# Patient Record
Sex: MALE | Race: WHITE | Employment: OTHER | URBAN - NONMETROPOLITAN AREA
[De-identification: names, ages, dates, MRNs, and addresses within clinical notes are randomized per-mention and may not be internally consistent; named-entity substitution may affect disease eponyms.]

---

## 2018-04-14 ENCOUNTER — APPOINTMENT (OUTPATIENT)
Dept: GENERAL RADIOLOGY | Age: 68
DRG: 981 | End: 2018-04-14
Payer: MEDICARE

## 2018-04-14 ENCOUNTER — APPOINTMENT (OUTPATIENT)
Dept: CT IMAGING | Age: 68
DRG: 981 | End: 2018-04-14
Payer: MEDICARE

## 2018-04-14 ENCOUNTER — HOSPITAL ENCOUNTER (INPATIENT)
Age: 68
LOS: 14 days | Discharge: OTHER INSTITUTION | DRG: 981 | End: 2018-04-28
Attending: EMERGENCY MEDICINE | Admitting: HOSPITALIST
Payer: MEDICARE

## 2018-04-14 DIAGNOSIS — R65.10 SIRS (SYSTEMIC INFLAMMATORY RESPONSE SYNDROME) (HCC): ICD-10-CM

## 2018-04-14 DIAGNOSIS — J96.01 ACUTE HYPOXEMIC RESPIRATORY FAILURE (HCC): Primary | ICD-10-CM

## 2018-04-14 DIAGNOSIS — I46.9 CARDIAC ARREST (HCC): ICD-10-CM

## 2018-04-14 LAB
ALBUMIN SERPL-MCNC: 4.3 G/DL (ref 3.5–5.2)
ALP BLD-CCNC: 126 U/L (ref 40–130)
ALT SERPL-CCNC: 29 U/L (ref 5–41)
AMORPHOUS: ABNORMAL /HPF
AMPHETAMINE SCREEN, URINE: NEGATIVE
ANION GAP SERPL CALCULATED.3IONS-SCNC: 24 MMOL/L (ref 7–19)
AST SERPL-CCNC: 34 U/L (ref 5–40)
BARBITURATE SCREEN URINE: NEGATIVE
BASE EXCESS ARTERIAL: -11.4 MMOL/L (ref -2–2)
BASOPHILS ABSOLUTE: 0.1 K/UL (ref 0–0.2)
BASOPHILS RELATIVE PERCENT: 0.7 % (ref 0–1)
BENZODIAZEPINE SCREEN, URINE: NEGATIVE
BILIRUB SERPL-MCNC: 0.4 MG/DL (ref 0.2–1.2)
BILIRUBIN URINE: NEGATIVE
BLOOD, URINE: ABNORMAL
BUN BLDV-MCNC: 6 MG/DL (ref 8–23)
CALCIUM SERPL-MCNC: 9 MG/DL (ref 8.8–10.2)
CANNABINOID SCREEN URINE: NEGATIVE
CARBOXYHEMOGLOBIN ARTERIAL: 7.4 % (ref 0–5)
CHLORIDE BLD-SCNC: 90 MMOL/L (ref 98–111)
CLARITY: CLEAR
CO2: 22 MMOL/L (ref 22–29)
COCAINE METABOLITE SCREEN URINE: NEGATIVE
COLOR: YELLOW
CREAT SERPL-MCNC: 0.7 MG/DL (ref 0.5–1.2)
D DIMER: 6.83 UG/ML FEU (ref 0–0.48)
EOSINOPHILS ABSOLUTE: 0.2 K/UL (ref 0–0.6)
EOSINOPHILS RELATIVE PERCENT: 1 % (ref 0–5)
EPITHELIAL CELLS, UA: ABNORMAL /HPF
ETHANOL: 270 MG/DL (ref 0–0.08)
GFR NON-AFRICAN AMERICAN: >60
GLUCOSE BLD-MCNC: 200 MG/DL (ref 74–109)
GLUCOSE URINE: NEGATIVE MG/DL
HCO3 ARTERIAL: 21.3 MMOL/L (ref 22–26)
HCT VFR BLD CALC: 61.6 % (ref 42–52)
HEMOGLOBIN, ART, EXTENDED: 18.4 G/DL (ref 14–18)
HEMOGLOBIN: 18.8 G/DL (ref 14–18)
KETONES, URINE: NEGATIVE MG/DL
LACTIC ACID: 3.7 MMOL/L (ref 0.5–1.9)
LACTIC ACID: 8.8 MMOL/L (ref 0.5–1.9)
LEUKOCYTE ESTERASE, URINE: NEGATIVE
LYMPHOCYTES ABSOLUTE: 6.1 K/UL (ref 1.1–4.5)
LYMPHOCYTES RELATIVE PERCENT: 36.7 % (ref 20–40)
Lab: NORMAL
MAGNESIUM: 2.8 MG/DL (ref 1.6–2.4)
MCH RBC QN AUTO: 31.1 PG (ref 27–31)
MCHC RBC AUTO-ENTMCNC: 30.5 G/DL (ref 33–37)
MCV RBC AUTO: 101.8 FL (ref 80–94)
METHEMOGLOBIN ARTERIAL: 1.2 %
MONOCYTES ABSOLUTE: 1.7 K/UL (ref 0–0.9)
MONOCYTES RELATIVE PERCENT: 10.3 % (ref 0–10)
NEUTROPHILS ABSOLUTE: 8.1 K/UL (ref 1.5–7.5)
NEUTROPHILS RELATIVE PERCENT: 48.5 % (ref 50–65)
NITRITE, URINE: NEGATIVE
O2 CONTENT ARTERIAL: 25.2 ML/DL
O2 SAT, ARTERIAL: 91.6 %
O2 THERAPY: ABNORMAL
OPIATE SCREEN URINE: NEGATIVE
PCO2 ARTERIAL: 77 MMHG (ref 35–45)
PDW BLD-RTO: 15.1 % (ref 11.5–14.5)
PH ARTERIAL: 7.05 (ref 7.35–7.45)
PH UA: 6
PLATELET # BLD: 169 K/UL (ref 130–400)
PMV BLD AUTO: 11.2 FL (ref 9.4–12.4)
PO2 ARTERIAL: 575 MMHG (ref 80–100)
POTASSIUM SERPL-SCNC: 3.4 MMOL/L (ref 3.5–5)
POTASSIUM, WHOLE BLOOD: 3.9
PROTEIN UA: NEGATIVE MG/DL
RBC # BLD: 6.05 M/UL (ref 4.7–6.1)
RBC UA: ABNORMAL /HPF (ref 0–2)
SODIUM BLD-SCNC: 136 MMOL/L (ref 136–145)
SPECIFIC GRAVITY UA: 1
TOTAL PROTEIN: 8.1 G/DL (ref 6.6–8.7)
TROPONIN: <0.01 NG/ML (ref 0–0.03)
URINE REFLEX TO CULTURE: ABNORMAL
UROBILINOGEN, URINE: 0.2 E.U./DL
WBC # BLD: 16.6 K/UL (ref 4.8–10.8)
WBC UA: ABNORMAL /HPF (ref 0–5)

## 2018-04-14 PROCEDURE — 83735 ASSAY OF MAGNESIUM: CPT

## 2018-04-14 PROCEDURE — 71045 X-RAY EXAM CHEST 1 VIEW: CPT

## 2018-04-14 PROCEDURE — 85379 FIBRIN DEGRADATION QUANT: CPT

## 2018-04-14 PROCEDURE — 2700000000 HC OXYGEN THERAPY PER DAY

## 2018-04-14 PROCEDURE — B543ZZA ULTRASONOGRAPHY OF RIGHT JUGULAR VEINS, GUIDANCE: ICD-10-PCS | Performed by: EMERGENCY MEDICINE

## 2018-04-14 PROCEDURE — 6360000002 HC RX W HCPCS: Performed by: EMERGENCY MEDICINE

## 2018-04-14 PROCEDURE — 96375 TX/PRO/DX INJ NEW DRUG ADDON: CPT

## 2018-04-14 PROCEDURE — 85025 COMPLETE CBC W/AUTO DIFF WBC: CPT

## 2018-04-14 PROCEDURE — 99291 CRITICAL CARE FIRST HOUR: CPT | Performed by: EMERGENCY MEDICINE

## 2018-04-14 PROCEDURE — 81001 URINALYSIS AUTO W/SCOPE: CPT

## 2018-04-14 PROCEDURE — 36556 INSERT NON-TUNNEL CV CATH: CPT | Performed by: EMERGENCY MEDICINE

## 2018-04-14 PROCEDURE — 80307 DRUG TEST PRSMV CHEM ANLYZR: CPT

## 2018-04-14 PROCEDURE — 5A1955Z RESPIRATORY VENTILATION, GREATER THAN 96 CONSECUTIVE HOURS: ICD-10-PCS | Performed by: INTERNAL MEDICINE

## 2018-04-14 PROCEDURE — 96374 THER/PROPH/DIAG INJ IV PUSH: CPT

## 2018-04-14 PROCEDURE — 0BH17EZ INSERTION OF ENDOTRACHEAL AIRWAY INTO TRACHEA, VIA NATURAL OR ARTIFICIAL OPENING: ICD-10-PCS | Performed by: EMERGENCY MEDICINE

## 2018-04-14 PROCEDURE — 70450 CT HEAD/BRAIN W/O DYE: CPT

## 2018-04-14 PROCEDURE — 84132 ASSAY OF SERUM POTASSIUM: CPT

## 2018-04-14 PROCEDURE — 83605 ASSAY OF LACTIC ACID: CPT

## 2018-04-14 PROCEDURE — 99291 CRITICAL CARE FIRST HOUR: CPT | Performed by: INTERNAL MEDICINE

## 2018-04-14 PROCEDURE — 36600 WITHDRAWAL OF ARTERIAL BLOOD: CPT

## 2018-04-14 PROCEDURE — 99291 CRITICAL CARE FIRST HOUR: CPT

## 2018-04-14 PROCEDURE — 87040 BLOOD CULTURE FOR BACTERIA: CPT

## 2018-04-14 PROCEDURE — 80053 COMPREHEN METABOLIC PANEL: CPT

## 2018-04-14 PROCEDURE — 31500 INSERT EMERGENCY AIRWAY: CPT | Performed by: EMERGENCY MEDICINE

## 2018-04-14 PROCEDURE — 93005 ELECTROCARDIOGRAM TRACING: CPT

## 2018-04-14 PROCEDURE — 36415 COLL VENOUS BLD VENIPUNCTURE: CPT

## 2018-04-14 PROCEDURE — 05HM33Z INSERTION OF INFUSION DEVICE INTO RIGHT INTERNAL JUGULAR VEIN, PERCUTANEOUS APPROACH: ICD-10-PCS | Performed by: EMERGENCY MEDICINE

## 2018-04-14 PROCEDURE — 31500 INSERT EMERGENCY AIRWAY: CPT

## 2018-04-14 PROCEDURE — 2580000003 HC RX 258: Performed by: EMERGENCY MEDICINE

## 2018-04-14 PROCEDURE — 6360000002 HC RX W HCPCS

## 2018-04-14 PROCEDURE — 2000000000 HC ICU R&B

## 2018-04-14 PROCEDURE — 94002 VENT MGMT INPAT INIT DAY: CPT

## 2018-04-14 PROCEDURE — 2500000003 HC RX 250 WO HCPCS: Performed by: EMERGENCY MEDICINE

## 2018-04-14 PROCEDURE — 84484 ASSAY OF TROPONIN QUANT: CPT

## 2018-04-14 PROCEDURE — G0480 DRUG TEST DEF 1-7 CLASSES: HCPCS

## 2018-04-14 PROCEDURE — 82803 BLOOD GASES ANY COMBINATION: CPT

## 2018-04-14 RX ORDER — 0.9 % SODIUM CHLORIDE 0.9 %
1000 INTRAVENOUS SOLUTION INTRAVENOUS ONCE
Status: COMPLETED | OUTPATIENT
Start: 2018-04-14 | End: 2018-04-14

## 2018-04-14 RX ORDER — LORAZEPAM 2 MG/ML
2 INJECTION INTRAMUSCULAR ONCE
Status: COMPLETED | OUTPATIENT
Start: 2018-04-14 | End: 2018-04-14

## 2018-04-14 RX ORDER — VECURONIUM BROMIDE 1 MG/ML
10 INJECTION, POWDER, LYOPHILIZED, FOR SOLUTION INTRAVENOUS ONCE
Status: COMPLETED | OUTPATIENT
Start: 2018-04-14 | End: 2018-04-14

## 2018-04-14 RX ORDER — 0.9 % SODIUM CHLORIDE 0.9 %
1000 INTRAVENOUS SOLUTION INTRAVENOUS ONCE
Status: COMPLETED | OUTPATIENT
Start: 2018-04-14 | End: 2018-04-15

## 2018-04-14 RX ORDER — FENTANYL CITRATE 50 UG/ML
100 INJECTION, SOLUTION INTRAMUSCULAR; INTRAVENOUS ONCE
Status: COMPLETED | OUTPATIENT
Start: 2018-04-14 | End: 2018-04-14

## 2018-04-14 RX ORDER — PROPOFOL 10 MG/ML
10 INJECTION, EMULSION INTRAVENOUS
Status: DISCONTINUED | OUTPATIENT
Start: 2018-04-14 | End: 2018-04-19

## 2018-04-14 RX ORDER — PROPOFOL 10 MG/ML
INJECTION, EMULSION INTRAVENOUS
Status: COMPLETED
Start: 2018-04-14 | End: 2018-04-14

## 2018-04-14 RX ADMIN — FENTANYL CITRATE 100 MCG: 50 INJECTION INTRAMUSCULAR; INTRAVENOUS at 19:09

## 2018-04-14 RX ADMIN — VECURONIUM BROMIDE 10 MG: 10 INJECTION, POWDER, LYOPHILIZED, FOR SOLUTION INTRAVENOUS at 20:48

## 2018-04-14 RX ADMIN — SODIUM CHLORIDE 1000 ML: 9 INJECTION, SOLUTION INTRAVENOUS at 19:30

## 2018-04-14 RX ADMIN — PROPOFOL 10 MCG/KG/MIN: 10 INJECTION, EMULSION INTRAVENOUS at 19:10

## 2018-04-14 RX ADMIN — LORAZEPAM 2 MG: 2 INJECTION INTRAMUSCULAR; INTRAVENOUS at 20:40

## 2018-04-14 RX ADMIN — SODIUM CHLORIDE 1000 ML: 9 INJECTION, SOLUTION INTRAVENOUS at 22:36

## 2018-04-14 RX ADMIN — NOREPINEPHRINE BITARTRATE 10 MCG/MIN: 1 INJECTION INTRAVENOUS at 20:15

## 2018-04-14 ASSESSMENT — PULMONARY FUNCTION TESTS
PIF_VALUE: 45.3
PIF_VALUE: 42.2
PIF_VALUE: 45.1

## 2018-04-14 ASSESSMENT — PAIN SCALES - GENERAL
PAINLEVEL_OUTOF10: 0
PAINLEVEL_OUTOF10: 1

## 2018-04-15 ENCOUNTER — APPOINTMENT (OUTPATIENT)
Dept: GENERAL RADIOLOGY | Age: 68
DRG: 981 | End: 2018-04-15
Payer: MEDICARE

## 2018-04-15 PROBLEM — E87.20 LACTIC ACIDOSIS: Status: ACTIVE | Noted: 2018-04-15

## 2018-04-15 PROBLEM — F10.929 ALCOHOLIC INTOXICATION WITH COMPLICATION (HCC): Status: ACTIVE | Noted: 2018-04-15

## 2018-04-15 PROBLEM — F17.200 SMOKER: Status: ACTIVE | Noted: 2018-04-15

## 2018-04-15 PROBLEM — J44.1 COPD EXACERBATION (HCC): Status: ACTIVE | Noted: 2018-04-15

## 2018-04-15 PROBLEM — J96.90 RESPIRATORY FAILURE REQUIRING INTUBATION (HCC): Status: ACTIVE | Noted: 2018-04-15

## 2018-04-15 LAB
ALBUMIN SERPL-MCNC: 3 G/DL (ref 3.5–5.2)
ALP BLD-CCNC: 80 U/L (ref 40–130)
ALT SERPL-CCNC: 24 U/L (ref 5–41)
ANION GAP SERPL CALCULATED.3IONS-SCNC: 12 MMOL/L (ref 7–19)
AST SERPL-CCNC: 34 U/L (ref 5–40)
BASE EXCESS ARTERIAL: -5.7 MMOL/L (ref -2–2)
BASE EXCESS ARTERIAL: -6.7 MMOL/L (ref -2–2)
BASOPHILS ABSOLUTE: 0 K/UL (ref 0–0.2)
BASOPHILS RELATIVE PERCENT: 0.2 % (ref 0–1)
BILIRUB SERPL-MCNC: 0.4 MG/DL (ref 0.2–1.2)
BUN BLDV-MCNC: 9 MG/DL (ref 8–23)
CALCIUM SERPL-MCNC: 6.7 MG/DL (ref 8.8–10.2)
CARBOXYHEMOGLOBIN ARTERIAL: 2.8 % (ref 0–5)
CARBOXYHEMOGLOBIN ARTERIAL: 3.4 % (ref 0–5)
CHLORIDE BLD-SCNC: 101 MMOL/L (ref 98–111)
CO2: 23 MMOL/L (ref 22–29)
CREAT SERPL-MCNC: 0.6 MG/DL (ref 0.5–1.2)
EOSINOPHILS ABSOLUTE: 0 K/UL (ref 0–0.6)
EOSINOPHILS RELATIVE PERCENT: 0 % (ref 0–5)
GFR NON-AFRICAN AMERICAN: >60
GLUCOSE BLD-MCNC: 115 MG/DL (ref 70–99)
GLUCOSE BLD-MCNC: 123 MG/DL (ref 70–99)
GLUCOSE BLD-MCNC: 128 MG/DL (ref 74–109)
GLUCOSE BLD-MCNC: 134 MG/DL (ref 70–99)
GLUCOSE BLD-MCNC: 138 MG/DL (ref 70–99)
GLUCOSE BLD-MCNC: 144 MG/DL (ref 70–99)
HCO3 ARTERIAL: 23.6 MMOL/L (ref 22–26)
HCO3 ARTERIAL: 24.9 MMOL/L (ref 22–26)
HCT VFR BLD CALC: 55.9 % (ref 42–52)
HEMOGLOBIN, ART, EXTENDED: 18 G/DL (ref 14–18)
HEMOGLOBIN, ART, EXTENDED: 18.3 G/DL (ref 14–18)
HEMOGLOBIN: 17.3 G/DL (ref 14–18)
LACTIC ACID: 1.9 MMOL/L (ref 0.5–1.9)
LACTIC ACID: 2.6 MMOL/L (ref 0.5–1.9)
LV EF: 60 %
LVEF MODALITY: NORMAL
LYMPHOCYTES ABSOLUTE: 0.6 K/UL (ref 1.1–4.5)
LYMPHOCYTES RELATIVE PERCENT: 3.1 % (ref 20–40)
MAGNESIUM: 1.8 MG/DL (ref 1.6–2.4)
MCH RBC QN AUTO: 30.8 PG (ref 27–31)
MCHC RBC AUTO-ENTMCNC: 30.9 G/DL (ref 33–37)
MCV RBC AUTO: 99.5 FL (ref 80–94)
METHEMOGLOBIN ARTERIAL: 1.7 %
METHEMOGLOBIN ARTERIAL: 2 %
MONOCYTES ABSOLUTE: 1.3 K/UL (ref 0–0.9)
MONOCYTES RELATIVE PERCENT: 6.7 % (ref 0–10)
NEUTROPHILS ABSOLUTE: 17.6 K/UL (ref 1.5–7.5)
NEUTROPHILS RELATIVE PERCENT: 89.4 % (ref 50–65)
O2 CONTENT ARTERIAL: 21.9 ML/DL
O2 CONTENT ARTERIAL: 23.1 ML/DL
O2 SAT, ARTERIAL: 86.6 %
O2 SAT, ARTERIAL: 89.8 %
O2 THERAPY: ABNORMAL
O2 THERAPY: ABNORMAL
PCO2 ARTERIAL: 59 MMHG (ref 35–45)
PCO2 ARTERIAL: 75 MMHG (ref 35–45)
PDW BLD-RTO: 15.1 % (ref 11.5–14.5)
PERFORMED ON: ABNORMAL
PH ARTERIAL: 7.13 (ref 7.35–7.45)
PH ARTERIAL: 7.21 (ref 7.35–7.45)
PHOSPHORUS: 3.2 MG/DL (ref 2.5–4.5)
PLATELET # BLD: 139 K/UL (ref 130–400)
PMV BLD AUTO: 10.7 FL (ref 9.4–12.4)
PO2 ARTERIAL: 60 MMHG (ref 80–100)
PO2 ARTERIAL: 81 MMHG (ref 80–100)
POTASSIUM SERPL-SCNC: 4.1 MMOL/L (ref 3.5–5)
POTASSIUM, WHOLE BLOOD: 4.5
POTASSIUM, WHOLE BLOOD: 4.6
RBC # BLD: 5.62 M/UL (ref 4.7–6.1)
SODIUM BLD-SCNC: 136 MMOL/L (ref 136–145)
TOTAL PROTEIN: 6 G/DL (ref 6.6–8.7)
TROPONIN: 0.56 NG/ML (ref 0–0.03)
WBC # BLD: 19.7 K/UL (ref 4.8–10.8)

## 2018-04-15 PROCEDURE — 36415 COLL VENOUS BLD VENIPUNCTURE: CPT

## 2018-04-15 PROCEDURE — 2500000003 HC RX 250 WO HCPCS: Performed by: INTERNAL MEDICINE

## 2018-04-15 PROCEDURE — 99233 SBSQ HOSP IP/OBS HIGH 50: CPT | Performed by: HOSPITALIST

## 2018-04-15 PROCEDURE — 84132 ASSAY OF SERUM POTASSIUM: CPT

## 2018-04-15 PROCEDURE — 94640 AIRWAY INHALATION TREATMENT: CPT

## 2018-04-15 PROCEDURE — 93306 TTE W/DOPPLER COMPLETE: CPT

## 2018-04-15 PROCEDURE — 84484 ASSAY OF TROPONIN QUANT: CPT

## 2018-04-15 PROCEDURE — 2700000000 HC OXYGEN THERAPY PER DAY

## 2018-04-15 PROCEDURE — 6360000002 HC RX W HCPCS: Performed by: HOSPITALIST

## 2018-04-15 PROCEDURE — 82948 REAGENT STRIP/BLOOD GLUCOSE: CPT

## 2018-04-15 PROCEDURE — 80053 COMPREHEN METABOLIC PANEL: CPT

## 2018-04-15 PROCEDURE — 85025 COMPLETE CBC W/AUTO DIFF WBC: CPT

## 2018-04-15 PROCEDURE — 6360000002 HC RX W HCPCS: Performed by: EMERGENCY MEDICINE

## 2018-04-15 PROCEDURE — 2580000003 HC RX 258: Performed by: HOSPITALIST

## 2018-04-15 PROCEDURE — 36600 WITHDRAWAL OF ARTERIAL BLOOD: CPT

## 2018-04-15 PROCEDURE — 2580000003 HC RX 258: Performed by: INTERNAL MEDICINE

## 2018-04-15 PROCEDURE — 87070 CULTURE OTHR SPECIMN AEROBIC: CPT

## 2018-04-15 PROCEDURE — C9113 INJ PANTOPRAZOLE SODIUM, VIA: HCPCS | Performed by: INTERNAL MEDICINE

## 2018-04-15 PROCEDURE — 82803 BLOOD GASES ANY COMBINATION: CPT

## 2018-04-15 PROCEDURE — 6370000000 HC RX 637 (ALT 250 FOR IP): Performed by: INTERNAL MEDICINE

## 2018-04-15 PROCEDURE — 2000000000 HC ICU R&B

## 2018-04-15 PROCEDURE — 6360000002 HC RX W HCPCS: Performed by: INTERNAL MEDICINE

## 2018-04-15 PROCEDURE — 84100 ASSAY OF PHOSPHORUS: CPT

## 2018-04-15 PROCEDURE — 83735 ASSAY OF MAGNESIUM: CPT

## 2018-04-15 PROCEDURE — 94003 VENT MGMT INPAT SUBQ DAY: CPT

## 2018-04-15 PROCEDURE — 71045 X-RAY EXAM CHEST 1 VIEW: CPT

## 2018-04-15 PROCEDURE — 83605 ASSAY OF LACTIC ACID: CPT

## 2018-04-15 RX ORDER — DILTIAZEM HYDROCHLORIDE 5 MG/ML
10 INJECTION INTRAVENOUS ONCE
Status: DISCONTINUED | OUTPATIENT
Start: 2018-04-15 | End: 2018-04-15

## 2018-04-15 RX ORDER — PANTOPRAZOLE SODIUM 40 MG/10ML
40 INJECTION, POWDER, LYOPHILIZED, FOR SOLUTION INTRAVENOUS DAILY
Status: DISCONTINUED | OUTPATIENT
Start: 2018-04-15 | End: 2018-04-20

## 2018-04-15 RX ORDER — ONDANSETRON 2 MG/ML
4 INJECTION INTRAMUSCULAR; INTRAVENOUS EVERY 6 HOURS PRN
Status: DISCONTINUED | OUTPATIENT
Start: 2018-04-15 | End: 2018-04-29 | Stop reason: HOSPADM

## 2018-04-15 RX ORDER — THIAMINE MONONITRATE (VIT B1) 100 MG
100 TABLET ORAL DAILY
Status: DISCONTINUED | OUTPATIENT
Start: 2018-04-15 | End: 2018-04-29 | Stop reason: HOSPADM

## 2018-04-15 RX ORDER — POTASSIUM CHLORIDE 7.45 MG/ML
10 INJECTION INTRAVENOUS PRN
Status: DISCONTINUED | OUTPATIENT
Start: 2018-04-15 | End: 2018-04-29 | Stop reason: HOSPADM

## 2018-04-15 RX ORDER — LORAZEPAM 2 MG/ML
1 INJECTION INTRAMUSCULAR EVERY 4 HOURS PRN
Status: DISCONTINUED | OUTPATIENT
Start: 2018-04-15 | End: 2018-04-20

## 2018-04-15 RX ORDER — SODIUM CHLORIDE 0.9 % (FLUSH) 0.9 %
10 SYRINGE (ML) INJECTION EVERY 12 HOURS SCHEDULED
Status: DISCONTINUED | OUTPATIENT
Start: 2018-04-15 | End: 2018-04-22 | Stop reason: SDUPTHER

## 2018-04-15 RX ORDER — SODIUM CHLORIDE 0.9 % (FLUSH) 0.9 %
10 SYRINGE (ML) INJECTION PRN
Status: DISCONTINUED | OUTPATIENT
Start: 2018-04-15 | End: 2018-04-22 | Stop reason: SDUPTHER

## 2018-04-15 RX ORDER — METHYLPREDNISOLONE SODIUM SUCCINATE 40 MG/ML
40 INJECTION, POWDER, LYOPHILIZED, FOR SOLUTION INTRAMUSCULAR; INTRAVENOUS EVERY 8 HOURS
Status: DISCONTINUED | OUTPATIENT
Start: 2018-04-15 | End: 2018-04-19

## 2018-04-15 RX ORDER — FOLIC ACID 1 MG/1
1 TABLET ORAL DAILY
Status: DISCONTINUED | OUTPATIENT
Start: 2018-04-15 | End: 2018-04-29 | Stop reason: HOSPADM

## 2018-04-15 RX ORDER — CHLORHEXIDINE GLUCONATE 0.12 MG/ML
15 RINSE ORAL 2 TIMES DAILY
Status: DISCONTINUED | OUTPATIENT
Start: 2018-04-15 | End: 2018-04-19

## 2018-04-15 RX ORDER — MORPHINE SULFATE 4 MG/ML
4 INJECTION, SOLUTION INTRAMUSCULAR; INTRAVENOUS
Status: DISCONTINUED | OUTPATIENT
Start: 2018-04-15 | End: 2018-04-29 | Stop reason: HOSPADM

## 2018-04-15 RX ORDER — SODIUM CHLORIDE 9 MG/ML
INJECTION, SOLUTION INTRAVENOUS CONTINUOUS
Status: DISCONTINUED | OUTPATIENT
Start: 2018-04-15 | End: 2018-04-20

## 2018-04-15 RX ORDER — IPRATROPIUM BROMIDE AND ALBUTEROL SULFATE 2.5; .5 MG/3ML; MG/3ML
1 SOLUTION RESPIRATORY (INHALATION) EVERY 4 HOURS
Status: DISCONTINUED | OUTPATIENT
Start: 2018-04-15 | End: 2018-04-29 | Stop reason: HOSPADM

## 2018-04-15 RX ORDER — MAGNESIUM SULFATE 1 G/100ML
1 INJECTION INTRAVENOUS PRN
Status: DISCONTINUED | OUTPATIENT
Start: 2018-04-15 | End: 2018-04-29 | Stop reason: HOSPADM

## 2018-04-15 RX ORDER — NICOTINE 21 MG/24HR
1 PATCH, TRANSDERMAL 24 HOURS TRANSDERMAL DAILY
Status: DISCONTINUED | OUTPATIENT
Start: 2018-04-15 | End: 2018-04-29 | Stop reason: HOSPADM

## 2018-04-15 RX ORDER — ACETAMINOPHEN 325 MG/1
650 TABLET ORAL EVERY 4 HOURS PRN
Status: DISCONTINUED | OUTPATIENT
Start: 2018-04-15 | End: 2018-04-29 | Stop reason: HOSPADM

## 2018-04-15 RX ORDER — POTASSIUM CHLORIDE 29.8 MG/ML
20 INJECTION INTRAVENOUS PRN
Status: DISCONTINUED | OUTPATIENT
Start: 2018-04-15 | End: 2018-04-29 | Stop reason: HOSPADM

## 2018-04-15 RX ADMIN — CHLORHEXIDINE GLUCONATE 15 ML: 1.2 RINSE ORAL at 22:00

## 2018-04-15 RX ADMIN — ENOXAPARIN SODIUM 40 MG: 100 INJECTION SUBCUTANEOUS at 10:43

## 2018-04-15 RX ADMIN — IPRATROPIUM BROMIDE AND ALBUTEROL SULFATE 1 AMPULE: .5; 3 SOLUTION RESPIRATORY (INHALATION) at 18:40

## 2018-04-15 RX ADMIN — IPRATROPIUM BROMIDE AND ALBUTEROL SULFATE 1 AMPULE: .5; 3 SOLUTION RESPIRATORY (INHALATION) at 14:40

## 2018-04-15 RX ADMIN — IPRATROPIUM BROMIDE AND ALBUTEROL SULFATE 1 AMPULE: .5; 3 SOLUTION RESPIRATORY (INHALATION) at 10:42

## 2018-04-15 RX ADMIN — PROPOFOL 50 MCG/KG/MIN: 10 INJECTION, EMULSION INTRAVENOUS at 22:28

## 2018-04-15 RX ADMIN — PROPOFOL 50 MCG/KG/MIN: 10 INJECTION, EMULSION INTRAVENOUS at 11:44

## 2018-04-15 RX ADMIN — PROPOFOL 40 MCG/KG/MIN: 10 INJECTION, EMULSION INTRAVENOUS at 09:20

## 2018-04-15 RX ADMIN — PROPOFOL 50 MCG/KG/MIN: 10 INJECTION, EMULSION INTRAVENOUS at 14:12

## 2018-04-15 RX ADMIN — PROPOFOL 30 MCG/KG/MIN: 10 INJECTION, EMULSION INTRAVENOUS at 04:05

## 2018-04-15 RX ADMIN — Medication 10 ML: at 22:00

## 2018-04-15 RX ADMIN — Medication 10 ML: at 10:58

## 2018-04-15 RX ADMIN — METHYLPREDNISOLONE SODIUM SUCCINATE 40 MG: 40 INJECTION, POWDER, FOR SOLUTION INTRAMUSCULAR; INTRAVENOUS at 10:47

## 2018-04-15 RX ADMIN — PROPOFOL 50 MCG/KG/MIN: 10 INJECTION, EMULSION INTRAVENOUS at 17:04

## 2018-04-15 RX ADMIN — PROPOFOL 50 MCG/KG/MIN: 10 INJECTION, EMULSION INTRAVENOUS at 20:29

## 2018-04-15 RX ADMIN — LORAZEPAM 1 MG: 2 INJECTION INTRAMUSCULAR; INTRAVENOUS at 22:28

## 2018-04-15 RX ADMIN — IPRATROPIUM BROMIDE AND ALBUTEROL SULFATE 1 AMPULE: .5; 3 SOLUTION RESPIRATORY (INHALATION) at 22:47

## 2018-04-15 RX ADMIN — SODIUM CHLORIDE 75 ML/HR: 9 INJECTION, SOLUTION INTRAVENOUS at 10:57

## 2018-04-15 RX ADMIN — CHLORHEXIDINE GLUCONATE 15 ML: 1.2 RINSE ORAL at 17:57

## 2018-04-15 RX ADMIN — IPRATROPIUM BROMIDE AND ALBUTEROL SULFATE 1 AMPULE: .5; 3 SOLUTION RESPIRATORY (INHALATION) at 02:43

## 2018-04-15 RX ADMIN — PANTOPRAZOLE SODIUM 40 MG: 40 INJECTION, POWDER, FOR SOLUTION INTRAVENOUS at 10:43

## 2018-04-15 RX ADMIN — CHLORHEXIDINE GLUCONATE 15 ML: 1.2 RINSE ORAL at 01:08

## 2018-04-15 RX ADMIN — METHYLPREDNISOLONE SODIUM SUCCINATE 40 MG: 40 INJECTION, POWDER, FOR SOLUTION INTRAMUSCULAR; INTRAVENOUS at 01:06

## 2018-04-15 RX ADMIN — IPRATROPIUM BROMIDE AND ALBUTEROL SULFATE 1 AMPULE: .5; 3 SOLUTION RESPIRATORY (INHALATION) at 06:58

## 2018-04-15 RX ADMIN — METHYLPREDNISOLONE SODIUM SUCCINATE 40 MG: 40 INJECTION, POWDER, FOR SOLUTION INTRAMUSCULAR; INTRAVENOUS at 17:04

## 2018-04-15 RX ADMIN — FOLIC ACID: 5 INJECTION, SOLUTION INTRAMUSCULAR; INTRAVENOUS; SUBCUTANEOUS at 00:40

## 2018-04-15 ASSESSMENT — PULMONARY FUNCTION TESTS
PIF_VALUE: 28.7
PIF_VALUE: 30.8
PIF_VALUE: 39.3
PIF_VALUE: 23
PIF_VALUE: 5.3
PIF_VALUE: 31.9
PIF_VALUE: 31.3
PIF_VALUE: 35.2
PIF_VALUE: 32.7
PIF_VALUE: 31.5
PIF_VALUE: 35.7
PIF_VALUE: 49.9
PIF_VALUE: 32.1
PIF_VALUE: 35.1

## 2018-04-15 ASSESSMENT — PAIN SCALES - GENERAL
PAINLEVEL_OUTOF10: 0

## 2018-04-16 ENCOUNTER — APPOINTMENT (OUTPATIENT)
Dept: GENERAL RADIOLOGY | Age: 68
DRG: 981 | End: 2018-04-16
Payer: MEDICARE

## 2018-04-16 LAB
ALBUMIN SERPL-MCNC: 3.1 G/DL (ref 3.5–5.2)
ALP BLD-CCNC: 65 U/L (ref 40–130)
ALT SERPL-CCNC: 20 U/L (ref 5–41)
ANION GAP SERPL CALCULATED.3IONS-SCNC: 12 MMOL/L (ref 7–19)
ANION GAP SERPL CALCULATED.3IONS-SCNC: 14 MMOL/L (ref 7–19)
AST SERPL-CCNC: 22 U/L (ref 5–40)
BASE EXCESS ARTERIAL: -0.2 MMOL/L (ref -2–2)
BASE EXCESS ARTERIAL: -1.1 MMOL/L (ref -2–2)
BASOPHILS ABSOLUTE: 0 K/UL (ref 0–0.2)
BASOPHILS RELATIVE PERCENT: 0.1 % (ref 0–1)
BILIRUB SERPL-MCNC: 0.4 MG/DL (ref 0.2–1.2)
BUN BLDV-MCNC: 12 MG/DL (ref 8–23)
BUN BLDV-MCNC: 12 MG/DL (ref 8–23)
CALCIUM SERPL-MCNC: 7.7 MG/DL (ref 8.8–10.2)
CALCIUM SERPL-MCNC: 7.8 MG/DL (ref 8.8–10.2)
CARBOXYHEMOGLOBIN ARTERIAL: 1.5 % (ref 0–5)
CARBOXYHEMOGLOBIN ARTERIAL: 2 % (ref 0–5)
CHLORIDE BLD-SCNC: 100 MMOL/L (ref 98–111)
CHLORIDE BLD-SCNC: 98 MMOL/L (ref 98–111)
CO2: 26 MMOL/L (ref 22–29)
CO2: 26 MMOL/L (ref 22–29)
CREAT SERPL-MCNC: 0.6 MG/DL (ref 0.5–1.2)
CREAT SERPL-MCNC: 0.6 MG/DL (ref 0.5–1.2)
EKG P AXIS: -70 DEGREES
EKG P-R INTERVAL: 196 MS
EKG Q-T INTERVAL: 488 MS
EKG QRS DURATION: 68 MS
EKG QTC CALCULATION (BAZETT): 473 MS
EKG T AXIS: 93 DEGREES
EOSINOPHILS ABSOLUTE: 0 K/UL (ref 0–0.6)
EOSINOPHILS RELATIVE PERCENT: 0 % (ref 0–5)
GFR NON-AFRICAN AMERICAN: >60
GFR NON-AFRICAN AMERICAN: >60
GLUCOSE BLD-MCNC: 129 MG/DL (ref 70–99)
GLUCOSE BLD-MCNC: 137 MG/DL (ref 70–99)
GLUCOSE BLD-MCNC: 149 MG/DL (ref 70–99)
GLUCOSE BLD-MCNC: 152 MG/DL (ref 70–99)
GLUCOSE BLD-MCNC: 166 MG/DL (ref 70–99)
GLUCOSE BLD-MCNC: 176 MG/DL (ref 74–109)
GLUCOSE BLD-MCNC: 189 MG/DL (ref 74–109)
HCO3 ARTERIAL: 26.2 MMOL/L (ref 22–26)
HCO3 ARTERIAL: 26.4 MMOL/L (ref 22–26)
HCT VFR BLD CALC: 48.4 % (ref 42–52)
HEMOGLOBIN, ART, EXTENDED: 16.2 G/DL (ref 14–18)
HEMOGLOBIN, ART, EXTENDED: 16.4 G/DL (ref 14–18)
HEMOGLOBIN: 15.5 G/DL (ref 14–18)
LYMPHOCYTES ABSOLUTE: 0.4 K/UL (ref 1.1–4.5)
LYMPHOCYTES RELATIVE PERCENT: 2.9 % (ref 20–40)
MAGNESIUM: 1.9 MG/DL (ref 1.6–2.4)
MAGNESIUM: 2.1 MG/DL (ref 1.6–2.4)
MCH RBC QN AUTO: 30.9 PG (ref 27–31)
MCHC RBC AUTO-ENTMCNC: 32 G/DL (ref 33–37)
MCV RBC AUTO: 96.4 FL (ref 80–94)
METHEMOGLOBIN ARTERIAL: 0.7 %
METHEMOGLOBIN ARTERIAL: 1.8 %
MONOCYTES ABSOLUTE: 0.8 K/UL (ref 0–0.9)
MONOCYTES RELATIVE PERCENT: 5.6 % (ref 0–10)
MRSA CULTURE ONLY: NORMAL
NEUTROPHILS ABSOLUTE: 12.4 K/UL (ref 1.5–7.5)
NEUTROPHILS RELATIVE PERCENT: 90.9 % (ref 50–65)
O2 CONTENT ARTERIAL: 21.2 ML/DL
O2 CONTENT ARTERIAL: 21.9 ML/DL
O2 SAT, ARTERIAL: 93.1 %
O2 SAT, ARTERIAL: 95.1 %
O2 THERAPY: ABNORMAL
O2 THERAPY: ABNORMAL
PCO2 ARTERIAL: 49 MMHG (ref 35–45)
PCO2 ARTERIAL: 52 MMHG (ref 35–45)
PDW BLD-RTO: 14.9 % (ref 11.5–14.5)
PERFORMED ON: ABNORMAL
PH ARTERIAL: 7.31 (ref 7.35–7.45)
PH ARTERIAL: 7.34 (ref 7.35–7.45)
PHOSPHORUS: 1.9 MG/DL (ref 2.5–4.5)
PLATELET # BLD: 101 K/UL (ref 130–400)
PMV BLD AUTO: 11.4 FL (ref 9.4–12.4)
PO2 ARTERIAL: 75 MMHG (ref 80–100)
PO2 ARTERIAL: 85 MMHG (ref 80–100)
POTASSIUM SERPL-SCNC: 3.8 MMOL/L (ref 3.5–5)
POTASSIUM SERPL-SCNC: 3.8 MMOL/L (ref 3.5–5)
POTASSIUM, WHOLE BLOOD: 3.6
POTASSIUM, WHOLE BLOOD: 3.6
RBC # BLD: 5.02 M/UL (ref 4.7–6.1)
SODIUM BLD-SCNC: 136 MMOL/L (ref 136–145)
SODIUM BLD-SCNC: 140 MMOL/L (ref 136–145)
TOTAL PROTEIN: 6.2 G/DL (ref 6.6–8.7)
TROPONIN: 0.04 NG/ML (ref 0–0.03)
WBC # BLD: 13.6 K/UL (ref 4.8–10.8)

## 2018-04-16 PROCEDURE — 85025 COMPLETE CBC W/AUTO DIFF WBC: CPT

## 2018-04-16 PROCEDURE — 6360000002 HC RX W HCPCS: Performed by: INTERNAL MEDICINE

## 2018-04-16 PROCEDURE — 99233 SBSQ HOSP IP/OBS HIGH 50: CPT | Performed by: FAMILY MEDICINE

## 2018-04-16 PROCEDURE — C9113 INJ PANTOPRAZOLE SODIUM, VIA: HCPCS | Performed by: INTERNAL MEDICINE

## 2018-04-16 PROCEDURE — 36600 WITHDRAWAL OF ARTERIAL BLOOD: CPT

## 2018-04-16 PROCEDURE — 6370000000 HC RX 637 (ALT 250 FOR IP): Performed by: INTERNAL MEDICINE

## 2018-04-16 PROCEDURE — 2500000003 HC RX 250 WO HCPCS: Performed by: INTERNAL MEDICINE

## 2018-04-16 PROCEDURE — 94640 AIRWAY INHALATION TREATMENT: CPT

## 2018-04-16 PROCEDURE — 84100 ASSAY OF PHOSPHORUS: CPT

## 2018-04-16 PROCEDURE — 82803 BLOOD GASES ANY COMBINATION: CPT

## 2018-04-16 PROCEDURE — 2000000000 HC ICU R&B

## 2018-04-16 PROCEDURE — 84132 ASSAY OF SERUM POTASSIUM: CPT

## 2018-04-16 PROCEDURE — 84484 ASSAY OF TROPONIN QUANT: CPT

## 2018-04-16 PROCEDURE — 83735 ASSAY OF MAGNESIUM: CPT

## 2018-04-16 PROCEDURE — 2580000003 HC RX 258: Performed by: INTERNAL MEDICINE

## 2018-04-16 PROCEDURE — 6360000002 HC RX W HCPCS: Performed by: EMERGENCY MEDICINE

## 2018-04-16 PROCEDURE — 80053 COMPREHEN METABOLIC PANEL: CPT

## 2018-04-16 PROCEDURE — 6360000002 HC RX W HCPCS: Performed by: HOSPITALIST

## 2018-04-16 PROCEDURE — 94003 VENT MGMT INPAT SUBQ DAY: CPT

## 2018-04-16 PROCEDURE — 71045 X-RAY EXAM CHEST 1 VIEW: CPT

## 2018-04-16 PROCEDURE — 82948 REAGENT STRIP/BLOOD GLUCOSE: CPT

## 2018-04-16 PROCEDURE — 2700000000 HC OXYGEN THERAPY PER DAY

## 2018-04-16 RX ADMIN — METHYLPREDNISOLONE SODIUM SUCCINATE 40 MG: 40 INJECTION, POWDER, FOR SOLUTION INTRAMUSCULAR; INTRAVENOUS at 01:19

## 2018-04-16 RX ADMIN — IPRATROPIUM BROMIDE AND ALBUTEROL SULFATE 1 AMPULE: .5; 3 SOLUTION RESPIRATORY (INHALATION) at 18:19

## 2018-04-16 RX ADMIN — IPRATROPIUM BROMIDE AND ALBUTEROL SULFATE 1 AMPULE: .5; 3 SOLUTION RESPIRATORY (INHALATION) at 22:26

## 2018-04-16 RX ADMIN — FOLIC ACID 1 MG: 1 TABLET ORAL at 08:52

## 2018-04-16 RX ADMIN — PROPOFOL 25 MCG/KG/MIN: 10 INJECTION, EMULSION INTRAVENOUS at 00:45

## 2018-04-16 RX ADMIN — LORAZEPAM 1 MG: 2 INJECTION INTRAMUSCULAR; INTRAVENOUS at 20:33

## 2018-04-16 RX ADMIN — PROPOFOL 40 MCG/KG/MIN: 10 INJECTION, EMULSION INTRAVENOUS at 05:08

## 2018-04-16 RX ADMIN — METHYLPREDNISOLONE SODIUM SUCCINATE 40 MG: 40 INJECTION, POWDER, FOR SOLUTION INTRAMUSCULAR; INTRAVENOUS at 08:51

## 2018-04-16 RX ADMIN — PROPOFOL 40 MCG/KG/MIN: 10 INJECTION, EMULSION INTRAVENOUS at 09:06

## 2018-04-16 RX ADMIN — METHYLPREDNISOLONE SODIUM SUCCINATE 40 MG: 40 INJECTION, POWDER, FOR SOLUTION INTRAMUSCULAR; INTRAVENOUS at 17:45

## 2018-04-16 RX ADMIN — Medication 10 ML: at 20:48

## 2018-04-16 RX ADMIN — IPRATROPIUM BROMIDE AND ALBUTEROL SULFATE 1 AMPULE: .5; 3 SOLUTION RESPIRATORY (INHALATION) at 06:20

## 2018-04-16 RX ADMIN — LORAZEPAM 1 MG: 2 INJECTION INTRAMUSCULAR; INTRAVENOUS at 14:45

## 2018-04-16 RX ADMIN — ENOXAPARIN SODIUM 40 MG: 100 INJECTION SUBCUTANEOUS at 09:49

## 2018-04-16 RX ADMIN — POTASSIUM PHOSPHATE, MONOBASIC AND POTASSIUM PHOSPHATE, DIBASIC 30 MMOL: 224; 236 INJECTION, SOLUTION INTRAVENOUS at 05:47

## 2018-04-16 RX ADMIN — PROPOFOL 40 MCG/KG/MIN: 10 INJECTION, EMULSION INTRAVENOUS at 13:12

## 2018-04-16 RX ADMIN — PROPOFOL 40 MCG/KG/MIN: 10 INJECTION, EMULSION INTRAVENOUS at 19:44

## 2018-04-16 RX ADMIN — Medication 100 MG: at 08:52

## 2018-04-16 RX ADMIN — PROPOFOL 45 MCG/KG/MIN: 10 INJECTION, EMULSION INTRAVENOUS at 22:22

## 2018-04-16 RX ADMIN — IPRATROPIUM BROMIDE AND ALBUTEROL SULFATE 1 AMPULE: .5; 3 SOLUTION RESPIRATORY (INHALATION) at 10:23

## 2018-04-16 RX ADMIN — IPRATROPIUM BROMIDE AND ALBUTEROL SULFATE 1 AMPULE: .5; 3 SOLUTION RESPIRATORY (INHALATION) at 14:26

## 2018-04-16 RX ADMIN — CHLORHEXIDINE GLUCONATE 15 ML: 1.2 RINSE ORAL at 08:51

## 2018-04-16 RX ADMIN — PANTOPRAZOLE SODIUM 40 MG: 40 INJECTION, POWDER, FOR SOLUTION INTRAVENOUS at 08:51

## 2018-04-16 RX ADMIN — CHLORHEXIDINE GLUCONATE 15 ML: 1.2 RINSE ORAL at 20:33

## 2018-04-16 RX ADMIN — Medication 10 ML: at 08:53

## 2018-04-16 RX ADMIN — IPRATROPIUM BROMIDE AND ALBUTEROL SULFATE 1 AMPULE: .5; 3 SOLUTION RESPIRATORY (INHALATION) at 02:47

## 2018-04-16 ASSESSMENT — PAIN SCALES - GENERAL
PAINLEVEL_OUTOF10: 0

## 2018-04-16 ASSESSMENT — PULMONARY FUNCTION TESTS
PIF_VALUE: 34.4
PIF_VALUE: 30.7

## 2018-04-17 ENCOUNTER — APPOINTMENT (OUTPATIENT)
Dept: GENERAL RADIOLOGY | Age: 68
DRG: 981 | End: 2018-04-17
Payer: MEDICARE

## 2018-04-17 LAB
ALBUMIN SERPL-MCNC: 2.8 G/DL (ref 3.5–5.2)
ALP BLD-CCNC: 53 U/L (ref 40–130)
ALT SERPL-CCNC: 16 U/L (ref 5–41)
ANION GAP SERPL CALCULATED.3IONS-SCNC: 9 MMOL/L (ref 7–19)
AST SERPL-CCNC: 13 U/L (ref 5–40)
BASE EXCESS ARTERIAL: 3.1 MMOL/L (ref -2–2)
BASOPHILS ABSOLUTE: 0 K/UL (ref 0–0.2)
BASOPHILS RELATIVE PERCENT: 0.1 % (ref 0–1)
BILIRUB SERPL-MCNC: 0.3 MG/DL (ref 0.2–1.2)
BUN BLDV-MCNC: 16 MG/DL (ref 8–23)
CALCIUM SERPL-MCNC: 8 MG/DL (ref 8.8–10.2)
CARBOXYHEMOGLOBIN ARTERIAL: 1.1 % (ref 0–5)
CHLORIDE BLD-SCNC: 106 MMOL/L (ref 98–111)
CO2: 28 MMOL/L (ref 22–29)
CREAT SERPL-MCNC: <0.5 MG/DL (ref 0.5–1.2)
EOSINOPHILS ABSOLUTE: 0 K/UL (ref 0–0.6)
EOSINOPHILS RELATIVE PERCENT: 0 % (ref 0–5)
GFR NON-AFRICAN AMERICAN: >60
GLUCOSE BLD-MCNC: 125 MG/DL (ref 70–99)
GLUCOSE BLD-MCNC: 154 MG/DL (ref 74–109)
HCO3 ARTERIAL: 28.5 MMOL/L (ref 22–26)
HCT VFR BLD CALC: 48.6 % (ref 42–52)
HEMOGLOBIN, ART, EXTENDED: 16.5 G/DL (ref 14–18)
HEMOGLOBIN: 15.6 G/DL (ref 14–18)
LYMPHOCYTES ABSOLUTE: 0.4 K/UL (ref 1.1–4.5)
LYMPHOCYTES RELATIVE PERCENT: 2.8 % (ref 20–40)
MAGNESIUM: 2.2 MG/DL (ref 1.6–2.4)
MCH RBC QN AUTO: 31 PG (ref 27–31)
MCHC RBC AUTO-ENTMCNC: 32.1 G/DL (ref 33–37)
MCV RBC AUTO: 96.4 FL (ref 80–94)
METHEMOGLOBIN ARTERIAL: 1.6 %
MONOCYTES ABSOLUTE: 1 K/UL (ref 0–0.9)
MONOCYTES RELATIVE PERCENT: 7.3 % (ref 0–10)
NEUTROPHILS ABSOLUTE: 12.1 K/UL (ref 1.5–7.5)
NEUTROPHILS RELATIVE PERCENT: 89.3 % (ref 50–65)
O2 CONTENT ARTERIAL: 22.5 ML/DL
O2 SAT, ARTERIAL: 96 %
O2 THERAPY: ABNORMAL
PCO2 ARTERIAL: 45 MMHG (ref 35–45)
PDW BLD-RTO: 15.4 % (ref 11.5–14.5)
PERFORMED ON: ABNORMAL
PH ARTERIAL: 7.41 (ref 7.35–7.45)
PHOSPHORUS: 2.7 MG/DL (ref 2.5–4.5)
PLATELET # BLD: 106 K/UL (ref 130–400)
PMV BLD AUTO: 11 FL (ref 9.4–12.4)
PO2 ARTERIAL: 153 MMHG (ref 80–100)
POTASSIUM SERPL-SCNC: 4.1 MMOL/L (ref 3.5–5)
POTASSIUM, WHOLE BLOOD: 3.9
RBC # BLD: 5.04 M/UL (ref 4.7–6.1)
SODIUM BLD-SCNC: 143 MMOL/L (ref 136–145)
TOTAL PROTEIN: 5.8 G/DL (ref 6.6–8.7)
WBC # BLD: 13.6 K/UL (ref 4.8–10.8)

## 2018-04-17 PROCEDURE — 2580000003 HC RX 258: Performed by: INTERNAL MEDICINE

## 2018-04-17 PROCEDURE — 2700000000 HC OXYGEN THERAPY PER DAY

## 2018-04-17 PROCEDURE — 80053 COMPREHEN METABOLIC PANEL: CPT

## 2018-04-17 PROCEDURE — 2000000000 HC ICU R&B

## 2018-04-17 PROCEDURE — 82803 BLOOD GASES ANY COMBINATION: CPT

## 2018-04-17 PROCEDURE — 6360000002 HC RX W HCPCS: Performed by: EMERGENCY MEDICINE

## 2018-04-17 PROCEDURE — 6360000002 HC RX W HCPCS: Performed by: INTERNAL MEDICINE

## 2018-04-17 PROCEDURE — 83735 ASSAY OF MAGNESIUM: CPT

## 2018-04-17 PROCEDURE — 6360000002 HC RX W HCPCS: Performed by: FAMILY MEDICINE

## 2018-04-17 PROCEDURE — C9113 INJ PANTOPRAZOLE SODIUM, VIA: HCPCS | Performed by: INTERNAL MEDICINE

## 2018-04-17 PROCEDURE — 36600 WITHDRAWAL OF ARTERIAL BLOOD: CPT

## 2018-04-17 PROCEDURE — 2500000003 HC RX 250 WO HCPCS: Performed by: INTERNAL MEDICINE

## 2018-04-17 PROCEDURE — 99233 SBSQ HOSP IP/OBS HIGH 50: CPT | Performed by: FAMILY MEDICINE

## 2018-04-17 PROCEDURE — 71045 X-RAY EXAM CHEST 1 VIEW: CPT

## 2018-04-17 PROCEDURE — 84132 ASSAY OF SERUM POTASSIUM: CPT

## 2018-04-17 PROCEDURE — 82948 REAGENT STRIP/BLOOD GLUCOSE: CPT

## 2018-04-17 PROCEDURE — 6370000000 HC RX 637 (ALT 250 FOR IP): Performed by: INTERNAL MEDICINE

## 2018-04-17 PROCEDURE — 94003 VENT MGMT INPAT SUBQ DAY: CPT

## 2018-04-17 PROCEDURE — 94640 AIRWAY INHALATION TREATMENT: CPT

## 2018-04-17 PROCEDURE — 2580000003 HC RX 258: Performed by: FAMILY MEDICINE

## 2018-04-17 PROCEDURE — 84100 ASSAY OF PHOSPHORUS: CPT

## 2018-04-17 PROCEDURE — 85025 COMPLETE CBC W/AUTO DIFF WBC: CPT

## 2018-04-17 RX ORDER — FUROSEMIDE 10 MG/ML
20 INJECTION INTRAMUSCULAR; INTRAVENOUS ONCE
Status: COMPLETED | OUTPATIENT
Start: 2018-04-17 | End: 2018-04-17

## 2018-04-17 RX ADMIN — PROPOFOL 45 MCG/KG/MIN: 10 INJECTION, EMULSION INTRAVENOUS at 07:56

## 2018-04-17 RX ADMIN — IPRATROPIUM BROMIDE AND ALBUTEROL SULFATE 1 AMPULE: .5; 3 SOLUTION RESPIRATORY (INHALATION) at 02:37

## 2018-04-17 RX ADMIN — PANTOPRAZOLE SODIUM 40 MG: 40 INJECTION, POWDER, FOR SOLUTION INTRAVENOUS at 08:08

## 2018-04-17 RX ADMIN — IPRATROPIUM BROMIDE AND ALBUTEROL SULFATE 1 AMPULE: .5; 3 SOLUTION RESPIRATORY (INHALATION) at 14:24

## 2018-04-17 RX ADMIN — METHYLPREDNISOLONE SODIUM SUCCINATE 40 MG: 40 INJECTION, POWDER, FOR SOLUTION INTRAMUSCULAR; INTRAVENOUS at 16:48

## 2018-04-17 RX ADMIN — Medication 10 ML: at 08:09

## 2018-04-17 RX ADMIN — SODIUM CHLORIDE: 9 INJECTION, SOLUTION INTRAVENOUS at 20:09

## 2018-04-17 RX ADMIN — DEXMEDETOMIDINE HYDROCHLORIDE 0.2 MCG/KG/HR: 100 INJECTION, SOLUTION INTRAVENOUS at 22:15

## 2018-04-17 RX ADMIN — Medication 100 MG: at 08:08

## 2018-04-17 RX ADMIN — ENOXAPARIN SODIUM 40 MG: 100 INJECTION SUBCUTANEOUS at 08:09

## 2018-04-17 RX ADMIN — FOLIC ACID 1 MG: 1 TABLET ORAL at 08:08

## 2018-04-17 RX ADMIN — Medication 10 ML: at 19:59

## 2018-04-17 RX ADMIN — CHLORHEXIDINE GLUCONATE 15 ML: 1.2 RINSE ORAL at 19:59

## 2018-04-17 RX ADMIN — METHYLPREDNISOLONE SODIUM SUCCINATE 40 MG: 40 INJECTION, POWDER, FOR SOLUTION INTRAMUSCULAR; INTRAVENOUS at 01:17

## 2018-04-17 RX ADMIN — IPRATROPIUM BROMIDE AND ALBUTEROL SULFATE 1 AMPULE: .5; 3 SOLUTION RESPIRATORY (INHALATION) at 10:09

## 2018-04-17 RX ADMIN — IPRATROPIUM BROMIDE AND ALBUTEROL SULFATE 1 AMPULE: .5; 3 SOLUTION RESPIRATORY (INHALATION) at 18:23

## 2018-04-17 RX ADMIN — PROPOFOL 45 MCG/KG/MIN: 10 INJECTION, EMULSION INTRAVENOUS at 01:16

## 2018-04-17 RX ADMIN — IPRATROPIUM BROMIDE AND ALBUTEROL SULFATE 1 AMPULE: .5; 3 SOLUTION RESPIRATORY (INHALATION) at 23:11

## 2018-04-17 RX ADMIN — CHLORHEXIDINE GLUCONATE 15 ML: 1.2 RINSE ORAL at 08:09

## 2018-04-17 RX ADMIN — FUROSEMIDE 20 MG: 10 INJECTION, SOLUTION INTRAVENOUS at 13:29

## 2018-04-17 RX ADMIN — METHYLPREDNISOLONE SODIUM SUCCINATE 40 MG: 40 INJECTION, POWDER, FOR SOLUTION INTRAMUSCULAR; INTRAVENOUS at 08:08

## 2018-04-17 RX ADMIN — IPRATROPIUM BROMIDE AND ALBUTEROL SULFATE 1 AMPULE: .5; 3 SOLUTION RESPIRATORY (INHALATION) at 06:23

## 2018-04-17 RX ADMIN — DEXMEDETOMIDINE HYDROCHLORIDE 0.2 MCG/HR: 100 INJECTION, SOLUTION INTRAVENOUS at 15:20

## 2018-04-17 ASSESSMENT — PAIN SCALES - WONG BAKER
WONGBAKER_NUMERICALRESPONSE: 0
WONGBAKER_NUMERICALRESPONSE: 0

## 2018-04-17 ASSESSMENT — PULMONARY FUNCTION TESTS
PIF_VALUE: 30.4
PIF_VALUE: 31.7
PIF_VALUE: 28
PIF_VALUE: 26.2
PIF_VALUE: 41
PIF_VALUE: 28.1
PIF_VALUE: 31.7

## 2018-04-17 ASSESSMENT — PAIN SCALES - GENERAL
PAINLEVEL_OUTOF10: 0

## 2018-04-18 ENCOUNTER — APPOINTMENT (OUTPATIENT)
Dept: GENERAL RADIOLOGY | Age: 68
DRG: 981 | End: 2018-04-18
Payer: MEDICARE

## 2018-04-18 LAB
ALBUMIN SERPL-MCNC: 3.2 G/DL (ref 3.5–5.2)
ALP BLD-CCNC: 54 U/L (ref 40–130)
ALT SERPL-CCNC: 15 U/L (ref 5–41)
ANION GAP SERPL CALCULATED.3IONS-SCNC: 10 MMOL/L (ref 7–19)
AST SERPL-CCNC: 11 U/L (ref 5–40)
BASE EXCESS ARTERIAL: 3.9 MMOL/L (ref -2–2)
BASOPHILS ABSOLUTE: 0 K/UL (ref 0–0.2)
BASOPHILS RELATIVE PERCENT: 0.1 % (ref 0–1)
BILIRUB SERPL-MCNC: 0.8 MG/DL (ref 0.2–1.2)
BUN BLDV-MCNC: 19 MG/DL (ref 8–23)
CALCIUM SERPL-MCNC: 8.2 MG/DL (ref 8.8–10.2)
CARBOXYHEMOGLOBIN ARTERIAL: 1.3 % (ref 0–5)
CHLORIDE BLD-SCNC: 105 MMOL/L (ref 98–111)
CO2: 29 MMOL/L (ref 22–29)
CREAT SERPL-MCNC: 0.5 MG/DL (ref 0.5–1.2)
EOSINOPHILS ABSOLUTE: 0 K/UL (ref 0–0.6)
EOSINOPHILS RELATIVE PERCENT: 0 % (ref 0–5)
GFR NON-AFRICAN AMERICAN: >60
GLUCOSE BLD-MCNC: 123 MG/DL (ref 70–99)
GLUCOSE BLD-MCNC: 137 MG/DL (ref 74–109)
HCO3 ARTERIAL: 26.3 MMOL/L (ref 22–26)
HCT VFR BLD CALC: 56.5 % (ref 42–52)
HEMOGLOBIN, ART, EXTENDED: 18.9 G/DL (ref 14–18)
HEMOGLOBIN: 18 G/DL (ref 14–18)
LYMPHOCYTES ABSOLUTE: 0.6 K/UL (ref 1.1–4.5)
LYMPHOCYTES RELATIVE PERCENT: 4.8 % (ref 20–40)
MAGNESIUM: 2.4 MG/DL (ref 1.6–2.4)
MCH RBC QN AUTO: 30.4 PG (ref 27–31)
MCHC RBC AUTO-ENTMCNC: 31.9 G/DL (ref 33–37)
MCV RBC AUTO: 95.4 FL (ref 80–94)
METHEMOGLOBIN ARTERIAL: 1.9 %
MONOCYTES ABSOLUTE: 1 K/UL (ref 0–0.9)
MONOCYTES RELATIVE PERCENT: 8.3 % (ref 0–10)
NEUTROPHILS ABSOLUTE: 10.5 K/UL (ref 1.5–7.5)
NEUTROPHILS RELATIVE PERCENT: 86.3 % (ref 50–65)
O2 CONTENT ARTERIAL: 24.4 ML/DL
O2 SAT, ARTERIAL: 91.9 %
O2 THERAPY: ABNORMAL
PCO2 ARTERIAL: 33 MMHG (ref 35–45)
PDW BLD-RTO: 15.8 % (ref 11.5–14.5)
PERFORMED ON: ABNORMAL
PH ARTERIAL: 7.51 (ref 7.35–7.45)
PHOSPHORUS: 2.2 MG/DL (ref 2.5–4.5)
PLATELET # BLD: 109 K/UL (ref 130–400)
PMV BLD AUTO: 11.3 FL (ref 9.4–12.4)
PO2 ARTERIAL: 68 MMHG (ref 80–100)
POTASSIUM SERPL-SCNC: 4 MMOL/L (ref 3.5–5)
POTASSIUM, WHOLE BLOOD: 3.8
RBC # BLD: 5.92 M/UL (ref 4.7–6.1)
SODIUM BLD-SCNC: 144 MMOL/L (ref 136–145)
TOTAL PROTEIN: 6.2 G/DL (ref 6.6–8.7)
WBC # BLD: 12.2 K/UL (ref 4.8–10.8)

## 2018-04-18 PROCEDURE — 83735 ASSAY OF MAGNESIUM: CPT

## 2018-04-18 PROCEDURE — 94003 VENT MGMT INPAT SUBQ DAY: CPT

## 2018-04-18 PROCEDURE — C9113 INJ PANTOPRAZOLE SODIUM, VIA: HCPCS | Performed by: INTERNAL MEDICINE

## 2018-04-18 PROCEDURE — 2000000000 HC ICU R&B

## 2018-04-18 PROCEDURE — 85025 COMPLETE CBC W/AUTO DIFF WBC: CPT

## 2018-04-18 PROCEDURE — 80053 COMPREHEN METABOLIC PANEL: CPT

## 2018-04-18 PROCEDURE — 2580000003 HC RX 258: Performed by: INTERNAL MEDICINE

## 2018-04-18 PROCEDURE — 2700000000 HC OXYGEN THERAPY PER DAY

## 2018-04-18 PROCEDURE — 82948 REAGENT STRIP/BLOOD GLUCOSE: CPT

## 2018-04-18 PROCEDURE — 6370000000 HC RX 637 (ALT 250 FOR IP): Performed by: INTERNAL MEDICINE

## 2018-04-18 PROCEDURE — 71045 X-RAY EXAM CHEST 1 VIEW: CPT

## 2018-04-18 PROCEDURE — 84132 ASSAY OF SERUM POTASSIUM: CPT

## 2018-04-18 PROCEDURE — 99233 SBSQ HOSP IP/OBS HIGH 50: CPT | Performed by: FAMILY MEDICINE

## 2018-04-18 PROCEDURE — 6360000002 HC RX W HCPCS: Performed by: EMERGENCY MEDICINE

## 2018-04-18 PROCEDURE — 6360000002 HC RX W HCPCS: Performed by: INTERNAL MEDICINE

## 2018-04-18 PROCEDURE — 36600 WITHDRAWAL OF ARTERIAL BLOOD: CPT

## 2018-04-18 PROCEDURE — 2500000003 HC RX 250 WO HCPCS: Performed by: INTERNAL MEDICINE

## 2018-04-18 PROCEDURE — 94640 AIRWAY INHALATION TREATMENT: CPT

## 2018-04-18 PROCEDURE — 82803 BLOOD GASES ANY COMBINATION: CPT

## 2018-04-18 PROCEDURE — 84100 ASSAY OF PHOSPHORUS: CPT

## 2018-04-18 PROCEDURE — 2580000003 HC RX 258: Performed by: FAMILY MEDICINE

## 2018-04-18 RX ORDER — HALOPERIDOL 0.5 MG/1
0.5 TABLET ORAL DAILY
Status: DISCONTINUED | OUTPATIENT
Start: 2018-04-18 | End: 2018-04-19

## 2018-04-18 RX ADMIN — HALOPERIDOL 0.5 MG: 0.5 TABLET ORAL at 21:48

## 2018-04-18 RX ADMIN — DEXMEDETOMIDINE HYDROCHLORIDE 0.2 MCG/KG/HR: 100 INJECTION, SOLUTION INTRAVENOUS at 05:04

## 2018-04-18 RX ADMIN — METHYLPREDNISOLONE SODIUM SUCCINATE 40 MG: 40 INJECTION, POWDER, FOR SOLUTION INTRAMUSCULAR; INTRAVENOUS at 02:24

## 2018-04-18 RX ADMIN — DEXMEDETOMIDINE HYDROCHLORIDE 0.9 MCG/KG/HR: 100 INJECTION, SOLUTION INTRAVENOUS at 18:13

## 2018-04-18 RX ADMIN — CHLORHEXIDINE GLUCONATE 15 ML: 1.2 RINSE ORAL at 08:17

## 2018-04-18 RX ADMIN — Medication 10 ML: at 22:00

## 2018-04-18 RX ADMIN — IPRATROPIUM BROMIDE AND ALBUTEROL SULFATE 1 AMPULE: .5; 3 SOLUTION RESPIRATORY (INHALATION) at 10:31

## 2018-04-18 RX ADMIN — SODIUM CHLORIDE: 9 INJECTION, SOLUTION INTRAVENOUS at 16:13

## 2018-04-18 RX ADMIN — DEXMEDETOMIDINE HYDROCHLORIDE 0.8 MCG/KG/HR: 100 INJECTION, SOLUTION INTRAVENOUS at 16:13

## 2018-04-18 RX ADMIN — METHYLPREDNISOLONE SODIUM SUCCINATE 40 MG: 40 INJECTION, POWDER, FOR SOLUTION INTRAMUSCULAR; INTRAVENOUS at 08:17

## 2018-04-18 RX ADMIN — IPRATROPIUM BROMIDE AND ALBUTEROL SULFATE 1 AMPULE: .5; 3 SOLUTION RESPIRATORY (INHALATION) at 22:36

## 2018-04-18 RX ADMIN — DEXMEDETOMIDINE HYDROCHLORIDE 1 MCG/KG/HR: 100 INJECTION, SOLUTION INTRAVENOUS at 21:48

## 2018-04-18 RX ADMIN — DEXMEDETOMIDINE HYDROCHLORIDE 0.7 MCG/KG/HR: 100 INJECTION, SOLUTION INTRAVENOUS at 14:00

## 2018-04-18 RX ADMIN — Medication 10 ML: at 08:17

## 2018-04-18 RX ADMIN — IPRATROPIUM BROMIDE AND ALBUTEROL SULFATE 1 AMPULE: .5; 3 SOLUTION RESPIRATORY (INHALATION) at 03:05

## 2018-04-18 RX ADMIN — IPRATROPIUM BROMIDE AND ALBUTEROL SULFATE 1 AMPULE: .5; 3 SOLUTION RESPIRATORY (INHALATION) at 06:40

## 2018-04-18 RX ADMIN — PANTOPRAZOLE SODIUM 40 MG: 40 INJECTION, POWDER, FOR SOLUTION INTRAVENOUS at 08:17

## 2018-04-18 RX ADMIN — IPRATROPIUM BROMIDE AND ALBUTEROL SULFATE 1 AMPULE: .5; 3 SOLUTION RESPIRATORY (INHALATION) at 14:24

## 2018-04-18 RX ADMIN — PROPOFOL 30 MCG/KG/MIN: 10 INJECTION, EMULSION INTRAVENOUS at 08:30

## 2018-04-18 RX ADMIN — FOLIC ACID 1 MG: 1 TABLET ORAL at 08:17

## 2018-04-18 RX ADMIN — CHLORHEXIDINE GLUCONATE 15 ML: 1.2 RINSE ORAL at 21:47

## 2018-04-18 RX ADMIN — ENOXAPARIN SODIUM 40 MG: 100 INJECTION SUBCUTANEOUS at 08:17

## 2018-04-18 RX ADMIN — PROPOFOL 15 MCG/KG/MIN: 10 INJECTION, EMULSION INTRAVENOUS at 02:25

## 2018-04-18 RX ADMIN — PROPOFOL 20 MCG/KG/MIN: 10 INJECTION, EMULSION INTRAVENOUS at 12:06

## 2018-04-18 RX ADMIN — Medication 100 MG: at 08:17

## 2018-04-18 RX ADMIN — IPRATROPIUM BROMIDE AND ALBUTEROL SULFATE 1 AMPULE: .5; 3 SOLUTION RESPIRATORY (INHALATION) at 18:45

## 2018-04-18 RX ADMIN — METHYLPREDNISOLONE SODIUM SUCCINATE 40 MG: 40 INJECTION, POWDER, FOR SOLUTION INTRAMUSCULAR; INTRAVENOUS at 18:08

## 2018-04-18 ASSESSMENT — PAIN SCALES - GENERAL
PAINLEVEL_OUTOF10: 0

## 2018-04-18 ASSESSMENT — PULMONARY FUNCTION TESTS
PIF_VALUE: 29.8
PIF_VALUE: 34.8
PIF_VALUE: 33.7
PIF_VALUE: 39.5
PIF_VALUE: 29.9
PIF_VALUE: 31.4
PIF_VALUE: 38.3
PIF_VALUE: 29.5
PIF_VALUE: 33.8

## 2018-04-18 ASSESSMENT — PAIN SCALES - WONG BAKER
WONGBAKER_NUMERICALRESPONSE: 0

## 2018-04-19 ENCOUNTER — APPOINTMENT (OUTPATIENT)
Dept: GENERAL RADIOLOGY | Age: 68
DRG: 981 | End: 2018-04-19
Payer: MEDICARE

## 2018-04-19 LAB
ALBUMIN SERPL-MCNC: 3 G/DL (ref 3.5–5.2)
ALP BLD-CCNC: 48 U/L (ref 40–130)
ALT SERPL-CCNC: 29 U/L (ref 5–41)
ANION GAP SERPL CALCULATED.3IONS-SCNC: 9 MMOL/L (ref 7–19)
AST SERPL-CCNC: 22 U/L (ref 5–40)
BASE EXCESS ARTERIAL: 1.6 MMOL/L (ref -2–2)
BASOPHILS ABSOLUTE: 0 K/UL (ref 0–0.2)
BASOPHILS RELATIVE PERCENT: 0.2 % (ref 0–1)
BILIRUB SERPL-MCNC: 2.2 MG/DL (ref 0.2–1.2)
BUN BLDV-MCNC: 23 MG/DL (ref 8–23)
CALCIUM SERPL-MCNC: 8.1 MG/DL (ref 8.8–10.2)
CARBOXYHEMOGLOBIN ARTERIAL: 1.4 % (ref 0–5)
CHLORIDE BLD-SCNC: 107 MMOL/L (ref 98–111)
CO2: 28 MMOL/L (ref 22–29)
CREAT SERPL-MCNC: <0.5 MG/DL (ref 0.5–1.2)
EOSINOPHILS ABSOLUTE: 0 K/UL (ref 0–0.6)
EOSINOPHILS RELATIVE PERCENT: 0 % (ref 0–5)
GFR NON-AFRICAN AMERICAN: >60
GLUCOSE BLD-MCNC: 111 MG/DL (ref 70–99)
GLUCOSE BLD-MCNC: 124 MG/DL (ref 70–99)
GLUCOSE BLD-MCNC: 125 MG/DL (ref 70–99)
GLUCOSE BLD-MCNC: 132 MG/DL (ref 74–109)
HCO3 ARTERIAL: 26.6 MMOL/L (ref 22–26)
HCT VFR BLD CALC: 57.1 % (ref 42–52)
HEMOGLOBIN, ART, EXTENDED: 19.8 G/DL (ref 14–18)
HEMOGLOBIN: 18.5 G/DL (ref 14–18)
LYMPHOCYTES ABSOLUTE: 0.6 K/UL (ref 1.1–4.5)
LYMPHOCYTES RELATIVE PERCENT: 5.2 % (ref 20–40)
MAGNESIUM: 2.2 MG/DL (ref 1.6–2.4)
MCH RBC QN AUTO: 31 PG (ref 27–31)
MCHC RBC AUTO-ENTMCNC: 32.4 G/DL (ref 33–37)
MCV RBC AUTO: 95.6 FL (ref 80–94)
METHEMOGLOBIN ARTERIAL: 1.6 %
MONOCYTES ABSOLUTE: 0.8 K/UL (ref 0–0.9)
MONOCYTES RELATIVE PERCENT: 7.1 % (ref 0–10)
NEUTROPHILS ABSOLUTE: 9.8 K/UL (ref 1.5–7.5)
NEUTROPHILS RELATIVE PERCENT: 87.2 % (ref 50–65)
O2 CONTENT ARTERIAL: 25.6 ML/DL
O2 SAT, ARTERIAL: 92.1 %
O2 THERAPY: ABNORMAL
PCO2 ARTERIAL: 42 MMHG (ref 35–45)
PDW BLD-RTO: 15.9 % (ref 11.5–14.5)
PERFORMED ON: ABNORMAL
PH ARTERIAL: 7.41 (ref 7.35–7.45)
PHOSPHORUS: 2.9 MG/DL (ref 2.5–4.5)
PLATELET # BLD: 102 K/UL (ref 130–400)
PMV BLD AUTO: 11.2 FL (ref 9.4–12.4)
PO2 ARTERIAL: 70 MMHG (ref 80–100)
POTASSIUM SERPL-SCNC: 3.9 MMOL/L (ref 3.5–5)
POTASSIUM, WHOLE BLOOD: 4.1
RBC # BLD: 5.97 M/UL (ref 4.7–6.1)
SODIUM BLD-SCNC: 144 MMOL/L (ref 136–145)
TOTAL PROTEIN: 5.7 G/DL (ref 6.6–8.7)
WBC # BLD: 11.3 K/UL (ref 4.8–10.8)

## 2018-04-19 PROCEDURE — 71045 X-RAY EXAM CHEST 1 VIEW: CPT

## 2018-04-19 PROCEDURE — 80053 COMPREHEN METABOLIC PANEL: CPT

## 2018-04-19 PROCEDURE — C9113 INJ PANTOPRAZOLE SODIUM, VIA: HCPCS | Performed by: INTERNAL MEDICINE

## 2018-04-19 PROCEDURE — 82948 REAGENT STRIP/BLOOD GLUCOSE: CPT

## 2018-04-19 PROCEDURE — 99291 CRITICAL CARE FIRST HOUR: CPT | Performed by: FAMILY MEDICINE

## 2018-04-19 PROCEDURE — 2000000000 HC ICU R&B

## 2018-04-19 PROCEDURE — 84132 ASSAY OF SERUM POTASSIUM: CPT

## 2018-04-19 PROCEDURE — 2580000003 HC RX 258: Performed by: INTERNAL MEDICINE

## 2018-04-19 PROCEDURE — 6370000000 HC RX 637 (ALT 250 FOR IP): Performed by: INTERNAL MEDICINE

## 2018-04-19 PROCEDURE — 36600 WITHDRAWAL OF ARTERIAL BLOOD: CPT

## 2018-04-19 PROCEDURE — 2500000003 HC RX 250 WO HCPCS: Performed by: INTERNAL MEDICINE

## 2018-04-19 PROCEDURE — 82803 BLOOD GASES ANY COMBINATION: CPT

## 2018-04-19 PROCEDURE — 6360000002 HC RX W HCPCS: Performed by: INTERNAL MEDICINE

## 2018-04-19 PROCEDURE — 94640 AIRWAY INHALATION TREATMENT: CPT

## 2018-04-19 PROCEDURE — 94003 VENT MGMT INPAT SUBQ DAY: CPT

## 2018-04-19 PROCEDURE — 92610 EVALUATE SWALLOWING FUNCTION: CPT

## 2018-04-19 PROCEDURE — 85025 COMPLETE CBC W/AUTO DIFF WBC: CPT

## 2018-04-19 PROCEDURE — 2700000000 HC OXYGEN THERAPY PER DAY

## 2018-04-19 PROCEDURE — 36592 COLLECT BLOOD FROM PICC: CPT

## 2018-04-19 PROCEDURE — 84100 ASSAY OF PHOSPHORUS: CPT

## 2018-04-19 PROCEDURE — G8997 SWALLOW GOAL STATUS: HCPCS

## 2018-04-19 PROCEDURE — G8996 SWALLOW CURRENT STATUS: HCPCS

## 2018-04-19 PROCEDURE — 83735 ASSAY OF MAGNESIUM: CPT

## 2018-04-19 PROCEDURE — 6360000002 HC RX W HCPCS: Performed by: NURSE PRACTITIONER

## 2018-04-19 RX ORDER — METHYLPREDNISOLONE SODIUM SUCCINATE 40 MG/ML
40 INJECTION, POWDER, LYOPHILIZED, FOR SOLUTION INTRAMUSCULAR; INTRAVENOUS EVERY 12 HOURS
Status: DISCONTINUED | OUTPATIENT
Start: 2018-04-19 | End: 2018-04-24

## 2018-04-19 RX ADMIN — DEXMEDETOMIDINE HYDROCHLORIDE 1 MCG/KG/HR: 100 INJECTION, SOLUTION INTRAVENOUS at 01:11

## 2018-04-19 RX ADMIN — IPRATROPIUM BROMIDE AND ALBUTEROL SULFATE 1 AMPULE: .5; 3 SOLUTION RESPIRATORY (INHALATION) at 15:19

## 2018-04-19 RX ADMIN — METHYLPREDNISOLONE SODIUM SUCCINATE 40 MG: 40 INJECTION, POWDER, FOR SOLUTION INTRAMUSCULAR; INTRAVENOUS at 20:01

## 2018-04-19 RX ADMIN — DEXMEDETOMIDINE HYDROCHLORIDE 1.1 MCG/KG/HR: 100 INJECTION, SOLUTION INTRAVENOUS at 08:46

## 2018-04-19 RX ADMIN — Medication 100 MG: at 08:03

## 2018-04-19 RX ADMIN — METHYLPREDNISOLONE SODIUM SUCCINATE 40 MG: 40 INJECTION, POWDER, FOR SOLUTION INTRAMUSCULAR; INTRAVENOUS at 00:30

## 2018-04-19 RX ADMIN — IPRATROPIUM BROMIDE AND ALBUTEROL SULFATE 1 AMPULE: .5; 3 SOLUTION RESPIRATORY (INHALATION) at 10:58

## 2018-04-19 RX ADMIN — ENOXAPARIN SODIUM 40 MG: 100 INJECTION SUBCUTANEOUS at 08:04

## 2018-04-19 RX ADMIN — IPRATROPIUM BROMIDE AND ALBUTEROL SULFATE 1 AMPULE: .5; 3 SOLUTION RESPIRATORY (INHALATION) at 06:26

## 2018-04-19 RX ADMIN — IPRATROPIUM BROMIDE AND ALBUTEROL SULFATE 1 AMPULE: .5; 3 SOLUTION RESPIRATORY (INHALATION) at 02:34

## 2018-04-19 RX ADMIN — PANTOPRAZOLE SODIUM 40 MG: 40 INJECTION, POWDER, FOR SOLUTION INTRAVENOUS at 08:04

## 2018-04-19 RX ADMIN — IPRATROPIUM BROMIDE AND ALBUTEROL SULFATE 1 AMPULE: .5; 3 SOLUTION RESPIRATORY (INHALATION) at 22:51

## 2018-04-19 RX ADMIN — CHLORHEXIDINE GLUCONATE 15 ML: 1.2 RINSE ORAL at 08:05

## 2018-04-19 RX ADMIN — Medication 10 ML: at 08:04

## 2018-04-19 RX ADMIN — METHYLPREDNISOLONE SODIUM SUCCINATE 40 MG: 40 INJECTION, POWDER, FOR SOLUTION INTRAMUSCULAR; INTRAVENOUS at 08:04

## 2018-04-19 RX ADMIN — FOLIC ACID 1 MG: 1 TABLET ORAL at 08:04

## 2018-04-19 RX ADMIN — IPRATROPIUM BROMIDE AND ALBUTEROL SULFATE 1 AMPULE: .5; 3 SOLUTION RESPIRATORY (INHALATION) at 19:08

## 2018-04-19 RX ADMIN — DEXMEDETOMIDINE HYDROCHLORIDE 1 MCG/KG/HR: 100 INJECTION, SOLUTION INTRAVENOUS at 05:00

## 2018-04-19 ASSESSMENT — PAIN SCALES - GENERAL
PAINLEVEL_OUTOF10: 0

## 2018-04-19 ASSESSMENT — PULMONARY FUNCTION TESTS
PIF_VALUE: 29.2
PIF_VALUE: 28.3

## 2018-04-20 ENCOUNTER — APPOINTMENT (OUTPATIENT)
Dept: ULTRASOUND IMAGING | Age: 68
DRG: 981 | End: 2018-04-20
Payer: MEDICARE

## 2018-04-20 ENCOUNTER — APPOINTMENT (OUTPATIENT)
Dept: GENERAL RADIOLOGY | Age: 68
DRG: 981 | End: 2018-04-20
Payer: MEDICARE

## 2018-04-20 PROBLEM — E80.6 HYPERBILIRUBINEMIA: Status: ACTIVE | Noted: 2018-04-20

## 2018-04-20 PROBLEM — J98.11 ATELECTASIS: Status: ACTIVE | Noted: 2018-04-20

## 2018-04-20 PROBLEM — K70.31 ASCITES DUE TO ALCOHOLIC CIRRHOSIS (HCC): Status: ACTIVE | Noted: 2018-04-20

## 2018-04-20 LAB
ALBUMIN SERPL-MCNC: 3.4 G/DL (ref 3.5–5.2)
ALP BLD-CCNC: 63 U/L (ref 40–130)
ALT SERPL-CCNC: 138 U/L (ref 5–41)
ANION GAP SERPL CALCULATED.3IONS-SCNC: 10 MMOL/L (ref 7–19)
AST SERPL-CCNC: 129 U/L (ref 5–40)
BASE EXCESS ARTERIAL: 1.9 MMOL/L (ref -2–2)
BASE EXCESS ARTERIAL: 10.4 MMOL/L (ref -2–2)
BASE EXCESS ARTERIAL: 3 MMOL/L (ref -2–2)
BASOPHILS ABSOLUTE: 0 K/UL (ref 0–0.2)
BASOPHILS RELATIVE PERCENT: 0.1 % (ref 0–1)
BILIRUB SERPL-MCNC: 2.9 MG/DL (ref 0.2–1.2)
BLOOD CULTURE, ROUTINE: NORMAL
BUN BLDV-MCNC: 23 MG/DL (ref 8–23)
CALCIUM SERPL-MCNC: 8.5 MG/DL (ref 8.8–10.2)
CARBOXYHEMOGLOBIN ARTERIAL: 1.7 % (ref 0–5)
CARBOXYHEMOGLOBIN ARTERIAL: 1.9 % (ref 0–5)
CARBOXYHEMOGLOBIN ARTERIAL: 2.4 % (ref 0–5)
CHLORIDE BLD-SCNC: 108 MMOL/L (ref 98–111)
CO2: 30 MMOL/L (ref 22–29)
CREAT SERPL-MCNC: 0.5 MG/DL (ref 0.5–1.2)
CULTURE, BLOOD 2: NORMAL
EOSINOPHILS ABSOLUTE: 0 K/UL (ref 0–0.6)
EOSINOPHILS RELATIVE PERCENT: 0 % (ref 0–5)
GFR NON-AFRICAN AMERICAN: >60
GLUCOSE BLD-MCNC: 102 MG/DL (ref 70–99)
GLUCOSE BLD-MCNC: 117 MG/DL (ref 70–99)
GLUCOSE BLD-MCNC: 125 MG/DL (ref 70–99)
GLUCOSE BLD-MCNC: 136 MG/DL (ref 74–109)
GLUCOSE BLD-MCNC: 140 MG/DL (ref 70–99)
GLUCOSE BLD-MCNC: 93 MG/DL (ref 70–99)
HCO3 ARTERIAL: 32.5 MMOL/L (ref 22–26)
HCO3 ARTERIAL: 32.9 MMOL/L (ref 22–26)
HCO3 ARTERIAL: 39 MMOL/L (ref 22–26)
HCT VFR BLD CALC: 55.1 % (ref 42–52)
HEMOGLOBIN, ART, EXTENDED: 18.4 G/DL (ref 14–18)
HEMOGLOBIN, ART, EXTENDED: 18.7 G/DL (ref 14–18)
HEMOGLOBIN, ART, EXTENDED: 19 G/DL (ref 14–18)
HEMOGLOBIN: 17.2 G/DL (ref 14–18)
LYMPHOCYTES ABSOLUTE: 0.4 K/UL (ref 1.1–4.5)
LYMPHOCYTES RELATIVE PERCENT: 2.9 % (ref 20–40)
MAGNESIUM: 2.5 MG/DL (ref 1.6–2.4)
MCH RBC QN AUTO: 31.2 PG (ref 27–31)
MCHC RBC AUTO-ENTMCNC: 31.2 G/DL (ref 33–37)
MCV RBC AUTO: 100 FL (ref 80–94)
METHEMOGLOBIN ARTERIAL: 1.4 %
METHEMOGLOBIN ARTERIAL: 1.6 %
METHEMOGLOBIN ARTERIAL: 1.7 %
MONOCYTES ABSOLUTE: 1.4 K/UL (ref 0–0.9)
MONOCYTES RELATIVE PERCENT: 9.5 % (ref 0–10)
NEUTROPHILS ABSOLUTE: 12.7 K/UL (ref 1.5–7.5)
NEUTROPHILS RELATIVE PERCENT: 87.1 % (ref 50–65)
O2 CONTENT ARTERIAL: 23.5 ML/DL
O2 CONTENT ARTERIAL: 24 ML/DL
O2 CONTENT ARTERIAL: 24.1 ML/DL
O2 SAT, ARTERIAL: 90.2 %
O2 SAT, ARTERIAL: 91 %
O2 SAT, ARTERIAL: 91.9 %
O2 THERAPY: ABNORMAL
PCO2 ARTERIAL: 63 MMHG (ref 35–45)
PCO2 ARTERIAL: 70 MMHG (ref 35–45)
PCO2 ARTERIAL: 74 MMHG (ref 35–45)
PDW BLD-RTO: 15.4 % (ref 11.5–14.5)
PERFORMED ON: ABNORMAL
PERFORMED ON: NORMAL
PH ARTERIAL: 7.25 (ref 7.35–7.45)
PH ARTERIAL: 7.28 (ref 7.35–7.45)
PH ARTERIAL: 7.4 (ref 7.35–7.45)
PHOSPHORUS: 3 MG/DL (ref 2.5–4.5)
PLATELET # BLD: 114 K/UL (ref 130–400)
PMV BLD AUTO: 11.5 FL (ref 9.4–12.4)
PO2 ARTERIAL: 64 MMHG (ref 80–100)
PO2 ARTERIAL: 74 MMHG (ref 80–100)
PO2 ARTERIAL: 80 MMHG (ref 80–100)
POTASSIUM SERPL-SCNC: 4.1 MMOL/L (ref 3.5–5)
POTASSIUM, WHOLE BLOOD: 3.3
POTASSIUM, WHOLE BLOOD: 3.7
POTASSIUM, WHOLE BLOOD: 3.8
RBC # BLD: 5.51 M/UL (ref 4.7–6.1)
SODIUM BLD-SCNC: 148 MMOL/L (ref 136–145)
TOTAL PROTEIN: 6.1 G/DL (ref 6.6–8.7)
TROPONIN: <0.01 NG/ML (ref 0–0.03)
WBC # BLD: 14.5 K/UL (ref 4.8–10.8)

## 2018-04-20 PROCEDURE — 6370000000 HC RX 637 (ALT 250 FOR IP): Performed by: INTERNAL MEDICINE

## 2018-04-20 PROCEDURE — 82948 REAGENT STRIP/BLOOD GLUCOSE: CPT

## 2018-04-20 PROCEDURE — 2000000000 HC ICU R&B

## 2018-04-20 PROCEDURE — 71046 X-RAY EXAM CHEST 2 VIEWS: CPT

## 2018-04-20 PROCEDURE — 87086 URINE CULTURE/COLONY COUNT: CPT

## 2018-04-20 PROCEDURE — 84484 ASSAY OF TROPONIN QUANT: CPT

## 2018-04-20 PROCEDURE — 87040 BLOOD CULTURE FOR BACTERIA: CPT

## 2018-04-20 PROCEDURE — 94660 CPAP INITIATION&MGMT: CPT

## 2018-04-20 PROCEDURE — 94640 AIRWAY INHALATION TREATMENT: CPT

## 2018-04-20 PROCEDURE — 99291 CRITICAL CARE FIRST HOUR: CPT | Performed by: FAMILY MEDICINE

## 2018-04-20 PROCEDURE — 76705 ECHO EXAM OF ABDOMEN: CPT

## 2018-04-20 PROCEDURE — C9113 INJ PANTOPRAZOLE SODIUM, VIA: HCPCS | Performed by: INTERNAL MEDICINE

## 2018-04-20 PROCEDURE — 36600 WITHDRAWAL OF ARTERIAL BLOOD: CPT

## 2018-04-20 PROCEDURE — 6360000002 HC RX W HCPCS: Performed by: HOSPITALIST

## 2018-04-20 PROCEDURE — 2500000003 HC RX 250 WO HCPCS: Performed by: NURSE PRACTITIONER

## 2018-04-20 PROCEDURE — 6370000000 HC RX 637 (ALT 250 FOR IP): Performed by: CLINICAL NURSE SPECIALIST

## 2018-04-20 PROCEDURE — 2580000003 HC RX 258: Performed by: INTERNAL MEDICINE

## 2018-04-20 PROCEDURE — 2580000003 HC RX 258: Performed by: FAMILY MEDICINE

## 2018-04-20 PROCEDURE — 85025 COMPLETE CBC W/AUTO DIFF WBC: CPT

## 2018-04-20 PROCEDURE — 6360000002 HC RX W HCPCS: Performed by: CLINICAL NURSE SPECIALIST

## 2018-04-20 PROCEDURE — 80053 COMPREHEN METABOLIC PANEL: CPT

## 2018-04-20 PROCEDURE — 83735 ASSAY OF MAGNESIUM: CPT

## 2018-04-20 PROCEDURE — 82803 BLOOD GASES ANY COMBINATION: CPT

## 2018-04-20 PROCEDURE — 84100 ASSAY OF PHOSPHORUS: CPT

## 2018-04-20 PROCEDURE — 86403 PARTICLE AGGLUT ANTBDY SCRN: CPT

## 2018-04-20 PROCEDURE — 2700000000 HC OXYGEN THERAPY PER DAY

## 2018-04-20 PROCEDURE — 84132 ASSAY OF SERUM POTASSIUM: CPT

## 2018-04-20 PROCEDURE — 6360000002 HC RX W HCPCS: Performed by: INTERNAL MEDICINE

## 2018-04-20 PROCEDURE — 6360000002 HC RX W HCPCS: Performed by: NURSE PRACTITIONER

## 2018-04-20 PROCEDURE — 36415 COLL VENOUS BLD VENIPUNCTURE: CPT

## 2018-04-20 PROCEDURE — 92526 ORAL FUNCTION THERAPY: CPT

## 2018-04-20 PROCEDURE — 87186 SC STD MICRODIL/AGAR DIL: CPT

## 2018-04-20 PROCEDURE — 93005 ELECTROCARDIOGRAM TRACING: CPT

## 2018-04-20 PROCEDURE — 2500000003 HC RX 250 WO HCPCS: Performed by: FAMILY MEDICINE

## 2018-04-20 RX ORDER — ACETAMINOPHEN 650 MG/1
650 SUPPOSITORY RECTAL EVERY 4 HOURS PRN
Status: DISCONTINUED | OUTPATIENT
Start: 2018-04-20 | End: 2018-04-29 | Stop reason: HOSPADM

## 2018-04-20 RX ORDER — PANTOPRAZOLE SODIUM 40 MG/1
40 TABLET, DELAYED RELEASE ORAL
Status: DISCONTINUED | OUTPATIENT
Start: 2018-04-21 | End: 2018-04-28

## 2018-04-20 RX ORDER — BUMETANIDE 0.25 MG/ML
2 INJECTION, SOLUTION INTRAMUSCULAR; INTRAVENOUS ONCE
Status: COMPLETED | OUTPATIENT
Start: 2018-04-20 | End: 2018-04-20

## 2018-04-20 RX ORDER — BUMETANIDE 0.25 MG/ML
2 INJECTION, SOLUTION INTRAMUSCULAR; INTRAVENOUS EVERY 12 HOURS
Status: DISCONTINUED | OUTPATIENT
Start: 2018-04-20 | End: 2018-04-28

## 2018-04-20 RX ORDER — LORAZEPAM 2 MG/ML
1 INJECTION INTRAMUSCULAR
Status: DISCONTINUED | OUTPATIENT
Start: 2018-04-20 | End: 2018-04-29 | Stop reason: HOSPADM

## 2018-04-20 RX ADMIN — IPRATROPIUM BROMIDE AND ALBUTEROL SULFATE 1 AMPULE: .5; 3 SOLUTION RESPIRATORY (INHALATION) at 06:28

## 2018-04-20 RX ADMIN — LORAZEPAM 1 MG: 2 INJECTION INTRAMUSCULAR; INTRAVENOUS at 18:05

## 2018-04-20 RX ADMIN — Medication 10 ML: at 07:51

## 2018-04-20 RX ADMIN — Medication 100 MG: at 07:50

## 2018-04-20 RX ADMIN — BUMETANIDE 2 MG: 0.25 INJECTION INTRAMUSCULAR; INTRAVENOUS at 10:32

## 2018-04-20 RX ADMIN — LORAZEPAM 1 MG: 2 INJECTION INTRAMUSCULAR; INTRAVENOUS at 08:08

## 2018-04-20 RX ADMIN — METHYLPREDNISOLONE SODIUM SUCCINATE 40 MG: 40 INJECTION, POWDER, FOR SOLUTION INTRAMUSCULAR; INTRAVENOUS at 19:50

## 2018-04-20 RX ADMIN — MORPHINE SULFATE 4 MG: 4 INJECTION INTRAVENOUS at 12:40

## 2018-04-20 RX ADMIN — SODIUM CHLORIDE: 9 INJECTION, SOLUTION INTRAVENOUS at 07:03

## 2018-04-20 RX ADMIN — Medication 10 ML: at 19:50

## 2018-04-20 RX ADMIN — ACETAMINOPHEN 650 MG: 325 TABLET ORAL at 07:50

## 2018-04-20 RX ADMIN — FOLIC ACID 1 MG: 1 TABLET ORAL at 07:50

## 2018-04-20 RX ADMIN — IPRATROPIUM BROMIDE AND ALBUTEROL SULFATE 1 AMPULE: .5; 3 SOLUTION RESPIRATORY (INHALATION) at 13:38

## 2018-04-20 RX ADMIN — METHYLPREDNISOLONE SODIUM SUCCINATE 40 MG: 40 INJECTION, POWDER, FOR SOLUTION INTRAMUSCULAR; INTRAVENOUS at 07:50

## 2018-04-20 RX ADMIN — MORPHINE SULFATE 4 MG: 4 INJECTION INTRAVENOUS at 19:50

## 2018-04-20 RX ADMIN — IPRATROPIUM BROMIDE AND ALBUTEROL SULFATE 1 AMPULE: .5; 3 SOLUTION RESPIRATORY (INHALATION) at 02:24

## 2018-04-20 RX ADMIN — Medication 10 ML: at 21:21

## 2018-04-20 RX ADMIN — ACETAMINOPHEN 650 MG: 650 SUPPOSITORY RECTAL at 21:20

## 2018-04-20 RX ADMIN — IPRATROPIUM BROMIDE AND ALBUTEROL SULFATE 1 AMPULE: .5; 3 SOLUTION RESPIRATORY (INHALATION) at 10:08

## 2018-04-20 RX ADMIN — ENOXAPARIN SODIUM 40 MG: 100 INJECTION SUBCUTANEOUS at 07:50

## 2018-04-20 RX ADMIN — IPRATROPIUM BROMIDE AND ALBUTEROL SULFATE 1 AMPULE: .5; 3 SOLUTION RESPIRATORY (INHALATION) at 18:50

## 2018-04-20 RX ADMIN — PANTOPRAZOLE SODIUM 40 MG: 40 INJECTION, POWDER, FOR SOLUTION INTRAVENOUS at 07:51

## 2018-04-20 RX ADMIN — LORAZEPAM 1 MG: 2 INJECTION INTRAMUSCULAR; INTRAVENOUS at 21:21

## 2018-04-20 RX ADMIN — MORPHINE SULFATE 4 MG: 4 INJECTION INTRAVENOUS at 08:08

## 2018-04-20 RX ADMIN — LORAZEPAM 1 MG: 2 INJECTION INTRAMUSCULAR; INTRAVENOUS at 01:10

## 2018-04-20 RX ADMIN — MORPHINE SULFATE 4 MG: 4 INJECTION INTRAVENOUS at 16:20

## 2018-04-20 RX ADMIN — BUMETANIDE 2 MG: 0.25 INJECTION INTRAMUSCULAR; INTRAVENOUS at 19:50

## 2018-04-20 RX ADMIN — IPRATROPIUM BROMIDE AND ALBUTEROL SULFATE 1 AMPULE: .5; 3 SOLUTION RESPIRATORY (INHALATION) at 22:14

## 2018-04-20 ASSESSMENT — PAIN DESCRIPTION - ORIENTATION
ORIENTATION: RIGHT;LEFT;MID

## 2018-04-20 ASSESSMENT — PAIN DESCRIPTION - LOCATION
LOCATION: CHEST

## 2018-04-20 ASSESSMENT — ENCOUNTER SYMPTOMS
NAUSEA: 0
SHORTNESS OF BREATH: 1
CHOKING: 0
CHEST TIGHTNESS: 0
WHEEZING: 1
ABDOMINAL PAIN: 0
COUGH: 1
TROUBLE SWALLOWING: 0

## 2018-04-20 ASSESSMENT — PAIN DESCRIPTION - DESCRIPTORS
DESCRIPTORS: ACHING

## 2018-04-20 ASSESSMENT — PAIN SCALES - GENERAL
PAINLEVEL_OUTOF10: 8
PAINLEVEL_OUTOF10: 0
PAINLEVEL_OUTOF10: 2
PAINLEVEL_OUTOF10: 8
PAINLEVEL_OUTOF10: 3
PAINLEVEL_OUTOF10: 8
PAINLEVEL_OUTOF10: 0
PAINLEVEL_OUTOF10: 0
PAINLEVEL_OUTOF10: 6
PAINLEVEL_OUTOF10: 8

## 2018-04-20 ASSESSMENT — PAIN DESCRIPTION - PAIN TYPE
TYPE: ACUTE PAIN

## 2018-04-20 ASSESSMENT — PAIN SCALES - WONG BAKER
WONGBAKER_NUMERICALRESPONSE: 0
WONGBAKER_NUMERICALRESPONSE: 0

## 2018-04-21 ENCOUNTER — APPOINTMENT (OUTPATIENT)
Dept: GENERAL RADIOLOGY | Age: 68
DRG: 981 | End: 2018-04-21
Payer: MEDICARE

## 2018-04-21 LAB
ALBUMIN SERPL-MCNC: 3.4 G/DL (ref 3.5–5.2)
ALP BLD-CCNC: 69 U/L (ref 40–130)
ALPHA FETOPROTEIN: 3.1 NG/ML (ref 0–8.3)
ALT SERPL-CCNC: 137 U/L (ref 5–41)
ANION GAP SERPL CALCULATED.3IONS-SCNC: 14 MMOL/L (ref 7–19)
AST SERPL-CCNC: 45 U/L (ref 5–40)
BANDED NEUTROPHILS RELATIVE PERCENT: 10 % (ref 0–5)
BASE EXCESS ARTERIAL: 11.6 MMOL/L (ref -2–2)
BASE EXCESS ARTERIAL: 9.3 MMOL/L (ref -2–2)
BASOPHILS ABSOLUTE: 0 K/UL (ref 0–0.2)
BASOPHILS MANUAL: 0 %
BASOPHILS RELATIVE PERCENT: 0 % (ref 0–1)
BILIRUB SERPL-MCNC: 2.4 MG/DL (ref 0.2–1.2)
BUN BLDV-MCNC: 22 MG/DL (ref 8–23)
CALCIUM SERPL-MCNC: 8.3 MG/DL (ref 8.8–10.2)
CARBOXYHEMOGLOBIN ARTERIAL: 2 % (ref 0–5)
CARBOXYHEMOGLOBIN ARTERIAL: 2.1 % (ref 0–5)
CHLORIDE BLD-SCNC: 103 MMOL/L (ref 98–111)
CO2: 33 MMOL/L (ref 22–29)
CREAT SERPL-MCNC: 0.7 MG/DL (ref 0.5–1.2)
EKG P AXIS: NORMAL DEGREES
EKG P-R INTERVAL: NORMAL MS
EKG Q-T INTERVAL: 324 MS
EKG QRS DURATION: 74 MS
EKG QTC CALCULATION (BAZETT): 385 MS
EKG T AXIS: 57 DEGREES
EOSINOPHILS ABSOLUTE: 0 K/UL (ref 0–0.6)
EOSINOPHILS RELATIVE PERCENT: 0 % (ref 0–5)
GFR NON-AFRICAN AMERICAN: >60
GLUCOSE BLD-MCNC: 145 MG/DL (ref 74–109)
HCO3 ARTERIAL: 41.8 MMOL/L (ref 22–26)
HCO3 ARTERIAL: 44.2 MMOL/L (ref 22–26)
HCT VFR BLD CALC: 58.2 % (ref 42–52)
HEMOGLOBIN, ART, EXTENDED: 18.4 G/DL (ref 14–18)
HEMOGLOBIN, ART, EXTENDED: 18.6 G/DL (ref 14–18)
HEMOGLOBIN: 17.7 G/DL (ref 14–18)
HYPOCHROMIA: ABNORMAL
LYMPHOCYTES ABSOLUTE: 0.3 K/UL (ref 1.1–4.5)
LYMPHOCYTES RELATIVE PERCENT: 1 % (ref 20–40)
MACROCYTES: ABNORMAL
MAGNESIUM: 2.3 MG/DL (ref 1.6–2.4)
MCH RBC QN AUTO: 30.7 PG (ref 27–31)
MCHC RBC AUTO-ENTMCNC: 30.4 G/DL (ref 33–37)
MCV RBC AUTO: 101 FL (ref 80–94)
METHEMOGLOBIN ARTERIAL: 1.6 %
METHEMOGLOBIN ARTERIAL: 1.7 %
MONOCYTES ABSOLUTE: 1.9 K/UL (ref 0–0.9)
MONOCYTES RELATIVE PERCENT: 7 % (ref 0–10)
NEUTROPHILS ABSOLUTE: 25.1 K/UL (ref 1.5–7.5)
NEUTROPHILS MANUAL: 82 %
NEUTROPHILS RELATIVE PERCENT: 82 % (ref 50–65)
O2 CONTENT ARTERIAL: 23.4 ML/DL
O2 CONTENT ARTERIAL: 23.6 ML/DL
O2 SAT, ARTERIAL: 90.6 %
O2 SAT, ARTERIAL: 90.6 %
O2 THERAPY: ABNORMAL
O2 THERAPY: ABNORMAL
PCO2 ARTERIAL: 91 MMHG (ref 35–45)
PCO2 ARTERIAL: 92 MMHG (ref 35–45)
PDW BLD-RTO: 15.5 % (ref 11.5–14.5)
PH ARTERIAL: 7.27 (ref 7.35–7.45)
PH ARTERIAL: 7.29 (ref 7.35–7.45)
PHOSPHORUS: 4.9 MG/DL (ref 2.5–4.5)
PLATELET # BLD: 107 K/UL (ref 130–400)
PLATELET SLIDE REVIEW: ADEQUATE
PMV BLD AUTO: 12 FL (ref 9.4–12.4)
PO2 ARTERIAL: 70 MMHG (ref 80–100)
PO2 ARTERIAL: 71 MMHG (ref 80–100)
POTASSIUM SERPL-SCNC: 2.5 MMOL/L (ref 3.5–5)
POTASSIUM SERPL-SCNC: 3.3 MMOL/L (ref 3.5–5)
POTASSIUM, WHOLE BLOOD: 3.4
POTASSIUM, WHOLE BLOOD: 3.4
RBC # BLD: 5.76 M/UL (ref 4.7–6.1)
SODIUM BLD-SCNC: 150 MMOL/L (ref 136–145)
TOTAL PROTEIN: 6.7 G/DL (ref 6.6–8.7)
TOXIC GRANULATION: ABNORMAL
VACUOLATED NEUTROPHILS: ABNORMAL
WBC # BLD: 27.3 K/UL (ref 4.8–10.8)

## 2018-04-21 PROCEDURE — 84132 ASSAY OF SERUM POTASSIUM: CPT

## 2018-04-21 PROCEDURE — 80053 COMPREHEN METABOLIC PANEL: CPT

## 2018-04-21 PROCEDURE — 94640 AIRWAY INHALATION TREATMENT: CPT

## 2018-04-21 PROCEDURE — 6360000002 HC RX W HCPCS: Performed by: FAMILY MEDICINE

## 2018-04-21 PROCEDURE — 6370000000 HC RX 637 (ALT 250 FOR IP): Performed by: INTERNAL MEDICINE

## 2018-04-21 PROCEDURE — 85025 COMPLETE CBC W/AUTO DIFF WBC: CPT

## 2018-04-21 PROCEDURE — 6360000002 HC RX W HCPCS: Performed by: INTERNAL MEDICINE

## 2018-04-21 PROCEDURE — 6370000000 HC RX 637 (ALT 250 FOR IP): Performed by: CLINICAL NURSE SPECIALIST

## 2018-04-21 PROCEDURE — 2580000003 HC RX 258: Performed by: INTERNAL MEDICINE

## 2018-04-21 PROCEDURE — 6370000000 HC RX 637 (ALT 250 FOR IP): Performed by: FAMILY MEDICINE

## 2018-04-21 PROCEDURE — 2500000003 HC RX 250 WO HCPCS: Performed by: FAMILY MEDICINE

## 2018-04-21 PROCEDURE — 71045 X-RAY EXAM CHEST 1 VIEW: CPT

## 2018-04-21 PROCEDURE — 6360000002 HC RX W HCPCS: Performed by: HOSPITALIST

## 2018-04-21 PROCEDURE — 36415 COLL VENOUS BLD VENIPUNCTURE: CPT

## 2018-04-21 PROCEDURE — 2700000000 HC OXYGEN THERAPY PER DAY

## 2018-04-21 PROCEDURE — 84100 ASSAY OF PHOSPHORUS: CPT

## 2018-04-21 PROCEDURE — 94660 CPAP INITIATION&MGMT: CPT

## 2018-04-21 PROCEDURE — 36600 WITHDRAWAL OF ARTERIAL BLOOD: CPT

## 2018-04-21 PROCEDURE — 6360000002 HC RX W HCPCS: Performed by: CLINICAL NURSE SPECIALIST

## 2018-04-21 PROCEDURE — 82803 BLOOD GASES ANY COMBINATION: CPT

## 2018-04-21 PROCEDURE — 2000000000 HC ICU R&B

## 2018-04-21 PROCEDURE — 99222 1ST HOSP IP/OBS MODERATE 55: CPT | Performed by: INTERNAL MEDICINE

## 2018-04-21 PROCEDURE — 6360000002 HC RX W HCPCS: Performed by: NURSE PRACTITIONER

## 2018-04-21 PROCEDURE — 99291 CRITICAL CARE FIRST HOUR: CPT | Performed by: FAMILY MEDICINE

## 2018-04-21 PROCEDURE — 2580000003 HC RX 258: Performed by: FAMILY MEDICINE

## 2018-04-21 PROCEDURE — 82105 ALPHA-FETOPROTEIN SERUM: CPT

## 2018-04-21 PROCEDURE — 83735 ASSAY OF MAGNESIUM: CPT

## 2018-04-21 RX ORDER — SODIUM PHOSPHATE, DIBASIC AND SODIUM PHOSPHATE, MONOBASIC 7; 19 G/133ML; G/133ML
1 ENEMA RECTAL ONCE
Status: COMPLETED | OUTPATIENT
Start: 2018-04-21 | End: 2018-04-21

## 2018-04-21 RX ORDER — LEVOFLOXACIN 5 MG/ML
500 INJECTION, SOLUTION INTRAVENOUS EVERY 24 HOURS
Status: DISCONTINUED | OUTPATIENT
Start: 2018-04-21 | End: 2018-04-23

## 2018-04-21 RX ORDER — POTASSIUM BICARBONATE 25 MEQ/1
50 TABLET, EFFERVESCENT ORAL ONCE
Status: COMPLETED | OUTPATIENT
Start: 2018-04-21 | End: 2018-04-21

## 2018-04-21 RX ADMIN — LORAZEPAM 1 MG: 2 INJECTION INTRAMUSCULAR; INTRAVENOUS at 07:19

## 2018-04-21 RX ADMIN — IPRATROPIUM BROMIDE AND ALBUTEROL SULFATE 1 AMPULE: .5; 3 SOLUTION RESPIRATORY (INHALATION) at 14:26

## 2018-04-21 RX ADMIN — POTASSIUM CHLORIDE 10 MEQ: 10 INJECTION, SOLUTION INTRAVENOUS at 07:01

## 2018-04-21 RX ADMIN — POTASSIUM CHLORIDE 10 MEQ: 10 INJECTION, SOLUTION INTRAVENOUS at 23:51

## 2018-04-21 RX ADMIN — METHYLPREDNISOLONE SODIUM SUCCINATE 40 MG: 40 INJECTION, POWDER, FOR SOLUTION INTRAMUSCULAR; INTRAVENOUS at 07:17

## 2018-04-21 RX ADMIN — LEVOFLOXACIN 500 MG: 5 INJECTION, SOLUTION INTRAVENOUS at 10:51

## 2018-04-21 RX ADMIN — IPRATROPIUM BROMIDE AND ALBUTEROL SULFATE 1 AMPULE: .5; 3 SOLUTION RESPIRATORY (INHALATION) at 06:11

## 2018-04-21 RX ADMIN — Medication 2 G: at 22:23

## 2018-04-21 RX ADMIN — MORPHINE SULFATE 4 MG: 4 INJECTION INTRAVENOUS at 04:37

## 2018-04-21 RX ADMIN — VANCOMYCIN HYDROCHLORIDE 1750 MG: 10 INJECTION, POWDER, LYOPHILIZED, FOR SOLUTION INTRAVENOUS at 15:00

## 2018-04-21 RX ADMIN — POTASSIUM BICARBONATE 50 MEQ: 25 TABLET, EFFERVESCENT ORAL at 23:42

## 2018-04-21 RX ADMIN — BUMETANIDE 2 MG: 0.25 INJECTION INTRAMUSCULAR; INTRAVENOUS at 20:22

## 2018-04-21 RX ADMIN — BUMETANIDE 2 MG: 0.25 INJECTION INTRAMUSCULAR; INTRAVENOUS at 07:17

## 2018-04-21 RX ADMIN — IPRATROPIUM BROMIDE AND ALBUTEROL SULFATE 1 AMPULE: .5; 3 SOLUTION RESPIRATORY (INHALATION) at 01:32

## 2018-04-21 RX ADMIN — SODIUM PHOSPHATE 1 ENEMA: 7; 19 ENEMA RECTAL at 16:09

## 2018-04-21 RX ADMIN — IPRATROPIUM BROMIDE AND ALBUTEROL SULFATE 1 AMPULE: .5; 3 SOLUTION RESPIRATORY (INHALATION) at 22:16

## 2018-04-21 RX ADMIN — Medication 10 ML: at 20:22

## 2018-04-21 RX ADMIN — METHYLPREDNISOLONE SODIUM SUCCINATE 40 MG: 40 INJECTION, POWDER, FOR SOLUTION INTRAMUSCULAR; INTRAVENOUS at 20:22

## 2018-04-21 RX ADMIN — POTASSIUM CHLORIDE 10 MEQ: 10 INJECTION, SOLUTION INTRAVENOUS at 05:44

## 2018-04-21 RX ADMIN — IPRATROPIUM BROMIDE AND ALBUTEROL SULFATE 1 AMPULE: .5; 3 SOLUTION RESPIRATORY (INHALATION) at 10:25

## 2018-04-21 RX ADMIN — ENOXAPARIN SODIUM 40 MG: 100 INJECTION SUBCUTANEOUS at 07:17

## 2018-04-21 RX ADMIN — POTASSIUM CHLORIDE 10 MEQ: 10 INJECTION, SOLUTION INTRAVENOUS at 22:49

## 2018-04-21 RX ADMIN — IPRATROPIUM BROMIDE AND ALBUTEROL SULFATE 1 AMPULE: .5; 3 SOLUTION RESPIRATORY (INHALATION) at 18:40

## 2018-04-21 RX ADMIN — POTASSIUM CHLORIDE 10 MEQ: 10 INJECTION, SOLUTION INTRAVENOUS at 08:25

## 2018-04-21 RX ADMIN — Medication 10 ML: at 07:17

## 2018-04-21 RX ADMIN — POTASSIUM CHLORIDE 10 MEQ: 10 INJECTION, SOLUTION INTRAVENOUS at 04:37

## 2018-04-21 RX ADMIN — Medication 2 G: at 10:00

## 2018-04-21 RX ADMIN — Medication 10 ML: at 04:37

## 2018-04-21 ASSESSMENT — ENCOUNTER SYMPTOMS
CHOKING: 0
SHORTNESS OF BREATH: 1
COUGH: 1
CHEST TIGHTNESS: 0
NAUSEA: 0
ABDOMINAL PAIN: 0
TROUBLE SWALLOWING: 0
WHEEZING: 1

## 2018-04-21 ASSESSMENT — PAIN SCALES - WONG BAKER
WONGBAKER_NUMERICALRESPONSE: 0

## 2018-04-21 ASSESSMENT — PAIN SCALES - GENERAL
PAINLEVEL_OUTOF10: 6
PAINLEVEL_OUTOF10: 0

## 2018-04-22 ENCOUNTER — APPOINTMENT (OUTPATIENT)
Dept: ULTRASOUND IMAGING | Age: 68
DRG: 981 | End: 2018-04-22
Payer: MEDICARE

## 2018-04-22 PROBLEM — B95.62 MRSA BACTEREMIA: Status: ACTIVE | Noted: 2018-04-22

## 2018-04-22 PROBLEM — R78.81 MRSA BACTEREMIA: Status: ACTIVE | Noted: 2018-04-22

## 2018-04-22 LAB
ALBUMIN SERPL-MCNC: 3.2 G/DL (ref 3.5–5.2)
ALP BLD-CCNC: 66 U/L (ref 40–130)
ALT SERPL-CCNC: 86 U/L (ref 5–41)
ANION GAP SERPL CALCULATED.3IONS-SCNC: 11 MMOL/L (ref 7–19)
AST SERPL-CCNC: 38 U/L (ref 5–40)
BASOPHILS ABSOLUTE: 0 K/UL (ref 0–0.2)
BASOPHILS RELATIVE PERCENT: 0.3 % (ref 0–1)
BILIRUB SERPL-MCNC: 1.7 MG/DL (ref 0.2–1.2)
BUN BLDV-MCNC: 24 MG/DL (ref 8–23)
CALCIUM SERPL-MCNC: 8.7 MG/DL (ref 8.8–10.2)
CHLORIDE BLD-SCNC: 94 MMOL/L (ref 98–111)
CO2: 45 MMOL/L (ref 22–29)
CREAT SERPL-MCNC: 0.6 MG/DL (ref 0.5–1.2)
EOSINOPHILS ABSOLUTE: 0 K/UL (ref 0–0.6)
EOSINOPHILS RELATIVE PERCENT: 0 % (ref 0–5)
FERRITIN: 716.5 NG/ML (ref 30–400)
GFR NON-AFRICAN AMERICAN: >60
GLUCOSE BLD-MCNC: 219 MG/DL (ref 74–109)
HCT VFR BLD CALC: 60.1 % (ref 42–52)
HEMOGLOBIN: 18.1 G/DL (ref 14–18)
INR BLD: 1.19 (ref 0.88–1.18)
LYMPHOCYTES ABSOLUTE: 0.5 K/UL (ref 1.1–4.5)
LYMPHOCYTES RELATIVE PERCENT: 3.2 % (ref 20–40)
MAGNESIUM: 2.2 MG/DL (ref 1.6–2.4)
MCH RBC QN AUTO: 30.5 PG (ref 27–31)
MCHC RBC AUTO-ENTMCNC: 30.1 G/DL (ref 33–37)
MCV RBC AUTO: 101.2 FL (ref 80–94)
MONOCYTES ABSOLUTE: 1.5 K/UL (ref 0–0.9)
MONOCYTES RELATIVE PERCENT: 10.1 % (ref 0–10)
NEUTROPHILS ABSOLUTE: 12.2 K/UL (ref 1.5–7.5)
NEUTROPHILS RELATIVE PERCENT: 85.2 % (ref 50–65)
PDW BLD-RTO: 15.2 % (ref 11.5–14.5)
PHOSPHORUS: 1.7 MG/DL (ref 2.5–4.5)
PLATELET # BLD: 100 K/UL (ref 130–400)
PMV BLD AUTO: 12 FL (ref 9.4–12.4)
POTASSIUM SERPL-SCNC: 2.9 MMOL/L (ref 3.5–5)
POTASSIUM SERPL-SCNC: 3.4 MMOL/L (ref 3.5–5)
PROTHROMBIN TIME: 15 SEC (ref 12–14.6)
RBC # BLD: 5.94 M/UL (ref 4.7–6.1)
SODIUM BLD-SCNC: 150 MMOL/L (ref 136–145)
TOTAL PROTEIN: 7 G/DL (ref 6.6–8.7)
URINE CULTURE, ROUTINE: NORMAL
WBC # BLD: 14.3 K/UL (ref 4.8–10.8)

## 2018-04-22 PROCEDURE — 80074 ACUTE HEPATITIS PANEL: CPT

## 2018-04-22 PROCEDURE — 0DJ6XZZ INSPECTION OF STOMACH, EXTERNAL APPROACH: ICD-10-PCS | Performed by: RADIOLOGY

## 2018-04-22 PROCEDURE — 6360000002 HC RX W HCPCS: Performed by: FAMILY MEDICINE

## 2018-04-22 PROCEDURE — 6360000002 HC RX W HCPCS: Performed by: NURSE PRACTITIONER

## 2018-04-22 PROCEDURE — 2500000003 HC RX 250 WO HCPCS: Performed by: FAMILY MEDICINE

## 2018-04-22 PROCEDURE — 83735 ASSAY OF MAGNESIUM: CPT

## 2018-04-22 PROCEDURE — 2580000003 HC RX 258: Performed by: INTERNAL MEDICINE

## 2018-04-22 PROCEDURE — 6370000000 HC RX 637 (ALT 250 FOR IP): Performed by: INTERNAL MEDICINE

## 2018-04-22 PROCEDURE — 94660 CPAP INITIATION&MGMT: CPT

## 2018-04-22 PROCEDURE — 87040 BLOOD CULTURE FOR BACTERIA: CPT

## 2018-04-22 PROCEDURE — 82728 ASSAY OF FERRITIN: CPT

## 2018-04-22 PROCEDURE — 80053 COMPREHEN METABOLIC PANEL: CPT

## 2018-04-22 PROCEDURE — 94640 AIRWAY INHALATION TREATMENT: CPT

## 2018-04-22 PROCEDURE — 99232 SBSQ HOSP IP/OBS MODERATE 35: CPT | Performed by: INTERNAL MEDICINE

## 2018-04-22 PROCEDURE — 85610 PROTHROMBIN TIME: CPT

## 2018-04-22 PROCEDURE — 6370000000 HC RX 637 (ALT 250 FOR IP): Performed by: FAMILY MEDICINE

## 2018-04-22 PROCEDURE — 1210000000 HC MED SURG R&B

## 2018-04-22 PROCEDURE — 2700000000 HC OXYGEN THERAPY PER DAY

## 2018-04-22 PROCEDURE — 6360000002 HC RX W HCPCS: Performed by: INTERNAL MEDICINE

## 2018-04-22 PROCEDURE — 49083 ABD PARACENTESIS W/IMAGING: CPT

## 2018-04-22 PROCEDURE — 85025 COMPLETE CBC W/AUTO DIFF WBC: CPT

## 2018-04-22 PROCEDURE — 2580000003 HC RX 258: Performed by: FAMILY MEDICINE

## 2018-04-22 PROCEDURE — 84100 ASSAY OF PHOSPHORUS: CPT

## 2018-04-22 PROCEDURE — 84132 ASSAY OF SERUM POTASSIUM: CPT

## 2018-04-22 PROCEDURE — 36415 COLL VENOUS BLD VENIPUNCTURE: CPT

## 2018-04-22 PROCEDURE — 99291 CRITICAL CARE FIRST HOUR: CPT | Performed by: FAMILY MEDICINE

## 2018-04-22 RX ORDER — SODIUM CHLORIDE 0.9 % (FLUSH) 0.9 %
10 SYRINGE (ML) INJECTION PRN
Status: DISCONTINUED | OUTPATIENT
Start: 2018-04-23 | End: 2018-04-29 | Stop reason: HOSPADM

## 2018-04-22 RX ORDER — SODIUM CHLORIDE 0.9 % (FLUSH) 0.9 %
10 SYRINGE (ML) INJECTION EVERY 12 HOURS SCHEDULED
Status: DISCONTINUED | OUTPATIENT
Start: 2018-04-23 | End: 2018-04-29 | Stop reason: HOSPADM

## 2018-04-22 RX ORDER — LIDOCAINE HYDROCHLORIDE 10 MG/ML
5 INJECTION, SOLUTION EPIDURAL; INFILTRATION; INTRACAUDAL; PERINEURAL ONCE
Status: DISCONTINUED | OUTPATIENT
Start: 2018-04-23 | End: 2018-04-29 | Stop reason: HOSPADM

## 2018-04-22 RX ADMIN — FOLIC ACID 1 MG: 1 TABLET ORAL at 07:32

## 2018-04-22 RX ADMIN — ENOXAPARIN SODIUM 40 MG: 100 INJECTION SUBCUTANEOUS at 07:32

## 2018-04-22 RX ADMIN — POTASSIUM & SODIUM PHOSPHATES POWDER PACK 280-160-250 MG 250 MG: 280-160-250 PACK at 15:35

## 2018-04-22 RX ADMIN — IPRATROPIUM BROMIDE AND ALBUTEROL SULFATE 1 AMPULE: .5; 3 SOLUTION RESPIRATORY (INHALATION) at 06:36

## 2018-04-22 RX ADMIN — Medication 100 MG: at 07:32

## 2018-04-22 RX ADMIN — METHYLPREDNISOLONE SODIUM SUCCINATE 40 MG: 40 INJECTION, POWDER, FOR SOLUTION INTRAMUSCULAR; INTRAVENOUS at 07:31

## 2018-04-22 RX ADMIN — POTASSIUM CHLORIDE 10 MEQ: 10 INJECTION, SOLUTION INTRAVENOUS at 07:31

## 2018-04-22 RX ADMIN — PANTOPRAZOLE SODIUM 40 MG: 40 TABLET, DELAYED RELEASE ORAL at 07:32

## 2018-04-22 RX ADMIN — POTASSIUM CHLORIDE 10 MEQ: 10 INJECTION, SOLUTION INTRAVENOUS at 01:40

## 2018-04-22 RX ADMIN — IPRATROPIUM BROMIDE AND ALBUTEROL SULFATE 1 AMPULE: .5; 3 SOLUTION RESPIRATORY (INHALATION) at 02:03

## 2018-04-22 RX ADMIN — POTASSIUM & SODIUM PHOSPHATES POWDER PACK 280-160-250 MG 250 MG: 280-160-250 PACK at 12:01

## 2018-04-22 RX ADMIN — POTASSIUM CHLORIDE 10 MEQ: 10 INJECTION, SOLUTION INTRAVENOUS at 14:52

## 2018-04-22 RX ADMIN — IPRATROPIUM BROMIDE AND ALBUTEROL SULFATE 1 AMPULE: .5; 3 SOLUTION RESPIRATORY (INHALATION) at 22:50

## 2018-04-22 RX ADMIN — POTASSIUM & SODIUM PHOSPHATES POWDER PACK 280-160-250 MG 250 MG: 280-160-250 PACK at 20:30

## 2018-04-22 RX ADMIN — VANCOMYCIN HYDROCHLORIDE 1750 MG: 10 INJECTION, POWDER, LYOPHILIZED, FOR SOLUTION INTRAVENOUS at 15:30

## 2018-04-22 RX ADMIN — IPRATROPIUM BROMIDE AND ALBUTEROL SULFATE 1 AMPULE: .5; 3 SOLUTION RESPIRATORY (INHALATION) at 14:17

## 2018-04-22 RX ADMIN — BUMETANIDE 2 MG: 0.25 INJECTION INTRAMUSCULAR; INTRAVENOUS at 07:32

## 2018-04-22 RX ADMIN — LEVOFLOXACIN 500 MG: 5 INJECTION, SOLUTION INTRAVENOUS at 11:00

## 2018-04-22 RX ADMIN — Medication 2 G: at 09:22

## 2018-04-22 RX ADMIN — VANCOMYCIN HYDROCHLORIDE 1750 MG: 10 INJECTION, POWDER, LYOPHILIZED, FOR SOLUTION INTRAVENOUS at 02:50

## 2018-04-22 RX ADMIN — IPRATROPIUM BROMIDE AND ALBUTEROL SULFATE 1 AMPULE: .5; 3 SOLUTION RESPIRATORY (INHALATION) at 18:54

## 2018-04-22 RX ADMIN — Medication 10 ML: at 07:34

## 2018-04-22 RX ADMIN — POTASSIUM CHLORIDE 10 MEQ: 10 INJECTION, SOLUTION INTRAVENOUS at 12:01

## 2018-04-22 RX ADMIN — IPRATROPIUM BROMIDE AND ALBUTEROL SULFATE 1 AMPULE: .5; 3 SOLUTION RESPIRATORY (INHALATION) at 10:31

## 2018-04-22 RX ADMIN — BUMETANIDE 2 MG: 0.25 INJECTION INTRAMUSCULAR; INTRAVENOUS at 20:29

## 2018-04-22 RX ADMIN — POTASSIUM CHLORIDE 10 MEQ: 10 INJECTION, SOLUTION INTRAVENOUS at 04:37

## 2018-04-22 RX ADMIN — METHYLPREDNISOLONE SODIUM SUCCINATE 40 MG: 40 INJECTION, POWDER, FOR SOLUTION INTRAMUSCULAR; INTRAVENOUS at 20:30

## 2018-04-22 RX ADMIN — POTASSIUM CHLORIDE 10 MEQ: 10 INJECTION, SOLUTION INTRAVENOUS at 02:50

## 2018-04-22 RX ADMIN — POTASSIUM CHLORIDE 10 MEQ: 10 INJECTION, SOLUTION INTRAVENOUS at 09:22

## 2018-04-22 RX ADMIN — Medication 2 G: at 20:30

## 2018-04-22 ASSESSMENT — ENCOUNTER SYMPTOMS
SHORTNESS OF BREATH: 1
CHOKING: 0
NAUSEA: 0
COUGH: 1
WHEEZING: 1
TROUBLE SWALLOWING: 0
CHEST TIGHTNESS: 0
ABDOMINAL PAIN: 0

## 2018-04-23 LAB
ALBUMIN SERPL-MCNC: 3.2 G/DL (ref 3.5–5.2)
ALP BLD-CCNC: 70 U/L (ref 40–130)
ALT SERPL-CCNC: 68 U/L (ref 5–41)
ANION GAP SERPL CALCULATED.3IONS-SCNC: 12 MMOL/L (ref 7–19)
AST SERPL-CCNC: 32 U/L (ref 5–40)
BASOPHILS ABSOLUTE: 0 K/UL (ref 0–0.2)
BASOPHILS RELATIVE PERCENT: 0.2 % (ref 0–1)
BILIRUB SERPL-MCNC: 1.8 MG/DL (ref 0.2–1.2)
BLOOD CULTURE, ROUTINE: ABNORMAL
BLOOD CULTURE, ROUTINE: ABNORMAL
BUN BLDV-MCNC: 26 MG/DL (ref 8–23)
CALCIUM SERPL-MCNC: 9.1 MG/DL (ref 8.8–10.2)
CHLORIDE BLD-SCNC: 92 MMOL/L (ref 98–111)
CO2: 44 MMOL/L (ref 22–29)
CREAT SERPL-MCNC: 0.6 MG/DL (ref 0.5–1.2)
CULTURE, BLOOD 2: ABNORMAL
CULTURE, BLOOD 2: ABNORMAL
EOSINOPHILS ABSOLUTE: 0 K/UL (ref 0–0.6)
EOSINOPHILS RELATIVE PERCENT: 0 % (ref 0–5)
GFR NON-AFRICAN AMERICAN: >60
GLUCOSE BLD-MCNC: 194 MG/DL (ref 74–109)
HAV IGM SER IA-ACNC: NORMAL
HCT VFR BLD CALC: 60.3 % (ref 42–52)
HEMOGLOBIN: 18.7 G/DL (ref 14–18)
HEPATITIS B CORE IGM ANTIBODY: NORMAL
HEPATITIS B SURFACE ANTIGEN INTERPRETATION: NORMAL
HEPATITIS C ANTIBODY INTERPRETATION: NORMAL
LV EF: 58 %
LVEF MODALITY: NORMAL
LYMPHOCYTES ABSOLUTE: 0.5 K/UL (ref 1.1–4.5)
LYMPHOCYTES RELATIVE PERCENT: 3.2 % (ref 20–40)
MAGNESIUM: 2.1 MG/DL (ref 1.6–2.4)
MCH RBC QN AUTO: 30.4 PG (ref 27–31)
MCHC RBC AUTO-ENTMCNC: 31 G/DL (ref 33–37)
MCV RBC AUTO: 98 FL (ref 80–94)
MONOCYTES ABSOLUTE: 1.4 K/UL (ref 0–0.9)
MONOCYTES RELATIVE PERCENT: 8.8 % (ref 0–10)
NEUTROPHILS ABSOLUTE: 14.2 K/UL (ref 1.5–7.5)
NEUTROPHILS RELATIVE PERCENT: 86.7 % (ref 50–65)
ORGANISM: ABNORMAL
ORGANISM: ABNORMAL
PDW BLD-RTO: 15.8 % (ref 11.5–14.5)
PHOSPHORUS: 2 MG/DL (ref 2.5–4.5)
PLATELET # BLD: 120 K/UL (ref 130–400)
PMV BLD AUTO: 12.6 FL (ref 9.4–12.4)
POTASSIUM SERPL-SCNC: 3.2 MMOL/L (ref 3.5–5)
RBC # BLD: 6.15 M/UL (ref 4.7–6.1)
SODIUM BLD-SCNC: 148 MMOL/L (ref 136–145)
TOTAL PROTEIN: 6.8 G/DL (ref 6.6–8.7)
VANCOMYCIN TROUGH: 12.9 UG/ML (ref 10–20)
WBC # BLD: 16.4 K/UL (ref 4.8–10.8)

## 2018-04-23 PROCEDURE — 94660 CPAP INITIATION&MGMT: CPT

## 2018-04-23 PROCEDURE — 51798 US URINE CAPACITY MEASURE: CPT

## 2018-04-23 PROCEDURE — 80053 COMPREHEN METABOLIC PANEL: CPT

## 2018-04-23 PROCEDURE — 99232 SBSQ HOSP IP/OBS MODERATE 35: CPT | Performed by: INTERNAL MEDICINE

## 2018-04-23 PROCEDURE — 6370000000 HC RX 637 (ALT 250 FOR IP): Performed by: INTERNAL MEDICINE

## 2018-04-23 PROCEDURE — 81256 HFE GENE: CPT

## 2018-04-23 PROCEDURE — 84100 ASSAY OF PHOSPHORUS: CPT

## 2018-04-23 PROCEDURE — 92526 ORAL FUNCTION THERAPY: CPT

## 2018-04-23 PROCEDURE — 6360000002 HC RX W HCPCS: Performed by: INTERNAL MEDICINE

## 2018-04-23 PROCEDURE — 6360000002 HC RX W HCPCS: Performed by: FAMILY MEDICINE

## 2018-04-23 PROCEDURE — G8978 MOBILITY CURRENT STATUS: HCPCS

## 2018-04-23 PROCEDURE — 94664 DEMO&/EVAL PT USE INHALER: CPT

## 2018-04-23 PROCEDURE — 6370000000 HC RX 637 (ALT 250 FOR IP): Performed by: NURSE PRACTITIONER

## 2018-04-23 PROCEDURE — 2500000003 HC RX 250 WO HCPCS: Performed by: FAMILY MEDICINE

## 2018-04-23 PROCEDURE — 85025 COMPLETE CBC W/AUTO DIFF WBC: CPT

## 2018-04-23 PROCEDURE — 2580000003 HC RX 258: Performed by: FAMILY MEDICINE

## 2018-04-23 PROCEDURE — 83735 ASSAY OF MAGNESIUM: CPT

## 2018-04-23 PROCEDURE — 6370000000 HC RX 637 (ALT 250 FOR IP): Performed by: FAMILY MEDICINE

## 2018-04-23 PROCEDURE — 6360000004 HC RX CONTRAST MEDICATION: Performed by: HOSPITALIST

## 2018-04-23 PROCEDURE — 94640 AIRWAY INHALATION TREATMENT: CPT

## 2018-04-23 PROCEDURE — 2700000000 HC OXYGEN THERAPY PER DAY

## 2018-04-23 PROCEDURE — G8979 MOBILITY GOAL STATUS: HCPCS

## 2018-04-23 PROCEDURE — 1210000000 HC MED SURG R&B

## 2018-04-23 PROCEDURE — 36415 COLL VENOUS BLD VENIPUNCTURE: CPT

## 2018-04-23 PROCEDURE — 99233 SBSQ HOSP IP/OBS HIGH 50: CPT | Performed by: HOSPITALIST

## 2018-04-23 PROCEDURE — 97162 PT EVAL MOD COMPLEX 30 MIN: CPT

## 2018-04-23 PROCEDURE — 6360000002 HC RX W HCPCS: Performed by: NURSE PRACTITIONER

## 2018-04-23 PROCEDURE — 80202 ASSAY OF VANCOMYCIN: CPT

## 2018-04-23 PROCEDURE — C8929 TTE W OR WO FOL WCON,DOPPLER: HCPCS

## 2018-04-23 PROCEDURE — 2580000003 HC RX 258: Performed by: HOSPITALIST

## 2018-04-23 PROCEDURE — 87040 BLOOD CULTURE FOR BACTERIA: CPT

## 2018-04-23 RX ORDER — SODIUM CHLORIDE 0.9 % (FLUSH) 0.9 %
10 SYRINGE (ML) INJECTION PRN
Status: ACTIVE | OUTPATIENT
Start: 2018-04-23 | End: 2018-04-24

## 2018-04-23 RX ORDER — GUAIFENESIN 600 MG/1
1200 TABLET, EXTENDED RELEASE ORAL 2 TIMES DAILY
Status: DISCONTINUED | OUTPATIENT
Start: 2018-04-23 | End: 2018-04-29 | Stop reason: HOSPADM

## 2018-04-23 RX ADMIN — BUMETANIDE 2 MG: 0.25 INJECTION INTRAMUSCULAR; INTRAVENOUS at 21:48

## 2018-04-23 RX ADMIN — PERFLUTREN 2.2 MG: 6.52 INJECTION, SUSPENSION INTRAVENOUS at 14:29

## 2018-04-23 RX ADMIN — Medication 10 ML: at 14:28

## 2018-04-23 RX ADMIN — POTASSIUM CHLORIDE 10 MEQ: 10 INJECTION, SOLUTION INTRAVENOUS at 01:59

## 2018-04-23 RX ADMIN — VANCOMYCIN HYDROCHLORIDE 1750 MG: 10 INJECTION, POWDER, LYOPHILIZED, FOR SOLUTION INTRAVENOUS at 04:10

## 2018-04-23 RX ADMIN — POTASSIUM & SODIUM PHOSPHATES POWDER PACK 280-160-250 MG 250 MG: 280-160-250 PACK at 10:06

## 2018-04-23 RX ADMIN — POTASSIUM & SODIUM PHOSPHATES POWDER PACK 280-160-250 MG 250 MG: 280-160-250 PACK at 16:39

## 2018-04-23 RX ADMIN — POTASSIUM CHLORIDE 10 MEQ: 10 INJECTION, SOLUTION INTRAVENOUS at 00:58

## 2018-04-23 RX ADMIN — POTASSIUM CHLORIDE 10 MEQ: 10 INJECTION, SOLUTION INTRAVENOUS at 03:01

## 2018-04-23 RX ADMIN — POTASSIUM & SODIUM PHOSPHATES POWDER PACK 280-160-250 MG 250 MG: 280-160-250 PACK at 13:28

## 2018-04-23 RX ADMIN — GUAIFENESIN 1200 MG: 600 TABLET, EXTENDED RELEASE ORAL at 13:28

## 2018-04-23 RX ADMIN — METHYLPREDNISOLONE SODIUM SUCCINATE 40 MG: 40 INJECTION, POWDER, FOR SOLUTION INTRAMUSCULAR; INTRAVENOUS at 21:47

## 2018-04-23 RX ADMIN — METHYLPREDNISOLONE SODIUM SUCCINATE 40 MG: 40 INJECTION, POWDER, FOR SOLUTION INTRAMUSCULAR; INTRAVENOUS at 10:05

## 2018-04-23 RX ADMIN — GUAIFENESIN 1200 MG: 600 TABLET, EXTENDED RELEASE ORAL at 21:48

## 2018-04-23 RX ADMIN — Medication 10 ML: at 21:48

## 2018-04-23 RX ADMIN — IPRATROPIUM BROMIDE AND ALBUTEROL SULFATE 1 AMPULE: .5; 3 SOLUTION RESPIRATORY (INHALATION) at 18:57

## 2018-04-23 RX ADMIN — Medication 10 ML: at 10:06

## 2018-04-23 RX ADMIN — Medication 100 MG: at 10:05

## 2018-04-23 RX ADMIN — BUMETANIDE 2 MG: 0.25 INJECTION INTRAMUSCULAR; INTRAVENOUS at 10:05

## 2018-04-23 RX ADMIN — IPRATROPIUM BROMIDE AND ALBUTEROL SULFATE 1 AMPULE: .5; 3 SOLUTION RESPIRATORY (INHALATION) at 06:57

## 2018-04-23 RX ADMIN — IPRATROPIUM BROMIDE AND ALBUTEROL SULFATE 1 AMPULE: .5; 3 SOLUTION RESPIRATORY (INHALATION) at 11:19

## 2018-04-23 RX ADMIN — FOLIC ACID 1 MG: 1 TABLET ORAL at 10:05

## 2018-04-23 RX ADMIN — ENOXAPARIN SODIUM 40 MG: 100 INJECTION SUBCUTANEOUS at 10:05

## 2018-04-23 RX ADMIN — IPRATROPIUM BROMIDE AND ALBUTEROL SULFATE 1 AMPULE: .5; 3 SOLUTION RESPIRATORY (INHALATION) at 02:29

## 2018-04-23 RX ADMIN — VANCOMYCIN HYDROCHLORIDE 1750 MG: 10 INJECTION, POWDER, LYOPHILIZED, FOR SOLUTION INTRAVENOUS at 16:39

## 2018-04-23 RX ADMIN — IPRATROPIUM BROMIDE AND ALBUTEROL SULFATE 1 AMPULE: .5; 3 SOLUTION RESPIRATORY (INHALATION) at 15:00

## 2018-04-23 RX ADMIN — IPRATROPIUM BROMIDE AND ALBUTEROL SULFATE 1 AMPULE: .5; 3 SOLUTION RESPIRATORY (INHALATION) at 23:03

## 2018-04-23 RX ADMIN — POTASSIUM & SODIUM PHOSPHATES POWDER PACK 280-160-250 MG 250 MG: 280-160-250 PACK at 21:48

## 2018-04-23 ASSESSMENT — ENCOUNTER SYMPTOMS
SHORTNESS OF BREATH: 1
NAUSEA: 0
ABDOMINAL DISTENTION: 1
ABDOMINAL PAIN: 0
TROUBLE SWALLOWING: 0
CHOKING: 0
CHEST TIGHTNESS: 0
WHEEZING: 1
COUGH: 1

## 2018-04-24 ENCOUNTER — APPOINTMENT (OUTPATIENT)
Dept: GENERAL RADIOLOGY | Age: 68
DRG: 981 | End: 2018-04-24
Payer: MEDICARE

## 2018-04-24 PROBLEM — R53.81 DEBILITY: Status: ACTIVE | Noted: 2018-04-24

## 2018-04-24 LAB
ALBUMIN SERPL-MCNC: 3.5 G/DL (ref 3.5–5.2)
ALP BLD-CCNC: 74 U/L (ref 40–130)
ALT SERPL-CCNC: 58 U/L (ref 5–41)
ANION GAP SERPL CALCULATED.3IONS-SCNC: 12 MMOL/L (ref 7–19)
AST SERPL-CCNC: 24 U/L (ref 5–40)
BASOPHILS ABSOLUTE: 0.1 K/UL (ref 0–0.2)
BASOPHILS RELATIVE PERCENT: 0.3 % (ref 0–1)
BILIRUB SERPL-MCNC: 2.2 MG/DL (ref 0.2–1.2)
BLOOD CULTURE, ROUTINE: ABNORMAL
BLOOD CULTURE, ROUTINE: ABNORMAL
BUN BLDV-MCNC: 29 MG/DL (ref 8–23)
CALCIUM SERPL-MCNC: 8.7 MG/DL (ref 8.8–10.2)
CHLORIDE BLD-SCNC: 91 MMOL/L (ref 98–111)
CO2: 45 MMOL/L (ref 22–29)
CREAT SERPL-MCNC: 0.5 MG/DL (ref 0.5–1.2)
CULTURE, BLOOD 2: ABNORMAL
CULTURE, BLOOD 2: ABNORMAL
EOSINOPHILS ABSOLUTE: 0 K/UL (ref 0–0.6)
EOSINOPHILS RELATIVE PERCENT: 0 % (ref 0–5)
GFR NON-AFRICAN AMERICAN: >60
GLUCOSE BLD-MCNC: 189 MG/DL (ref 74–109)
HCT VFR BLD CALC: 61.9 % (ref 42–52)
HEMOGLOBIN: 19.5 G/DL (ref 14–18)
LYMPHOCYTES ABSOLUTE: 0.8 K/UL (ref 1.1–4.5)
LYMPHOCYTES RELATIVE PERCENT: 4.4 % (ref 20–40)
MAGNESIUM: 2.3 MG/DL (ref 1.6–2.4)
MCH RBC QN AUTO: 30.9 PG (ref 27–31)
MCHC RBC AUTO-ENTMCNC: 31.5 G/DL (ref 33–37)
MCV RBC AUTO: 98.1 FL (ref 80–94)
MONOCYTES ABSOLUTE: 1.2 K/UL (ref 0–0.9)
MONOCYTES RELATIVE PERCENT: 6.6 % (ref 0–10)
NEUTROPHILS ABSOLUTE: 15.8 K/UL (ref 1.5–7.5)
NEUTROPHILS RELATIVE PERCENT: 87.5 % (ref 50–65)
ORGANISM: ABNORMAL
ORGANISM: ABNORMAL
PDW BLD-RTO: 15.3 % (ref 11.5–14.5)
PHOSPHORUS: 3.9 MG/DL (ref 2.5–4.5)
PLATELET # BLD: 127 K/UL (ref 130–400)
PMV BLD AUTO: 12.9 FL (ref 9.4–12.4)
POTASSIUM SERPL-SCNC: 3.1 MMOL/L (ref 3.5–5)
RBC # BLD: 6.31 M/UL (ref 4.7–6.1)
SODIUM BLD-SCNC: 148 MMOL/L (ref 136–145)
TOTAL PROTEIN: 6.5 G/DL (ref 6.6–8.7)
WBC # BLD: 18 K/UL (ref 4.8–10.8)

## 2018-04-24 PROCEDURE — 6370000000 HC RX 637 (ALT 250 FOR IP): Performed by: INTERNAL MEDICINE

## 2018-04-24 PROCEDURE — 36569 INSJ PICC 5 YR+ W/O IMAGING: CPT

## 2018-04-24 PROCEDURE — 2500000003 HC RX 250 WO HCPCS: Performed by: FAMILY MEDICINE

## 2018-04-24 PROCEDURE — 94660 CPAP INITIATION&MGMT: CPT

## 2018-04-24 PROCEDURE — 80053 COMPREHEN METABOLIC PANEL: CPT

## 2018-04-24 PROCEDURE — C1751 CATH, INF, PER/CENT/MIDLINE: HCPCS

## 2018-04-24 PROCEDURE — 6370000000 HC RX 637 (ALT 250 FOR IP): Performed by: NURSE PRACTITIONER

## 2018-04-24 PROCEDURE — 94640 AIRWAY INHALATION TREATMENT: CPT

## 2018-04-24 PROCEDURE — 83735 ASSAY OF MAGNESIUM: CPT

## 2018-04-24 PROCEDURE — 36415 COLL VENOUS BLD VENIPUNCTURE: CPT

## 2018-04-24 PROCEDURE — 6360000002 HC RX W HCPCS: Performed by: FAMILY MEDICINE

## 2018-04-24 PROCEDURE — 92526 ORAL FUNCTION THERAPY: CPT

## 2018-04-24 PROCEDURE — 2700000000 HC OXYGEN THERAPY PER DAY

## 2018-04-24 PROCEDURE — 1210000000 HC MED SURG R&B

## 2018-04-24 PROCEDURE — 6360000002 HC RX W HCPCS: Performed by: INTERNAL MEDICINE

## 2018-04-24 PROCEDURE — G8987 SELF CARE CURRENT STATUS: HCPCS

## 2018-04-24 PROCEDURE — 02HV33Z INSERTION OF INFUSION DEVICE INTO SUPERIOR VENA CAVA, PERCUTANEOUS APPROACH: ICD-10-PCS | Performed by: FAMILY MEDICINE

## 2018-04-24 PROCEDURE — 97166 OT EVAL MOD COMPLEX 45 MIN: CPT

## 2018-04-24 PROCEDURE — 74230 X-RAY XM SWLNG FUNCJ C+: CPT

## 2018-04-24 PROCEDURE — 84100 ASSAY OF PHOSPHORUS: CPT

## 2018-04-24 PROCEDURE — 4A02X4A MEASUREMENT OF CARDIAC ELECTRICAL ACTIVITY, GUIDANCE, EXTERNAL APPROACH: ICD-10-PCS | Performed by: FAMILY MEDICINE

## 2018-04-24 PROCEDURE — 6370000000 HC RX 637 (ALT 250 FOR IP): Performed by: HOSPITALIST

## 2018-04-24 PROCEDURE — 2580000003 HC RX 258: Performed by: FAMILY MEDICINE

## 2018-04-24 PROCEDURE — 92611 MOTION FLUOROSCOPY/SWALLOW: CPT

## 2018-04-24 PROCEDURE — 99233 SBSQ HOSP IP/OBS HIGH 50: CPT | Performed by: HOSPITALIST

## 2018-04-24 PROCEDURE — 2580000003 HC RX 258: Performed by: HOSPITALIST

## 2018-04-24 PROCEDURE — 6370000000 HC RX 637 (ALT 250 FOR IP): Performed by: FAMILY MEDICINE

## 2018-04-24 PROCEDURE — G8988 SELF CARE GOAL STATUS: HCPCS

## 2018-04-24 PROCEDURE — 76937 US GUIDE VASCULAR ACCESS: CPT

## 2018-04-24 PROCEDURE — 97530 THERAPEUTIC ACTIVITIES: CPT

## 2018-04-24 PROCEDURE — 85025 COMPLETE CBC W/AUTO DIFF WBC: CPT

## 2018-04-24 PROCEDURE — 87040 BLOOD CULTURE FOR BACTERIA: CPT

## 2018-04-24 RX ORDER — DEXTROSE MONOHYDRATE 50 MG/ML
INJECTION, SOLUTION INTRAVENOUS CONTINUOUS
Status: DISCONTINUED | OUTPATIENT
Start: 2018-04-24 | End: 2018-04-28

## 2018-04-24 RX ORDER — POTASSIUM CHLORIDE 20MEQ/15ML
40 LIQUID (ML) ORAL EVERY 4 HOURS
Status: COMPLETED | OUTPATIENT
Start: 2018-04-24 | End: 2018-04-24

## 2018-04-24 RX ORDER — METHYLPREDNISOLONE SODIUM SUCCINATE 40 MG/ML
40 INJECTION, POWDER, LYOPHILIZED, FOR SOLUTION INTRAMUSCULAR; INTRAVENOUS DAILY
Status: DISCONTINUED | OUTPATIENT
Start: 2018-04-25 | End: 2018-04-25

## 2018-04-24 RX ADMIN — PANTOPRAZOLE SODIUM 40 MG: 40 TABLET, DELAYED RELEASE ORAL at 05:57

## 2018-04-24 RX ADMIN — IPRATROPIUM BROMIDE AND ALBUTEROL SULFATE 1 AMPULE: .5; 3 SOLUTION RESPIRATORY (INHALATION) at 02:23

## 2018-04-24 RX ADMIN — IPRATROPIUM BROMIDE AND ALBUTEROL SULFATE 1 AMPULE: .5; 3 SOLUTION RESPIRATORY (INHALATION) at 06:59

## 2018-04-24 RX ADMIN — VANCOMYCIN HYDROCHLORIDE 1500 MG: 10 INJECTION, POWDER, LYOPHILIZED, FOR SOLUTION INTRAVENOUS at 12:17

## 2018-04-24 RX ADMIN — POTASSIUM CHLORIDE 40 MEQ: 20 SOLUTION ORAL at 15:09

## 2018-04-24 RX ADMIN — IPRATROPIUM BROMIDE AND ALBUTEROL SULFATE 1 AMPULE: .5; 3 SOLUTION RESPIRATORY (INHALATION) at 23:01

## 2018-04-24 RX ADMIN — POTASSIUM CHLORIDE 10 MEQ: 10 INJECTION, SOLUTION INTRAVENOUS at 05:54

## 2018-04-24 RX ADMIN — POTASSIUM & SODIUM PHOSPHATES POWDER PACK 280-160-250 MG 250 MG: 280-160-250 PACK at 13:05

## 2018-04-24 RX ADMIN — IPRATROPIUM BROMIDE AND ALBUTEROL SULFATE 1 AMPULE: .5; 3 SOLUTION RESPIRATORY (INHALATION) at 10:39

## 2018-04-24 RX ADMIN — FOLIC ACID 1 MG: 1 TABLET ORAL at 10:29

## 2018-04-24 RX ADMIN — VANCOMYCIN HYDROCHLORIDE 1500 MG: 10 INJECTION, POWDER, LYOPHILIZED, FOR SOLUTION INTRAVENOUS at 19:26

## 2018-04-24 RX ADMIN — DEXTROSE MONOHYDRATE: 50 INJECTION, SOLUTION INTRAVENOUS at 12:17

## 2018-04-24 RX ADMIN — IPRATROPIUM BROMIDE AND ALBUTEROL SULFATE 1 AMPULE: .5; 3 SOLUTION RESPIRATORY (INHALATION) at 16:43

## 2018-04-24 RX ADMIN — VANCOMYCIN HYDROCHLORIDE 1750 MG: 10 INJECTION, POWDER, LYOPHILIZED, FOR SOLUTION INTRAVENOUS at 03:25

## 2018-04-24 RX ADMIN — Medication 100 MG: at 10:30

## 2018-04-24 RX ADMIN — IPRATROPIUM BROMIDE AND ALBUTEROL SULFATE 1 AMPULE: .5; 3 SOLUTION RESPIRATORY (INHALATION) at 19:25

## 2018-04-24 RX ADMIN — POTASSIUM & SODIUM PHOSPHATES POWDER PACK 280-160-250 MG 250 MG: 280-160-250 PACK at 17:20

## 2018-04-24 RX ADMIN — POTASSIUM & SODIUM PHOSPHATES POWDER PACK 280-160-250 MG 250 MG: 280-160-250 PACK at 21:56

## 2018-04-24 RX ADMIN — ENOXAPARIN SODIUM 40 MG: 100 INJECTION SUBCUTANEOUS at 10:30

## 2018-04-24 RX ADMIN — BUMETANIDE 2 MG: 0.25 INJECTION INTRAMUSCULAR; INTRAVENOUS at 10:29

## 2018-04-24 RX ADMIN — POTASSIUM CHLORIDE 40 MEQ: 20 SOLUTION ORAL at 10:30

## 2018-04-24 RX ADMIN — GUAIFENESIN 1200 MG: 600 TABLET, EXTENDED RELEASE ORAL at 10:30

## 2018-04-24 RX ADMIN — GUAIFENESIN 1200 MG: 600 TABLET, EXTENDED RELEASE ORAL at 21:56

## 2018-04-24 RX ADMIN — BUMETANIDE 2 MG: 0.25 INJECTION INTRAMUSCULAR; INTRAVENOUS at 19:26

## 2018-04-24 RX ADMIN — POTASSIUM & SODIUM PHOSPHATES POWDER PACK 280-160-250 MG 250 MG: 280-160-250 PACK at 10:30

## 2018-04-24 RX ADMIN — Medication 10 ML: at 19:37

## 2018-04-24 ASSESSMENT — PAIN DESCRIPTION - LOCATION
LOCATION: CHEST
LOCATION: CHEST

## 2018-04-24 ASSESSMENT — ENCOUNTER SYMPTOMS
ABDOMINAL DISTENTION: 1
CHOKING: 0
WHEEZING: 1
SHORTNESS OF BREATH: 1
NAUSEA: 0
CHEST TIGHTNESS: 0
ABDOMINAL PAIN: 0
TROUBLE SWALLOWING: 0
COUGH: 1

## 2018-04-24 ASSESSMENT — PAIN SCALES - WONG BAKER
WONGBAKER_NUMERICALRESPONSE: 6
WONGBAKER_NUMERICALRESPONSE: 6

## 2018-04-24 ASSESSMENT — PAIN DESCRIPTION - PAIN TYPE
TYPE: ACUTE PAIN
TYPE: ACUTE PAIN

## 2018-04-24 ASSESSMENT — PAIN DESCRIPTION - ORIENTATION
ORIENTATION: MID
ORIENTATION: MID

## 2018-04-24 ASSESSMENT — PAIN DESCRIPTION - DESCRIPTORS: DESCRIPTORS: ACHING

## 2018-04-25 ENCOUNTER — APPOINTMENT (OUTPATIENT)
Dept: GENERAL RADIOLOGY | Age: 68
DRG: 981 | End: 2018-04-25
Payer: MEDICARE

## 2018-04-25 LAB
ALBUMIN SERPL-MCNC: 3.4 G/DL (ref 3.5–5.2)
ALP BLD-CCNC: 72 U/L (ref 40–130)
ALT SERPL-CCNC: 62 U/L (ref 5–41)
ANION GAP SERPL CALCULATED.3IONS-SCNC: 8 MMOL/L (ref 7–19)
ANISOCYTOSIS: ABNORMAL
AST SERPL-CCNC: 33 U/L (ref 5–40)
ATYPICAL LYMPHOCYTE RELATIVE PERCENT: 1 % (ref 0–8)
BANDED NEUTROPHILS RELATIVE PERCENT: 3 % (ref 0–5)
BASOPHILS ABSOLUTE: 0 K/UL (ref 0–0.2)
BASOPHILS MANUAL: 0 %
BASOPHILS RELATIVE PERCENT: 0 % (ref 0–1)
BILIRUB SERPL-MCNC: 3 MG/DL (ref 0.2–1.2)
BUN BLDV-MCNC: 24 MG/DL (ref 8–23)
C282Y HEMOCHROMATOSIS MUT: NEGATIVE
CALCIUM SERPL-MCNC: 8.5 MG/DL (ref 8.8–10.2)
CHLORIDE BLD-SCNC: 89 MMOL/L (ref 98–111)
CO2: 45 MMOL/L (ref 22–29)
CREAT SERPL-MCNC: 0.5 MG/DL (ref 0.5–1.2)
EOSINOPHILS ABSOLUTE: 0 K/UL (ref 0–0.6)
EOSINOPHILS RELATIVE PERCENT: 0 % (ref 0–5)
GFR NON-AFRICAN AMERICAN: >60
GLUCOSE BLD-MCNC: 153 MG/DL (ref 74–109)
H63D HEMOCHROMATOSIS MUT: NEGATIVE
HCT VFR BLD CALC: 58.2 % (ref 42–52)
HEMOCHROMATOSIS GENE ANALYSIS: NORMAL
HEMOGLOBIN: 18.9 G/DL (ref 14–18)
HFE PCR SPECIMEN: NORMAL
LYMPHOCYTES ABSOLUTE: 0.9 K/UL (ref 1.1–4.5)
LYMPHOCYTES RELATIVE PERCENT: 3 % (ref 20–40)
MAGNESIUM: 2 MG/DL (ref 1.6–2.4)
MCH RBC QN AUTO: 31.3 PG (ref 27–31)
MCHC RBC AUTO-ENTMCNC: 32.5 G/DL (ref 33–37)
MCV RBC AUTO: 96.4 FL (ref 80–94)
MONOCYTES ABSOLUTE: 1.8 K/UL (ref 0–0.9)
MONOCYTES RELATIVE PERCENT: 8 % (ref 0–10)
MYELOCYTE PERCENT: 1 %
NEUTROPHILS ABSOLUTE: 19.4 K/UL (ref 1.5–7.5)
NEUTROPHILS MANUAL: 84 %
NEUTROPHILS RELATIVE PERCENT: 84 % (ref 50–65)
PDW BLD-RTO: 15.3 % (ref 11.5–14.5)
PHOSPHORUS: 3 MG/DL (ref 2.5–4.5)
PLATELET # BLD: 118 K/UL (ref 130–400)
PLATELET SLIDE REVIEW: ABNORMAL
PMV BLD AUTO: 12.8 FL (ref 9.4–12.4)
POTASSIUM SERPL-SCNC: 2.8 MMOL/L (ref 3.5–5)
POTASSIUM SERPL-SCNC: 3.3 MMOL/L (ref 3.5–5)
POTASSIUM SERPL-SCNC: 4.1 MMOL/L (ref 3.5–5)
RBC # BLD: 6.04 M/UL (ref 4.7–6.1)
S65C HEMOCHROMATOSIS MUT: NEGATIVE
SODIUM BLD-SCNC: 142 MMOL/L (ref 136–145)
TOTAL PROTEIN: 6.6 G/DL (ref 6.6–8.7)
VANCOMYCIN TROUGH: 28.9 UG/ML (ref 10–20)
WBC # BLD: 22 K/UL (ref 4.8–10.8)

## 2018-04-25 PROCEDURE — 2700000000 HC OXYGEN THERAPY PER DAY

## 2018-04-25 PROCEDURE — 6370000000 HC RX 637 (ALT 250 FOR IP): Performed by: NURSE PRACTITIONER

## 2018-04-25 PROCEDURE — 83735 ASSAY OF MAGNESIUM: CPT

## 2018-04-25 PROCEDURE — 6370000000 HC RX 637 (ALT 250 FOR IP): Performed by: FAMILY MEDICINE

## 2018-04-25 PROCEDURE — 6360000002 HC RX W HCPCS: Performed by: FAMILY MEDICINE

## 2018-04-25 PROCEDURE — 80202 ASSAY OF VANCOMYCIN: CPT

## 2018-04-25 PROCEDURE — 2500000003 HC RX 250 WO HCPCS: Performed by: FAMILY MEDICINE

## 2018-04-25 PROCEDURE — 6370000000 HC RX 637 (ALT 250 FOR IP): Performed by: INTERNAL MEDICINE

## 2018-04-25 PROCEDURE — 92526 ORAL FUNCTION THERAPY: CPT

## 2018-04-25 PROCEDURE — 97530 THERAPEUTIC ACTIVITIES: CPT

## 2018-04-25 PROCEDURE — 6360000002 HC RX W HCPCS: Performed by: INTERNAL MEDICINE

## 2018-04-25 PROCEDURE — 74018 RADEX ABDOMEN 1 VIEW: CPT

## 2018-04-25 PROCEDURE — 94640 AIRWAY INHALATION TREATMENT: CPT

## 2018-04-25 PROCEDURE — 94660 CPAP INITIATION&MGMT: CPT

## 2018-04-25 PROCEDURE — 80053 COMPREHEN METABOLIC PANEL: CPT

## 2018-04-25 PROCEDURE — 85025 COMPLETE CBC W/AUTO DIFF WBC: CPT

## 2018-04-25 PROCEDURE — 6370000000 HC RX 637 (ALT 250 FOR IP): Performed by: HOSPITALIST

## 2018-04-25 PROCEDURE — 84100 ASSAY OF PHOSPHORUS: CPT

## 2018-04-25 PROCEDURE — 2580000003 HC RX 258: Performed by: HOSPITALIST

## 2018-04-25 PROCEDURE — 99233 SBSQ HOSP IP/OBS HIGH 50: CPT | Performed by: HOSPITALIST

## 2018-04-25 PROCEDURE — 2580000003 HC RX 258: Performed by: FAMILY MEDICINE

## 2018-04-25 PROCEDURE — 84132 ASSAY OF SERUM POTASSIUM: CPT

## 2018-04-25 PROCEDURE — 1210000000 HC MED SURG R&B

## 2018-04-25 RX ORDER — POTASSIUM CHLORIDE 3 G/15ML
40 SOLUTION ORAL ONCE
Status: COMPLETED | OUTPATIENT
Start: 2018-04-25 | End: 2018-04-25

## 2018-04-25 RX ORDER — POTASSIUM CHLORIDE 3 G/15ML
40 SOLUTION ORAL EVERY 4 HOURS
Status: COMPLETED | OUTPATIENT
Start: 2018-04-25 | End: 2018-04-25

## 2018-04-25 RX ADMIN — POTASSIUM CHLORIDE 20 MEQ: 29.8 INJECTION, SOLUTION INTRAVENOUS at 18:18

## 2018-04-25 RX ADMIN — POTASSIUM CHLORIDE 20 MEQ: 29.8 INJECTION, SOLUTION INTRAVENOUS at 16:35

## 2018-04-25 RX ADMIN — VANCOMYCIN HYDROCHLORIDE 1500 MG: 10 INJECTION, POWDER, LYOPHILIZED, FOR SOLUTION INTRAVENOUS at 05:19

## 2018-04-25 RX ADMIN — POTASSIUM & SODIUM PHOSPHATES POWDER PACK 280-160-250 MG 250 MG: 280-160-250 PACK at 09:07

## 2018-04-25 RX ADMIN — IPRATROPIUM BROMIDE AND ALBUTEROL SULFATE 1 AMPULE: .5; 3 SOLUTION RESPIRATORY (INHALATION) at 02:35

## 2018-04-25 RX ADMIN — Medication 40 MEQ: at 12:38

## 2018-04-25 RX ADMIN — IPRATROPIUM BROMIDE AND ALBUTEROL SULFATE 1 AMPULE: .5; 3 SOLUTION RESPIRATORY (INHALATION) at 14:57

## 2018-04-25 RX ADMIN — IPRATROPIUM BROMIDE AND ALBUTEROL SULFATE 1 AMPULE: .5; 3 SOLUTION RESPIRATORY (INHALATION) at 22:44

## 2018-04-25 RX ADMIN — Medication 40 MEQ: at 16:35

## 2018-04-25 RX ADMIN — GUAIFENESIN 1200 MG: 600 TABLET, EXTENDED RELEASE ORAL at 09:08

## 2018-04-25 RX ADMIN — POTASSIUM CHLORIDE 20 MEQ: 29.8 INJECTION, SOLUTION INTRAVENOUS at 05:52

## 2018-04-25 RX ADMIN — BUMETANIDE 2 MG: 0.25 INJECTION INTRAMUSCULAR; INTRAVENOUS at 09:07

## 2018-04-25 RX ADMIN — GUAIFENESIN 1200 MG: 600 TABLET, EXTENDED RELEASE ORAL at 21:31

## 2018-04-25 RX ADMIN — ENOXAPARIN SODIUM 40 MG: 100 INJECTION SUBCUTANEOUS at 09:08

## 2018-04-25 RX ADMIN — POTASSIUM CHLORIDE 20 MEQ: 29.8 INJECTION, SOLUTION INTRAVENOUS at 09:04

## 2018-04-25 RX ADMIN — POTASSIUM CHLORIDE 20 MEQ: 29.8 INJECTION, SOLUTION INTRAVENOUS at 07:17

## 2018-04-25 RX ADMIN — POTASSIUM & SODIUM PHOSPHATES POWDER PACK 280-160-250 MG 250 MG: 280-160-250 PACK at 14:50

## 2018-04-25 RX ADMIN — FOLIC ACID 1 MG: 1 TABLET ORAL at 09:08

## 2018-04-25 RX ADMIN — POTASSIUM & SODIUM PHOSPHATES POWDER PACK 280-160-250 MG 250 MG: 280-160-250 PACK at 21:31

## 2018-04-25 RX ADMIN — DEXTROSE MONOHYDRATE: 50 INJECTION, SOLUTION INTRAVENOUS at 11:29

## 2018-04-25 RX ADMIN — Medication 40 MEQ: at 09:08

## 2018-04-25 RX ADMIN — IPRATROPIUM BROMIDE AND ALBUTEROL SULFATE 1 AMPULE: .5; 3 SOLUTION RESPIRATORY (INHALATION) at 18:32

## 2018-04-25 RX ADMIN — IPRATROPIUM BROMIDE AND ALBUTEROL SULFATE 1 AMPULE: .5; 3 SOLUTION RESPIRATORY (INHALATION) at 10:15

## 2018-04-25 RX ADMIN — POTASSIUM & SODIUM PHOSPHATES POWDER PACK 280-160-250 MG 250 MG: 280-160-250 PACK at 18:18

## 2018-04-25 RX ADMIN — BUMETANIDE 2 MG: 0.25 INJECTION INTRAMUSCULAR; INTRAVENOUS at 21:30

## 2018-04-25 RX ADMIN — IPRATROPIUM BROMIDE AND ALBUTEROL SULFATE 1 AMPULE: .5; 3 SOLUTION RESPIRATORY (INHALATION) at 06:18

## 2018-04-25 RX ADMIN — PANTOPRAZOLE SODIUM 40 MG: 40 TABLET, DELAYED RELEASE ORAL at 05:19

## 2018-04-25 RX ADMIN — Medication 10 ML: at 21:31

## 2018-04-25 RX ADMIN — Medication 100 MG: at 09:08

## 2018-04-26 PROBLEM — K63.89 COLON DISTENTION: Status: ACTIVE | Noted: 2018-04-26

## 2018-04-26 PROBLEM — K56.7 ILEUS (HCC): Status: ACTIVE | Noted: 2018-04-26

## 2018-04-26 LAB
ALBUMIN SERPL-MCNC: 3.1 G/DL (ref 3.5–5.2)
ALP BLD-CCNC: 77 U/L (ref 40–130)
ALT SERPL-CCNC: 84 U/L (ref 5–41)
ANION GAP SERPL CALCULATED.3IONS-SCNC: 11 MMOL/L (ref 7–19)
ANISOCYTOSIS: ABNORMAL
AST SERPL-CCNC: 34 U/L (ref 5–40)
BANDED NEUTROPHILS RELATIVE PERCENT: 3 % (ref 0–5)
BASOPHILS ABSOLUTE: 0 K/UL (ref 0–0.2)
BASOPHILS MANUAL: 0 %
BASOPHILS RELATIVE PERCENT: 0 % (ref 0–1)
BILIRUB SERPL-MCNC: 5.3 MG/DL (ref 0.2–1.2)
BUN BLDV-MCNC: 22 MG/DL (ref 8–23)
CALCIUM SERPL-MCNC: 8.7 MG/DL (ref 8.8–10.2)
CHLORIDE BLD-SCNC: 89 MMOL/L (ref 98–111)
CO2: 42 MMOL/L (ref 22–29)
CREAT SERPL-MCNC: 0.5 MG/DL (ref 0.5–1.2)
EOSINOPHILS ABSOLUTE: 0 K/UL (ref 0–0.6)
EOSINOPHILS RELATIVE PERCENT: 0 % (ref 0–5)
GFR NON-AFRICAN AMERICAN: >60
GLUCOSE BLD-MCNC: 154 MG/DL (ref 74–109)
HCT VFR BLD CALC: 59 % (ref 42–52)
HEMOGLOBIN: 19 G/DL (ref 14–18)
HYPOCHROMIA: ABNORMAL
LYMPHOCYTES ABSOLUTE: 1.6 K/UL (ref 1.1–4.5)
LYMPHOCYTES RELATIVE PERCENT: 6 % (ref 20–40)
MACROCYTES: ABNORMAL
MAGNESIUM: 2 MG/DL (ref 1.6–2.4)
MCH RBC QN AUTO: 31.5 PG (ref 27–31)
MCHC RBC AUTO-ENTMCNC: 32.2 G/DL (ref 33–37)
MCV RBC AUTO: 97.7 FL (ref 80–94)
MONOCYTES ABSOLUTE: 2.9 K/UL (ref 0–0.9)
MONOCYTES RELATIVE PERCENT: 11 % (ref 0–10)
NEUTROPHILS ABSOLUTE: 21.7 K/UL (ref 1.5–7.5)
NEUTROPHILS MANUAL: 80 %
NEUTROPHILS RELATIVE PERCENT: 80 % (ref 50–65)
PDW BLD-RTO: 15.1 % (ref 11.5–14.5)
PHOSPHORUS: 4.6 MG/DL (ref 2.5–4.5)
PLATELET # BLD: 109 K/UL (ref 130–400)
PLATELET SLIDE REVIEW: ABNORMAL
PMV BLD AUTO: 14 FL (ref 9.4–12.4)
POTASSIUM SERPL-SCNC: 3.5 MMOL/L (ref 3.5–5)
RBC # BLD: 6.04 M/UL (ref 4.7–6.1)
SODIUM BLD-SCNC: 142 MMOL/L (ref 136–145)
TOTAL PROTEIN: 6.3 G/DL (ref 6.6–8.7)
TOXIC GRANULATION: ABNORMAL
VANCOMYCIN TROUGH: 9.1 UG/ML (ref 10–20)
WBC # BLD: 26.2 K/UL (ref 4.8–10.8)

## 2018-04-26 PROCEDURE — 6360000002 HC RX W HCPCS: Performed by: FAMILY MEDICINE

## 2018-04-26 PROCEDURE — 6370000000 HC RX 637 (ALT 250 FOR IP): Performed by: INTERNAL MEDICINE

## 2018-04-26 PROCEDURE — 94660 CPAP INITIATION&MGMT: CPT

## 2018-04-26 PROCEDURE — 80053 COMPREHEN METABOLIC PANEL: CPT

## 2018-04-26 PROCEDURE — 83735 ASSAY OF MAGNESIUM: CPT

## 2018-04-26 PROCEDURE — 6370000000 HC RX 637 (ALT 250 FOR IP): Performed by: NURSE PRACTITIONER

## 2018-04-26 PROCEDURE — 84100 ASSAY OF PHOSPHORUS: CPT

## 2018-04-26 PROCEDURE — 6360000002 HC RX W HCPCS: Performed by: HOSPITALIST

## 2018-04-26 PROCEDURE — 92526 ORAL FUNCTION THERAPY: CPT

## 2018-04-26 PROCEDURE — 6360000002 HC RX W HCPCS: Performed by: INTERNAL MEDICINE

## 2018-04-26 PROCEDURE — 80202 ASSAY OF VANCOMYCIN: CPT

## 2018-04-26 PROCEDURE — 2700000000 HC OXYGEN THERAPY PER DAY

## 2018-04-26 PROCEDURE — 97530 THERAPEUTIC ACTIVITIES: CPT

## 2018-04-26 PROCEDURE — 2580000003 HC RX 258: Performed by: FAMILY MEDICINE

## 2018-04-26 PROCEDURE — 94640 AIRWAY INHALATION TREATMENT: CPT

## 2018-04-26 PROCEDURE — 6370000000 HC RX 637 (ALT 250 FOR IP): Performed by: FAMILY MEDICINE

## 2018-04-26 PROCEDURE — 1210000000 HC MED SURG R&B

## 2018-04-26 PROCEDURE — 2500000003 HC RX 250 WO HCPCS: Performed by: FAMILY MEDICINE

## 2018-04-26 PROCEDURE — 85025 COMPLETE CBC W/AUTO DIFF WBC: CPT

## 2018-04-26 PROCEDURE — 99233 SBSQ HOSP IP/OBS HIGH 50: CPT | Performed by: HOSPITALIST

## 2018-04-26 RX ORDER — LUBIPROSTONE 24 UG/1
24 CAPSULE, GELATIN COATED ORAL 2 TIMES DAILY WITH MEALS
Status: DISCONTINUED | OUTPATIENT
Start: 2018-04-27 | End: 2018-04-28

## 2018-04-26 RX ORDER — METOCLOPRAMIDE HYDROCHLORIDE 5 MG/ML
10 INJECTION INTRAMUSCULAR; INTRAVENOUS EVERY 6 HOURS
Status: DISCONTINUED | OUTPATIENT
Start: 2018-04-26 | End: 2018-04-29 | Stop reason: HOSPADM

## 2018-04-26 RX ADMIN — ENOXAPARIN SODIUM 40 MG: 100 INJECTION SUBCUTANEOUS at 08:38

## 2018-04-26 RX ADMIN — POTASSIUM & SODIUM PHOSPHATES POWDER PACK 280-160-250 MG 250 MG: 280-160-250 PACK at 13:27

## 2018-04-26 RX ADMIN — VANCOMYCIN HYDROCHLORIDE 1500 MG: 10 INJECTION, POWDER, LYOPHILIZED, FOR SOLUTION INTRAVENOUS at 14:52

## 2018-04-26 RX ADMIN — FOLIC ACID 1 MG: 1 TABLET ORAL at 08:38

## 2018-04-26 RX ADMIN — POTASSIUM & SODIUM PHOSPHATES POWDER PACK 280-160-250 MG 250 MG: 280-160-250 PACK at 17:10

## 2018-04-26 RX ADMIN — MORPHINE SULFATE 4 MG: 4 INJECTION INTRAVENOUS at 12:28

## 2018-04-26 RX ADMIN — PANTOPRAZOLE SODIUM 40 MG: 40 TABLET, DELAYED RELEASE ORAL at 08:38

## 2018-04-26 RX ADMIN — POTASSIUM & SODIUM PHOSPHATES POWDER PACK 280-160-250 MG 250 MG: 280-160-250 PACK at 08:38

## 2018-04-26 RX ADMIN — IPRATROPIUM BROMIDE AND ALBUTEROL SULFATE 1 AMPULE: .5; 3 SOLUTION RESPIRATORY (INHALATION) at 10:02

## 2018-04-26 RX ADMIN — MORPHINE SULFATE 4 MG: 4 INJECTION INTRAVENOUS at 17:50

## 2018-04-26 RX ADMIN — BUMETANIDE 2 MG: 0.25 INJECTION INTRAMUSCULAR; INTRAVENOUS at 08:37

## 2018-04-26 RX ADMIN — Medication 10 ML: at 20:14

## 2018-04-26 RX ADMIN — GUAIFENESIN 1200 MG: 600 TABLET, EXTENDED RELEASE ORAL at 20:13

## 2018-04-26 RX ADMIN — Medication 100 MG: at 08:38

## 2018-04-26 RX ADMIN — BUMETANIDE 2 MG: 0.25 INJECTION INTRAMUSCULAR; INTRAVENOUS at 20:13

## 2018-04-26 RX ADMIN — IPRATROPIUM BROMIDE AND ALBUTEROL SULFATE 1 AMPULE: .5; 3 SOLUTION RESPIRATORY (INHALATION) at 02:53

## 2018-04-26 RX ADMIN — IPRATROPIUM BROMIDE AND ALBUTEROL SULFATE 1 AMPULE: .5; 3 SOLUTION RESPIRATORY (INHALATION) at 06:24

## 2018-04-26 RX ADMIN — POTASSIUM & SODIUM PHOSPHATES POWDER PACK 280-160-250 MG 250 MG: 280-160-250 PACK at 20:13

## 2018-04-26 ASSESSMENT — PAIN DESCRIPTION - PAIN TYPE: TYPE: OTHER (COMMENT)

## 2018-04-26 ASSESSMENT — PAIN SCALES - GENERAL
PAINLEVEL_OUTOF10: 9
PAINLEVEL_OUTOF10: 9

## 2018-04-26 ASSESSMENT — PAIN DESCRIPTION - LOCATION
LOCATION: CHEST
LOCATION: CHEST

## 2018-04-26 ASSESSMENT — PAIN SCALES - WONG BAKER: WONGBAKER_NUMERICALRESPONSE: 8

## 2018-04-26 ASSESSMENT — PAIN DESCRIPTION - DESCRIPTORS: DESCRIPTORS: ACHING

## 2018-04-26 ASSESSMENT — PAIN DESCRIPTION - ORIENTATION: ORIENTATION: MID;UPPER

## 2018-04-27 ENCOUNTER — ANESTHESIA (OUTPATIENT)
Dept: OPERATING ROOM | Age: 68
DRG: 981 | End: 2018-04-27
Payer: MEDICARE

## 2018-04-27 ENCOUNTER — ANESTHESIA EVENT (OUTPATIENT)
Dept: OPERATING ROOM | Age: 68
DRG: 981 | End: 2018-04-27
Payer: MEDICARE

## 2018-04-27 ENCOUNTER — APPOINTMENT (OUTPATIENT)
Dept: CT IMAGING | Age: 68
DRG: 981 | End: 2018-04-27
Payer: MEDICARE

## 2018-04-27 ENCOUNTER — APPOINTMENT (OUTPATIENT)
Dept: GENERAL RADIOLOGY | Age: 68
DRG: 981 | End: 2018-04-27
Payer: MEDICARE

## 2018-04-27 VITALS
OXYGEN SATURATION: 100 % | SYSTOLIC BLOOD PRESSURE: 127 MMHG | DIASTOLIC BLOOD PRESSURE: 73 MMHG | RESPIRATION RATE: 1 BRPM | TEMPERATURE: 96.6 F

## 2018-04-27 PROBLEM — R19.8 PERFORATED VISCUS: Status: ACTIVE | Noted: 2018-04-27

## 2018-04-27 LAB
ABO/RH: NORMAL
ALBUMIN SERPL-MCNC: 2.9 G/DL (ref 3.5–5.2)
ALBUMIN SERPL-MCNC: 2.9 G/DL (ref 3.5–5.2)
ALP BLD-CCNC: 82 U/L (ref 40–130)
ALP BLD-CCNC: 84 U/L (ref 40–130)
ALT SERPL-CCNC: 77 U/L (ref 5–41)
ALT SERPL-CCNC: 82 U/L (ref 5–41)
ANION GAP SERPL CALCULATED.3IONS-SCNC: 8 MMOL/L (ref 7–19)
ANISOCYTOSIS: ABNORMAL
ANTIBODY SCREEN: NORMAL
AST SERPL-CCNC: 30 U/L (ref 5–40)
AST SERPL-CCNC: 31 U/L (ref 5–40)
BASE EXCESS ARTERIAL: 6.8 MMOL/L (ref -2–2)
BASE EXCESS ARTERIAL: 9 (ref -3–3)
BASOPHILS ABSOLUTE: 0 K/UL (ref 0–0.2)
BASOPHILS MANUAL: 0 %
BASOPHILS RELATIVE PERCENT: 0 % (ref 0–1)
BILIRUB SERPL-MCNC: 6 MG/DL (ref 0.2–1.2)
BILIRUB SERPL-MCNC: 7.8 MG/DL (ref 0.2–1.2)
BILIRUBIN DIRECT: 6.3 MG/DL (ref 0–0.3)
BILIRUBIN, INDIRECT: 1.5 MG/DL (ref 0.1–1)
BLOOD BANK DISPENSE STATUS: NORMAL
BLOOD BANK DISPENSE STATUS: NORMAL
BLOOD BANK PRODUCT CODE: NORMAL
BLOOD BANK PRODUCT CODE: NORMAL
BPU ID: NORMAL
BPU ID: NORMAL
BUN BLDV-MCNC: 24 MG/DL (ref 8–23)
CALCIUM IONIZED: 0.82 MMOL/L (ref 1.1–1.3)
CALCIUM SERPL-MCNC: 8.6 MG/DL (ref 8.8–10.2)
CARBOXYHEMOGLOBIN ARTERIAL: 2.5 % (ref 0–5)
CHLORIDE BLD-SCNC: 82 MMOL/L (ref 98–111)
CO2: 33 MEQ/L (ref 21–32)
CO2: 43 MMOL/L (ref 22–29)
CREAT SERPL-MCNC: 0.5 MG/DL (ref 0.5–1.2)
DESCRIPTION BLOOD BANK: NORMAL
DESCRIPTION BLOOD BANK: NORMAL
EOSINOPHILS ABSOLUTE: 0.28 K/UL (ref 0–0.6)
EOSINOPHILS RELATIVE PERCENT: 1 % (ref 0–5)
GFR NON-AFRICAN AMERICAN: 33
GFR NON-AFRICAN AMERICAN: >60
GLUCOSE BLD-MCNC: 118 MG/DL (ref 74–109)
GLUCOSE BLD-MCNC: 190 MG/DL (ref 70–99)
HCO3 ARTERIAL: 32.4 MMOL/L (ref 22–26)
HCT VFR BLD CALC: 57.8 % (ref 42–52)
HEMOGLOBIN, ART, EXTENDED: 11 G/DL (ref 14–18)
HEMOGLOBIN: 15.3 GM/DL (ref 12–18)
HEMOGLOBIN: 18.9 G/DL (ref 14–18)
HYPOCHROMIA: ABNORMAL
INR BLD: 1.36 (ref 0.88–1.18)
LACTIC ACID: 1.7 MMOL/L (ref 0.5–1.9)
LYMPHOCYTES ABSOLUTE: 1.1 K/UL (ref 1.1–4.5)
LYMPHOCYTES RELATIVE PERCENT: 4 % (ref 20–40)
MACROCYTES: ABNORMAL
MAGNESIUM: 1.8 MG/DL (ref 1.6–2.4)
MCH RBC QN AUTO: 30.9 PG (ref 27–31)
MCHC RBC AUTO-ENTMCNC: 32.7 G/DL (ref 33–37)
MCV RBC AUTO: 94.6 FL (ref 80–94)
METHEMOGLOBIN ARTERIAL: 1.4 %
MONOCYTES ABSOLUTE: 1.1 K/UL (ref 0–0.9)
MONOCYTES RELATIVE PERCENT: 4 % (ref 0–10)
NEUTROPHILS ABSOLUTE: 25.5 K/UL (ref 1.5–7.5)
NEUTROPHILS MANUAL: 91 %
NEUTROPHILS RELATIVE PERCENT: 91 % (ref 50–65)
O2 CONTENT ARTERIAL: 14.7 ML/DL
O2 SAT, ARTERIAL: 94.5 %
O2 SAT, ARTERIAL: 99 % (ref 93–100)
O2 THERAPY: ABNORMAL
PCO2 ARTERIAL: 44 MM HG (ref 35–48)
PCO2 ARTERIAL: 50 MMHG (ref 35–45)
PDW BLD-RTO: 14.5 % (ref 11.5–14.5)
PERFORMED ON: ABNORMAL
PH ARTERIAL: 7.42 (ref 7.35–7.45)
PH ARTERIAL: 7.49 (ref 7.3–7.5)
PHOSPHORUS: 4.2 MG/DL (ref 2.5–4.5)
PLATELET # BLD: 116 K/UL (ref 130–400)
PLATELET SLIDE REVIEW: ABNORMAL
PMV BLD AUTO: 13.3 FL (ref 9.4–12.4)
PO2 ARTERIAL: 112 MM HG (ref 83–108)
PO2 ARTERIAL: 82 MMHG (ref 80–100)
POC ANION GAP: 9
POC CHLORIDE: 87 MEQ/L (ref 99–110)
POC CREATININE: 2 MG/DL (ref 0.3–1.3)
POC HEMATOCRIT: 45 % (ref 37–52)
POC POTASSIUM: 4.4 MEQ/L (ref 3.5–5.1)
POC SAMPLE TYPE: ABNORMAL
POC SODIUM: 129 MEQ/L (ref 136–145)
POTASSIUM SERPL-SCNC: 3.2 MMOL/L (ref 3.5–5)
POTASSIUM, WHOLE BLOOD: 3.4
PROTHROMBIN TIME: 16.7 SEC (ref 12–14.6)
RBC # BLD: 6.11 M/UL (ref 4.7–6.1)
SODIUM BLD-SCNC: 133 MMOL/L (ref 136–145)
TCO2 ARTERIAL: 35 MMOL/L
TOTAL PROTEIN: 6.1 G/DL (ref 6.6–8.7)
TOTAL PROTEIN: 6.3 G/DL (ref 6.6–8.7)
TOXIC GRANULATION: ABNORMAL
VACUOLATED NEUTROPHILS: ABNORMAL
VANCOMYCIN TROUGH: 20.7 UG/ML (ref 10–20)
WBC # BLD: 28 K/UL (ref 4.8–10.8)

## 2018-04-27 PROCEDURE — 74177 CT ABD & PELVIS W/CONTRAST: CPT

## 2018-04-27 PROCEDURE — 3209999900 FLUORO FOR SURGICAL PROCEDURES

## 2018-04-27 PROCEDURE — 6360000002 HC RX W HCPCS: Performed by: SURGERY

## 2018-04-27 PROCEDURE — 2000000000 HC ICU R&B

## 2018-04-27 PROCEDURE — 3700000001 HC ADD 15 MINUTES (ANESTHESIA): Performed by: SURGERY

## 2018-04-27 PROCEDURE — 82330 ASSAY OF CALCIUM: CPT

## 2018-04-27 PROCEDURE — 86900 BLOOD TYPING SEROLOGIC ABO: CPT

## 2018-04-27 PROCEDURE — 99291 CRITICAL CARE FIRST HOUR: CPT | Performed by: INTERNAL MEDICINE

## 2018-04-27 PROCEDURE — 88309 TISSUE EXAM BY PATHOLOGIST: CPT

## 2018-04-27 PROCEDURE — 80053 COMPREHEN METABOLIC PANEL: CPT

## 2018-04-27 PROCEDURE — 99223 1ST HOSP IP/OBS HIGH 75: CPT | Performed by: SURGERY

## 2018-04-27 PROCEDURE — 71045 X-RAY EXAM CHEST 1 VIEW: CPT

## 2018-04-27 PROCEDURE — 86256 FLUORESCENT ANTIBODY TITER: CPT

## 2018-04-27 PROCEDURE — 2500000003 HC RX 250 WO HCPCS: Performed by: SURGERY

## 2018-04-27 PROCEDURE — 83516 IMMUNOASSAY NONANTIBODY: CPT

## 2018-04-27 PROCEDURE — 44139 MOBILIZATION OF COLON: CPT | Performed by: SURGERY

## 2018-04-27 PROCEDURE — 97116 GAIT TRAINING THERAPY: CPT

## 2018-04-27 PROCEDURE — 86901 BLOOD TYPING SEROLOGIC RH(D): CPT

## 2018-04-27 PROCEDURE — 82948 REAGENT STRIP/BLOOD GLUCOSE: CPT

## 2018-04-27 PROCEDURE — 82800 BLOOD PH: CPT

## 2018-04-27 PROCEDURE — 2500000003 HC RX 250 WO HCPCS: Performed by: FAMILY MEDICINE

## 2018-04-27 PROCEDURE — 6360000004 HC RX CONTRAST MEDICATION: Performed by: HOSPITALIST

## 2018-04-27 PROCEDURE — 2580000003 HC RX 258: Performed by: ANESTHESIOLOGY

## 2018-04-27 PROCEDURE — 36415 COLL VENOUS BLD VENIPUNCTURE: CPT

## 2018-04-27 PROCEDURE — 44140 PARTIAL REMOVAL OF COLON: CPT | Performed by: PHYSICIAN ASSISTANT

## 2018-04-27 PROCEDURE — 44139 MOBILIZATION OF COLON: CPT | Performed by: PHYSICIAN ASSISTANT

## 2018-04-27 PROCEDURE — 6370000000 HC RX 637 (ALT 250 FOR IP): Performed by: INTERNAL MEDICINE

## 2018-04-27 PROCEDURE — 2500000003 HC RX 250 WO HCPCS: Performed by: NURSE ANESTHETIST, CERTIFIED REGISTERED

## 2018-04-27 PROCEDURE — 6360000002 HC RX W HCPCS: Performed by: HOSPITALIST

## 2018-04-27 PROCEDURE — 94660 CPAP INITIATION&MGMT: CPT

## 2018-04-27 PROCEDURE — 82810 BLOOD GASES O2 SAT ONLY: CPT

## 2018-04-27 PROCEDURE — 82803 BLOOD GASES ANY COMBINATION: CPT

## 2018-04-27 PROCEDURE — 84100 ASSAY OF PHOSPHORUS: CPT

## 2018-04-27 PROCEDURE — P9059 PLASMA, FRZ BETWEEN 8-24HOUR: HCPCS

## 2018-04-27 PROCEDURE — 44140 PARTIAL REMOVAL OF COLON: CPT | Performed by: SURGERY

## 2018-04-27 PROCEDURE — 85610 PROTHROMBIN TIME: CPT

## 2018-04-27 PROCEDURE — 3600000004 HC SURGERY LEVEL 4 BASE: Performed by: SURGERY

## 2018-04-27 PROCEDURE — 82565 ASSAY OF CREATININE: CPT

## 2018-04-27 PROCEDURE — 97530 THERAPEUTIC ACTIVITIES: CPT

## 2018-04-27 PROCEDURE — 6360000002 HC RX W HCPCS: Performed by: INTERNAL MEDICINE

## 2018-04-27 PROCEDURE — 82374 ASSAY BLOOD CARBON DIOXIDE: CPT

## 2018-04-27 PROCEDURE — 2500000003 HC RX 250 WO HCPCS

## 2018-04-27 PROCEDURE — 2780000010 HC IMPLANT OTHER: Performed by: SURGERY

## 2018-04-27 PROCEDURE — 3700000000 HC ANESTHESIA ATTENDED CARE: Performed by: SURGERY

## 2018-04-27 PROCEDURE — P9045 ALBUMIN (HUMAN), 5%, 250 ML: HCPCS | Performed by: NURSE ANESTHETIST, CERTIFIED REGISTERED

## 2018-04-27 PROCEDURE — 36600 WITHDRAWAL OF ARTERIAL BLOOD: CPT

## 2018-04-27 PROCEDURE — 85014 HEMATOCRIT: CPT

## 2018-04-27 PROCEDURE — C1729 CATH, DRAINAGE: HCPCS | Performed by: SURGERY

## 2018-04-27 PROCEDURE — 83735 ASSAY OF MAGNESIUM: CPT

## 2018-04-27 PROCEDURE — 88305 TISSUE EXAM BY PATHOLOGIST: CPT

## 2018-04-27 PROCEDURE — 84132 ASSAY OF SERUM POTASSIUM: CPT

## 2018-04-27 PROCEDURE — 97535 SELF CARE MNGMENT TRAINING: CPT

## 2018-04-27 PROCEDURE — 84295 ASSAY OF SERUM SODIUM: CPT

## 2018-04-27 PROCEDURE — 3600000014 HC SURGERY LEVEL 4 ADDTL 15MIN: Performed by: SURGERY

## 2018-04-27 PROCEDURE — 99999 PR OFFICE/OUTPT VISIT,PROCEDURE ONLY: CPT | Performed by: SURGERY

## 2018-04-27 PROCEDURE — 82435 ASSAY OF BLOOD CHLORIDE: CPT

## 2018-04-27 PROCEDURE — 86308 HETEROPHILE ANTIBODY SCREEN: CPT

## 2018-04-27 PROCEDURE — 99233 SBSQ HOSP IP/OBS HIGH 50: CPT | Performed by: HOSPITALIST

## 2018-04-27 PROCEDURE — 2580000003 HC RX 258: Performed by: SURGERY

## 2018-04-27 PROCEDURE — 94002 VENT MGMT INPAT INIT DAY: CPT

## 2018-04-27 PROCEDURE — 86850 RBC ANTIBODY SCREEN: CPT

## 2018-04-27 PROCEDURE — 85025 COMPLETE CBC W/AUTO DIFF WBC: CPT

## 2018-04-27 PROCEDURE — 2700000000 HC OXYGEN THERAPY PER DAY

## 2018-04-27 PROCEDURE — 6360000002 HC RX W HCPCS: Performed by: FAMILY MEDICINE

## 2018-04-27 PROCEDURE — 80202 ASSAY OF VANCOMYCIN: CPT

## 2018-04-27 PROCEDURE — 2720000010 HC SURG SUPPLY STERILE: Performed by: SURGERY

## 2018-04-27 PROCEDURE — 83605 ASSAY OF LACTIC ACID: CPT

## 2018-04-27 PROCEDURE — 99233 SBSQ HOSP IP/OBS HIGH 50: CPT | Performed by: INTERNAL MEDICINE

## 2018-04-27 PROCEDURE — 2580000003 HC RX 258: Performed by: FAMILY MEDICINE

## 2018-04-27 PROCEDURE — 0DTH0ZZ RESECTION OF CECUM, OPEN APPROACH: ICD-10-PCS | Performed by: SURGERY

## 2018-04-27 PROCEDURE — 2580000003 HC RX 258: Performed by: NURSE ANESTHETIST, CERTIFIED REGISTERED

## 2018-04-27 PROCEDURE — 6360000002 HC RX W HCPCS: Performed by: NURSE ANESTHETIST, CERTIFIED REGISTERED

## 2018-04-27 PROCEDURE — 6370000000 HC RX 637 (ALT 250 FOR IP): Performed by: FAMILY MEDICINE

## 2018-04-27 PROCEDURE — 2720000001 HC MISC SURG SUPPLY STERILE $51-500: Performed by: SURGERY

## 2018-04-27 DEVICE — RELOAD STPL H1.5X3.6XL60MM REG TISS BLU B FRM AUTO RET PIN: Type: IMPLANTABLE DEVICE | Site: ABDOMEN | Status: FUNCTIONAL

## 2018-04-27 DEVICE — RELOAD STPL L75MM OPN H3.8MM CLS 1.5MM WIRE DIA0.2MM REG: Type: IMPLANTABLE DEVICE | Site: ABDOMEN | Status: FUNCTIONAL

## 2018-04-27 RX ORDER — VANCOMYCIN HYDROCHLORIDE 1 G/200ML
1000 INJECTION, SOLUTION INTRAVENOUS EVERY 12 HOURS
Status: DISCONTINUED | OUTPATIENT
Start: 2018-04-27 | End: 2018-04-28 | Stop reason: SDUPTHER

## 2018-04-27 RX ORDER — SODIUM CHLORIDE 0.9 % (FLUSH) 0.9 %
10 SYRINGE (ML) INJECTION EVERY 12 HOURS SCHEDULED
Status: CANCELLED | OUTPATIENT
Start: 2018-04-27

## 2018-04-27 RX ORDER — LIDOCAINE HYDROCHLORIDE 10 MG/ML
1 INJECTION, SOLUTION EPIDURAL; INFILTRATION; INTRACAUDAL; PERINEURAL
Status: DISCONTINUED | OUTPATIENT
Start: 2018-04-27 | End: 2018-04-27 | Stop reason: HOSPADM

## 2018-04-27 RX ORDER — SODIUM CHLORIDE 0.9 % (FLUSH) 0.9 %
10 SYRINGE (ML) INJECTION PRN
Status: DISCONTINUED | OUTPATIENT
Start: 2018-04-27 | End: 2018-04-27 | Stop reason: HOSPADM

## 2018-04-27 RX ORDER — MORPHINE SULFATE 4 MG/ML
2 INJECTION, SOLUTION INTRAMUSCULAR; INTRAVENOUS
Status: CANCELLED | OUTPATIENT
Start: 2018-04-27

## 2018-04-27 RX ORDER — SODIUM CHLORIDE 9 MG/ML
INJECTION, SOLUTION INTRAVENOUS CONTINUOUS PRN
Status: DISCONTINUED | OUTPATIENT
Start: 2018-04-27 | End: 2018-04-27 | Stop reason: SDUPTHER

## 2018-04-27 RX ORDER — SODIUM CHLORIDE, SODIUM LACTATE, POTASSIUM CHLORIDE, CALCIUM CHLORIDE 600; 310; 30; 20 MG/100ML; MG/100ML; MG/100ML; MG/100ML
INJECTION, SOLUTION INTRAVENOUS CONTINUOUS
Status: DISCONTINUED | OUTPATIENT
Start: 2018-04-27 | End: 2018-04-28

## 2018-04-27 RX ORDER — ROCURONIUM BROMIDE 10 MG/ML
INJECTION, SOLUTION INTRAVENOUS PRN
Status: DISCONTINUED | OUTPATIENT
Start: 2018-04-27 | End: 2018-04-27 | Stop reason: SDUPTHER

## 2018-04-27 RX ORDER — POTASSIUM CHLORIDE 20MEQ/15ML
40 LIQUID (ML) ORAL ONCE
Status: COMPLETED | OUTPATIENT
Start: 2018-04-27 | End: 2018-04-27

## 2018-04-27 RX ORDER — PROPOFOL 10 MG/ML
INJECTION, EMULSION INTRAVENOUS PRN
Status: DISCONTINUED | OUTPATIENT
Start: 2018-04-27 | End: 2018-04-27 | Stop reason: SDUPTHER

## 2018-04-27 RX ORDER — PROPOFOL 10 MG/ML
INJECTION, EMULSION INTRAVENOUS CONTINUOUS PRN
Status: DISCONTINUED | OUTPATIENT
Start: 2018-04-27 | End: 2018-04-27 | Stop reason: SDUPTHER

## 2018-04-27 RX ORDER — FENTANYL CITRATE 50 UG/ML
25 INJECTION, SOLUTION INTRAMUSCULAR; INTRAVENOUS
Status: DISCONTINUED | OUTPATIENT
Start: 2018-04-27 | End: 2018-04-27 | Stop reason: HOSPADM

## 2018-04-27 RX ORDER — MORPHINE SULFATE 4 MG/ML
4 INJECTION, SOLUTION INTRAMUSCULAR; INTRAVENOUS
Status: CANCELLED | OUTPATIENT
Start: 2018-04-27

## 2018-04-27 RX ORDER — 0.9 % SODIUM CHLORIDE 0.9 %
250 INTRAVENOUS SOLUTION INTRAVENOUS ONCE
Status: DISCONTINUED | OUTPATIENT
Start: 2018-04-27 | End: 2018-04-27 | Stop reason: HOSPADM

## 2018-04-27 RX ORDER — CALCIUM CHLORIDE 100 MG/ML
INJECTION INTRAVENOUS; INTRAVENTRICULAR PRN
Status: DISCONTINUED | OUTPATIENT
Start: 2018-04-27 | End: 2018-04-27 | Stop reason: SDUPTHER

## 2018-04-27 RX ORDER — FENTANYL CITRATE 50 UG/ML
INJECTION, SOLUTION INTRAMUSCULAR; INTRAVENOUS PRN
Status: DISCONTINUED | OUTPATIENT
Start: 2018-04-27 | End: 2018-04-27 | Stop reason: SDUPTHER

## 2018-04-27 RX ORDER — ONDANSETRON 2 MG/ML
4 INJECTION INTRAMUSCULAR; INTRAVENOUS EVERY 6 HOURS PRN
Status: CANCELLED | OUTPATIENT
Start: 2018-04-27

## 2018-04-27 RX ORDER — ALBUMIN, HUMAN INJ 5% 5 %
SOLUTION INTRAVENOUS PRN
Status: DISCONTINUED | OUTPATIENT
Start: 2018-04-27 | End: 2018-04-27 | Stop reason: SDUPTHER

## 2018-04-27 RX ORDER — SODIUM CHLORIDE 0.9 % (FLUSH) 0.9 %
10 SYRINGE (ML) INJECTION PRN
Status: CANCELLED | OUTPATIENT
Start: 2018-04-27

## 2018-04-27 RX ORDER — MIDAZOLAM HYDROCHLORIDE 1 MG/ML
2 INJECTION INTRAMUSCULAR; INTRAVENOUS
Status: DISCONTINUED | OUTPATIENT
Start: 2018-04-27 | End: 2018-04-27 | Stop reason: HOSPADM

## 2018-04-27 RX ORDER — FENTANYL CITRATE 50 UG/ML
50 INJECTION, SOLUTION INTRAMUSCULAR; INTRAVENOUS
Status: DISCONTINUED | OUTPATIENT
Start: 2018-04-27 | End: 2018-04-27 | Stop reason: HOSPADM

## 2018-04-27 RX ORDER — SODIUM CHLORIDE 0.9 % (FLUSH) 0.9 %
10 SYRINGE (ML) INJECTION EVERY 12 HOURS SCHEDULED
Status: DISCONTINUED | OUTPATIENT
Start: 2018-04-27 | End: 2018-04-27 | Stop reason: HOSPADM

## 2018-04-27 RX ORDER — SUCCINYLCHOLINE CHLORIDE 20 MG/ML
INJECTION INTRAMUSCULAR; INTRAVENOUS PRN
Status: DISCONTINUED | OUTPATIENT
Start: 2018-04-27 | End: 2018-04-27 | Stop reason: SDUPTHER

## 2018-04-27 RX ORDER — SODIUM CHLORIDE 9 MG/ML
INJECTION, SOLUTION INTRAVENOUS CONTINUOUS
Status: DISCONTINUED | OUTPATIENT
Start: 2018-04-27 | End: 2018-04-28

## 2018-04-27 RX ADMIN — POTASSIUM CHLORIDE 10 MEQ: 10 INJECTION, SOLUTION INTRAVENOUS at 12:39

## 2018-04-27 RX ADMIN — PROPOFOL 80 MG: 10 INJECTION, EMULSION INTRAVENOUS at 20:15

## 2018-04-27 RX ADMIN — MORPHINE SULFATE 4 MG: 4 INJECTION INTRAVENOUS at 02:31

## 2018-04-27 RX ADMIN — ALBUMIN (HUMAN) 250 ML: 2.5 SOLUTION INTRAVENOUS at 21:57

## 2018-04-27 RX ADMIN — POTASSIUM & SODIUM PHOSPHATES POWDER PACK 280-160-250 MG 250 MG: 280-160-250 PACK at 16:48

## 2018-04-27 RX ADMIN — PANTOPRAZOLE SODIUM 40 MG: 40 TABLET, DELAYED RELEASE ORAL at 10:14

## 2018-04-27 RX ADMIN — Medication 160 MG: at 20:15

## 2018-04-27 RX ADMIN — METOCLOPRAMIDE 10 MG: 5 INJECTION, SOLUTION INTRAMUSCULAR; INTRAVENOUS at 16:48

## 2018-04-27 RX ADMIN — METRONIDAZOLE 500 MG: 500 INJECTION, SOLUTION INTRAVENOUS at 19:22

## 2018-04-27 RX ADMIN — POTASSIUM CHLORIDE 40 MEQ: 20 SOLUTION ORAL at 14:55

## 2018-04-27 RX ADMIN — CALCIUM CHLORIDE 1 G: 100 INJECTION INTRAVENOUS; INTRAVENTRICULAR at 21:50

## 2018-04-27 RX ADMIN — ENOXAPARIN SODIUM 40 MG: 100 INJECTION SUBCUTANEOUS at 08:23

## 2018-04-27 RX ADMIN — FENTANYL CITRATE 50 MCG: 50 INJECTION, SOLUTION INTRAMUSCULAR; INTRAVENOUS at 20:15

## 2018-04-27 RX ADMIN — ROCURONIUM BROMIDE 50 MG: 10 INJECTION INTRAVENOUS at 21:47

## 2018-04-27 RX ADMIN — SODIUM CHLORIDE: 9 INJECTION, SOLUTION INTRAVENOUS at 20:35

## 2018-04-27 RX ADMIN — VANCOMYCIN HYDROCHLORIDE 1250 MG: 10 INJECTION, POWDER, LYOPHILIZED, FOR SOLUTION INTRAVENOUS at 16:48

## 2018-04-27 RX ADMIN — LUBIPROSTONE 24 MCG: 24 CAPSULE, GELATIN COATED ORAL at 08:23

## 2018-04-27 RX ADMIN — PHENYLEPHRINE HYDROCHLORIDE 100 MCG: 10 INJECTION INTRAVENOUS at 20:17

## 2018-04-27 RX ADMIN — Medication 100 MG: at 08:23

## 2018-04-27 RX ADMIN — CALCIUM CHLORIDE 1 G: 100 INJECTION INTRAVENOUS; INTRAVENTRICULAR at 21:22

## 2018-04-27 RX ADMIN — BUMETANIDE 2 MG: 0.25 INJECTION INTRAMUSCULAR; INTRAVENOUS at 08:23

## 2018-04-27 RX ADMIN — POTASSIUM CHLORIDE 10 MEQ: 10 INJECTION, SOLUTION INTRAVENOUS at 10:14

## 2018-04-27 RX ADMIN — VANCOMYCIN HYDROCHLORIDE 1500 MG: 10 INJECTION, POWDER, LYOPHILIZED, FOR SOLUTION INTRAVENOUS at 00:43

## 2018-04-27 RX ADMIN — ALBUMIN (HUMAN) 250 ML: 2.5 SOLUTION INTRAVENOUS at 21:50

## 2018-04-27 RX ADMIN — IOPAMIDOL 90 ML: 755 INJECTION, SOLUTION INTRAVENOUS at 17:29

## 2018-04-27 RX ADMIN — SODIUM CHLORIDE: 9 INJECTION, SOLUTION INTRAVENOUS at 21:20

## 2018-04-27 RX ADMIN — PROPOFOL 20 MCG/KG/MIN: 10 INJECTION, EMULSION INTRAVENOUS at 22:08

## 2018-04-27 RX ADMIN — ROCURONIUM BROMIDE 50 MG: 10 INJECTION INTRAVENOUS at 20:31

## 2018-04-27 RX ADMIN — PHENYLEPHRINE HYDROCHLORIDE 50 MCG/MIN: 10 INJECTION INTRAVENOUS at 20:17

## 2018-04-27 RX ADMIN — METOCLOPRAMIDE 10 MG: 5 INJECTION, SOLUTION INTRAMUSCULAR; INTRAVENOUS at 05:30

## 2018-04-27 RX ADMIN — POTASSIUM CHLORIDE 10 MEQ: 10 INJECTION, SOLUTION INTRAVENOUS at 11:34

## 2018-04-27 RX ADMIN — MORPHINE SULFATE 4 MG: 4 INJECTION INTRAVENOUS at 09:11

## 2018-04-27 RX ADMIN — ALBUMIN (HUMAN) 250 ML: 2.5 SOLUTION INTRAVENOUS at 20:21

## 2018-04-27 RX ADMIN — SODIUM CHLORIDE, SODIUM LACTATE, POTASSIUM CHLORIDE, AND CALCIUM CHLORIDE: 600; 310; 30; 20 INJECTION, SOLUTION INTRAVENOUS at 20:07

## 2018-04-27 RX ADMIN — LUBIPROSTONE 24 MCG: 24 CAPSULE, GELATIN COATED ORAL at 16:48

## 2018-04-27 RX ADMIN — TAZOBACTAM SODIUM AND PIPERACILLIN SODIUM 3.38 G: 375; 3 INJECTION, SOLUTION INTRAVENOUS at 20:29

## 2018-04-27 RX ADMIN — POTASSIUM & SODIUM PHOSPHATES POWDER PACK 280-160-250 MG 250 MG: 280-160-250 PACK at 12:38

## 2018-04-27 RX ADMIN — METOCLOPRAMIDE 10 MG: 5 INJECTION, SOLUTION INTRAMUSCULAR; INTRAVENOUS at 00:42

## 2018-04-27 RX ADMIN — METOCLOPRAMIDE 10 MG: 5 INJECTION, SOLUTION INTRAMUSCULAR; INTRAVENOUS at 11:29

## 2018-04-27 RX ADMIN — MORPHINE SULFATE 4 MG: 4 INJECTION INTRAVENOUS at 14:56

## 2018-04-27 RX ADMIN — Medication 10 ML: at 08:24

## 2018-04-27 RX ADMIN — ALBUMIN (HUMAN) 250 ML: 2.5 SOLUTION INTRAVENOUS at 20:54

## 2018-04-27 RX ADMIN — SODIUM CHLORIDE: 9 INJECTION, SOLUTION INTRAVENOUS at 22:14

## 2018-04-27 RX ADMIN — POTASSIUM & SODIUM PHOSPHATES POWDER PACK 280-160-250 MG 250 MG: 280-160-250 PACK at 08:22

## 2018-04-27 RX ADMIN — FOLIC ACID 1 MG: 1 TABLET ORAL at 08:23

## 2018-04-27 RX ADMIN — SODIUM CHLORIDE: 9 INJECTION, SOLUTION INTRAVENOUS at 21:56

## 2018-04-27 ASSESSMENT — PAIN SCALES - GENERAL
PAINLEVEL_OUTOF10: 9
PAINLEVEL_OUTOF10: 7
PAINLEVEL_OUTOF10: 10
PAINLEVEL_OUTOF10: 1

## 2018-04-27 ASSESSMENT — PULMONARY FUNCTION TESTS
PIF_VALUE: 30.3
PIF_VALUE: 30.5
PIF_VALUE: 32.4
PIF_VALUE: 29.7

## 2018-04-27 ASSESSMENT — LIFESTYLE VARIABLES: SMOKING_STATUS: 1

## 2018-04-28 VITALS
RESPIRATION RATE: 18 BRPM | BODY MASS INDEX: 37.2 KG/M2 | DIASTOLIC BLOOD PRESSURE: 49 MMHG | HEIGHT: 67 IN | TEMPERATURE: 97.1 F | OXYGEN SATURATION: 95 % | WEIGHT: 237 LBS | HEART RATE: 96 BPM | SYSTOLIC BLOOD PRESSURE: 106 MMHG

## 2018-04-28 PROBLEM — A41.9 SEPTIC SHOCK (HCC): Status: ACTIVE | Noted: 2018-04-28

## 2018-04-28 PROBLEM — R65.20 SEVERE SEPSIS (HCC): Status: ACTIVE | Noted: 2018-04-28

## 2018-04-28 PROBLEM — R65.21 SEPTIC SHOCK (HCC): Status: ACTIVE | Noted: 2018-04-28

## 2018-04-28 PROBLEM — E83.42 HYPOMAGNESEMIA: Status: ACTIVE | Noted: 2018-04-28

## 2018-04-28 PROBLEM — A41.9 SEVERE SEPSIS (HCC): Status: ACTIVE | Noted: 2018-04-28

## 2018-04-28 PROBLEM — D72.829 LEUKOCYTOSIS: Status: ACTIVE | Noted: 2018-04-28

## 2018-04-28 LAB
ALBUMIN SERPL-MCNC: 2.7 G/DL (ref 3.5–5.2)
ALP BLD-CCNC: 46 U/L (ref 40–130)
ALT SERPL-CCNC: 34 U/L (ref 5–41)
ANION GAP SERPL CALCULATED.3IONS-SCNC: 13 MMOL/L (ref 7–19)
AST SERPL-CCNC: 18 U/L (ref 5–40)
BASE EXCESS ARTERIAL: 4.7 MMOL/L (ref -2–2)
BASE EXCESS ARTERIAL: 6.9 MMOL/L (ref -2–2)
BASOPHILS ABSOLUTE: 0.1 K/UL (ref 0–0.2)
BASOPHILS RELATIVE PERCENT: 0.2 % (ref 0–1)
BILIRUB SERPL-MCNC: 8.2 MG/DL (ref 0.2–1.2)
BLOOD CULTURE, ROUTINE: NORMAL
BUN BLDV-MCNC: 29 MG/DL (ref 8–23)
CALCIUM SERPL-MCNC: 8.4 MG/DL (ref 8.8–10.2)
CARBOXYHEMOGLOBIN ARTERIAL: 1.9 % (ref 0–5)
CARBOXYHEMOGLOBIN ARTERIAL: 2.4 % (ref 0–5)
CHLORIDE BLD-SCNC: 92 MMOL/L (ref 98–111)
CO2: 29 MMOL/L (ref 22–29)
CREAT SERPL-MCNC: 0.7 MG/DL (ref 0.5–1.2)
EOSINOPHILS ABSOLUTE: 0 K/UL (ref 0–0.6)
EOSINOPHILS RELATIVE PERCENT: 0 % (ref 0–5)
GFR NON-AFRICAN AMERICAN: >60
GLUCOSE BLD-MCNC: 128 MG/DL (ref 70–99)
GLUCOSE BLD-MCNC: 135 MG/DL (ref 74–109)
GLUCOSE BLD-MCNC: 144 MG/DL (ref 70–99)
GLUCOSE BLD-MCNC: 149 MG/DL (ref 70–99)
GLUCOSE BLD-MCNC: 152 MG/DL (ref 70–99)
GLUCOSE BLD-MCNC: 156 MG/DL (ref 70–99)
GLUCOSE BLD-MCNC: 168 MG/DL (ref 70–99)
HCO3 ARTERIAL: 28.2 MMOL/L (ref 22–26)
HCO3 ARTERIAL: 32 MMOL/L (ref 22–26)
HCT VFR BLD CALC: 33.2 % (ref 42–52)
HCT VFR BLD CALC: 38.9 % (ref 42–52)
HEMOGLOBIN, ART, EXTENDED: 10.7 G/DL (ref 14–18)
HEMOGLOBIN, ART, EXTENDED: 13.6 G/DL (ref 14–18)
HEMOGLOBIN: 11 G/DL (ref 14–18)
HEMOGLOBIN: 12.8 G/DL (ref 14–18)
LYMPHOCYTES ABSOLUTE: 0.9 K/UL (ref 1.1–4.5)
LYMPHOCYTES RELATIVE PERCENT: 2 % (ref 20–40)
MAGNESIUM: 1.3 MG/DL (ref 1.6–2.4)
MCH RBC QN AUTO: 30.8 PG (ref 27–31)
MCH RBC QN AUTO: 31 PG (ref 27–31)
MCHC RBC AUTO-ENTMCNC: 32.9 G/DL (ref 33–37)
MCHC RBC AUTO-ENTMCNC: 33.1 G/DL (ref 33–37)
MCV RBC AUTO: 93 FL (ref 80–94)
MCV RBC AUTO: 94.2 FL (ref 80–94)
METHEMOGLOBIN ARTERIAL: 1.6 %
METHEMOGLOBIN ARTERIAL: 1.8 %
MONOCYTES ABSOLUTE: 2.3 K/UL (ref 0–0.9)
MONOCYTES RELATIVE PERCENT: 5.3 % (ref 0–10)
NEUTROPHILS ABSOLUTE: 39.5 K/UL (ref 1.5–7.5)
NEUTROPHILS RELATIVE PERCENT: 90.8 % (ref 50–65)
O2 CONTENT ARTERIAL: 14.3 ML/DL
O2 CONTENT ARTERIAL: 18.5 ML/DL
O2 SAT, ARTERIAL: 94.6 %
O2 SAT, ARTERIAL: 95.7 %
O2 THERAPY: ABNORMAL
O2 THERAPY: ABNORMAL
PCO2 ARTERIAL: 37 MMHG (ref 35–45)
PCO2 ARTERIAL: 46 MMHG (ref 35–45)
PDW BLD-RTO: 14.4 % (ref 11.5–14.5)
PDW BLD-RTO: 14.5 % (ref 11.5–14.5)
PERFORMED ON: ABNORMAL
PH ARTERIAL: 7.45 (ref 7.35–7.45)
PH ARTERIAL: 7.49 (ref 7.35–7.45)
PHOSPHORUS: 4.4 MG/DL (ref 2.5–4.5)
PLATELET # BLD: 146 K/UL (ref 130–400)
PLATELET # BLD: 98 K/UL (ref 130–400)
PMV BLD AUTO: 13.6 FL (ref 9.4–12.4)
PMV BLD AUTO: 13.8 FL (ref 9.4–12.4)
PO2 ARTERIAL: 123 MMHG (ref 80–100)
PO2 ARTERIAL: 77 MMHG (ref 80–100)
POTASSIUM SERPL-SCNC: 3.9 MMOL/L (ref 3.5–5)
POTASSIUM, WHOLE BLOOD: 3.9
POTASSIUM, WHOLE BLOOD: 4.1
RBC # BLD: 3.57 M/UL (ref 4.7–6.1)
RBC # BLD: 4.13 M/UL (ref 4.7–6.1)
SODIUM BLD-SCNC: 134 MMOL/L (ref 136–145)
TOTAL PROTEIN: 4.6 G/DL (ref 6.6–8.7)
VANCOMYCIN TROUGH: 28.8 UG/ML (ref 10–20)
WBC # BLD: 32.6 K/UL (ref 4.8–10.8)
WBC # BLD: 43.5 K/UL (ref 4.8–10.8)

## 2018-04-28 PROCEDURE — 2500000003 HC RX 250 WO HCPCS: Performed by: SURGERY

## 2018-04-28 PROCEDURE — 6360000002 HC RX W HCPCS: Performed by: FAMILY MEDICINE

## 2018-04-28 PROCEDURE — 2580000003 HC RX 258: Performed by: INTERNAL MEDICINE

## 2018-04-28 PROCEDURE — 2580000003 HC RX 258: Performed by: ANESTHESIOLOGY

## 2018-04-28 PROCEDURE — 84132 ASSAY OF SERUM POTASSIUM: CPT

## 2018-04-28 PROCEDURE — 36600 WITHDRAWAL OF ARTERIAL BLOOD: CPT

## 2018-04-28 PROCEDURE — 99024 POSTOP FOLLOW-UP VISIT: CPT | Performed by: SURGERY

## 2018-04-28 PROCEDURE — 2580000003 HC RX 258: Performed by: HOSPITALIST

## 2018-04-28 PROCEDURE — 85025 COMPLETE CBC W/AUTO DIFF WBC: CPT

## 2018-04-28 PROCEDURE — 6360000002 HC RX W HCPCS: Performed by: INTERNAL MEDICINE

## 2018-04-28 PROCEDURE — 80053 COMPREHEN METABOLIC PANEL: CPT

## 2018-04-28 PROCEDURE — 6360000002 HC RX W HCPCS: Performed by: HOSPITALIST

## 2018-04-28 PROCEDURE — 6360000002 HC RX W HCPCS: Performed by: SURGERY

## 2018-04-28 PROCEDURE — 2500000003 HC RX 250 WO HCPCS: Performed by: FAMILY MEDICINE

## 2018-04-28 PROCEDURE — 6370000000 HC RX 637 (ALT 250 FOR IP): Performed by: INTERNAL MEDICINE

## 2018-04-28 PROCEDURE — P9045 ALBUMIN (HUMAN), 5%, 250 ML: HCPCS | Performed by: HOSPITALIST

## 2018-04-28 PROCEDURE — 99239 HOSP IP/OBS DSCHRG MGMT >30: CPT | Performed by: HOSPITALIST

## 2018-04-28 PROCEDURE — 82948 REAGENT STRIP/BLOOD GLUCOSE: CPT

## 2018-04-28 PROCEDURE — 87070 CULTURE OTHR SPECIMN AEROBIC: CPT

## 2018-04-28 PROCEDURE — 84100 ASSAY OF PHOSPHORUS: CPT

## 2018-04-28 PROCEDURE — 80202 ASSAY OF VANCOMYCIN: CPT

## 2018-04-28 PROCEDURE — 2000000000 HC ICU R&B

## 2018-04-28 PROCEDURE — 2700000000 HC OXYGEN THERAPY PER DAY

## 2018-04-28 PROCEDURE — 94640 AIRWAY INHALATION TREATMENT: CPT

## 2018-04-28 PROCEDURE — 99232 SBSQ HOSP IP/OBS MODERATE 35: CPT | Performed by: INTERNAL MEDICINE

## 2018-04-28 PROCEDURE — 2500000003 HC RX 250 WO HCPCS: Performed by: INTERNAL MEDICINE

## 2018-04-28 PROCEDURE — 85027 COMPLETE CBC AUTOMATED: CPT

## 2018-04-28 PROCEDURE — 2580000003 HC RX 258: Performed by: FAMILY MEDICINE

## 2018-04-28 PROCEDURE — 94002 VENT MGMT INPAT INIT DAY: CPT

## 2018-04-28 PROCEDURE — 82803 BLOOD GASES ANY COMBINATION: CPT

## 2018-04-28 PROCEDURE — 83735 ASSAY OF MAGNESIUM: CPT

## 2018-04-28 PROCEDURE — C9113 INJ PANTOPRAZOLE SODIUM, VIA: HCPCS | Performed by: HOSPITALIST

## 2018-04-28 RX ORDER — PANTOPRAZOLE SODIUM 40 MG/10ML
40 INJECTION, POWDER, LYOPHILIZED, FOR SOLUTION INTRAVENOUS DAILY
Status: DISCONTINUED | OUTPATIENT
Start: 2018-04-28 | End: 2018-04-29 | Stop reason: HOSPADM

## 2018-04-28 RX ORDER — NICOTINE POLACRILEX 4 MG
15 LOZENGE BUCCAL PRN
Status: DISCONTINUED | OUTPATIENT
Start: 2018-04-28 | End: 2018-04-29 | Stop reason: HOSPADM

## 2018-04-28 RX ORDER — SODIUM CHLORIDE 9 MG/ML
INJECTION, SOLUTION INTRAVENOUS CONTINUOUS
Status: DISCONTINUED | OUTPATIENT
Start: 2018-04-28 | End: 2018-04-29 | Stop reason: HOSPADM

## 2018-04-28 RX ORDER — CHLORHEXIDINE GLUCONATE 0.12 MG/ML
15 RINSE ORAL 2 TIMES DAILY
Status: DISCONTINUED | OUTPATIENT
Start: 2018-04-28 | End: 2018-04-29 | Stop reason: HOSPADM

## 2018-04-28 RX ORDER — PROPOFOL 10 MG/ML
10 INJECTION, EMULSION INTRAVENOUS
Status: DISCONTINUED | OUTPATIENT
Start: 2018-04-28 | End: 2018-04-29 | Stop reason: HOSPADM

## 2018-04-28 RX ORDER — MAGNESIUM SULFATE IN WATER 40 MG/ML
2 INJECTION, SOLUTION INTRAVENOUS ONCE
Status: COMPLETED | OUTPATIENT
Start: 2018-04-28 | End: 2018-04-28

## 2018-04-28 RX ORDER — DEXTROSE MONOHYDRATE 50 MG/ML
100 INJECTION, SOLUTION INTRAVENOUS PRN
Status: DISCONTINUED | OUTPATIENT
Start: 2018-04-28 | End: 2018-04-29 | Stop reason: HOSPADM

## 2018-04-28 RX ORDER — DEXTROSE MONOHYDRATE 25 G/50ML
12.5 INJECTION, SOLUTION INTRAVENOUS PRN
Status: DISCONTINUED | OUTPATIENT
Start: 2018-04-28 | End: 2018-04-29 | Stop reason: HOSPADM

## 2018-04-28 RX ORDER — ALBUMIN, HUMAN INJ 5% 5 %
25 SOLUTION INTRAVENOUS ONCE
Status: DISCONTINUED | OUTPATIENT
Start: 2018-04-28 | End: 2018-04-28

## 2018-04-28 RX ORDER — ALBUMIN, HUMAN INJ 5% 5 %
25 SOLUTION INTRAVENOUS EVERY 8 HOURS
Status: DISCONTINUED | OUTPATIENT
Start: 2018-04-28 | End: 2018-04-29 | Stop reason: HOSPADM

## 2018-04-28 RX ADMIN — IPRATROPIUM BROMIDE AND ALBUTEROL SULFATE 1 AMPULE: .5; 3 SOLUTION RESPIRATORY (INHALATION) at 06:21

## 2018-04-28 RX ADMIN — METRONIDAZOLE 500 MG: 500 INJECTION, SOLUTION INTRAVENOUS at 06:21

## 2018-04-28 RX ADMIN — IPRATROPIUM BROMIDE AND ALBUTEROL SULFATE 1 AMPULE: .5; 3 SOLUTION RESPIRATORY (INHALATION) at 10:22

## 2018-04-28 RX ADMIN — TAZOBACTAM SODIUM AND PIPERACILLIN SODIUM 3.38 G: 375; 3 INJECTION, SOLUTION INTRAVENOUS at 08:14

## 2018-04-28 RX ADMIN — ENOXAPARIN SODIUM 40 MG: 100 INJECTION SUBCUTANEOUS at 08:13

## 2018-04-28 RX ADMIN — MEROPENEM 1 G: 1 INJECTION, POWDER, FOR SOLUTION INTRAVENOUS at 20:01

## 2018-04-28 RX ADMIN — IPRATROPIUM BROMIDE AND ALBUTEROL SULFATE 1 AMPULE: .5; 3 SOLUTION RESPIRATORY (INHALATION) at 18:57

## 2018-04-28 RX ADMIN — PROPOFOL 35 MCG/KG/MIN: 10 INJECTION, EMULSION INTRAVENOUS at 17:17

## 2018-04-28 RX ADMIN — ALBUMIN (HUMAN) 25 G: 12.5 INJECTION, SOLUTION INTRAVENOUS at 17:45

## 2018-04-28 RX ADMIN — MORPHINE SULFATE 4 MG: 4 INJECTION INTRAVENOUS at 20:01

## 2018-04-28 RX ADMIN — MAGNESIUM SULFATE HEPTAHYDRATE 1 G: 1 INJECTION, SOLUTION INTRAVENOUS at 05:39

## 2018-04-28 RX ADMIN — METOCLOPRAMIDE 10 MG: 5 INJECTION, SOLUTION INTRAMUSCULAR; INTRAVENOUS at 11:38

## 2018-04-28 RX ADMIN — PROPOFOL 50 MCG/KG/MIN: 10 INJECTION, EMULSION INTRAVENOUS at 00:29

## 2018-04-28 RX ADMIN — MORPHINE SULFATE 4 MG: 4 INJECTION INTRAVENOUS at 22:45

## 2018-04-28 RX ADMIN — MICAFUNGIN SODIUM 100 MG: 20 INJECTION, POWDER, LYOPHILIZED, FOR SOLUTION INTRAVENOUS at 09:54

## 2018-04-28 RX ADMIN — VANCOMYCIN HYDROCHLORIDE 1250 MG: 10 INJECTION, POWDER, LYOPHILIZED, FOR SOLUTION INTRAVENOUS at 04:40

## 2018-04-28 RX ADMIN — PROPOFOL 40 MCG/KG/MIN: 10 INJECTION, EMULSION INTRAVENOUS at 08:34

## 2018-04-28 RX ADMIN — METOCLOPRAMIDE 10 MG: 5 INJECTION, SOLUTION INTRAMUSCULAR; INTRAVENOUS at 04:40

## 2018-04-28 RX ADMIN — IPRATROPIUM BROMIDE AND ALBUTEROL SULFATE 1 AMPULE: .5; 3 SOLUTION RESPIRATORY (INHALATION) at 03:13

## 2018-04-28 RX ADMIN — CHLORHEXIDINE GLUCONATE 15 ML: 1.2 RINSE ORAL at 20:01

## 2018-04-28 RX ADMIN — NOREPINEPHRINE BITARTRATE 20 MCG/MIN: 1 INJECTION INTRAVENOUS at 15:31

## 2018-04-28 RX ADMIN — IPRATROPIUM BROMIDE AND ALBUTEROL SULFATE 1 AMPULE: .5; 3 SOLUTION RESPIRATORY (INHALATION) at 14:52

## 2018-04-28 RX ADMIN — PROPOFOL 35 MCG/KG/MIN: 10 INJECTION, EMULSION INTRAVENOUS at 20:31

## 2018-04-28 RX ADMIN — CHLORHEXIDINE GLUCONATE 15 ML: 1.2 RINSE ORAL at 04:40

## 2018-04-28 RX ADMIN — SODIUM CHLORIDE: 9 INJECTION, SOLUTION INTRAVENOUS at 18:00

## 2018-04-28 RX ADMIN — TAZOBACTAM SODIUM AND PIPERACILLIN SODIUM 3.38 G: 375; 3 INJECTION, SOLUTION INTRAVENOUS at 02:44

## 2018-04-28 RX ADMIN — PROPOFOL 40 MCG/KG/MIN: 10 INJECTION, EMULSION INTRAVENOUS at 12:14

## 2018-04-28 RX ADMIN — METRONIDAZOLE 500 MG: 500 INJECTION, SOLUTION INTRAVENOUS at 00:29

## 2018-04-28 RX ADMIN — PROPOFOL 35 MCG/KG/MIN: 10 INJECTION, EMULSION INTRAVENOUS at 23:00

## 2018-04-28 RX ADMIN — METOCLOPRAMIDE 10 MG: 5 INJECTION, SOLUTION INTRAMUSCULAR; INTRAVENOUS at 16:49

## 2018-04-28 RX ADMIN — SODIUM CHLORIDE: 9 INJECTION, SOLUTION INTRAVENOUS at 16:57

## 2018-04-28 RX ADMIN — MEROPENEM 1 G: 1 INJECTION, POWDER, FOR SOLUTION INTRAVENOUS at 12:16

## 2018-04-28 RX ADMIN — PANTOPRAZOLE SODIUM 40 MG: 40 INJECTION, POWDER, FOR SOLUTION INTRAVENOUS at 16:49

## 2018-04-28 RX ADMIN — MAGNESIUM SULFATE HEPTAHYDRATE 2 G: 40 INJECTION, SOLUTION INTRAVENOUS at 08:14

## 2018-04-28 RX ADMIN — Medication 10 ML: at 20:03

## 2018-04-28 RX ADMIN — PROPOFOL 40 MCG/KG/MIN: 10 INJECTION, EMULSION INTRAVENOUS at 04:08

## 2018-04-28 RX ADMIN — CHLORHEXIDINE GLUCONATE 15 ML: 1.2 RINSE ORAL at 08:31

## 2018-04-28 RX ADMIN — BUMETANIDE 2 MG: 0.25 INJECTION INTRAMUSCULAR; INTRAVENOUS at 08:13

## 2018-04-28 ASSESSMENT — PAIN SCALES - GENERAL
PAINLEVEL_OUTOF10: 0
PAINLEVEL_OUTOF10: 6
PAINLEVEL_OUTOF10: 0
PAINLEVEL_OUTOF10: 0

## 2018-04-28 ASSESSMENT — PULMONARY FUNCTION TESTS
PIF_VALUE: 30
PIF_VALUE: 30.9
PIF_VALUE: 29.7
PIF_VALUE: 26.8
PIF_VALUE: 28.8
PIF_VALUE: 30.6
PIF_VALUE: 33.1
PIF_VALUE: 33.5
PIF_VALUE: 27.2
PIF_VALUE: 31

## 2018-04-29 LAB
CULTURE, BLOOD 2: NORMAL
MRSA CULTURE ONLY: NORMAL

## 2018-04-30 LAB
ANA IGG, ELISA: NORMAL
MITOCHONDRIAL M2 AB, IGG: 14.4 UNITS (ref 0–20)

## 2018-05-01 LAB
BLOOD BANK DISPENSE STATUS: NORMAL
BLOOD BANK DISPENSE STATUS: NORMAL
BLOOD BANK PRODUCT CODE: NORMAL
BLOOD BANK PRODUCT CODE: NORMAL
BPU ID: NORMAL
BPU ID: NORMAL
DESCRIPTION BLOOD BANK: NORMAL
DESCRIPTION BLOOD BANK: NORMAL
F-ACTIN AB IGG: 24 UNITS (ref 0–19)
SMOOTH MUSCLE AB IGG TITER: ABNORMAL

## 2018-07-11 ENCOUNTER — OFFICE VISIT (OUTPATIENT)
Dept: SURGERY | Age: 68
End: 2018-07-11

## 2018-07-11 VITALS
SYSTOLIC BLOOD PRESSURE: 128 MMHG | TEMPERATURE: 97.8 F | HEIGHT: 67 IN | WEIGHT: 214.4 LBS | BODY MASS INDEX: 33.65 KG/M2 | DIASTOLIC BLOOD PRESSURE: 74 MMHG

## 2018-07-11 DIAGNOSIS — Z98.890 STATUS POST LAPAROTOMY: Primary | ICD-10-CM

## 2018-07-11 PROCEDURE — 99024 POSTOP FOLLOW-UP VISIT: CPT | Performed by: PHYSICIAN ASSISTANT

## 2018-07-11 RX ORDER — BUPROPION HYDROCHLORIDE 150 MG/1
TABLET ORAL
COMMUNITY
Start: 2018-07-06

## 2018-07-11 RX ORDER — TAMSULOSIN HYDROCHLORIDE 0.4 MG/1
CAPSULE ORAL
COMMUNITY
Start: 2018-07-06

## 2018-07-11 RX ORDER — PANTOPRAZOLE SODIUM 40 MG/1
TABLET, DELAYED RELEASE ORAL
COMMUNITY
Start: 2018-07-06

## 2018-07-11 RX ORDER — OXYBUTYNIN CHLORIDE 5 MG/1
TABLET ORAL
COMMUNITY
Start: 2018-07-06

## 2018-07-11 RX ORDER — OXYCODONE AND ACETAMINOPHEN 7.5; 325 MG/1; MG/1
TABLET ORAL
COMMUNITY
Start: 2018-07-06 | End: 2021-06-03 | Stop reason: SDUPTHER

## 2018-10-17 ENCOUNTER — OFFICE VISIT (OUTPATIENT)
Dept: OTOLARYNGOLOGY | Facility: CLINIC | Age: 68
End: 2018-10-17

## 2018-10-17 VITALS
SYSTOLIC BLOOD PRESSURE: 120 MMHG | TEMPERATURE: 96.6 F | BODY MASS INDEX: 34.06 KG/M2 | HEART RATE: 62 BPM | DIASTOLIC BLOOD PRESSURE: 57 MMHG | RESPIRATION RATE: 16 BRPM | WEIGHT: 217 LBS | HEIGHT: 67 IN

## 2018-10-17 DIAGNOSIS — C44.311 BASAL CELL CARCINOMA OF LEFT ALA NASI: Primary | ICD-10-CM

## 2018-10-17 PROCEDURE — 99203 OFFICE O/P NEW LOW 30 MIN: CPT | Performed by: OTOLARYNGOLOGY

## 2018-10-17 RX ORDER — FUROSEMIDE 20 MG/1
40 TABLET ORAL DAILY
Status: ON HOLD | COMMUNITY
End: 2020-01-01

## 2018-10-17 RX ORDER — PANTOPRAZOLE SODIUM 40 MG/1
40 TABLET, DELAYED RELEASE ORAL DAILY
COMMUNITY
Start: 2018-07-06

## 2018-10-17 RX ORDER — DIAZEPAM 5 MG/1
5 TABLET ORAL ONCE AS NEEDED
Qty: 2 TABLET | Refills: 0 | Status: ON HOLD | OUTPATIENT
Start: 2018-10-17 | End: 2018-10-23

## 2018-10-17 RX ORDER — TAMSULOSIN HYDROCHLORIDE 0.4 MG/1
0.4 CAPSULE ORAL DAILY
COMMUNITY
Start: 2018-07-06

## 2018-10-17 RX ORDER — OXYBUTYNIN CHLORIDE 5 MG/1
5 TABLET ORAL DAILY
COMMUNITY
Start: 2018-07-06

## 2018-10-17 RX ORDER — PROPRANOLOL HYDROCHLORIDE 20 MG/1
10 TABLET ORAL 2 TIMES DAILY
COMMUNITY
End: 2021-01-01 | Stop reason: HOSPADM

## 2018-10-17 RX ORDER — OXYCODONE AND ACETAMINOPHEN 7.5; 325 MG/1; MG/1
TABLET ORAL
Status: ON HOLD | COMMUNITY
Start: 2018-07-06 | End: 2018-10-23

## 2018-10-17 RX ORDER — BUPROPION HYDROCHLORIDE 150 MG/1
150 TABLET ORAL DAILY
Status: ON HOLD | COMMUNITY
Start: 2018-07-06 | End: 2021-01-01

## 2018-10-17 NOTE — PROGRESS NOTES
CC:   Chief Complaint   Patient presents with   • Skin Lesion     nose       HPI: Brett Ortiz is a 67 y.o. male who reports a skin lesion on the left nasal ala.  This lesion has been present for 24 years.  The lesion has changed. He reports bleeding from the lesion, itching overlying the lesion and pain at or near the lesion. He also reports occasional epistaxis.  Nothing makes the lesion better or worse.  A biopsy has been performed revealing pigmented basal cell carcinoma.    Prior history of skin cancer: no prior history    Dermatologist: Dr. Ortega    He has a history of recent hospitalization for MI and bowel perforations- seen at [facility]. Further work-up revealed further heart issues. History of COPD.    PFSH:  Past Medical History:   Diagnosis Date   • Basal cell carcinoma (BCC) of left ala nasi    • Cherry hemangioma    • Cirrhosis of liver (CMS/HCC)     alcohol   • Nevus    • Venous insufficiency      Past Surgical History:   Procedure Laterality Date   • COLON SURGERY      x4     Family History   Problem Relation Age of Onset   • Cancer Mother    • Diabetes Mother    • Cancer Father      Social History   Substance Use Topics   • Smoking status: Former Smoker     Types: Cigarettes     Quit date: 04/2018   • Smokeless tobacco: Former User     Types: Chew   • Alcohol use No      Comment: quit April 14 2018     Allergies:  Patient has no known allergies.    Current Outpatient Prescriptions:   •  buPROPion XL (WELLBUTRIN XL) 150 MG 24 hr tablet, , Disp: , Rfl:   •  furosemide (LASIX) 20 MG tablet, Take 20 mg by mouth 2 (Two) Times a Day., Disp: , Rfl:   •  oxybutynin (DITROPAN) 5 MG tablet, , Disp: , Rfl:   •  oxyCODONE-acetaminophen (PERCOCET) 7.5-325 MG per tablet, , Disp: , Rfl:   •  pantoprazole (PROTONIX) 40 MG EC tablet, , Disp: , Rfl:   •  propranolol (INDERAL) 20 MG tablet, Take 20 mg by mouth 3 (Three) Times a Day., Disp: , Rfl:   •  tamsulosin (FLOMAX) 0.4 MG capsule 24 hr capsule, ,  "Disp: , Rfl:   •  TRAZODONE HCL PO, Take  by mouth., Disp: , Rfl:   •  diazePAM (VALIUM) 5 MG tablet, Take 1 tablet by mouth 1 (One) Time As Needed for Anxiety for up to 1 dose. Must get Dr. Ortega's permission to take this prior to excision, Disp: 2 tablet, Rfl: 0    ROS:  Review of Systems   Constitutional: Negative for activity change, appetite change, chills, fatigue, fever and unexpected weight change.   HENT: Negative for congestion, dental problem, facial swelling and nosebleeds.    Eyes: Negative for discharge and redness.   Musculoskeletal: Positive for gait problem (wheelchair).   Skin: Negative for color change, pallor and rash.   Hematological: Negative for adenopathy. Does not bruise/bleed easily.       PE:  /57   Pulse 62   Temp 96.6 °F (35.9 °C)   Resp 16   Ht 170.2 cm (67\")   Wt 98.4 kg (217 lb)   BMI 33.99 kg/m²   Physical Exam   Constitutional: He is oriented to person, place, and time. He appears well-developed and well-nourished. He is cooperative. No distress.   HENT:   Head: Normocephalic and atraumatic.   Right Ear: External ear normal.   Left Ear: External ear normal.   Nose: Nose normal. No nasal deformity.       Mouth/Throat: Oropharynx is clear and moist.   Eyes: Pupils are equal, round, and reactive to light. Conjunctivae and EOM are normal. Right eye exhibits no discharge. Left eye exhibits no discharge. No scleral icterus.   Neck: Normal range of motion. Neck supple. No JVD present. No tracheal deviation present. No thyromegaly present.   Pulmonary/Chest: Effort normal. No stridor.   Musculoskeletal: Normal range of motion. He exhibits no edema or deformity.   Lymphadenopathy:     He has no cervical adenopathy.   Neurological: He is alert and oriented to person, place, and time. He has normal strength. No cranial nerve deficit. Coordination normal.   Skin: Skin is warm and dry. No rash noted. He is not diaphoretic. No erythema. No pallor.   Psychiatric: He has a normal mood " and affect. His speech is normal and behavior is normal. Judgment and thought content normal. Cognition and memory are normal.   Nursing note and vitals reviewed.          Data review:      Assessment:  1. Basal cell carcinoma of left ala nasi    2. BMI 34.0-34.9,adult        Plan:    1. We have had a lengthy discussed regarding excision vs Mohs surgery. The patient prefers to be under general anesthesia for the entire length of the procedure. However, after considering the options, the patient would like to proceed with Mohs surgery given the higher cure rate and possibly smaller defect. We will plan for repair in the operating room with melolabial flap and left septal mucosal flap and conchal cartilage graft. We will try to coordinate this with Dr. Ortega's office- would like to aim for a Tuesday so I can repair the defect the same day.   2. The risks and benefits of my recommendations, as well as other treatment options were discussed with the patient, daughter and wife today.   3. Discussion of skin lesion. Discussed risks, benefits, alternatives, and possible complications of excision of the skin lesion with reconstruction utilizing local tissue rearrangement, full-thickness skin grafting, or local interpolated flaps. Risks include, but are not limited too: bleeding, infection, hematoma, recurrence, need for additional procedures, flap failure, cosmetic deformity. Patient understands risks and would like to proceed with surgery.     QUALITY MEASURES                     Body Mass Index Screening and Follow-Up Plan  Body mass index is 33.99 kg/m².  Patient's Body mass index is 33.99 kg/m². BMI is above normal parameters. Recommendations include: educational material.    Tobacco Use: Screening and Cessation Intervention  Smoking status: Former Smoker                                                              Packs/day: 0.00      Years: 0.00         Types: Cigarettes     Quit date: 04/2018  Smokeless tobacco:  Former User                        Types: Chew      Return if symptoms worsen or fail to improve, for 1 week postoperatively.      Izaiah Ceron MD   10/17/2018  2:24 PM       Oriented - self; Oriented - place; Oriented - time

## 2018-10-22 ENCOUNTER — APPOINTMENT (OUTPATIENT)
Dept: PREADMISSION TESTING | Facility: HOSPITAL | Age: 68
End: 2018-10-22

## 2018-10-22 ENCOUNTER — HOSPITAL ENCOUNTER (OUTPATIENT)
Dept: GENERAL RADIOLOGY | Facility: HOSPITAL | Age: 68
Discharge: HOME | End: 2018-10-22
Admitting: OTOLARYNGOLOGY

## 2018-10-22 VITALS
HEART RATE: 75 BPM | SYSTOLIC BLOOD PRESSURE: 141 MMHG | RESPIRATION RATE: 16 BRPM | WEIGHT: 211.2 LBS | BODY MASS INDEX: 33.15 KG/M2 | OXYGEN SATURATION: 94 % | HEIGHT: 67 IN | DIASTOLIC BLOOD PRESSURE: 60 MMHG

## 2018-10-22 DIAGNOSIS — C44.311 BASAL CELL CARCINOMA OF LEFT ALA NASI: ICD-10-CM

## 2018-10-22 LAB
ANION GAP SERPL CALCULATED.3IONS-SCNC: 10 MMOL/L (ref 4–13)
BUN BLD-MCNC: 11 MG/DL (ref 5–21)
BUN/CREAT SERPL: 12.8 (ref 7–25)
CALCIUM SPEC-SCNC: 9.7 MG/DL (ref 8.4–10.4)
CHLORIDE SERPL-SCNC: 102 MMOL/L (ref 98–110)
CO2 SERPL-SCNC: 30 MMOL/L (ref 24–31)
CREAT BLD-MCNC: 0.86 MG/DL (ref 0.5–1.4)
DEPRECATED RDW RBC AUTO: 55.4 FL (ref 40–54)
ERYTHROCYTE [DISTWIDTH] IN BLOOD BY AUTOMATED COUNT: 17.2 % (ref 12–15)
GFR SERPL CREATININE-BSD FRML MDRD: 89 ML/MIN/1.73
GLUCOSE BLD-MCNC: 93 MG/DL (ref 70–100)
HCT VFR BLD AUTO: 42.7 % (ref 40–52)
HGB BLD-MCNC: 13.6 G/DL (ref 14–18)
MCH RBC QN AUTO: 27.8 PG (ref 28–32)
MCHC RBC AUTO-ENTMCNC: 31.9 G/DL (ref 33–36)
MCV RBC AUTO: 87.1 FL (ref 82–95)
PLATELET # BLD AUTO: 185 10*3/MM3 (ref 130–400)
PMV BLD AUTO: 11 FL (ref 6–12)
POTASSIUM BLD-SCNC: 4.4 MMOL/L (ref 3.5–5.3)
RBC # BLD AUTO: 4.9 10*6/MM3 (ref 4.8–5.9)
SODIUM BLD-SCNC: 142 MMOL/L (ref 135–145)
WBC NRBC COR # BLD: 9.59 10*3/MM3 (ref 4.8–10.8)

## 2018-10-22 PROCEDURE — 80048 BASIC METABOLIC PNL TOTAL CA: CPT | Performed by: OTOLARYNGOLOGY

## 2018-10-22 PROCEDURE — 93010 ELECTROCARDIOGRAM REPORT: CPT | Performed by: INTERNAL MEDICINE

## 2018-10-22 PROCEDURE — 36415 COLL VENOUS BLD VENIPUNCTURE: CPT

## 2018-10-22 PROCEDURE — 85027 COMPLETE CBC AUTOMATED: CPT | Performed by: OTOLARYNGOLOGY

## 2018-10-22 PROCEDURE — 71045 X-RAY EXAM CHEST 1 VIEW: CPT

## 2018-10-22 PROCEDURE — 93005 ELECTROCARDIOGRAM TRACING: CPT

## 2018-10-22 NOTE — DISCHARGE INSTRUCTIONS
DAY OF SURGERY INSTRUCTIONS        YOUR SURGEON: ***DR HIDALGO     PROCEDURE: ***REPAIR OF THE MOHS DEFECT     DATE OF SURGERY: ***10/23/2018    ARRIVAL TIME: AS DIRECTED BY OFFICE    DAY OF SURGERY TAKE ONLY THESE MEDICATIONS UNLESS OTHERWISE INSTRUCTED BY YOUR PHYSICIAN: ***INDERAL (PROPRANOLOL)          MANAGING PAIN AFTER SURGERY    We know you are probably wondering what your pain will be like after surgery.  Following surgery it is unrealistic to expect you will not have pain.   Pain is how our bodies let us know that something is wrong or cautions us to be careful.  That said, our goal is to make your pain tolerable.    Methods we may use to treat your pain include (oral or IV medications, PCAs, epidurals, nerve blocks, etc.)   While some procedures require IV pain medications for a short time after surgery, transitioning to pain medications by mouth allows for better management of pain.   Your nurse will encourage you to take oral pain medications whenever possible.  IV medications work almost immediately, but only last a short while.  Taking medications by mouth allows for a more constant level of medication in your blood stream for a longer period of time.      Once your pain is out of control it is harder to get back under control.  It is important you are aware when your next dose of pain medication is due.  If you are admitted, your nurse may write the time of your next dose on the white board in your room to help you remember.      We are interested in your pain and encourage you to inform us about aggravating factors during your visit.   Many times a simple repositioning every few hours can make a big difference.    If your physician says it is okay, do not let your pain prevent you from getting out of bed. Be sure to call your nurse for assistance prior to getting up so you do not fall.      Before surgery, please decide your tolerable pain goal.  These faces help describe the pain ratings we use on  a 0-10 scale.   Be prepared to tell us your goal and whether or not you take pain or anxiety medications at home.            BEFORE YOU COME TO THE HOSPITAL  (Pre-op instructions)  • Do not eat, drink, smoke or chew gum after midnight the night before surgery.  This also includes no mints.  • Morning of surgery take only the medicines you have been instructed with a sip of water unless otherwise instructed  by your physician.  • Do not shave, wear makeup or dark nail polish.  • Remove all jewelry including rings.  • Leave anything you consider valuable at home.  • Leave your suitcase in the car until after your surgery.  • Bring the following with you if applicable:  o Picture ID and insurance, Medicare or Medicaid cards  o Co-pay/deductible required by insurance (cash, check, credit card)  o Copy of advance directive, living will or power-of- documents if not brought to PAT  o CPAP or BIPAP mask and tubing  o Relaxation aids (MP3 player, book, magazine)  • On the day of surgery check in at registration located at the main entrance of the hospital.       Outpatient Surgery Guidelines, Adult  Outpatient procedures are those for which the person having the procedure is allowed to go home the same day as the procedure. Various procedures are done on an outpatient basis. You should follow some general guidelines if you will be having an outpatient procedure.  LET YOUR HEALTH CARE PROVIDER KNOW ABOUT:  · Any allergies you have.  · All medicines you are taking, including vitamins, herbs, eye drops, creams, and over-the-counter medicines.  · Previous problems you or members of your family have had with the use of anesthetics.  · Any blood disorders you have.  · Previous surgeries you have had.  · Medical conditions you have.  RISKS AND COMPLICATIONS  Your health care provider will discuss possible risks and complications with you before surgery. Common risks and complications include:    · Problems due to the use  of anesthetics.  · Blood loss and replacement (does not apply to minor surgical procedures).  · Temporary increase in pain due to surgery.  · Uncorrected pain or problems that the surgery was meant to correct.  · Infection.  · New damage.  BEFORE THE PROCEDURE  · Ask your health care provider about changing or stopping your regular medicines. You may need to stop taking certain medicines in the days or weeks before the procedure.  · Stop smoking at least 2 weeks before surgery. This lowers your risk for complications during and after surgery. Ask your health care provider for help with this if needed.  · Eat your usual meals and a light supper the day before surgery. Continue fluid intake. Do not drink alcohol.  · Do not eat or drink after midnight the night before your surgery.   · Arrange for someone to take you home and to stay with you for 24 hours after the procedure. Medicine given for your procedure may affect your ability to drive or to care for yourself.  · Call your health care provider's office if you develop an illness or problem that may prevent you from safely having your procedure.  AFTER THE PROCEDURE  After surgery, you will be taken to a recovery area, where your progress will be monitored. If there are no complications, you will be allowed to go home when you are awake, stable, and taking fluids well. You may have numbness around the surgical site. Healing will take some time. You will have tenderness at the surgical site and may have some swelling and bruising. You may also have some nausea.  HOME CARE INSTRUCTIONS  · Do not drive for 24 hours, or as directed by your health care provider. Do not drive while taking prescription pain medicines.  · Do not drink alcohol for 24 hours.  · Do not make important decisions or sign legal documents for 24 hours.  · You may resume a normal diet and activities as directed.  · Do not lift anything heavier than 10 pounds (4.5 kg) or play contact sports until  your health care provider says it is okay.  · Change your bandages (dressings) as directed.  · Only take over-the-counter or prescription medicines as directed by your health care provider.  · Follow up with your health care provider as directed.  SEEK MEDICAL CARE IF:  · You have increased bleeding (more than a small spot) from the surgical site.  · You have redness, swelling, or increasing pain in the wound.  · You see pus coming from the wound.  · You have a fever.  · You notice a bad smell coming from the wound or dressing.  · You feel lightheaded or faint.  · You develop a rash.  · You have trouble breathing.  · You develop allergies.  MAKE SURE YOU:  · Understand these instructions.  · Will watch your condition.  · Will get help right away if you are not doing well or get worse.     This information is not intended to replace advice given to you by your health care provider. Make sure you discuss any questions you have with your health care provider.     Document Released: 09/12/2002 Document Revised: 05/03/2016 Document Reviewed: 05/22/2014  [employer] Interactive Patient Education ©2016 [employer]       Fall Prevention in Hospitals, Adult  As a hospital patient, your condition and the treatments you receive can increase your risk for falls. Some additional risk factors for falls in a hospital include:  · Being in an unfamiliar environment.  · Being on bed rest.  · Your surgery.  · Taking certain medicines.  · Your tubing requirements, such as intravenous (IV) therapy or catheters.  It is important that you learn how to decrease fall risks while at the hospital. Below are important tips that can help prevent falls.  SAFETY TIPS FOR PREVENTING FALLS  Talk about your risk of falling.  · Ask your health care provider why you are at risk for falling. Is it your medicine, illness, tubing placement, or something else?  · Make a plan with your health care provider to keep you safe from falls.  · Ask your health  care provider or pharmacist about side effects of your medicines. Some medicines can make you dizzy or affect your coordination.  Ask for help.  · Ask for help before getting out of bed. You may need to press your call button.  · Ask for assistance in getting safely to the toilet.  · Ask for a walker or cane to be put at your bedside. Ask that most of the side rails on your bed be placed up before your health care provider leaves the room.  · Ask family or friends to sit with you.  · Ask for things that are out of your reach, such as your glasses, hearing aids, telephone, bedside table, or call button.  Follow these tips to avoid falling:  · Stay lying or seated, rather than standing, while waiting for help.  · Wear rubber-soled slippers or shoes whenever you walk in the hospital.  · Avoid quick, sudden movements.  ¨ Change positions slowly.  ¨ Sit on the side of your bed before standing.  ¨ Stand up slowly and wait before you start to walk.  · Let your health care provider know if there is a spill on the floor.  · Pay careful attention to the medical equipment, electrical cords, and tubes around you.  · When you need help, use your call button by your bed or in the bathroom. Wait for one of your health care providers to help you.  · If you feel dizzy or unsure of your footing, return to bed and wait for assistance.  · Avoid being distracted by the TV, telephone, or another person in your room.  · Do not lean or support yourself on rolling objects, such as IV poles or bedside tables.     This information is not intended to replace advice given to you by your health care provider. Make sure you discuss any questions you have with your health care provider.     Document Released: 12/15/2001 Document Revised: 01/08/2016 Document Reviewed: 08/25/2013  [employer] Interactive Patient Education ©2016 [employer]       Surgical Site Infections FAQs  What is a Surgical Site Infection (SSI)?  A surgical site infection is an  infection that occurs after surgery in the part of the body where the surgery took place. Most patients who have surgery do not develop an infection. However, infections develop in about 1 to 3 out of every 100 patients who have surgery.  Some of the common symptoms of a surgical site infection are:  · Redness and pain around the area where you had surgery  · Drainage of cloudy fluid from your surgical wound  · Fever  Can SSIs be treated?  Yes. Most surgical site infections can be treated with antibiotics. The antibiotic given to you depends on the bacteria (germs) causing the infection. Sometimes patients with SSIs also need another surgery to treat the infection.  What are some of the things that hospitals are doing to prevent SSIs?  To prevent SSIs, doctors, nurses, and other healthcare providers:  · Clean their hands and arms up to their elbows with an antiseptic agent just before the surgery.  · Clean their hands with soap and water or an alcohol-based hand rub before and after caring for each patient.  · May remove some of your hair immediately before your surgery using electric clippers if the hair is in the same area where the procedure will occur. They should not shave you with a razor.  · Wear special hair covers, masks, gowns, and gloves during surgery to keep the surgery area clean.  · Give you antibiotics before your surgery starts. In most cases, you should get antibiotics within 60 minutes before the surgery starts and the antibiotics should be stopped within 24 hours after surgery.  · Clean the skin at the site of your surgery with a special soap that kills germs.  What can I do to help prevent SSIs?  Before your surgery:  · Tell your doctor about other medical problems you may have. Health problems such as allergies, diabetes, and obesity could affect your surgery and your treatment.  · Quit smoking. Patients who smoke get more infections. Talk to your doctor about how you can quit before your  surgery.  · Do not shave near where you will have surgery. Shaving with a razor can irritate your skin and make it easier to develop an infection.  At the time of your surgery:  · Speak up if someone tries to shave you with a razor before surgery. Ask why you need to be shaved and talk with your surgeon if you have any concerns.  · Ask if you will get antibiotics before surgery.  After your surgery:  · Make sure that your healthcare providers clean their hands before examining you, either with soap and water or an alcohol-based hand rub.  · If you do not see your providers clean their hands, please ask them to do so.  · Family and friends who visit you should not touch the surgical wound or dressings.  · Family and friends should clean their hands with soap and water or an alcohol-based hand rub before and after visiting you. If you do not see them clean their hands, ask them to clean their hands.  What do I need to do when I go home from the hospital?  · Before you go home, your doctor or nurse should explain everything you need to know about taking care of your wound. Make sure you understand how to care for your wound before you leave the hospital.  · Always clean your hands before and after caring for your wound.  · Before you go home, make sure you know who to contact if you have questions or problems after you get home.  · If you have any symptoms of an infection, such as redness and pain at the surgery site, drainage, or fever, call your doctor immediately.  If you have additional questions, please ask your doctor or nurse.  Developed and co-sponsored by The Society for Healthcare Epidemiology of [place] (SHEA); Infectious Diseases Society of [place] (IDSA); American Hospital Association; Association for Professionals in Infection Control and Epidemiology (APIC); Centers for Disease Control and Prevention (CDC); and The Joint Commission.     This information is not intended to replace advice given to you by  your health care provider. Make sure you discuss any questions you have with your health care provider.     Document Released: 12/23/2014 Document Revised: 01/08/2016 Document Reviewed: 03/02/2016  [employer] Interactive Patient Education ©2016 [employer]     PATIENT/FAMILY/RESPONSIBLE PARTY VERBALIZES UNDERSTANDING OF ABOVE EDUCATION.  COPY OF PAIN SCALE GIVEN AND REVIEWED WITH VERBALIZED UNDERSTANDING.

## 2018-10-23 ENCOUNTER — ANESTHESIA (OUTPATIENT)
Dept: PERIOP | Facility: HOSPITAL | Age: 68
End: 2018-10-23

## 2018-10-23 ENCOUNTER — ANESTHESIA EVENT (OUTPATIENT)
Dept: PERIOP | Facility: HOSPITAL | Age: 68
End: 2018-10-23

## 2018-10-23 ENCOUNTER — HOSPITAL ENCOUNTER (OUTPATIENT)
Facility: HOSPITAL | Age: 68
Discharge: HOME | End: 2018-10-24
Attending: OTOLARYNGOLOGY | Admitting: OTOLARYNGOLOGY

## 2018-10-23 DIAGNOSIS — C44.311 BASAL CELL CARCINOMA OF LEFT ALA NASI: ICD-10-CM

## 2018-10-23 PROBLEM — S01.402A: Status: ACTIVE | Noted: 2018-10-23

## 2018-10-23 PROBLEM — S01.501A OPEN WOUND OF LIP: Status: ACTIVE | Noted: 2018-10-23

## 2018-10-23 PROBLEM — S01.20XA OPEN WOUND OF NOSE: Status: ACTIVE | Noted: 2018-10-23

## 2018-10-23 PROCEDURE — 25010000002 DEXAMETHASONE PER 1 MG: Performed by: NURSE ANESTHETIST, CERTIFIED REGISTERED

## 2018-10-23 PROCEDURE — 14041 TIS TRNFR F/C/C/M/N/A/G/H/F: CPT | Performed by: OTOLARYNGOLOGY

## 2018-10-23 PROCEDURE — A9270 NON-COVERED ITEM OR SERVICE: HCPCS | Performed by: NURSE PRACTITIONER

## 2018-10-23 PROCEDURE — 25010000002 ONDANSETRON PER 1 MG: Performed by: NURSE ANESTHETIST, CERTIFIED REGISTERED

## 2018-10-23 PROCEDURE — 25010000003 CEFAZOLIN 1-4 GM/50ML-% SOLUTION: Performed by: NURSE PRACTITIONER

## 2018-10-23 PROCEDURE — 63710000001 OXYCODONE-ACETAMINOPHEN 5-325 MG TABLET: Performed by: NURSE PRACTITIONER

## 2018-10-23 PROCEDURE — 21235 EAR CARTILAGE GRAFT: CPT | Performed by: OTOLARYNGOLOGY

## 2018-10-23 PROCEDURE — 15731 FOREHEAD FLAP W/VASC PEDICLE: CPT | Performed by: OTOLARYNGOLOGY

## 2018-10-23 PROCEDURE — 25010000002 NEOSTIGMINE PER 0.5 MG: Performed by: NURSE ANESTHETIST, CERTIFIED REGISTERED

## 2018-10-23 PROCEDURE — 25010000002 FENTANYL CITRATE (PF) 100 MCG/2ML SOLUTION: Performed by: NURSE ANESTHETIST, CERTIFIED REGISTERED

## 2018-10-23 PROCEDURE — 14060 TIS TRNFR E/N/E/L 10 SQ CM/<: CPT | Performed by: OTOLARYNGOLOGY

## 2018-10-23 PROCEDURE — 25010000002 PROPOFOL 10 MG/ML EMULSION: Performed by: NURSE ANESTHETIST, CERTIFIED REGISTERED

## 2018-10-23 PROCEDURE — 13133 CMPLX RPR F/C/C/M/N/AX/G/H/F: CPT | Performed by: OTOLARYNGOLOGY

## 2018-10-23 PROCEDURE — 13132 CMPLX RPR F/C/C/M/N/AX/G/H/F: CPT | Performed by: OTOLARYNGOLOGY

## 2018-10-23 PROCEDURE — 94640 AIRWAY INHALATION TREATMENT: CPT

## 2018-10-23 PROCEDURE — 63710000001 MUPIROCIN 2 % OINTMENT 22 G TUBE: Performed by: NURSE PRACTITIONER

## 2018-10-23 DEVICE — HEMO ABS GELFOAM SPNG PORCN SZ12TO7: Type: IMPLANTABLE DEVICE | Status: FUNCTIONAL

## 2018-10-23 RX ORDER — OXYCODONE AND ACETAMINOPHEN 10; 325 MG/1; MG/1
1 TABLET ORAL ONCE AS NEEDED
Status: COMPLETED | OUTPATIENT
Start: 2018-10-23 | End: 2018-10-23

## 2018-10-23 RX ORDER — MAGNESIUM HYDROXIDE 1200 MG/15ML
LIQUID ORAL AS NEEDED
Status: DISCONTINUED | OUTPATIENT
Start: 2018-10-23 | End: 2018-10-23 | Stop reason: HOSPADM

## 2018-10-23 RX ORDER — FENTANYL CITRATE 50 UG/ML
25 INJECTION, SOLUTION INTRAMUSCULAR; INTRAVENOUS AS NEEDED
Status: DISCONTINUED | OUTPATIENT
Start: 2018-10-23 | End: 2018-10-23 | Stop reason: HOSPADM

## 2018-10-23 RX ORDER — LIDOCAINE HYDROCHLORIDE AND EPINEPHRINE 10; 10 MG/ML; UG/ML
INJECTION, SOLUTION INFILTRATION; PERINEURAL AS NEEDED
Status: DISCONTINUED | OUTPATIENT
Start: 2018-10-23 | End: 2018-10-23 | Stop reason: HOSPADM

## 2018-10-23 RX ORDER — MIDAZOLAM HYDROCHLORIDE 1 MG/ML
1 INJECTION INTRAMUSCULAR; INTRAVENOUS
Status: DISCONTINUED | OUTPATIENT
Start: 2018-10-23 | End: 2018-10-23 | Stop reason: HOSPADM

## 2018-10-23 RX ORDER — SODIUM CHLORIDE, SODIUM LACTATE, POTASSIUM CHLORIDE, CALCIUM CHLORIDE 600; 310; 30; 20 MG/100ML; MG/100ML; MG/100ML; MG/100ML
100 INJECTION, SOLUTION INTRAVENOUS CONTINUOUS
Status: DISCONTINUED | OUTPATIENT
Start: 2018-10-23 | End: 2018-10-23

## 2018-10-23 RX ORDER — NALOXONE HCL 0.4 MG/ML
0.04 VIAL (ML) INJECTION AS NEEDED
Status: DISCONTINUED | OUTPATIENT
Start: 2018-10-23 | End: 2018-10-23 | Stop reason: HOSPADM

## 2018-10-23 RX ORDER — ONDANSETRON 4 MG/1
4 TABLET, FILM COATED ORAL EVERY 6 HOURS PRN
Status: DISCONTINUED | OUTPATIENT
Start: 2018-10-23 | End: 2018-10-24 | Stop reason: HOSPADM

## 2018-10-23 RX ORDER — OXYMETAZOLINE HYDROCHLORIDE 0.05 G/100ML
SPRAY NASAL AS NEEDED
Status: DISCONTINUED | OUTPATIENT
Start: 2018-10-23 | End: 2018-10-23 | Stop reason: HOSPADM

## 2018-10-23 RX ORDER — OXYCODONE HYDROCHLORIDE AND ACETAMINOPHEN 5; 325 MG/1; MG/1
1 TABLET ORAL EVERY 4 HOURS PRN
Status: DISCONTINUED | OUTPATIENT
Start: 2018-10-23 | End: 2018-10-24 | Stop reason: HOSPADM

## 2018-10-23 RX ORDER — TAMSULOSIN HYDROCHLORIDE 0.4 MG/1
0.4 CAPSULE ORAL DAILY
Status: DISCONTINUED | OUTPATIENT
Start: 2018-10-24 | End: 2018-10-24 | Stop reason: HOSPADM

## 2018-10-23 RX ORDER — SODIUM CHLORIDE 0.9 % (FLUSH) 0.9 %
1-10 SYRINGE (ML) INJECTION AS NEEDED
Status: DISCONTINUED | OUTPATIENT
Start: 2018-10-23 | End: 2018-10-23 | Stop reason: HOSPADM

## 2018-10-23 RX ORDER — PROPRANOLOL HYDROCHLORIDE 20 MG/1
20 TABLET ORAL DAILY
Status: DISCONTINUED | OUTPATIENT
Start: 2018-10-24 | End: 2018-10-24 | Stop reason: HOSPADM

## 2018-10-23 RX ORDER — MORPHINE SULFATE 2 MG/ML
2 INJECTION, SOLUTION INTRAMUSCULAR; INTRAVENOUS
Status: DISCONTINUED | OUTPATIENT
Start: 2018-10-23 | End: 2018-10-23 | Stop reason: HOSPADM

## 2018-10-23 RX ORDER — HYDROCODONE BITARTRATE AND ACETAMINOPHEN 7.5; 325 MG/1; MG/1
1 TABLET ORAL EVERY 4 HOURS PRN
Qty: 20 TABLET | Refills: 0 | Status: SHIPPED | OUTPATIENT
Start: 2018-10-23 | End: 2018-10-29 | Stop reason: SDUPTHER

## 2018-10-23 RX ORDER — OXYCODONE HYDROCHLORIDE AND ACETAMINOPHEN 5; 325 MG/1; MG/1
1 TABLET ORAL 2 TIMES DAILY PRN
COMMUNITY
End: 2018-10-24 | Stop reason: HOSPADM

## 2018-10-23 RX ORDER — CEFAZOLIN SODIUM 1 G/50ML
1 INJECTION, SOLUTION INTRAVENOUS EVERY 8 HOURS
Status: DISCONTINUED | OUTPATIENT
Start: 2018-10-23 | End: 2018-10-24 | Stop reason: HOSPADM

## 2018-10-23 RX ORDER — SODIUM CHLORIDE 0.9 % (FLUSH) 0.9 %
3 SYRINGE (ML) INJECTION EVERY 12 HOURS SCHEDULED
Status: DISCONTINUED | OUTPATIENT
Start: 2018-10-23 | End: 2018-10-23 | Stop reason: HOSPADM

## 2018-10-23 RX ORDER — HYDROCODONE BITARTRATE AND ACETAMINOPHEN 7.5; 325 MG/1; MG/1
2 TABLET ORAL ONCE AS NEEDED
Status: DISCONTINUED | OUTPATIENT
Start: 2018-10-23 | End: 2018-10-23 | Stop reason: HOSPADM

## 2018-10-23 RX ORDER — SODIUM CHLORIDE, SODIUM LACTATE, POTASSIUM CHLORIDE, CALCIUM CHLORIDE 600; 310; 30; 20 MG/100ML; MG/100ML; MG/100ML; MG/100ML
1000 INJECTION, SOLUTION INTRAVENOUS CONTINUOUS
Status: DISCONTINUED | OUTPATIENT
Start: 2018-10-23 | End: 2018-10-23

## 2018-10-23 RX ORDER — BUPIVACAINE HCL/0.9 % NACL/PF 0.1 %
2 PLASTIC BAG, INJECTION (ML) EPIDURAL ONCE
Status: COMPLETED | OUTPATIENT
Start: 2018-10-23 | End: 2018-10-23

## 2018-10-23 RX ORDER — DEXAMETHASONE SODIUM PHOSPHATE 4 MG/ML
INJECTION, SOLUTION INTRA-ARTICULAR; INTRALESIONAL; INTRAMUSCULAR; INTRAVENOUS; SOFT TISSUE AS NEEDED
Status: DISCONTINUED | OUTPATIENT
Start: 2018-10-23 | End: 2018-10-23 | Stop reason: SURG

## 2018-10-23 RX ORDER — ONDANSETRON 2 MG/ML
INJECTION INTRAMUSCULAR; INTRAVENOUS AS NEEDED
Status: DISCONTINUED | OUTPATIENT
Start: 2018-10-23 | End: 2018-10-23 | Stop reason: SURG

## 2018-10-23 RX ORDER — CALCIUM CHLORIDE 100 MG/ML
INJECTION INTRAVENOUS; INTRAVENTRICULAR AS NEEDED
Status: DISCONTINUED | OUTPATIENT
Start: 2018-10-23 | End: 2018-10-23 | Stop reason: SURG

## 2018-10-23 RX ORDER — LABETALOL HYDROCHLORIDE 5 MG/ML
5 INJECTION, SOLUTION INTRAVENOUS
Status: DISCONTINUED | OUTPATIENT
Start: 2018-10-23 | End: 2018-10-23 | Stop reason: HOSPADM

## 2018-10-23 RX ORDER — GLYCOPYRROLATE 0.2 MG/ML
INJECTION INTRAMUSCULAR; INTRAVENOUS AS NEEDED
Status: DISCONTINUED | OUTPATIENT
Start: 2018-10-23 | End: 2018-10-23 | Stop reason: SURG

## 2018-10-23 RX ORDER — MEPERIDINE HYDROCHLORIDE 25 MG/ML
12.5 INJECTION INTRAMUSCULAR; INTRAVENOUS; SUBCUTANEOUS
Status: DISCONTINUED | OUTPATIENT
Start: 2018-10-23 | End: 2018-10-23 | Stop reason: HOSPADM

## 2018-10-23 RX ORDER — FLUMAZENIL 0.1 MG/ML
0.2 INJECTION INTRAVENOUS AS NEEDED
Status: DISCONTINUED | OUTPATIENT
Start: 2018-10-23 | End: 2018-10-23 | Stop reason: HOSPADM

## 2018-10-23 RX ORDER — PROPOFOL 10 MG/ML
VIAL (ML) INTRAVENOUS AS NEEDED
Status: DISCONTINUED | OUTPATIENT
Start: 2018-10-23 | End: 2018-10-23 | Stop reason: SURG

## 2018-10-23 RX ORDER — BUPROPION HYDROCHLORIDE 150 MG/1
150 TABLET ORAL DAILY
Status: DISCONTINUED | OUTPATIENT
Start: 2018-10-24 | End: 2018-10-24 | Stop reason: HOSPADM

## 2018-10-23 RX ORDER — CEPHALEXIN 500 MG/1
500 CAPSULE ORAL 3 TIMES DAILY
Qty: 21 CAPSULE | Refills: 0 | Status: SHIPPED | OUTPATIENT
Start: 2018-10-23 | End: 2018-11-05

## 2018-10-23 RX ORDER — PANTOPRAZOLE SODIUM 40 MG/1
40 TABLET, DELAYED RELEASE ORAL
Status: DISCONTINUED | OUTPATIENT
Start: 2018-10-24 | End: 2018-10-24 | Stop reason: HOSPADM

## 2018-10-23 RX ORDER — IPRATROPIUM BROMIDE AND ALBUTEROL SULFATE 2.5; .5 MG/3ML; MG/3ML
3 SOLUTION RESPIRATORY (INHALATION) ONCE AS NEEDED
Status: COMPLETED | OUTPATIENT
Start: 2018-10-23 | End: 2018-10-23

## 2018-10-23 RX ORDER — SODIUM CHLORIDE 0.9 % (FLUSH) 0.9 %
3 SYRINGE (ML) INJECTION AS NEEDED
Status: DISCONTINUED | OUTPATIENT
Start: 2018-10-23 | End: 2018-10-23 | Stop reason: HOSPADM

## 2018-10-23 RX ORDER — MIDAZOLAM HYDROCHLORIDE 1 MG/ML
2 INJECTION INTRAMUSCULAR; INTRAVENOUS
Status: DISCONTINUED | OUTPATIENT
Start: 2018-10-23 | End: 2018-10-23 | Stop reason: HOSPADM

## 2018-10-23 RX ORDER — HYDROCODONE BITARTRATE AND ACETAMINOPHEN 10; 325 MG/1; MG/1
1 TABLET ORAL EVERY 4 HOURS PRN
Qty: 30 TABLET | Refills: 0 | Status: SHIPPED | OUTPATIENT
Start: 2018-10-23 | End: 2018-10-30

## 2018-10-23 RX ORDER — ONDANSETRON 2 MG/ML
4 INJECTION INTRAMUSCULAR; INTRAVENOUS AS NEEDED
Status: DISCONTINUED | OUTPATIENT
Start: 2018-10-23 | End: 2018-10-23 | Stop reason: HOSPADM

## 2018-10-23 RX ORDER — FUROSEMIDE 20 MG/1
20 TABLET ORAL DAILY
Status: DISCONTINUED | OUTPATIENT
Start: 2018-10-24 | End: 2018-10-24 | Stop reason: HOSPADM

## 2018-10-23 RX ORDER — ARFORMOTEROL TARTRATE 15 UG/2ML
15 SOLUTION RESPIRATORY (INHALATION)
Status: DISCONTINUED | OUTPATIENT
Start: 2018-10-24 | End: 2018-10-24 | Stop reason: SINTOL

## 2018-10-23 RX ORDER — GINSENG 100 MG
CAPSULE ORAL AS NEEDED
Status: DISCONTINUED | OUTPATIENT
Start: 2018-10-23 | End: 2018-10-23 | Stop reason: HOSPADM

## 2018-10-23 RX ORDER — LIDOCAINE HYDROCHLORIDE 40 MG/ML
SOLUTION TOPICAL AS NEEDED
Status: DISCONTINUED | OUTPATIENT
Start: 2018-10-23 | End: 2018-10-23 | Stop reason: SURG

## 2018-10-23 RX ORDER — OXYBUTYNIN CHLORIDE 5 MG/1
5 TABLET ORAL DAILY
Status: DISCONTINUED | OUTPATIENT
Start: 2018-10-24 | End: 2018-10-24 | Stop reason: HOSPADM

## 2018-10-23 RX ORDER — HYDRALAZINE HYDROCHLORIDE 20 MG/ML
5 INJECTION INTRAMUSCULAR; INTRAVENOUS
Status: DISCONTINUED | OUTPATIENT
Start: 2018-10-23 | End: 2018-10-23 | Stop reason: HOSPADM

## 2018-10-23 RX ORDER — VECURONIUM BROMIDE 1 MG/ML
INJECTION, POWDER, LYOPHILIZED, FOR SOLUTION INTRAVENOUS AS NEEDED
Status: DISCONTINUED | OUTPATIENT
Start: 2018-10-23 | End: 2018-10-23 | Stop reason: SURG

## 2018-10-23 RX ORDER — LIDOCAINE HYDROCHLORIDE 20 MG/ML
INJECTION, SOLUTION INFILTRATION; PERINEURAL AS NEEDED
Status: DISCONTINUED | OUTPATIENT
Start: 2018-10-23 | End: 2018-10-23 | Stop reason: SURG

## 2018-10-23 RX ORDER — METOCLOPRAMIDE HYDROCHLORIDE 5 MG/ML
5 INJECTION INTRAMUSCULAR; INTRAVENOUS
Status: DISCONTINUED | OUTPATIENT
Start: 2018-10-23 | End: 2018-10-23 | Stop reason: HOSPADM

## 2018-10-23 RX ORDER — FENTANYL CITRATE 50 UG/ML
INJECTION, SOLUTION INTRAMUSCULAR; INTRAVENOUS AS NEEDED
Status: DISCONTINUED | OUTPATIENT
Start: 2018-10-23 | End: 2018-10-23 | Stop reason: SURG

## 2018-10-23 RX ORDER — HYDROCODONE BITARTRATE AND ACETAMINOPHEN 7.5; 325 MG/1; MG/1
1 TABLET ORAL EVERY 4 HOURS PRN
Status: DISCONTINUED | OUTPATIENT
Start: 2018-10-23 | End: 2018-10-24 | Stop reason: HOSPADM

## 2018-10-23 RX ADMIN — FENTANYL CITRATE 100 MCG: 50 INJECTION, SOLUTION INTRAMUSCULAR; INTRAVENOUS at 15:16

## 2018-10-23 RX ADMIN — MEPERIDINE HYDROCHLORIDE 12.5 MG: 25 INJECTION INTRAMUSCULAR; INTRAVENOUS; SUBCUTANEOUS at 19:05

## 2018-10-23 RX ADMIN — VECURONIUM BROMIDE 10 MG: 1 INJECTION, POWDER, LYOPHILIZED, FOR SOLUTION INTRAVENOUS at 15:05

## 2018-10-23 RX ADMIN — CALCIUM CHLORIDE 500 MG: 100 INJECTION, SOLUTION INTRAVENOUS; INTRAVENTRICULAR at 15:20

## 2018-10-23 RX ADMIN — OXYCODONE HYDROCHLORIDE AND ACETAMINOPHEN 1 TABLET: 10; 325 TABLET ORAL at 19:20

## 2018-10-23 RX ADMIN — Medication 3 MG: at 17:44

## 2018-10-23 RX ADMIN — LIDOCAINE HYDROCHLORIDE 1 EACH: 40 SOLUTION TOPICAL at 15:05

## 2018-10-23 RX ADMIN — SODIUM CHLORIDE, POTASSIUM CHLORIDE, SODIUM LACTATE AND CALCIUM CHLORIDE 100 ML/HR: 600; 310; 30; 20 INJECTION, SOLUTION INTRAVENOUS at 14:52

## 2018-10-23 RX ADMIN — MUPIROCIN 1 APPLICATION: 20 OINTMENT TOPICAL at 22:49

## 2018-10-23 RX ADMIN — DEXAMETHASONE SODIUM PHOSPHATE 4 MG: 4 INJECTION, SOLUTION INTRAMUSCULAR; INTRAVENOUS at 15:16

## 2018-10-23 RX ADMIN — FENTANYL CITRATE 50 MCG: 50 INJECTION, SOLUTION INTRAMUSCULAR; INTRAVENOUS at 16:49

## 2018-10-23 RX ADMIN — MEPERIDINE HYDROCHLORIDE 12.5 MG: 25 INJECTION INTRAMUSCULAR; INTRAVENOUS; SUBCUTANEOUS at 19:00

## 2018-10-23 RX ADMIN — GLYCOPYRROLATE 0.4 MG: 0.2 INJECTION, SOLUTION INTRAMUSCULAR; INTRAVENOUS at 17:41

## 2018-10-23 RX ADMIN — Medication 2 G: at 15:08

## 2018-10-23 RX ADMIN — FENTANYL CITRATE 50 MCG: 50 INJECTION, SOLUTION INTRAMUSCULAR; INTRAVENOUS at 16:46

## 2018-10-23 RX ADMIN — LIDOCAINE HYDROCHLORIDE 50 MG: 20 INJECTION, SOLUTION INFILTRATION; PERINEURAL at 15:05

## 2018-10-23 RX ADMIN — CEFAZOLIN SODIUM 1 G: 1 INJECTION, SOLUTION INTRAVENOUS at 23:01

## 2018-10-23 RX ADMIN — OXYCODONE HYDROCHLORIDE AND ACETAMINOPHEN 1 TABLET: 5; 325 TABLET ORAL at 22:49

## 2018-10-23 RX ADMIN — IPRATROPIUM BROMIDE AND ALBUTEROL SULFATE 3 ML: 2.5; .5 SOLUTION RESPIRATORY (INHALATION) at 19:15

## 2018-10-23 RX ADMIN — PROPOFOL 150 MG: 10 INJECTION, EMULSION INTRAVENOUS at 15:05

## 2018-10-23 RX ADMIN — ONDANSETRON HYDROCHLORIDE 4 MG: 2 SOLUTION INTRAMUSCULAR; INTRAVENOUS at 15:16

## 2018-10-23 RX ADMIN — SODIUM CHLORIDE, POTASSIUM CHLORIDE, SODIUM LACTATE AND CALCIUM CHLORIDE: 600; 310; 30; 20 INJECTION, SOLUTION INTRAVENOUS at 16:42

## 2018-10-23 NOTE — ANESTHESIA PROCEDURE NOTES
Airway  Urgency: elective    Airway not difficult    General Information and Staff    Patient location during procedure: OR  CRNA: BRIDGER THOMPSON    Indications and Patient Condition  Indications for airway management: airway protection    Preoxygenated: yes  MILS maintained throughout  Mask difficulty assessment: 1 - vent by mask    Final Airway Details  Final airway type: endotracheal airway      Successful airway: ETT and DORIS tube  Cuffed: yes   Successful intubation technique: direct laryngoscopy  Facilitating devices/methods: intubating stylet  Endotracheal tube insertion site: oral  Blade: Bellamy  Blade size: 2  ETT size: 7.5 mm  Cormack-Lehane Classification: grade I - full view of glottis  Placement verified by: chest auscultation and capnometry   Cuff volume (mL): 8  Measured from: lips  ETT to lips (cm): 21  Number of attempts at approach: 1

## 2018-10-23 NOTE — ANESTHESIA PREPROCEDURE EVALUATION
Anesthesia Evaluation     NPO Solid Status: > 8 hours  NPO Liquid Status: > 8 hours           Airway   Neck ROM: full  Dental      Pulmonary - normal exam   (+) a smoker Former, COPD moderate,   Cardiovascular - normal exam  Exercise tolerance: good (4-7 METS)    (+) hypertension,     ROS comment: Patient had what he calls a cardiac event of blacking out in April.  He went to [facility] where he states he had a negative cardiac workup    Neuro/Psych- negative ROS  GI/Hepatic/Renal/Endo    (+)   liver disease,     Musculoskeletal (-) negative ROS    Abdominal  - normal exam   Substance History   (+) alcohol use,      OB/GYN negative ob/gyn ROS         Other      history of cancer active                  Anesthesia Plan    ASA 3     general     intravenous induction   Anesthetic plan, all risks, benefits, and alternatives have been provided, discussed and informed consent has been obtained with: patient.

## 2018-10-24 ENCOUNTER — TELEPHONE (OUTPATIENT)
Dept: OTOLARYNGOLOGY | Facility: CLINIC | Age: 68
End: 2018-10-24

## 2018-10-24 VITALS
WEIGHT: 211.2 LBS | RESPIRATION RATE: 18 BRPM | OXYGEN SATURATION: 96 % | BODY MASS INDEX: 33.15 KG/M2 | DIASTOLIC BLOOD PRESSURE: 59 MMHG | TEMPERATURE: 97.9 F | HEIGHT: 67 IN | SYSTOLIC BLOOD PRESSURE: 117 MMHG | HEART RATE: 78 BPM

## 2018-10-24 PROBLEM — R33.9 URINARY RETENTION: Status: ACTIVE | Noted: 2018-10-24

## 2018-10-24 PROCEDURE — 99024 POSTOP FOLLOW-UP VISIT: CPT | Performed by: NURSE PRACTITIONER

## 2018-10-24 PROCEDURE — A9270 NON-COVERED ITEM OR SERVICE: HCPCS | Performed by: NURSE PRACTITIONER

## 2018-10-24 PROCEDURE — 94799 UNLISTED PULMONARY SVC/PX: CPT

## 2018-10-24 PROCEDURE — 63710000001 OXYCODONE-ACETAMINOPHEN 5-325 MG TABLET: Performed by: NURSE PRACTITIONER

## 2018-10-24 PROCEDURE — 94760 N-INVAS EAR/PLS OXIMETRY 1: CPT

## 2018-10-24 PROCEDURE — 63710000001 PROPRANOLOL 20 MG TABLET: Performed by: NURSE PRACTITIONER

## 2018-10-24 PROCEDURE — 63710000001 BUPROPION XL 150 MG TABLET SUSTAINED-RELEASE 24 HOUR: Performed by: NURSE PRACTITIONER

## 2018-10-24 PROCEDURE — 51701 INSERT BLADDER CATHETER: CPT

## 2018-10-24 PROCEDURE — 63710000001 FUROSEMIDE 20 MG TABLET: Performed by: NURSE PRACTITIONER

## 2018-10-24 PROCEDURE — 99024 POSTOP FOLLOW-UP VISIT: CPT | Performed by: OTOLARYNGOLOGY

## 2018-10-24 PROCEDURE — 63710000001 TAMSULOSIN 0.4 MG CAPSULE: Performed by: NURSE PRACTITIONER

## 2018-10-24 PROCEDURE — 25010000003 CEFAZOLIN 1-4 GM/50ML-% SOLUTION: Performed by: NURSE PRACTITIONER

## 2018-10-24 PROCEDURE — 63710000001 PANTOPRAZOLE 40 MG TABLET DELAYED-RELEASE: Performed by: NURSE PRACTITIONER

## 2018-10-24 PROCEDURE — 63710000001 OXYBUTYNIN 5 MG TABLET: Performed by: NURSE PRACTITIONER

## 2018-10-24 RX ADMIN — FUROSEMIDE 20 MG: 20 TABLET ORAL at 08:46

## 2018-10-24 RX ADMIN — CEFAZOLIN SODIUM 1 G: 1 INJECTION, SOLUTION INTRAVENOUS at 07:52

## 2018-10-24 RX ADMIN — PROPRANOLOL HYDROCHLORIDE 20 MG: 20 TABLET ORAL at 08:46

## 2018-10-24 RX ADMIN — MUPIROCIN 1 APPLICATION: 20 OINTMENT TOPICAL at 05:12

## 2018-10-24 RX ADMIN — PANTOPRAZOLE SODIUM 40 MG: 40 TABLET, DELAYED RELEASE ORAL at 05:12

## 2018-10-24 RX ADMIN — OXYCODONE HYDROCHLORIDE AND ACETAMINOPHEN 1 TABLET: 5; 325 TABLET ORAL at 14:27

## 2018-10-24 RX ADMIN — OXYCODONE HYDROCHLORIDE AND ACETAMINOPHEN 1 TABLET: 5; 325 TABLET ORAL at 09:49

## 2018-10-24 RX ADMIN — BUPROPION HYDROCHLORIDE 150 MG: 150 TABLET, FILM COATED, EXTENDED RELEASE ORAL at 08:46

## 2018-10-24 RX ADMIN — MUPIROCIN 1 APPLICATION: 20 OINTMENT TOPICAL at 14:27

## 2018-10-24 RX ADMIN — OXYCODONE HYDROCHLORIDE AND ACETAMINOPHEN 1 TABLET: 5; 325 TABLET ORAL at 05:14

## 2018-10-24 RX ADMIN — OXYBUTYNIN CHLORIDE 5 MG: 5 TABLET ORAL at 08:46

## 2018-10-24 RX ADMIN — TAMSULOSIN HYDROCHLORIDE 0.4 MG: 0.4 CAPSULE ORAL at 08:46

## 2018-10-24 NOTE — TELEPHONE ENCOUNTER
We had called the floor to check up on Mr. Ortiz, and they reported that he use the restroom and left.  I called sure he is doing all right.  There is no answer on his home phone.  I left a message with his daughter, Melissa.    Izaiah Ceron MD

## 2018-10-24 NOTE — ANESTHESIA POSTPROCEDURE EVALUATION
Patient: Brett Ortiz    Procedure Summary     Date:  10/23/18 Room / Location:   [facility] OR  /  [facility] OR    Anesthesia Start:  1501 Anesthesia Stop:  1800    Procedures:       LEFT PARAMEDIAN FOREHEAD FLAP WITH PRESERVATION OF VASCULAR PEDICLE (Left )      LEFT CHEEK ADVANCEMENT FLAP CLOSURE OF LIP AND CHEEK,5CM X 6CM IPSILATERAL LEFT SEPTAL MUCOSAL HINGE FLAP,2CM X 3CM (Left )      Conchal cartilage graft from right ear to left nose with complex closure of skin of forehead (Left ) Diagnosis:       Basal cell carcinoma of left ala nasi      (Basal cell carcinoma of left ala nasi [C44.311])    Surgeon:  Izaiah Ceron MD Provider:  Bry Powell CRNA    Anesthesia Type:  general ASA Status:  3          Anesthesia Type: general  Last vitals  BP   143/63 (10/23/18 2156)   Temp   98.2 °F (36.8 °C) (10/23/18 2156)   Pulse   76 (10/23/18 2156)   Resp   16 (10/23/18 2156)     SpO2   96 % (10/23/18 2156)     Post Anesthesia Care and Evaluation    Patient location during evaluation: PACU  Patient participation: complete - patient participated  Level of consciousness: awake  Pain score: 0  Pain management: adequate  Airway patency: patent  Anesthetic complications: No anesthetic complications  PONV Status: none  Cardiovascular status: acceptable  Respiratory status: acceptable  Hydration status: acceptable

## 2018-10-29 ENCOUNTER — OFFICE VISIT (OUTPATIENT)
Dept: OTOLARYNGOLOGY | Facility: CLINIC | Age: 68
End: 2018-10-29

## 2018-10-29 VITALS
BODY MASS INDEX: 33.12 KG/M2 | SYSTOLIC BLOOD PRESSURE: 120 MMHG | WEIGHT: 211 LBS | DIASTOLIC BLOOD PRESSURE: 81 MMHG | RESPIRATION RATE: 20 BRPM | HEIGHT: 67 IN | HEART RATE: 67 BPM | TEMPERATURE: 98 F

## 2018-10-29 DIAGNOSIS — S01.501D: ICD-10-CM

## 2018-10-29 DIAGNOSIS — C44.311 BASAL CELL CARCINOMA OF LEFT ALA NASI: Primary | ICD-10-CM

## 2018-10-29 DIAGNOSIS — S01.402D: ICD-10-CM

## 2018-10-29 DIAGNOSIS — S01.20XD OPEN WOUND OF NOSE, UNSPECIFIED OPEN WOUND TYPE, SUBSEQUENT ENCOUNTER: ICD-10-CM

## 2018-10-29 PROCEDURE — 99024 POSTOP FOLLOW-UP VISIT: CPT | Performed by: OTOLARYNGOLOGY

## 2018-10-29 RX ORDER — HYDROCODONE BITARTRATE AND ACETAMINOPHEN 7.5; 325 MG/1; MG/1
1 TABLET ORAL EVERY 4 HOURS PRN
Qty: 20 TABLET | Refills: 0 | Status: SHIPPED | OUTPATIENT
Start: 2018-10-29 | End: 2018-11-05 | Stop reason: SDUPTHER

## 2018-10-29 NOTE — PROGRESS NOTES
Brett Ortiz presents for a routine postoperative visit following repair of Mohs defect with left paramedian forehead flap, left cheek advancement flap, septal mucosal hinge flap, and conchal cartilage graft on 10/23/18. Postoperatively, he was admitted following surgery because he was not able to keep his O2 sats over 90%. He was discharged the next morning in stable condition.    Subjective: Since surgery, he is doing fairly well, but the following is reported: pain to the surgical site. This is moderate in severity and has been present since the procedure. Symptoms denied postoperatively include fever, chills, bleeding and drainage. The patient states the pain has decreased since surgery.    Objective:   Pathology review: Pathology is not applicable (Mohs reconstruction).    Physical Exam:  Bolster removed from the right ear- healing well. Pedicle flap is intact but remains congested. Crusting removed and xeroform dressing was changed. Sutures and staples were partially removed. Incisions healing well.     Assessment:  Brett was seen today for post-op.    Diagnoses and all orders for this visit:    Basal cell carcinoma of left ala nasi    Open wound of nose, unspecified open wound type, subsequent encounter    Open wound of lip, unspecified open wound type, subsequent encounter    Open wound of left cheek, unspecified open wound type, subsequent encounter    BMI 34.0-34.9,adult    Other orders  -     HYDROcodone-acetaminophen (NORCO) 7.5-325 MG per tablet; Take 1 tablet by mouth Every 4 (Four) Hours As Needed for Moderate Pain  or Severe Pain  (Pain).        Plan:  Keep xeroform dressing and incisions coated with vaseline. Follow-up in 1 week for xeroform dressing change. Call for any problems or worsening symptoms. Plan for pedicle division and flap inset in 2 weeks.    Return in about 1 week (around 11/5/2018), or if symptoms worsen or fail to improve, for Recheck.      Izaiah Ceron MD  10/29/18  5:25  PM

## 2018-11-05 ENCOUNTER — OFFICE VISIT (OUTPATIENT)
Dept: OTOLARYNGOLOGY | Facility: CLINIC | Age: 68
End: 2018-11-05

## 2018-11-05 VITALS
WEIGHT: 211 LBS | RESPIRATION RATE: 20 BRPM | DIASTOLIC BLOOD PRESSURE: 74 MMHG | BODY MASS INDEX: 33.12 KG/M2 | HEIGHT: 67 IN | TEMPERATURE: 97.6 F | SYSTOLIC BLOOD PRESSURE: 127 MMHG | HEART RATE: 67 BPM

## 2018-11-05 DIAGNOSIS — S01.501D: ICD-10-CM

## 2018-11-05 DIAGNOSIS — S01.20XD OPEN WOUND OF NOSE, UNSPECIFIED OPEN WOUND TYPE, SUBSEQUENT ENCOUNTER: ICD-10-CM

## 2018-11-05 DIAGNOSIS — C44.311 BASAL CELL CARCINOMA OF LEFT ALA NASI: Primary | ICD-10-CM

## 2018-11-05 DIAGNOSIS — S01.402D: ICD-10-CM

## 2018-11-05 PROCEDURE — 99024 POSTOP FOLLOW-UP VISIT: CPT | Performed by: OTOLARYNGOLOGY

## 2018-11-05 RX ORDER — HYDROCODONE BITARTRATE AND ACETAMINOPHEN 7.5; 325 MG/1; MG/1
1 TABLET ORAL EVERY 4 HOURS PRN
Qty: 20 TABLET | Refills: 0 | Status: ON HOLD | OUTPATIENT
Start: 2018-11-05 | End: 2020-01-01

## 2018-11-05 NOTE — PROGRESS NOTES
Brett Ortiz presents for a routine postoperative visit following repair of Mohs defect with left paramedian forehead flap, left cheek advancement flap, septal mucosal hinge flap, and conchal cartilage graft on 10/23/18.     Subjective: Since surgery, he is doing fairly well, but the following is reported: pain to the surgical site. This is moderate in severity and has been present since the procedure. This has slightly improved with oral pain medications. Symptoms denied postoperatively include fever, chills, bleeding and drainage. The patient states the pain has decreased since surgery.    Objective:   Pathology review: Pathology is not applicable (Mohs reconstruction).    Physical Exam:  Right ear healing well. Pedicle flap is intact with improved perfusion. Intranasal flap healing well.  Significant crusting removed and xeroform dressing was changed. Sutures and staples removed - the inverting sutures on the left were left in place.  Hair growth on the flap. Incisions healing well.     Assessment:  Brett was seen today for post-op.    Diagnoses and all orders for this visit:    Basal cell carcinoma of left ala nasi    Open wound of nose, unspecified open wound type, subsequent encounter    Open wound of left cheek, unspecified open wound type, subsequent encounter    Open wound of lip, unspecified open wound type, subsequent encounter    Other orders  -     HYDROcodone-acetaminophen (NORCO) 7.5-325 MG per tablet; Take 1 tablet by mouth Every 4 (Four) Hours As Needed for Moderate Pain  or Severe Pain  (Pain).        Plan:  Keep xeroform dressing and incisions coated with vaseline. Follow-up in 1 week for pedicle division and flap inset. Call for any problems or worsening symptoms.     Return for 1 week postoperatively.      Izaiah Ceron MD  11/05/18  4:10 PM

## 2018-11-07 ENCOUNTER — APPOINTMENT (OUTPATIENT)
Dept: PREADMISSION TESTING | Facility: HOSPITAL | Age: 68
End: 2018-11-07

## 2018-11-07 VITALS
HEART RATE: 64 BPM | OXYGEN SATURATION: 93 % | BODY MASS INDEX: 35.08 KG/M2 | DIASTOLIC BLOOD PRESSURE: 57 MMHG | WEIGHT: 218.26 LBS | SYSTOLIC BLOOD PRESSURE: 129 MMHG | HEIGHT: 66 IN

## 2018-11-07 LAB
ANION GAP SERPL CALCULATED.3IONS-SCNC: 11 MMOL/L (ref 4–13)
BUN BLD-MCNC: 10 MG/DL (ref 5–21)
BUN/CREAT SERPL: 11.8 (ref 7–25)
CALCIUM SPEC-SCNC: 9.6 MG/DL (ref 8.4–10.4)
CHLORIDE SERPL-SCNC: 98 MMOL/L (ref 98–110)
CO2 SERPL-SCNC: 33 MMOL/L (ref 24–31)
CREAT BLD-MCNC: 0.85 MG/DL (ref 0.5–1.4)
DEPRECATED RDW RBC AUTO: 54.1 FL (ref 40–54)
ERYTHROCYTE [DISTWIDTH] IN BLOOD BY AUTOMATED COUNT: 16.1 % (ref 12–15)
GFR SERPL CREATININE-BSD FRML MDRD: 90 ML/MIN/1.73
GLUCOSE BLD-MCNC: 91 MG/DL (ref 70–100)
HCT VFR BLD AUTO: 39.8 % (ref 40–52)
HGB BLD-MCNC: 12.4 G/DL (ref 14–18)
MCH RBC QN AUTO: 28.1 PG (ref 28–32)
MCHC RBC AUTO-ENTMCNC: 31.2 G/DL (ref 33–36)
MCV RBC AUTO: 90.2 FL (ref 82–95)
PLATELET # BLD AUTO: 168 10*3/MM3 (ref 130–400)
PMV BLD AUTO: 11.2 FL (ref 6–12)
POTASSIUM BLD-SCNC: 4.7 MMOL/L (ref 3.5–5.3)
RBC # BLD AUTO: 4.41 10*6/MM3 (ref 4.8–5.9)
SODIUM BLD-SCNC: 142 MMOL/L (ref 135–145)
WBC NRBC COR # BLD: 9.74 10*3/MM3 (ref 4.8–10.8)

## 2018-11-07 PROCEDURE — 85027 COMPLETE CBC AUTOMATED: CPT | Performed by: OTOLARYNGOLOGY

## 2018-11-07 PROCEDURE — 93010 ELECTROCARDIOGRAM REPORT: CPT | Performed by: INTERNAL MEDICINE

## 2018-11-07 PROCEDURE — 80048 BASIC METABOLIC PNL TOTAL CA: CPT | Performed by: OTOLARYNGOLOGY

## 2018-11-07 PROCEDURE — 93005 ELECTROCARDIOGRAM TRACING: CPT

## 2018-11-07 PROCEDURE — 36415 COLL VENOUS BLD VENIPUNCTURE: CPT

## 2018-11-07 NOTE — DISCHARGE INSTRUCTIONS
DAY OF SURGERY INSTRUCTIONS  Pedicle division and flap inset of nose    DR HIDALGO        DATE OF SURGERY: NOV 13 2018    ARRIVAL TIME: AS DIRECTED BY OFFICE    DAY OF SURGERY TAKE ONLY THESE MEDICATIONS UNLESS OTHERWISE INSTRUCTED BY YOUR PHYSICIAN:PAXTON        MANAGING PAIN AFTER SURGERY    We know you are probably wondering what your pain will be like after surgery.  Following surgery it is unrealistic to expect you will not have pain.   Pain is how our bodies let us know that something is wrong or cautions us to be careful.  That said, our goal is to make your pain tolerable.    Methods we may use to treat your pain include (oral or IV medications, PCAs, epidurals, nerve blocks, etc.)   While some procedures require IV pain medications for a short time after surgery, transitioning to pain medications by mouth allows for better management of pain.   Your nurse will encourage you to take oral pain medications whenever possible.  IV medications work almost immediately, but only last a short while.  Taking medications by mouth allows for a more constant level of medication in your blood stream for a longer period of time.      Once your pain is out of control it is harder to get back under control.  It is important you are aware when your next dose of pain medication is due.  If you are admitted, your nurse may write the time of your next dose on the white board in your room to help you remember.      We are interested in your pain and encourage you to inform us about aggravating factors during your visit.   Many times a simple repositioning every few hours can make a big difference.    If your physician says it is okay, do not let your pain prevent you from getting out of bed. Be sure to call your nurse for assistance prior to getting up so you do not fall.      Before surgery, please decide your tolerable pain goal.  These faces help describe the pain ratings we use on a 0-10 scale.   Be prepared to  tell us your goal and whether or not you take pain or anxiety medications at home.            BEFORE YOU COME TO THE HOSPITAL  (Pre-op instructions)  • Do not eat, drink, smoke or chew gum after midnight the night before surgery.  This also includes no mints.  • Morning of surgery take only the medicines you have been instructed with a sip of water unless otherwise instructed  by your physician.  • Do not shave, wear makeup or dark nail polish.  • Remove all jewelry including rings.  • Leave anything you consider valuable at home.  • Leave your suitcase in the car until after your surgery.  • Bring the following with you if applicable:  o Picture ID and insurance, Medicare or Medicaid cards  o Co-pay/deductible required by insurance (cash, check, credit card)  o Copy of advance directive, living will or power-of- documents if not brought to PAT  o CPAP or BIPAP mask and tubing  o Relaxation aids (MP3 player, book, magazine)  • On the day of surgery check in at registration located at the main entrance of the hospital.       Outpatient Surgery Guidelines, Adult  Outpatient procedures are those for which the person having the procedure is allowed to go home the same day as the procedure. Various procedures are done on an outpatient basis. You should follow some general guidelines if you will be having an outpatient procedure.  LET YOUR HEALTH CARE PROVIDER KNOW ABOUT:  · Any allergies you have.  · All medicines you are taking, including vitamins, herbs, eye drops, creams, and over-the-counter medicines.  · Previous problems you or members of your family have had with the use of anesthetics.  · Any blood disorders you have.  · Previous surgeries you have had.  · Medical conditions you have.  RISKS AND COMPLICATIONS  Your health care provider will discuss possible risks and complications with you before surgery. Common risks and complications include:    · Problems due to the use of anesthetics.  · Blood loss  and replacement (does not apply to minor surgical procedures).  · Temporary increase in pain due to surgery.  · Uncorrected pain or problems that the surgery was meant to correct.  · Infection.  · New damage.  BEFORE THE PROCEDURE  · Ask your health care provider about changing or stopping your regular medicines. You may need to stop taking certain medicines in the days or weeks before the procedure.  · Stop smoking at least 2 weeks before surgery. This lowers your risk for complications during and after surgery. Ask your health care provider for help with this if needed.  · Eat your usual meals and a light supper the day before surgery. Continue fluid intake. Do not drink alcohol.  · Do not eat or drink after midnight the night before your surgery.   · Arrange for someone to take you home and to stay with you for 24 hours after the procedure. Medicine given for your procedure may affect your ability to drive or to care for yourself.  · Call your health care provider's office if you develop an illness or problem that may prevent you from safely having your procedure.  AFTER THE PROCEDURE  After surgery, you will be taken to a recovery area, where your progress will be monitored. If there are no complications, you will be allowed to go home when you are awake, stable, and taking fluids well. You may have numbness around the surgical site. Healing will take some time. You will have tenderness at the surgical site and may have some swelling and bruising. You may also have some nausea.  HOME CARE INSTRUCTIONS  · Do not drive for 24 hours, or as directed by your health care provider. Do not drive while taking prescription pain medicines.  · Do not drink alcohol for 24 hours.  · Do not make important decisions or sign legal documents for 24 hours.  · You may resume a normal diet and activities as directed.  · Do not lift anything heavier than 10 pounds (4.5 kg) or play contact sports until your health care provider says  it is okay.  · Change your bandages (dressings) as directed.  · Only take over-the-counter or prescription medicines as directed by your health care provider.  · Follow up with your health care provider as directed.  SEEK MEDICAL CARE IF:  · You have increased bleeding (more than a small spot) from the surgical site.  · You have redness, swelling, or increasing pain in the wound.  · You see pus coming from the wound.  · You have a fever.  · You notice a bad smell coming from the wound or dressing.  · You feel lightheaded or faint.  · You develop a rash.  · You have trouble breathing.  · You develop allergies.  MAKE SURE YOU:  · Understand these instructions.  · Will watch your condition.  · Will get help right away if you are not doing well or get worse.     This information is not intended to replace advice given to you by your health care provider. Make sure you discuss any questions you have with your health care provider.     Document Released: 09/12/2002 Document Revised: 05/03/2016 Document Reviewed: 05/22/2014  [employer] Interactive Patient Education ©2016 [employer]       Fall Prevention in Hospitals, Adult  As a hospital patient, your condition and the treatments you receive can increase your risk for falls. Some additional risk factors for falls in a hospital include:  · Being in an unfamiliar environment.  · Being on bed rest.  · Your surgery.  · Taking certain medicines.  · Your tubing requirements, such as intravenous (IV) therapy or catheters.  It is important that you learn how to decrease fall risks while at the hospital. Below are important tips that can help prevent falls.  SAFETY TIPS FOR PREVENTING FALLS  Talk about your risk of falling.  · Ask your health care provider why you are at risk for falling. Is it your medicine, illness, tubing placement, or something else?  · Make a plan with your health care provider to keep you safe from falls.  · Ask your health care provider or pharmacist about  side effects of your medicines. Some medicines can make you dizzy or affect your coordination.  Ask for help.  · Ask for help before getting out of bed. You may need to press your call button.  · Ask for assistance in getting safely to the toilet.  · Ask for a walker or cane to be put at your bedside. Ask that most of the side rails on your bed be placed up before your health care provider leaves the room.  · Ask family or friends to sit with you.  · Ask for things that are out of your reach, such as your glasses, hearing aids, telephone, bedside table, or call button.  Follow these tips to avoid falling:  · Stay lying or seated, rather than standing, while waiting for help.  · Wear rubber-soled slippers or shoes whenever you walk in the hospital.  · Avoid quick, sudden movements.  ¨ Change positions slowly.  ¨ Sit on the side of your bed before standing.  ¨ Stand up slowly and wait before you start to walk.  · Let your health care provider know if there is a spill on the floor.  · Pay careful attention to the medical equipment, electrical cords, and tubes around you.  · When you need help, use your call button by your bed or in the bathroom. Wait for one of your health care providers to help you.  · If you feel dizzy or unsure of your footing, return to bed and wait for assistance.  · Avoid being distracted by the TV, telephone, or another person in your room.  · Do not lean or support yourself on rolling objects, such as IV poles or bedside tables.     This information is not intended to replace advice given to you by your health care provider. Make sure you discuss any questions you have with your health care provider.     Document Released: 12/15/2001 Document Revised: 01/08/2016 Document Reviewed: 08/25/2013  [employer] Interactive Patient Education ©2016 [employer]       Surgical Site Infections FAQs  What is a Surgical Site Infection (SSI)?  A surgical site infection is an infection that occurs after surgery  in the part of the body where the surgery took place. Most patients who have surgery do not develop an infection. However, infections develop in about 1 to 3 out of every 100 patients who have surgery.  Some of the common symptoms of a surgical site infection are:  · Redness and pain around the area where you had surgery  · Drainage of cloudy fluid from your surgical wound  · Fever  Can SSIs be treated?  Yes. Most surgical site infections can be treated with antibiotics. The antibiotic given to you depends on the bacteria (germs) causing the infection. Sometimes patients with SSIs also need another surgery to treat the infection.  What are some of the things that hospitals are doing to prevent SSIs?  To prevent SSIs, doctors, nurses, and other healthcare providers:  · Clean their hands and arms up to their elbows with an antiseptic agent just before the surgery.  · Clean their hands with soap and water or an alcohol-based hand rub before and after caring for each patient.  · May remove some of your hair immediately before your surgery using electric clippers if the hair is in the same area where the procedure will occur. They should not shave you with a razor.  · Wear special hair covers, masks, gowns, and gloves during surgery to keep the surgery area clean.  · Give you antibiotics before your surgery starts. In most cases, you should get antibiotics within 60 minutes before the surgery starts and the antibiotics should be stopped within 24 hours after surgery.  · Clean the skin at the site of your surgery with a special soap that kills germs.  What can I do to help prevent SSIs?  Before your surgery:  · Tell your doctor about other medical problems you may have. Health problems such as allergies, diabetes, and obesity could affect your surgery and your treatment.  · Quit smoking. Patients who smoke get more infections. Talk to your doctor about how you can quit before your surgery.  · Do not shave near where you  will have surgery. Shaving with a razor can irritate your skin and make it easier to develop an infection.  At the time of your surgery:  · Speak up if someone tries to shave you with a razor before surgery. Ask why you need to be shaved and talk with your surgeon if you have any concerns.  · Ask if you will get antibiotics before surgery.  After your surgery:  · Make sure that your healthcare providers clean their hands before examining you, either with soap and water or an alcohol-based hand rub.  · If you do not see your providers clean their hands, please ask them to do so.  · Family and friends who visit you should not touch the surgical wound or dressings.  · Family and friends should clean their hands with soap and water or an alcohol-based hand rub before and after visiting you. If you do not see them clean their hands, ask them to clean their hands.  What do I need to do when I go home from the hospital?  · Before you go home, your doctor or nurse should explain everything you need to know about taking care of your wound. Make sure you understand how to care for your wound before you leave the hospital.  · Always clean your hands before and after caring for your wound.  · Before you go home, make sure you know who to contact if you have questions or problems after you get home.  · If you have any symptoms of an infection, such as redness and pain at the surgery site, drainage, or fever, call your doctor immediately.  If you have additional questions, please ask your doctor or nurse.  Developed and co-sponsored by The Society for Healthcare Epidemiology of [place] (SHEA); Infectious Diseases Society of [place] (IDSA); American Hospital Association; Association for Professionals in Infection Control and Epidemiology (APIC); Centers for Disease Control and Prevention (CDC); and The Joint Commission.     This information is not intended to replace advice given to you by your health care provider. Make sure you  discuss any questions you have with your health care provider.     Document Released: 12/23/2014 Document Revised: 01/08/2016 Document Reviewed: 03/02/2016  [employer] Interactive Patient Education ©2016 [employer]     PATIENT/FAMILY/RESPONSIBLE PARTY VERBALIZES UNDERSTANDING OF ABOVE EDUCATION.  COPY OF PAIN SCALE GIVEN AND REVIEWED WITH VERBALIZED UNDERSTANDING.

## 2018-11-13 ENCOUNTER — ANESTHESIA EVENT (OUTPATIENT)
Dept: PERIOP | Facility: HOSPITAL | Age: 68
End: 2018-11-13

## 2018-11-13 ENCOUNTER — HOSPITAL ENCOUNTER (OUTPATIENT)
Facility: HOSPITAL | Age: 68
Setting detail: HOSPITAL OUTPATIENT SURGERY
Discharge: HOME | End: 2018-11-13
Attending: OTOLARYNGOLOGY | Admitting: OTOLARYNGOLOGY

## 2018-11-13 ENCOUNTER — ANESTHESIA (OUTPATIENT)
Dept: PERIOP | Facility: HOSPITAL | Age: 68
End: 2018-11-13

## 2018-11-13 VITALS
TEMPERATURE: 97.9 F | HEART RATE: 69 BPM | DIASTOLIC BLOOD PRESSURE: 58 MMHG | OXYGEN SATURATION: 93 % | SYSTOLIC BLOOD PRESSURE: 102 MMHG | RESPIRATION RATE: 16 BRPM

## 2018-11-13 PROCEDURE — 25010000002 DEXAMETHASONE PER 1 MG: Performed by: NURSE ANESTHETIST, CERTIFIED REGISTERED

## 2018-11-13 PROCEDURE — 13152 CMPLX RPR E/N/E/L 2.6-7.5 CM: CPT | Performed by: OTOLARYNGOLOGY

## 2018-11-13 PROCEDURE — 25010000002 FENTANYL CITRATE (PF) 100 MCG/2ML SOLUTION: Performed by: ANESTHESIOLOGY

## 2018-11-13 PROCEDURE — 25010000002 ONDANSETRON PER 1 MG: Performed by: NURSE ANESTHETIST, CERTIFIED REGISTERED

## 2018-11-13 PROCEDURE — 15630 DELAY FLAP EYE/NOS/EAR/LIP: CPT | Performed by: OTOLARYNGOLOGY

## 2018-11-13 PROCEDURE — 25010000002 FENTANYL CITRATE (PF) 100 MCG/2ML SOLUTION: Performed by: NURSE ANESTHETIST, CERTIFIED REGISTERED

## 2018-11-13 PROCEDURE — 94799 UNLISTED PULMONARY SVC/PX: CPT

## 2018-11-13 PROCEDURE — 25010000002 PROPOFOL 10 MG/ML EMULSION: Performed by: NURSE ANESTHETIST, CERTIFIED REGISTERED

## 2018-11-13 PROCEDURE — 25010000003 CEFAZOLIN PER 500 MG: Performed by: NURSE ANESTHETIST, CERTIFIED REGISTERED

## 2018-11-13 RX ORDER — IPRATROPIUM BROMIDE AND ALBUTEROL SULFATE 2.5; .5 MG/3ML; MG/3ML
3 SOLUTION RESPIRATORY (INHALATION) ONCE
Status: COMPLETED | OUTPATIENT
Start: 2018-11-13 | End: 2018-11-13

## 2018-11-13 RX ORDER — DEXAMETHASONE SODIUM PHOSPHATE 4 MG/ML
INJECTION, SOLUTION INTRA-ARTICULAR; INTRALESIONAL; INTRAMUSCULAR; INTRAVENOUS; SOFT TISSUE AS NEEDED
Status: DISCONTINUED | OUTPATIENT
Start: 2018-11-13 | End: 2018-11-13 | Stop reason: SURG

## 2018-11-13 RX ORDER — IPRATROPIUM BROMIDE AND ALBUTEROL SULFATE 2.5; .5 MG/3ML; MG/3ML
3 SOLUTION RESPIRATORY (INHALATION) ONCE AS NEEDED
Status: COMPLETED | OUTPATIENT
Start: 2018-11-13 | End: 2018-11-13

## 2018-11-13 RX ORDER — MEPERIDINE HYDROCHLORIDE 25 MG/ML
12.5 INJECTION INTRAMUSCULAR; INTRAVENOUS; SUBCUTANEOUS
Status: DISCONTINUED | OUTPATIENT
Start: 2018-11-13 | End: 2018-11-13 | Stop reason: HOSPADM

## 2018-11-13 RX ORDER — SODIUM CHLORIDE, SODIUM LACTATE, POTASSIUM CHLORIDE, CALCIUM CHLORIDE 600; 310; 30; 20 MG/100ML; MG/100ML; MG/100ML; MG/100ML
1000 INJECTION, SOLUTION INTRAVENOUS CONTINUOUS
Status: DISCONTINUED | OUTPATIENT
Start: 2018-11-13 | End: 2018-11-13 | Stop reason: HOSPADM

## 2018-11-13 RX ORDER — CEFAZOLIN SODIUM 1 G/3ML
INJECTION, POWDER, FOR SOLUTION INTRAMUSCULAR; INTRAVENOUS AS NEEDED
Status: DISCONTINUED | OUTPATIENT
Start: 2018-11-13 | End: 2018-11-13 | Stop reason: SURG

## 2018-11-13 RX ORDER — HYDROCODONE BITARTRATE AND ACETAMINOPHEN 7.5; 325 MG/1; MG/1
1 TABLET ORAL ONCE AS NEEDED
Status: DISCONTINUED | OUTPATIENT
Start: 2018-11-13 | End: 2018-11-13 | Stop reason: HOSPADM

## 2018-11-13 RX ORDER — ONDANSETRON 2 MG/ML
4 INJECTION INTRAMUSCULAR; INTRAVENOUS ONCE AS NEEDED
Status: DISCONTINUED | OUTPATIENT
Start: 2018-11-13 | End: 2018-11-13 | Stop reason: HOSPADM

## 2018-11-13 RX ORDER — SODIUM CHLORIDE 0.9 % (FLUSH) 0.9 %
3 SYRINGE (ML) INJECTION EVERY 12 HOURS SCHEDULED
Status: DISCONTINUED | OUTPATIENT
Start: 2018-11-13 | End: 2018-11-13 | Stop reason: HOSPADM

## 2018-11-13 RX ORDER — ONDANSETRON 2 MG/ML
INJECTION INTRAMUSCULAR; INTRAVENOUS AS NEEDED
Status: DISCONTINUED | OUTPATIENT
Start: 2018-11-13 | End: 2018-11-13 | Stop reason: SURG

## 2018-11-13 RX ORDER — LIDOCAINE HYDROCHLORIDE 20 MG/ML
INJECTION, SOLUTION INFILTRATION; PERINEURAL AS NEEDED
Status: DISCONTINUED | OUTPATIENT
Start: 2018-11-13 | End: 2018-11-13 | Stop reason: SURG

## 2018-11-13 RX ORDER — LIDOCAINE HYDROCHLORIDE AND EPINEPHRINE 10; 10 MG/ML; UG/ML
INJECTION, SOLUTION INFILTRATION; PERINEURAL AS NEEDED
Status: DISCONTINUED | OUTPATIENT
Start: 2018-11-13 | End: 2018-11-13 | Stop reason: HOSPADM

## 2018-11-13 RX ORDER — OXYCODONE AND ACETAMINOPHEN 10; 325 MG/1; MG/1
1 TABLET ORAL ONCE AS NEEDED
Status: DISCONTINUED | OUTPATIENT
Start: 2018-11-13 | End: 2018-11-13 | Stop reason: HOSPADM

## 2018-11-13 RX ORDER — ROCURONIUM BROMIDE 10 MG/ML
INJECTION, SOLUTION INTRAVENOUS AS NEEDED
Status: DISCONTINUED | OUTPATIENT
Start: 2018-11-13 | End: 2018-11-13 | Stop reason: SURG

## 2018-11-13 RX ORDER — IBUPROFEN 600 MG/1
600 TABLET ORAL ONCE AS NEEDED
Status: DISCONTINUED | OUTPATIENT
Start: 2018-11-13 | End: 2018-11-13 | Stop reason: HOSPADM

## 2018-11-13 RX ORDER — MIDAZOLAM HYDROCHLORIDE 1 MG/ML
2 INJECTION INTRAMUSCULAR; INTRAVENOUS
Status: DISCONTINUED | OUTPATIENT
Start: 2018-11-13 | End: 2018-11-13 | Stop reason: HOSPADM

## 2018-11-13 RX ORDER — FENTANYL CITRATE 50 UG/ML
INJECTION, SOLUTION INTRAMUSCULAR; INTRAVENOUS AS NEEDED
Status: DISCONTINUED | OUTPATIENT
Start: 2018-11-13 | End: 2018-11-13 | Stop reason: SURG

## 2018-11-13 RX ORDER — PROPOFOL 10 MG/ML
VIAL (ML) INTRAVENOUS AS NEEDED
Status: DISCONTINUED | OUTPATIENT
Start: 2018-11-13 | End: 2018-11-13 | Stop reason: SURG

## 2018-11-13 RX ORDER — OXYCODONE AND ACETAMINOPHEN 7.5; 325 MG/1; MG/1
2 TABLET ORAL ONCE AS NEEDED
Status: COMPLETED | OUTPATIENT
Start: 2018-11-13 | End: 2018-11-13

## 2018-11-13 RX ORDER — SODIUM CHLORIDE, SODIUM LACTATE, POTASSIUM CHLORIDE, CALCIUM CHLORIDE 600; 310; 30; 20 MG/100ML; MG/100ML; MG/100ML; MG/100ML
100 INJECTION, SOLUTION INTRAVENOUS CONTINUOUS
Status: DISCONTINUED | OUTPATIENT
Start: 2018-11-13 | End: 2018-11-13 | Stop reason: HOSPADM

## 2018-11-13 RX ORDER — VASOPRESSIN 20 U/ML
INJECTION PARENTERAL AS NEEDED
Status: DISCONTINUED | OUTPATIENT
Start: 2018-11-13 | End: 2018-11-13 | Stop reason: SURG

## 2018-11-13 RX ORDER — SODIUM CHLORIDE 0.9 % (FLUSH) 0.9 %
1-10 SYRINGE (ML) INJECTION AS NEEDED
Status: DISCONTINUED | OUTPATIENT
Start: 2018-11-13 | End: 2018-11-13 | Stop reason: HOSPADM

## 2018-11-13 RX ORDER — SODIUM CHLORIDE 0.9 % (FLUSH) 0.9 %
3 SYRINGE (ML) INJECTION AS NEEDED
Status: DISCONTINUED | OUTPATIENT
Start: 2018-11-13 | End: 2018-11-13 | Stop reason: HOSPADM

## 2018-11-13 RX ORDER — FENTANYL CITRATE 50 UG/ML
25 INJECTION, SOLUTION INTRAMUSCULAR; INTRAVENOUS AS NEEDED
Status: DISCONTINUED | OUTPATIENT
Start: 2018-11-13 | End: 2018-11-13 | Stop reason: HOSPADM

## 2018-11-13 RX ORDER — HYDROCODONE BITARTRATE AND ACETAMINOPHEN 7.5; 325 MG/1; MG/1
1 TABLET ORAL EVERY 4 HOURS PRN
Qty: 20 TABLET | Refills: 0 | Status: ON HOLD | OUTPATIENT
Start: 2018-11-13 | End: 2020-01-01

## 2018-11-13 RX ORDER — LABETALOL HYDROCHLORIDE 5 MG/ML
5 INJECTION, SOLUTION INTRAVENOUS
Status: DISCONTINUED | OUTPATIENT
Start: 2018-11-13 | End: 2018-11-13 | Stop reason: HOSPADM

## 2018-11-13 RX ORDER — METOCLOPRAMIDE HYDROCHLORIDE 5 MG/ML
5 INJECTION INTRAMUSCULAR; INTRAVENOUS
Status: DISCONTINUED | OUTPATIENT
Start: 2018-11-13 | End: 2018-11-13 | Stop reason: HOSPADM

## 2018-11-13 RX ORDER — MAGNESIUM HYDROXIDE 1200 MG/15ML
LIQUID ORAL AS NEEDED
Status: DISCONTINUED | OUTPATIENT
Start: 2018-11-13 | End: 2018-11-13 | Stop reason: HOSPADM

## 2018-11-13 RX ORDER — LIDOCAINE HYDROCHLORIDE 40 MG/ML
SOLUTION TOPICAL AS NEEDED
Status: DISCONTINUED | OUTPATIENT
Start: 2018-11-13 | End: 2018-11-13 | Stop reason: SURG

## 2018-11-13 RX ORDER — MIDAZOLAM HYDROCHLORIDE 1 MG/ML
1 INJECTION INTRAMUSCULAR; INTRAVENOUS
Status: DISCONTINUED | OUTPATIENT
Start: 2018-11-13 | End: 2018-11-13 | Stop reason: HOSPADM

## 2018-11-13 RX ORDER — NALOXONE HCL 0.4 MG/ML
0.4 VIAL (ML) INJECTION AS NEEDED
Status: DISCONTINUED | OUTPATIENT
Start: 2018-11-13 | End: 2018-11-13 | Stop reason: HOSPADM

## 2018-11-13 RX ORDER — ACETAMINOPHEN 500 MG
1000 TABLET ORAL ONCE
Status: COMPLETED | OUTPATIENT
Start: 2018-11-13 | End: 2018-11-13

## 2018-11-13 RX ADMIN — IPRATROPIUM BROMIDE AND ALBUTEROL SULFATE 3 ML: 2.5; .5 SOLUTION RESPIRATORY (INHALATION) at 13:43

## 2018-11-13 RX ADMIN — LIDOCAINE HYDROCHLORIDE 0.5 ML: 10 INJECTION, SOLUTION EPIDURAL; INFILTRATION; INTRACAUDAL; PERINEURAL at 09:50

## 2018-11-13 RX ADMIN — DEXAMETHASONE SODIUM PHOSPHATE 4 MG: 4 INJECTION, SOLUTION INTRAMUSCULAR; INTRAVENOUS at 11:32

## 2018-11-13 RX ADMIN — PROPOFOL 120 MG: 10 INJECTION, EMULSION INTRAVENOUS at 11:12

## 2018-11-13 RX ADMIN — LIDOCAINE HYDROCHLORIDE 75 MG: 20 INJECTION, SOLUTION INFILTRATION; PERINEURAL at 11:12

## 2018-11-13 RX ADMIN — ROCURONIUM BROMIDE 30 MG: 10 INJECTION INTRAVENOUS at 11:12

## 2018-11-13 RX ADMIN — ACETAMINOPHEN 1000 MG: 500 TABLET ORAL at 11:01

## 2018-11-13 RX ADMIN — FENTANYL CITRATE 25 MCG: 50 INJECTION INTRAMUSCULAR; INTRAVENOUS at 12:44

## 2018-11-13 RX ADMIN — FENTANYL CITRATE 100 MCG: 50 INJECTION, SOLUTION INTRAMUSCULAR; INTRAVENOUS at 11:12

## 2018-11-13 RX ADMIN — ONDANSETRON HYDROCHLORIDE 4 MG: 2 SOLUTION INTRAMUSCULAR; INTRAVENOUS at 11:32

## 2018-11-13 RX ADMIN — SODIUM CHLORIDE, POTASSIUM CHLORIDE, SODIUM LACTATE AND CALCIUM CHLORIDE 1000 ML: 600; 310; 30; 20 INJECTION, SOLUTION INTRAVENOUS at 09:50

## 2018-11-13 RX ADMIN — FENTANYL CITRATE 25 MCG: 50 INJECTION INTRAMUSCULAR; INTRAVENOUS at 12:41

## 2018-11-13 RX ADMIN — OXYCODONE HYDROCHLORIDE AND ACETAMINOPHEN 2 TABLET: 7.5; 325 TABLET ORAL at 12:45

## 2018-11-13 RX ADMIN — CEFAZOLIN 2 G: 1 INJECTION, POWDER, FOR SOLUTION INTRAVENOUS at 11:19

## 2018-11-13 RX ADMIN — VASOPRESSIN 0.5 UNITS: 20 INJECTION INTRAVENOUS at 11:41

## 2018-11-13 RX ADMIN — LIDOCAINE HYDROCHLORIDE 1 EACH: 40 SOLUTION TOPICAL at 11:12

## 2018-11-13 RX ADMIN — IPRATROPIUM BROMIDE AND ALBUTEROL SULFATE 3 ML: 2.5; .5 SOLUTION RESPIRATORY (INHALATION) at 11:01

## 2018-11-13 NOTE — DISCHARGE INSTRUCTIONS

## 2018-11-13 NOTE — ANESTHESIA PROCEDURE NOTES
ANESTHESIA INTUBATION  Airway not difficult    General Information and Staff    Patient location during procedure: OR  CRNA: Beck Galvez CRNA    Indications and Patient Condition  Indications for airway management: airway protection    Preoxygenated: yes  Mask difficulty assessment: 1 - vent by mask    Final Airway Details  Final airway type: endotracheal airway      Successful airway: ETT  Cuffed: yes   Successful intubation technique: direct laryngoscopy  Blade: Praag  Blade size: 3  ETT size (mm): 7.5  Cormack-Lehane Classification: grade I - full view of glottis  Placement verified by: chest auscultation and capnometry   Cuff volume (mL): 8  Measured from: lips  ETT to lips (cm): 22

## 2018-11-13 NOTE — ANESTHESIA POSTPROCEDURE EVALUATION
Patient: Brett Ortiz    Procedure Summary     Date:  11/13/18 Room / Location:   [facility] OR  /  [facility]    Anesthesia Start:  1108 Anesthesia Stop:  1231    Procedure:  Pedicle division and flap inset of nose (Bilateral ) Diagnosis:       Open wound of nose, unspecified open wound type, subsequent encounter      (Open wound of nose, unspecified open wound type, subsequent encounter [S01.20XD])    Surgeon:  Izaiah Ceron MD Provider:  Beck Galvez CRNA    Anesthesia Type:  general ASA Status:  3          Anesthesia Type: general  Last vitals  BP   121/59 (11/13/18 1415)   Temp   97.9 °F (36.6 °C) (11/13/18 1358)   Pulse   75 (11/13/18 1415)   Resp   14 (11/13/18 1415)     SpO2   90 % (11/13/18 1415)     Post Anesthesia Care and Evaluation    Patient location during evaluation: PACU  Patient participation: complete - patient participated  Level of consciousness: awake and alert  Pain management: adequate  Airway patency: patent  Anesthetic complications: No anesthetic complications    Cardiovascular status: acceptable  Respiratory status: acceptable  Hydration status: acceptable    Comments: Blood pressure 121/59, pulse 75, temperature 97.9 °F (36.6 °C), temperature source Temporal, resp. rate 14, SpO2 90 %.    Pt discharged from PACU based on oziel score >8

## 2018-11-13 NOTE — INTERVAL H&P NOTE
H&P reviewed. The patient was examined and there are no changes to the H&P.       He is now s/p flap.  Ready for division and inset.  Discussed possible need for additional procedures in the future to recreate the alar-facial crease, then in the flap, etc.
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
## 2018-11-13 NOTE — OP NOTE
Preprocedure diagnosis: Open defect skin of left nasal ala, cheek, and upper lip    Post procedure diagnosis: The same     Procedure performed:      1) Pedicle division and flap inset of nose (forehead flap)     2) Pedicle division and flap inset of nose (septal mucosal flap)     3) Complex closure skin of forehead, 3 cm    Surgeon: Izaiah Ceron M.D.    Anesthesia: Gen. with 5 cc of 1% lidocaine with 1:100,000 epinephrine    Specimen:  None    Complications: None    Disposition: Home    Details: After patient verification and informed consent was obtained, the patient was taken to the operating room and laid supine on the operative table.  The skin was cleansed with alcohol and the skin infiltrated with 1% lidocaine of 1:100,000 epinephrine.  The skin was sterilely prepped with Betadine and the patient was sterilely draped.    A 15 blade used to incise the skin of the pedicle.  Retracting the inferior skin flap with a double-pronged skin hook, I dissected back towards the nasal inset site using a combination of a 15 blade and tenotomy scissors.  I then incised the remaining pedicle full-thickness and obtained hemostasis with bipolar cautery set at 15.  The native dorsal skin of the nose was then incised using a 15 blade to allow flap inset, making sure to leo t the wound edges.  Dissection was then carried down thinning the flap towards the nasal tip.  I limited the amount of thinning due to the fact that this was previously a full-thickness defect and I was concerned of nasal blood flow to the flap.  I figured that he may have additional thinning procedures done the road, but this could not be performed today, due to concerns for blood supply.  I removed the intervening segment and placed this in saline and the back table for possible later use.    At this point the pedicle was then re-inset.  The forehead pedicle was incised in the inverted V fashion superiorly and this was undermined in the subcutaneous  plain.  The native forehead skin was then also excised using a 15 blade, making sure to orient the brow to preserve its shape.  Hemostasis was again obtained with bipolar cautery set at 15 after undermining the skin of the forehead using a 15 blade.  Then inset to the pedicle stump using deep 5-0 undyed Vicryl suture followed by running locking 5-0 fast absorbing gut.    Redraping the skin of the nose and then trimmed it to fit the defect.  I then inset the flap using deep 5-0 undyed Vicryl suture for a running locking 5-0 fast absorbing gut.      The septal mucosal flap was then divided.  Looking intranasally, flap was clipped using curved iris scissors.  The flap was inset using simple 4-0 chromic gut sutures.    There is some dehiscence with spitting stitches of the central forehead defect.  As a result, I excised this using a fusiform incision using a fresh 15 blade.  I then undermined laterally for 1 cm in each direction.  I then obtained hemostasis with bipolar cautery set at 15.  The skin was advanced and closed using deep 5-0 undyed Vicryl suture followed by running, locking 5-0 fast absorbing gut.    Antibiotic ointment was placed incisions.    The patient was weaned from anesthesia, and transferred to the PACU for recovery without apparent complication.

## 2018-11-15 ENCOUNTER — TELEPHONE (OUTPATIENT)
Dept: OTOLARYNGOLOGY | Facility: CLINIC | Age: 68
End: 2018-11-15

## 2018-11-15 NOTE — TELEPHONE ENCOUNTER
Postop Phone Call:    Patient is doing well.  Flap color is decent per patient.  Questions addressed.  F/U as scheduled.    Izaiah Ceron MD

## 2018-11-21 ENCOUNTER — OFFICE VISIT (OUTPATIENT)
Dept: OTOLARYNGOLOGY | Facility: CLINIC | Age: 68
End: 2018-11-21

## 2018-11-21 VITALS
WEIGHT: 217 LBS | HEIGHT: 65 IN | DIASTOLIC BLOOD PRESSURE: 80 MMHG | SYSTOLIC BLOOD PRESSURE: 136 MMHG | BODY MASS INDEX: 36.15 KG/M2 | TEMPERATURE: 97.3 F

## 2018-11-21 DIAGNOSIS — C44.311 BASAL CELL CARCINOMA OF LEFT ALA NASI: Primary | ICD-10-CM

## 2018-11-21 PROCEDURE — 99024 POSTOP FOLLOW-UP VISIT: CPT | Performed by: OTOLARYNGOLOGY

## 2018-11-21 NOTE — PROGRESS NOTES
Brett Ortiz presents for a routine postoperative visit following pedicle division and flap inset of nose and complex closure of forehead on 11/13/18.    Subjective: Since surgery, he is doing well without complaints.  Symptoms denied postoperatively include fever, chills, bleeding and drainage. The patient states the pain has decreased since surgery. He states he is breathing well.    Objective:   Pathology review: Pathology is not applicable (Mohs reconstruction).     Physical Exam:  Forehead incisions are healing well - sutures removed. Pedicle flap is full, but healing well with improved perfusion- sutures removed. Intranasal flap healing well. Hair growth on the flap.   Very thick flap      Assessment:  Brett was seen today for post-op.    Diagnoses and all orders for this visit:    Basal cell carcinoma of left ala nasi        Plan:  Protect the incisions from sunlight. Sunlight to the incisions will cause permanent pigmentation to the incision line and make the incision more noticeable. After the incision has reepithelialized (typically 2-3 weeks after the procedure), you may begin to use sunscreen with an SPF of 15 or greater    I discussed the use of  Vitamin E, Mederma, a high-quality extra virgin olive oil, or a silicone-based wound cream to the incisions to optimize the end result. Apply topically twice daily, or as directed, to help optimize wound healing and decrease erythema.    We discussed the importance of routine skin checks with a dermatologist or your PCP every 6-12 months.     May gently trim hair growth on flap- Do not apply cuevas or other hair removal cream    May benefit from flap thinning.  He reports that he is breathing well, but the ala is very thick and occluding that left nostril.    Return in about 3 months (around 2/21/2019), or if symptoms worsen or fail to improve, for Recheck.      Izaiah Ceron MD  11/21/18  2:12 PM

## 2018-12-13 ENCOUNTER — OFFICE VISIT (OUTPATIENT)
Dept: SURGERY | Age: 68
End: 2018-12-13
Payer: MEDICARE

## 2018-12-13 VITALS — WEIGHT: 219 LBS | HEIGHT: 67 IN | BODY MASS INDEX: 34.37 KG/M2 | OXYGEN SATURATION: 98 % | TEMPERATURE: 64 F

## 2018-12-13 DIAGNOSIS — K43.5 PARASTOMAL HERNIA WITHOUT OBSTRUCTION OR GANGRENE: Primary | ICD-10-CM

## 2018-12-13 PROCEDURE — 1123F ACP DISCUSS/DSCN MKR DOCD: CPT | Performed by: SURGERY

## 2018-12-13 PROCEDURE — G8427 DOCREV CUR MEDS BY ELIG CLIN: HCPCS | Performed by: SURGERY

## 2018-12-13 PROCEDURE — G8484 FLU IMMUNIZE NO ADMIN: HCPCS | Performed by: SURGERY

## 2018-12-13 PROCEDURE — 4040F PNEUMOC VAC/ADMIN/RCVD: CPT | Performed by: SURGERY

## 2018-12-13 PROCEDURE — 99213 OFFICE O/P EST LOW 20 MIN: CPT | Performed by: SURGERY

## 2018-12-13 PROCEDURE — 1101F PT FALLS ASSESS-DOCD LE1/YR: CPT | Performed by: SURGERY

## 2018-12-13 PROCEDURE — 1036F TOBACCO NON-USER: CPT | Performed by: SURGERY

## 2018-12-13 PROCEDURE — 3017F COLORECTAL CA SCREEN DOC REV: CPT | Performed by: SURGERY

## 2018-12-13 PROCEDURE — G8417 CALC BMI ABV UP PARAM F/U: HCPCS | Performed by: SURGERY

## 2018-12-13 RX ORDER — ALBUTEROL SULFATE 2.5 MG/3ML
SOLUTION RESPIRATORY (INHALATION)
COMMUNITY
Start: 2018-12-03

## 2018-12-13 RX ORDER — FUROSEMIDE 20 MG/1
20 TABLET ORAL 2 TIMES DAILY
COMMUNITY

## 2018-12-13 RX ORDER — PROPRANOLOL HYDROCHLORIDE 20 MG/1
20 TABLET ORAL 3 TIMES DAILY
COMMUNITY

## 2019-02-09 ENCOUNTER — NURSE TRIAGE (OUTPATIENT)
Dept: CALL CENTER | Facility: HOSPITAL | Age: 69
End: 2019-02-09

## 2019-02-09 NOTE — TELEPHONE ENCOUNTER
"Caller is asking for an antibiotic for her .  I explained that Dr. Farooq will not call in an antibiotic and recommended going to an urgent care clinic. Caller voiced understanding.    Reason for Disposition  • Caller requesting a NON-URGENT new prescription or refill and triager unable to refill per unit policy    Additional Information  • Negative: Drug overdose and nurse unable to answer question  • Negative: Caller requesting information not related to medicine  • Negative: Caller requesting a prescription for Strep throat and has a positive culture result  • Negative: Rash while taking a medication or within 3 days of stopping it  • Negative: Immunization reaction suspected  • Negative: [1] Asthma and [2] having symptoms of asthma (cough, wheezing, etc)  • Negative: MORE THAN A DOUBLE DOSE of a prescription or over-the-counter (OTC) drug  • Negative: [1] DOUBLE DOSE (an extra dose or lesser amount) of over-the-counter (OTC) drug AND [2] any symptoms (e.g., dizziness, nausea, pain, sleepiness)  • Negative: [1] DOUBLE DOSE (an extra dose or lesser amount) of prescription drug AND [2] any symptoms (e.g., dizziness, nausea, pain, sleepiness)  • Negative: Took another person's prescription drug  • Negative: [1] DOUBLE DOSE (an extra dose or lesser amount) of prescription drug AND [2] NO symptoms (Exception: a double dose of antibiotics)  • Negative: Diabetes drug error or overdose (e.g., insulin or extra dose)  • Negative: [1] Request for URGENT new prescription or refill of \"essential\" medication (i.e., likelihood of harm to patient if not taken) AND [2] triager unable to fill per unit policy  • Negative: [1] Prescription not at pharmacy AND [2] was prescribed today by PCP  • Negative: Pharmacy calling with prescription questions and triager unable to answer question  • Negative: Caller has URGENT medication question about med that PCP prescribed and triager unable to answer question  • Negative: Caller has " "NON-URGENT medication question about med that PCP prescribed and triager unable to answer question  • Negative: Caller has medication question about med not prescribed by PCP and triager unable to answer question (e.g., compatibility with other med, storage)    Answer Assessment - Initial Assessment Questions  1. SYMPTOMS: \"Do you have any symptoms?\"      yes  2. SEVERITY: If symptoms are present, ask \"Are they mild, moderate or severe?\"      mild    Protocols used: MEDICATION QUESTION CALL-ADULT-      "

## 2019-02-20 ENCOUNTER — OFFICE VISIT (OUTPATIENT)
Dept: OTOLARYNGOLOGY | Facility: CLINIC | Age: 69
End: 2019-02-20

## 2019-02-20 VITALS
SYSTOLIC BLOOD PRESSURE: 126 MMHG | HEIGHT: 67 IN | HEART RATE: 69 BPM | BODY MASS INDEX: 32.8 KG/M2 | TEMPERATURE: 97.7 F | RESPIRATION RATE: 20 BRPM | DIASTOLIC BLOOD PRESSURE: 78 MMHG | WEIGHT: 209 LBS

## 2019-02-20 DIAGNOSIS — C44.311 BASAL CELL CARCINOMA OF LEFT ALA NASI: Primary | ICD-10-CM

## 2019-02-20 PROCEDURE — 99213 OFFICE O/P EST LOW 20 MIN: CPT | Performed by: OTOLARYNGOLOGY

## 2019-02-20 NOTE — PROGRESS NOTES
Chief Complaint   Patient presents with   • Follow-up     Pedicle Division and Flap Inset of Nose       Skin Cancer Follow-up and Surveillance Visit    Brett Ortiz presents for a cancer surveillance and skin check following repair of Mohs defect with pedicle division and flap inset of nose and complex closure of forehead on 11/13/18.    Subjective: Since surgery, He is doing well without complaints.  Symptoms denied postoperatively include fever, chills, bleeding and drainage. The patient states the pain has ceased since surgery. He states he is breathing very well. He does have some hair growth on the flap which is not bothersome to him. He is pleased with the result.    Patient presents for follow-up general head and neck skin examination.  Patient has a history of basal cell carcinoma.  He has not noted recurrence.  The patient has not noted new worrisome lesions.    Pathology review: Pathology is not applicable (Mohs reconstruction).    Dermatologist:  Dr. 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    • Nevus    • Perforation of colon (CMS/HCC) 2018   • Venous insufficiency    • Weakness of extremity     LEFT SIDE   • Wheelchair dependent      Past Surgical History:   Procedure Laterality Date   • COLON SURGERY      x4   • EYE SURGERY Bilateral     cataracts    • FLAP HEAD/NECK Left 10/23/2018    Procedure: LEFT CHEEK ADVANCEMENT FLAP CLOSURE OF LIP AND CHEEK,5CM X 6CM IPSILATERAL LEFT SEPTAL MUCOSAL HINGE FLAP,2CM X 3CM;  Surgeon: Izaiah Ceron MD;  Location: [facility];  Service: ENT   • FLAP HEAD/NECK Bilateral 11/13/2018    Procedure:  Pedicle division and flap inset of nose;  Surgeon: Izaiah Ceron MD;  Location: [facility] OR;  Service: ENT   • HEAD/NECK LESION/CYST EXCISION Left 10/23/2018    Procedure: LEFT PARAMEDIAN FOREHEAD FLAP WITH PRESERVATION OF VASCULAR PEDICLE;  Surgeon: Izaiah Ceron MD;  Location:  [facility] OR;  Service: ENT   • SKIN FULL THICKNESS GRAFT Left 10/23/2018    Procedure: Conchal cartilage graft from right ear to left nose with complex closure of skin of forehead;  Surgeon: Izaiah Ceron MD;  Location:  [facility] OR;  Service: ENT     Family History   Problem Relation Age of Onset   • Cancer Mother    • Diabetes Mother    • Cancer Father      Social History     Tobacco Use   • Smoking status: Former Smoker     Years: 50.00     Types: Cigarettes     Last attempt to quit: 2018     Years since quittin.8   • Smokeless tobacco: Former User     Types: Chew   Substance Use Topics   • Alcohol use: No     Comment: quit 2018/ hx of alchohlism    • Drug use: No     Allergies:  Patient has no known allergies.    Current Outpatient Medications:   •  buPROPion XL (WELLBUTRIN XL) 150 MG 24 hr tablet, Take 150 mg by mouth Every Morning., Disp: , Rfl:   •  furosemide (LASIX) 20 MG tablet, Take 20 mg by mouth Daily., Disp: , Rfl:   •  HYDROcodone-acetaminophen (NORCO) 7.5-325 MG per tablet, Take 1 tablet by mouth Every 4 (Four) Hours As Needed for Moderate Pain  or Severe Pain  (Pain)., Disp: 20 tablet, Rfl: 0  •  HYDROcodone-acetaminophen (NORCO) 7.5-325 MG per tablet, Take 1 tablet by mouth Every 4 (Four) Hours As Needed for Moderate Pain  or Severe Pain  (Pain)., Disp: 20 tablet, Rfl: 0  •  oxybutynin (DITROPAN) 5 MG tablet, Take 5 mg by mouth Daily., Disp: , Rfl:   •  pantoprazole (PROTONIX) 40 MG EC tablet, Take 40 mg by mouth Daily., Disp: , Rfl:   •  propranolol (INDERAL) 20 MG tablet, Take 20 mg by mouth Daily., Disp: , Rfl:   •  tamsulosin (FLOMAX) 0.4 MG capsule 24 hr capsule, Take 1 capsule by mouth Daily., Disp:  ", Rfl:   •  tiotropium bromide-olodaterol (STIOLTO RESPIMAT) 2.5-2.5 MCG/ACT aerosol solution inhaler, Inhale 2 puffs Daily., Disp: , Rfl:   •  TRAZODONE HCL PO, Take 100 mg by mouth every night at bedtime., Disp: , Rfl:     Review of Systems   Constitutional: Negative for activity change, appetite change, chills, fatigue, fever and unexpected weight change.   HENT: Negative for congestion, dental problem, facial swelling and nosebleeds.    Eyes: Negative for discharge and redness.   Musculoskeletal: Positive for gait problem (wheelchair). Negative for neck pain.   Skin: Negative for color change, pallor, rash and wound.   Hematological: Negative for adenopathy. Does not bruise/bleed easily.          Objective:  /78   Pulse 69   Temp 97.7 °F (36.5 °C)   Resp 20   Ht 170.2 cm (67\")   Wt 94.8 kg (209 lb)   BMI 32.73 kg/m²     Physical Exam   Constitutional: He is oriented to person, place, and time. He appears well-developed and well-nourished. He is cooperative. No distress.   HENT:   Head: Normocephalic and atraumatic.       Right Ear: External ear normal.   Left Ear: External ear normal.   Nose: Nose normal.   Mouth/Throat: Oropharynx is clear and moist.   Eyes: Conjunctivae and EOM are normal. Pupils are equal, round, and reactive to light. Right eye exhibits no discharge. Left eye exhibits no discharge. No scleral icterus.   Neck: Normal range of motion. Neck supple. No JVD present. No tracheal deviation present. No thyromegaly present.   Pulmonary/Chest: Effort normal. No stridor.   Musculoskeletal: Normal range of motion. He exhibits no edema or deformity.   Lymphadenopathy:     He has no cervical adenopathy.   Neurological: He is alert and oriented to person, place, and time. He has normal strength. No cranial nerve deficit. Coordination normal.   Skin: Skin is warm and dry. No lesion and no rash noted. He is not diaphoretic. No erythema. No pallor.   Psychiatric: He has a normal mood and affect. " His speech is normal and behavior is normal. Judgment and thought content normal. Cognition and memory are normal.   Nursing note and vitals reviewed.      Assessment:  Brett was seen today for follow-up.    Diagnoses and all orders for this visit:    Basal cell carcinoma of left ala nasi        Plan:    His flap is full but he is breathing well. We have discussed thinning of the flap but he is not interested in that at this time. He may try topical hair removal cream to his flap. I have also offered laser hair removal every 3-4 weeks for 6 treatments. He is going to try the hair removal cream first and he will call if he decides to proceed with laser hair removal.    Discussed contouring the alar crease and flap thinning if interested - he is not interested and is pleased with the result.    Protect the incisions from sunlight. Sunlight to the incisions will cause permanent pigmentation to the incision line and make the incision more noticeable. After the incision has reepithelialized (typically 2-3 weeks after the procedure), you may begin to use sunscreen with an SPF of 15 or greater    I discussed the use of  Vitamin E, Mederma, a high-quality extra virgin olive oil, or a silicone-based wound cream to the incisions to optimize the end result. Apply topically twice daily, or as directed, to help optimize wound healing and decrease erythema.    We discussed the importance of routine skin checks.    Return in about 6 months (around 8/20/2019).      Izaiah Ceron MD  02/20/19  10:30 AM

## 2019-04-01 ENCOUNTER — TRANSCRIBE ORDERS (OUTPATIENT)
Dept: ADMINISTRATIVE | Facility: HOSPITAL | Age: 69
End: 2019-04-01

## 2019-04-01 ENCOUNTER — HOSPITAL ENCOUNTER (OUTPATIENT)
Dept: GENERAL RADIOLOGY | Facility: HOSPITAL | Age: 69
Discharge: HOME | End: 2019-04-01
Admitting: INTERNAL MEDICINE

## 2019-04-01 DIAGNOSIS — J44.9 CHRONIC OBSTRUCTIVE PULMONARY DISEASE, UNSPECIFIED COPD TYPE (HCC): ICD-10-CM

## 2019-04-01 DIAGNOSIS — I50.812 CHRONIC RIGHT HEART FAILURE (HCC): Primary | ICD-10-CM

## 2019-04-01 PROCEDURE — 71046 X-RAY EXAM CHEST 2 VIEWS: CPT

## 2019-04-02 ENCOUNTER — TRANSCRIBE ORDERS (OUTPATIENT)
Dept: ADMINISTRATIVE | Facility: HOSPITAL | Age: 69
End: 2019-04-02

## 2019-04-02 DIAGNOSIS — I50.812 CHRONIC RIGHT HEART FAILURE (HCC): Primary | ICD-10-CM

## 2019-04-02 DIAGNOSIS — R07.9 CHEST PAIN, UNSPECIFIED TYPE: ICD-10-CM

## 2019-04-04 ENCOUNTER — HOSPITAL ENCOUNTER (OUTPATIENT)
Dept: CARDIOLOGY | Facility: HOSPITAL | Age: 69
Discharge: HOME | End: 2019-04-04
Admitting: PHYSICIAN ASSISTANT

## 2019-04-04 VITALS
BODY MASS INDEX: 32.8 KG/M2 | WEIGHT: 209 LBS | SYSTOLIC BLOOD PRESSURE: 126 MMHG | HEIGHT: 67 IN | DIASTOLIC BLOOD PRESSURE: 78 MMHG

## 2019-04-04 DIAGNOSIS — R07.9 CHEST PAIN, UNSPECIFIED TYPE: ICD-10-CM

## 2019-04-04 DIAGNOSIS — I50.812 CHRONIC RIGHT HEART FAILURE (HCC): ICD-10-CM

## 2019-04-04 PROCEDURE — 93306 TTE W/DOPPLER COMPLETE: CPT

## 2019-04-04 PROCEDURE — 93306 TTE W/DOPPLER COMPLETE: CPT | Performed by: INTERNAL MEDICINE

## 2019-04-04 PROCEDURE — 25010000002 PERFLUTREN 6.52 MG/ML SUSPENSION: Performed by: PHYSICIAN ASSISTANT

## 2019-04-04 RX ADMIN — PERFLUTREN: 6.52 INJECTION, SUSPENSION INTRAVENOUS at 15:44

## 2019-04-05 LAB
BH CV ECHO MEAS - AO MAX PG (FULL): 1.2 MMHG
BH CV ECHO MEAS - AO MAX PG: 3.5 MMHG
BH CV ECHO MEAS - AO MEAN PG (FULL): 1 MMHG
BH CV ECHO MEAS - AO MEAN PG: 2 MMHG
BH CV ECHO MEAS - AO ROOT AREA (BSA CORRECTED): 1.5
BH CV ECHO MEAS - AO ROOT AREA: 7.5 CM^2
BH CV ECHO MEAS - AO ROOT DIAM: 3.1 CM
BH CV ECHO MEAS - AO V2 MAX: 93.9 CM/SEC
BH CV ECHO MEAS - AO V2 MEAN: 61.8 CM/SEC
BH CV ECHO MEAS - AO V2 VTI: 17.7 CM
BH CV ECHO MEAS - AVA(I,A): 2.8 CM^2
BH CV ECHO MEAS - AVA(I,D): 2.8 CM^2
BH CV ECHO MEAS - AVA(V,A): 2.6 CM^2
BH CV ECHO MEAS - AVA(V,D): 2.6 CM^2
BH CV ECHO MEAS - BSA(HAYCOCK): 2.2 M^2
BH CV ECHO MEAS - BSA: 2.1 M^2
BH CV ECHO MEAS - BZI_BMI: 32.7 KILOGRAMS/M^2
BH CV ECHO MEAS - BZI_METRIC_HEIGHT: 170.2 CM
BH CV ECHO MEAS - BZI_METRIC_WEIGHT: 94.8 KG
BH CV ECHO MEAS - EDV(CUBED): 108.5 ML
BH CV ECHO MEAS - EDV(MOD-SP4): 103 ML
BH CV ECHO MEAS - EDV(TEICH): 106 ML
BH CV ECHO MEAS - EF(CUBED): 57.4 %
BH CV ECHO MEAS - EF(MOD-SP4): 71.7 %
BH CV ECHO MEAS - EF(TEICH): 49 %
BH CV ECHO MEAS - ESV(CUBED): 46.3 ML
BH CV ECHO MEAS - ESV(MOD-SP4): 29.2 ML
BH CV ECHO MEAS - ESV(TEICH): 54.1 ML
BH CV ECHO MEAS - FS: 24.7 %
BH CV ECHO MEAS - IVS/LVPW: 1.4
BH CV ECHO MEAS - IVSD: 0.9 CM
BH CV ECHO MEAS - LA DIMENSION: 2.9 CM
BH CV ECHO MEAS - LA/AO: 0.94
BH CV ECHO MEAS - LAT PEAK E' VEL: 8.9 CM/SEC
BH CV ECHO MEAS - LV DIASTOLIC VOL/BSA (35-75): 50 ML/M^2
BH CV ECHO MEAS - LV MASS(C)D: 119.4 GRAMS
BH CV ECHO MEAS - LV MASS(C)DI: 58 GRAMS/M^2
BH CV ECHO MEAS - LV MAX PG: 2.4 MMHG
BH CV ECHO MEAS - LV MEAN PG: 1 MMHG
BH CV ECHO MEAS - LV SYSTOLIC VOL/BSA (12-30): 14.2 ML/M^2
BH CV ECHO MEAS - LV V1 MAX: 77 CM/SEC
BH CV ECHO MEAS - LV V1 MEAN: 50.5 CM/SEC
BH CV ECHO MEAS - LV V1 VTI: 15.5 CM
BH CV ECHO MEAS - LVIDD: 4.8 CM
BH CV ECHO MEAS - LVIDS: 3.6 CM
BH CV ECHO MEAS - LVLD AP4: 7.3 CM
BH CV ECHO MEAS - LVLS AP4: 5.6 CM
BH CV ECHO MEAS - LVOT AREA (M): 3.1 CM^2
BH CV ECHO MEAS - LVOT AREA: 3.1 CM^2
BH CV ECHO MEAS - LVOT DIAM: 2 CM
BH CV ECHO MEAS - LVPWD: 0.65 CM
BH CV ECHO MEAS - MED PEAK E' VEL: 7.4 CM/SEC
BH CV ECHO MEAS - MV A MAX VEL: 89.1 CM/SEC
BH CV ECHO MEAS - MV DEC TIME: 0.29 SEC
BH CV ECHO MEAS - MV E MAX VEL: 79.7 CM/SEC
BH CV ECHO MEAS - MV E/A: 0.89
BH CV ECHO MEAS - RAP SYSTOLE: 5 MMHG
BH CV ECHO MEAS - RVSP: 23 MMHG
BH CV ECHO MEAS - SI(AO): 64.8 ML/M^2
BH CV ECHO MEAS - SI(CUBED): 30.2 ML/M^2
BH CV ECHO MEAS - SI(LVOT): 23.6 ML/M^2
BH CV ECHO MEAS - SI(MOD-SP4): 35.8 ML/M^2
BH CV ECHO MEAS - SI(TEICH): 25.2 ML/M^2
BH CV ECHO MEAS - SV(AO): 133.6 ML
BH CV ECHO MEAS - SV(CUBED): 62.3 ML
BH CV ECHO MEAS - SV(LVOT): 48.7 ML
BH CV ECHO MEAS - SV(MOD-SP4): 73.8 ML
BH CV ECHO MEAS - SV(TEICH): 51.9 ML
BH CV ECHO MEAS - TR MAX VEL: 212 CM/SEC
BH CV ECHO MEASUREMENTS AVERAGE E/E' RATIO: 9.78
LEFT ATRIUM VOLUME INDEX: 27.8 ML/M2
LEFT ATRIUM VOLUME: 57.2 CM3
LV EF 2D ECHO EST: 55 %
MAXIMAL PREDICTED HEART RATE: 152 BPM
STRESS TARGET HR: 129 BPM

## 2019-09-04 ENCOUNTER — OFFICE VISIT (OUTPATIENT)
Dept: OTOLARYNGOLOGY | Facility: CLINIC | Age: 69
End: 2019-09-04

## 2019-09-04 VITALS
RESPIRATION RATE: 20 BRPM | HEIGHT: 68 IN | BODY MASS INDEX: 34.86 KG/M2 | TEMPERATURE: 98 F | HEART RATE: 71 BPM | DIASTOLIC BLOOD PRESSURE: 75 MMHG | SYSTOLIC BLOOD PRESSURE: 145 MMHG | WEIGHT: 230 LBS

## 2019-09-04 DIAGNOSIS — S01.20XD OPEN WOUND OF NOSE, UNSPECIFIED OPEN WOUND TYPE, SUBSEQUENT ENCOUNTER: ICD-10-CM

## 2019-09-04 DIAGNOSIS — J30.9 ALLERGIC RHINITIS, UNSPECIFIED SEASONALITY, UNSPECIFIED TRIGGER: ICD-10-CM

## 2019-09-04 DIAGNOSIS — R49.9 HOARSENESS OR CHANGING VOICE: ICD-10-CM

## 2019-09-04 DIAGNOSIS — C44.311 BASAL CELL CARCINOMA OF LEFT ALA NASI: Primary | ICD-10-CM

## 2019-09-04 DIAGNOSIS — H61.21 IMPACTED CERUMEN OF RIGHT EAR: ICD-10-CM

## 2019-09-04 DIAGNOSIS — K21.9 LPRD (LARYNGOPHARYNGEAL REFLUX DISEASE): ICD-10-CM

## 2019-09-04 PROCEDURE — 31575 DIAGNOSTIC LARYNGOSCOPY: CPT | Performed by: OTOLARYNGOLOGY

## 2019-09-04 PROCEDURE — 99214 OFFICE O/P EST MOD 30 MIN: CPT | Performed by: OTOLARYNGOLOGY

## 2019-09-04 RX ORDER — FEXOFENADINE HCL 180 MG/1
180 TABLET ORAL DAILY
Qty: 30 TABLET | Refills: 3 | Status: SHIPPED | OUTPATIENT
Start: 2019-09-04 | End: 2019-12-30

## 2019-09-04 RX ORDER — RANITIDINE HCL 75 MG
75 TABLET ORAL 2 TIMES DAILY
Qty: 30 TABLET | Refills: 3 | Status: SHIPPED | OUTPATIENT
Start: 2019-09-04 | End: 2019-10-04

## 2019-09-04 NOTE — PROGRESS NOTES
OPERATIVE NOTE:    PREPROCEDURE DIAGNOSIS: Hoarseness of voice change    POSTPROCEDURE DIAGNOSIS: Pachydermia of larynx    PROCEDURE:   Flexible Fiberoptic Laryngoscopy    ANESTHESIA:  None    REASON FOR PROCEDURE:  Procedure was recommend for inability to fully visualize the glottis on mirror laryngoscopy.  Risks, benefits and alternatives were discussed.      DETAILS of OPERATION:  The patient was seated in the exam chair.  A flexible fiberoptic laryngoscopy was performed through the oral cavity.  The scope was introduced into the oral cavity and directed to the level of the glottis, examining the structures of the oropharynx, base of tongue, vallecula, supraglottic larynx, glottic larynx, and hypopharynx.      FINDINGS:  Mucosal surfaces:   The mucosal surfaces demonstrated normal mucosa surfaces without inflammation    Base of tongue:  The base of tongue was found to have no mass or lesion.    Epiglottis:  The epiglottis was found to have  no mass or lesion.    Aryepligottic fold:  The AE folds were found to have mild to moderate inter-arytenoid edema with erythema.    False Vocal Fold:  The false cords were found to have mild to moderate inter-arytenoid edema with erythema.    True Vocal Cord:  The true vocal cords were found to have no mass or lesion.  Normal mobility.    Arytenoid:   The arytenoids were found to have mild to moderate inter-arytenoid edema with erythema.    Hypopharynx:  The hypopharynx was found to have no mass or lesion.    The patient tolerated procedure well.      Izaiah Ceron MD  09/04/19  4:50 PM

## 2019-09-04 NOTE — PROGRESS NOTES
Chief Complaint   Patient presents with   • Follow-up     Pedical Division and Flap inset of Nose   • Ear Problem   • Hoarse       Skin Cancer Follow-up and Surveillance Visit    Brett Ortiz presents for a cancer surveillance and skin check following repair of Mohs defect with pedicle division and flap inset of nose and complex closure of forehead on 11/13/18.    Subjective: Since surgery, He is doing well without complaints.  Symptoms denied postoperatively include fever, chills, bleeding and drainage. The patient states the pain has ceased since surgery. He states he is breathing very well. He does have some hair growth on the flap which is not bothersome to him. He has been using topical hair removal cream for this. He is pleased with the result.    Patient presents for follow-up general head and neck skin examination.  Patient has a history of basal cell carcinoma.  He has not noted recurrence.  The patient has not noted new worrisome lesions.    Pathology review: Pathology is not applicable (Mohs reconstruction).    Dermatologist:  Dr. Ortega    Additional Skin Cancer History: none    He also complains of sudden hearing loss of the right ear. This occurred 2 weeks ago. This is moderate to severe in severity. Nothing makes this better or worse. He denies otalgia, otorrhea, dizziness, vertigo, or tinnitus. Following removal of cerumen today in the office, his hearing returned to normal.    He also complains of hoarseness. This is localized to the throat. This has been present since May/Kristen of 2018 and is moderate in severity. He feels this was caused by multiple intubations while being hospitalized. Nothing makes this better. He denies PND, heart burn, or acid reflux. He is taking Protonix 40 mg daily in the morning. He does have rhinorrhea. Wears nasal cannula oxygen.  Not smoking.      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    • Nevus    • Perforation of colon (CMS/HCC) 2018   • Venous insufficiency    • Weakness of extremity     LEFT SIDE   • Wheelchair dependent      Past Surgical History:   Procedure Laterality Date   • COLON SURGERY      x4   • EYE SURGERY Bilateral     cataracts    • FLAP HEAD/NECK Left 10/23/2018    Procedure: LEFT CHEEK ADVANCEMENT FLAP CLOSURE OF LIP AND CHEEK,5CM X 6CM IPSILATERAL LEFT SEPTAL MUCOSAL HINGE FLAP,2CM X 3CM;  Surgeon: Izaiah Ceron MD;  Location:  [facility] OR;  Service: ENT   • FLAP HEAD/NECK Bilateral 2018    Procedure: Pedicle division and flap inset of nose;  Surgeon: Izaiah Ceron MD;  Location:  [facility] OR;  Service: ENT   • HEAD/NECK LESION/CYST EXCISION Left 10/23/2018    Procedure: LEFT PARAMEDIAN FOREHEAD FLAP WITH PRESERVATION OF VASCULAR PEDICLE;  Surgeon: Izaiah Ceron MD;  Location:  [facility] OR;  Service: ENT   • SKIN FULL THICKNESS GRAFT Left 10/23/2018    Procedure: Conchal cartilage graft from right ear to left nose with complex closure of skin of forehead;  Surgeon: Izaiah Ceron MD;  Location:  [facility] OR;  Service: ENT     Family History   Problem Relation Age of Onset   • Cancer Mother    • Diabetes Mother    • Cancer Father      Social History     Tobacco Use   • Smoking status: Former Smoker     Years: 50.00     Types: Cigarettes     Last attempt to quit: 2018     Years since quittin.4   • Smokeless tobacco: Former User     Types: Chew   Substance Use Topics   • Alcohol use: No     Comment: quit 2018/ hx of alchohlism    • Drug use: No     Allergies:  Patient has no known allergies.    Current Outpatient Medications:   •  buPROPion XL (WELLBUTRIN XL) 150 MG 24 hr tablet, Take 150 mg by mouth Every Morning.,  "Disp: , Rfl:   •  furosemide (LASIX) 20 MG tablet, Take 20 mg by mouth Daily., Disp: , Rfl:   •  HYDROcodone-acetaminophen (NORCO) 7.5-325 MG per tablet, Take 1 tablet by mouth Every 4 (Four) Hours As Needed for Moderate Pain  or Severe Pain  (Pain)., Disp: 20 tablet, Rfl: 0  •  oxybutynin (DITROPAN) 5 MG tablet, Take 5 mg by mouth Daily., Disp: , Rfl:   •  pantoprazole (PROTONIX) 40 MG EC tablet, Take 40 mg by mouth Daily., Disp: , Rfl:   •  propranolol (INDERAL) 20 MG tablet, Take 20 mg by mouth Daily., Disp: , Rfl:   •  tamsulosin (FLOMAX) 0.4 MG capsule 24 hr capsule, Take 1 capsule by mouth Daily., Disp: , Rfl:   •  tiotropium bromide-olodaterol (STIOLTO RESPIMAT) 2.5-2.5 MCG/ACT aerosol solution inhaler, Inhale 2 puffs Daily., Disp: , Rfl:   •  TRAZODONE HCL PO, Take 100 mg by mouth every night at bedtime., Disp: , Rfl:   •  fexofenadine (ALLEGRA) 180 MG tablet, Take 1 tablet by mouth Daily for 30 days. 1 by mouth every day as needed for allergy symptoms, Disp: 30 tablet, Rfl: 3  •  HYDROcodone-acetaminophen (NORCO) 7.5-325 MG per tablet, Take 1 tablet by mouth Every 4 (Four) Hours As Needed for Moderate Pain  or Severe Pain  (Pain)., Disp: 20 tablet, Rfl: 0  •  raNITIdine (ZANTAC) 75 MG tablet, Take 1 tablet by mouth 2 (Two) Times a Day for 30 days., Disp: 30 tablet, Rfl: 3    Review of Systems   Constitutional: Negative for activity change, appetite change, chills, fatigue, fever and unexpected weight change.   HENT: Positive for hearing loss and voice change. Negative for congestion, dental problem, facial swelling, nosebleeds and trouble swallowing.    Eyes: Negative for discharge and redness.   Musculoskeletal: Positive for gait problem (walker). Negative for neck pain.   Skin: Negative for color change, pallor, rash and wound.   Hematological: Negative for adenopathy. Does not bruise/bleed easily.          Objective:  /75   Pulse 71   Temp 98 °F (36.7 °C)   Resp 20   Ht 172.7 cm (68\")   Wt 104 " kg (230 lb)   BMI 34.97 kg/m²     Physical Exam   Constitutional: He is oriented to person, place, and time. He appears well-developed and well-nourished. He is cooperative. No distress. Nasal cannula in place.   HENT:   Head: Normocephalic and atraumatic.       Right Ear: Tympanic membrane, external ear and ear canal normal.   Left Ear: Tympanic membrane, external ear and ear canal normal.   Nose: Nose normal.   Mouth/Throat: Uvula is midline, oropharynx is clear and moist and mucous membranes are normal.   Right cerumen impaction removed with suction- patient reports improvement in hearing  Rinne- AC>BC  [place]- lateralizes to the right    Unable to fully visualize the larynx via mirror - curved epiglottis.   Eyes: Conjunctivae, EOM and lids are normal. Pupils are equal, round, and reactive to light. Right eye exhibits no discharge. Left eye exhibits no discharge. No scleral icterus.   Neck: Normal range of motion. Neck supple. No tracheal deviation present.   See scope note  Voice coarse   Pulmonary/Chest: Effort normal. No stridor.   Musculoskeletal: Normal range of motion. He exhibits no edema or deformity.   Lymphadenopathy:     He has no cervical adenopathy.   Neurological: He is alert and oriented to person, place, and time. He has normal strength. No cranial nerve deficit. Coordination normal.   Skin: Skin is warm and dry. No lesion and no rash noted. He is not diaphoretic. No erythema. No pallor.   Psychiatric: He has a normal mood and affect. His speech is normal and behavior is normal. Judgment and thought content normal. Cognition and memory are normal.   Nursing note and vitals reviewed.      Assessment:  Brett was seen today for follow-up, ear problem and hoarse.    Diagnoses and all orders for this visit:    Basal cell carcinoma of left ala nasi    Open wound of nose, unspecified open wound type, subsequent encounter    Hoarseness or changing voice    Impacted cerumen of right ear    LPRD  (laryngopharyngeal reflux disease)    Allergic rhinitis, unspecified seasonality, unspecified trigger    Other orders  -     raNITIdine (ZANTAC) 75 MG tablet; Take 1 tablet by mouth 2 (Two) Times a Day for 30 days.  -     fexofenadine (ALLEGRA) 180 MG tablet; Take 1 tablet by mouth Daily for 30 days. 1 by mouth every day as needed for allergy symptoms        Plan:    Forehead flap: His flap is full, but he is breathing well. We have discussed thinning of the flap and contouring of the alar crease but he is still not interested in this at this time. He has used topical hair removal cream and he feels this usually works pretty well. He is still not interested in laser hair removal.    Hoarseness: Continue PPI. Start taking this 30 mins prior to dinner. Will also start zantac BID for 3 months.  Start Allegra. Will hold off on nasal steroid due to concomitant nasal cannula O2 use.    Right hearing loss: Right cerumen impaction was removed and patient reports improvement in hearing.     Protect the incisions from sunlight. Sunlight to the incisions will cause permanent pigmentation to the incision line and make the incision more noticeable. After the incision has reepithelialized (typically 2-3 weeks after the procedure), you may begin to use sunscreen with an SPF of 15 or greater    I discussed the use of  Vitamin E, Mederma, a high-quality extra virgin olive oil, or a silicone-based wound cream to the incisions to optimize the end result. Apply topically twice daily, or as directed, to help optimize wound healing and decrease erythema.    We discussed the importance of routine skin checks.    Return in about 4 months (around 1/4/2020) for Recheck.      Izaiah Ceron MD  09/04/19  4:50 PM

## 2019-12-30 RX ORDER — FEXOFENADINE HCL 180 MG/1
TABLET ORAL
Qty: 30 TABLET | Refills: 0 | Status: ON HOLD | OUTPATIENT
Start: 2019-12-30 | End: 2020-01-01

## 2020-01-01 ENCOUNTER — APPOINTMENT (OUTPATIENT)
Dept: CT IMAGING | Facility: HOSPITAL | Age: 70
End: 2020-01-01

## 2020-01-01 ENCOUNTER — HOSPITAL ENCOUNTER (OUTPATIENT)
Dept: CT IMAGING | Facility: HOSPITAL | Age: 70
Discharge: HOME | End: 2020-12-07

## 2020-01-01 ENCOUNTER — LAB REQUISITION (OUTPATIENT)
Dept: LAB | Facility: HOSPITAL | Age: 70
End: 2020-01-01

## 2020-01-01 ENCOUNTER — READMISSION MANAGEMENT (OUTPATIENT)
Dept: CALL CENTER | Facility: HOSPITAL | Age: 70
End: 2020-01-01

## 2020-01-01 ENCOUNTER — TRANSCRIBE ORDERS (OUTPATIENT)
Dept: ADMINISTRATIVE | Facility: HOSPITAL | Age: 70
End: 2020-01-01

## 2020-01-01 ENCOUNTER — HOSPITAL ENCOUNTER (INPATIENT)
Facility: HOSPITAL | Age: 70
LOS: 3 days | Discharge: HOME | End: 2020-11-30
Attending: EMERGENCY MEDICINE | Admitting: FAMILY MEDICINE

## 2020-01-01 ENCOUNTER — APPOINTMENT (OUTPATIENT)
Dept: GENERAL RADIOLOGY | Facility: HOSPITAL | Age: 70
End: 2020-01-01

## 2020-01-01 VITALS
HEIGHT: 68 IN | RESPIRATION RATE: 16 BRPM | WEIGHT: 238.4 LBS | BODY MASS INDEX: 36.13 KG/M2 | HEART RATE: 73 BPM | DIASTOLIC BLOOD PRESSURE: 54 MMHG | SYSTOLIC BLOOD PRESSURE: 129 MMHG | TEMPERATURE: 98.1 F | OXYGEN SATURATION: 95 %

## 2020-01-01 DIAGNOSIS — J44.1 CHRONIC OBSTRUCTIVE PULMONARY DISEASE WITH ACUTE EXACERBATION (HCC): Primary | ICD-10-CM

## 2020-01-01 DIAGNOSIS — Z00.00 ENCOUNTER FOR GENERAL ADULT MEDICAL EXAMINATION WITHOUT ABNORMAL FINDINGS: ICD-10-CM

## 2020-01-01 DIAGNOSIS — R91.8 OTHER NONSPECIFIC ABNORMAL FINDING OF LUNG FIELD: ICD-10-CM

## 2020-01-01 DIAGNOSIS — J96.12 CHRONIC RESPIRATORY FAILURE WITH HYPERCAPNIA (HCC): ICD-10-CM

## 2020-01-01 DIAGNOSIS — R06.89 HYPERCARBIA: ICD-10-CM

## 2020-01-01 DIAGNOSIS — D38.1 NEOPLASM OF UNCERTAIN BEHAVIOR OF TRACHEA, BRONCHUS, AND LUNG: Primary | ICD-10-CM

## 2020-01-01 DIAGNOSIS — Z74.09 IMPAIRED MOBILITY: ICD-10-CM

## 2020-01-01 DIAGNOSIS — R91.1 SOLITARY PULMONARY NODULE: ICD-10-CM

## 2020-01-01 DIAGNOSIS — R91.1 SOLITARY PULMONARY NODULE: Primary | ICD-10-CM

## 2020-01-01 LAB
ALBUMIN SERPL-MCNC: 3.6 G/DL (ref 3.5–5.2)
ALBUMIN SERPL-MCNC: 3.8 G/DL (ref 3.5–5.2)
ALBUMIN SERPL-MCNC: 3.9 G/DL (ref 3.5–5.2)
ALBUMIN UR-MCNC: 1.2 MG/DL
ALBUMIN/GLOB SERPL: 1.1 G/DL
ALBUMIN/GLOB SERPL: 1.1 G/DL
ALBUMIN/GLOB SERPL: 1.2 G/DL
ALP SERPL-CCNC: 115 U/L (ref 39–117)
ALP SERPL-CCNC: 86 U/L (ref 39–117)
ALP SERPL-CCNC: 96 U/L (ref 39–117)
ALT SERPL W P-5'-P-CCNC: 10 U/L (ref 1–41)
ALT SERPL W P-5'-P-CCNC: 10 U/L (ref 1–41)
ALT SERPL W P-5'-P-CCNC: 11 U/L (ref 1–41)
AMPHET+METHAMPHET UR QL: NEGATIVE
AMPHETAMINES UR QL: NEGATIVE
ANION GAP SERPL CALCULATED.3IONS-SCNC: 3 MMOL/L (ref 5–15)
ANION GAP SERPL CALCULATED.3IONS-SCNC: 4 MMOL/L (ref 5–15)
ANION GAP SERPL CALCULATED.3IONS-SCNC: 6 MMOL/L (ref 5–15)
ANION GAP SERPL CALCULATED.3IONS-SCNC: 7 MMOL/L (ref 5–15)
ANION GAP SERPL CALCULATED.3IONS-SCNC: 8 MMOL/L (ref 5–15)
ARTERIAL PATENCY WRIST A: POSITIVE
ARTERIAL PATENCY WRIST A: POSITIVE
AST SERPL-CCNC: 14 U/L (ref 1–40)
AST SERPL-CCNC: 16 U/L (ref 1–40)
AST SERPL-CCNC: 19 U/L (ref 1–40)
ATMOSPHERIC PRESS: 753 MMHG
ATMOSPHERIC PRESS: 754 MMHG
BACTERIA SPEC AEROBE CULT: NORMAL
BACTERIA UR QL AUTO: ABNORMAL /HPF
BARBITURATES UR QL SCN: NEGATIVE
BASE EXCESS BLDA CALC-SCNC: 15.8 MMOL/L (ref 0–2)
BASE EXCESS BLDA CALC-SCNC: 18.9 MMOL/L (ref 0–2)
BASOPHILS # BLD AUTO: 0.01 10*3/MM3 (ref 0–0.2)
BASOPHILS # BLD AUTO: 0.01 10*3/MM3 (ref 0–0.2)
BASOPHILS # BLD AUTO: 0.03 10*3/MM3 (ref 0–0.2)
BASOPHILS NFR BLD AUTO: 0.1 % (ref 0–1.5)
BASOPHILS NFR BLD AUTO: 0.1 % (ref 0–1.5)
BASOPHILS NFR BLD AUTO: 0.4 % (ref 0–1.5)
BDY SITE: ABNORMAL
BDY SITE: ABNORMAL
BENZODIAZ UR QL SCN: NEGATIVE
BILIRUB SERPL-MCNC: 0.2 MG/DL (ref 0–1.2)
BILIRUB SERPL-MCNC: 0.4 MG/DL (ref 0–1.2)
BILIRUB SERPL-MCNC: 0.4 MG/DL (ref 0–1.2)
BILIRUB UR QL STRIP: NEGATIVE
BODY TEMPERATURE: 37 C
BODY TEMPERATURE: 37 C
BUN SERPL-MCNC: 12 MG/DL (ref 8–23)
BUN SERPL-MCNC: 13 MG/DL (ref 8–23)
BUN SERPL-MCNC: 6 MG/DL (ref 8–23)
BUN SERPL-MCNC: 9 MG/DL (ref 8–23)
BUN SERPL-MCNC: 9 MG/DL (ref 8–23)
BUN/CREAT SERPL: 10.2 (ref 7–25)
BUN/CREAT SERPL: 11.8 (ref 7–25)
BUN/CREAT SERPL: 11.8 (ref 7–25)
BUN/CREAT SERPL: 16.7 (ref 7–25)
BUN/CREAT SERPL: 19.4 (ref 7–25)
BUPRENORPHINE SERPL-MCNC: NEGATIVE NG/ML
CALCIUM SPEC-SCNC: 9 MG/DL (ref 8.6–10.5)
CALCIUM SPEC-SCNC: 9.2 MG/DL (ref 8.6–10.5)
CALCIUM SPEC-SCNC: 9.2 MG/DL (ref 8.6–10.5)
CALCIUM SPEC-SCNC: 9.4 MG/DL (ref 8.6–10.5)
CALCIUM SPEC-SCNC: 9.8 MG/DL (ref 8.6–10.5)
CANNABINOIDS SERPL QL: NEGATIVE
CEA SERPL-MCNC: 2.8 NG/ML
CHLORIDE SERPL-SCNC: 90 MMOL/L (ref 98–107)
CHLORIDE SERPL-SCNC: 92 MMOL/L (ref 98–107)
CHLORIDE SERPL-SCNC: 96 MMOL/L (ref 98–107)
CHLORIDE SERPL-SCNC: 97 MMOL/L (ref 98–107)
CHLORIDE SERPL-SCNC: 99 MMOL/L (ref 98–107)
CHOLEST SERPL-MCNC: 173 MG/DL (ref 0–200)
CLARITY UR: ABNORMAL
CO2 SERPL-SCNC: 39 MMOL/L (ref 22–29)
CO2 SERPL-SCNC: 40 MMOL/L (ref 22–29)
CO2 SERPL-SCNC: 41 MMOL/L (ref 22–29)
CO2 SERPL-SCNC: 43 MMOL/L (ref 22–29)
CO2 SERPL-SCNC: 43 MMOL/L (ref 22–29)
COCAINE UR QL: NEGATIVE
COLOR UR: ABNORMAL
CREAT SERPL-MCNC: 0.59 MG/DL (ref 0.76–1.27)
CREAT SERPL-MCNC: 0.67 MG/DL (ref 0.76–1.27)
CREAT SERPL-MCNC: 0.72 MG/DL (ref 0.76–1.27)
CREAT SERPL-MCNC: 0.76 MG/DL (ref 0.76–1.27)
CREAT SERPL-MCNC: 0.76 MG/DL (ref 0.76–1.27)
D DIMER PPP FEU-MCNC: 1.87 MG/L (FEU) (ref 0–0.5)
D-LACTATE SERPL-SCNC: 1.2 MMOL/L (ref 0.5–2)
D-LACTATE SERPL-SCNC: 2.5 MMOL/L (ref 0.5–2)
DEPRECATED RDW RBC AUTO: 44.2 FL (ref 37–54)
DEPRECATED RDW RBC AUTO: 45.3 FL (ref 37–54)
DEPRECATED RDW RBC AUTO: 45.5 FL (ref 37–54)
DEPRECATED RDW RBC AUTO: 46.8 FL (ref 37–54)
EOSINOPHIL # BLD AUTO: 0 10*3/MM3 (ref 0–0.4)
EOSINOPHIL # BLD AUTO: 0.03 10*3/MM3 (ref 0–0.4)
EOSINOPHIL # BLD AUTO: 0.18 10*3/MM3 (ref 0–0.4)
EOSINOPHIL # BLD MANUAL: 0.42 10*3/MM3 (ref 0–0.4)
EOSINOPHIL NFR BLD AUTO: 0 % (ref 0.3–6.2)
EOSINOPHIL NFR BLD AUTO: 0.4 % (ref 0.3–6.2)
EOSINOPHIL NFR BLD AUTO: 2.3 % (ref 0.3–6.2)
EOSINOPHIL NFR BLD MANUAL: 5.2 % (ref 0.3–6.2)
EPAP: 6
ERYTHROCYTE [DISTWIDTH] IN BLOOD BY AUTOMATED COUNT: 13.2 % (ref 12.3–15.4)
ERYTHROCYTE [DISTWIDTH] IN BLOOD BY AUTOMATED COUNT: 13.4 % (ref 12.3–15.4)
ERYTHROCYTE [DISTWIDTH] IN BLOOD BY AUTOMATED COUNT: 13.7 % (ref 12.3–15.4)
ERYTHROCYTE [DISTWIDTH] IN BLOOD BY AUTOMATED COUNT: 14.3 % (ref 12.3–15.4)
GAS FLOW AIRWAY: 3 LPM
GFR SERPL CREATININE-BSD FRML MDRD: 102 ML/MIN/1.73
GFR SERPL CREATININE-BSD FRML MDRD: 102 ML/MIN/1.73
GFR SERPL CREATININE-BSD FRML MDRD: 108 ML/MIN/1.73
GFR SERPL CREATININE-BSD FRML MDRD: 117 ML/MIN/1.73
GFR SERPL CREATININE-BSD FRML MDRD: 136 ML/MIN/1.73
GLOBULIN UR ELPH-MCNC: 3.1 GM/DL
GLOBULIN UR ELPH-MCNC: 3.2 GM/DL
GLOBULIN UR ELPH-MCNC: 3.6 GM/DL
GLUCOSE SERPL-MCNC: 107 MG/DL (ref 65–99)
GLUCOSE SERPL-MCNC: 113 MG/DL (ref 65–99)
GLUCOSE SERPL-MCNC: 138 MG/DL (ref 65–99)
GLUCOSE SERPL-MCNC: 153 MG/DL (ref 65–99)
GLUCOSE SERPL-MCNC: 94 MG/DL (ref 65–99)
GLUCOSE UR STRIP-MCNC: NEGATIVE MG/DL
HBA1C MFR BLD: 5.1 % (ref 4.8–5.6)
HCO3 BLDA-SCNC: 46.2 MMOL/L (ref 20–26)
HCO3 BLDA-SCNC: 47.4 MMOL/L (ref 20–26)
HCT VFR BLD AUTO: 28.7 % (ref 37.5–51)
HCT VFR BLD AUTO: 29.5 % (ref 37.5–51)
HCT VFR BLD AUTO: 34 % (ref 37.5–51)
HCT VFR BLD AUTO: 34.2 % (ref 37.5–51)
HDLC SERPL-MCNC: 60 MG/DL (ref 40–60)
HGB BLD-MCNC: 10.2 G/DL (ref 13–17.7)
HGB BLD-MCNC: 8.7 G/DL (ref 13–17.7)
HGB BLD-MCNC: 8.7 G/DL (ref 13–17.7)
HGB BLD-MCNC: 9.7 G/DL (ref 13–17.7)
HGB UR QL STRIP.AUTO: NEGATIVE
HYALINE CASTS UR QL AUTO: ABNORMAL /LPF
IMM GRANULOCYTES # BLD AUTO: 0.02 10*3/MM3 (ref 0–0.05)
IMM GRANULOCYTES # BLD AUTO: 0.02 10*3/MM3 (ref 0–0.05)
IMM GRANULOCYTES # BLD AUTO: 0.03 10*3/MM3 (ref 0–0.05)
IMM GRANULOCYTES NFR BLD AUTO: 0.3 % (ref 0–0.5)
IMM GRANULOCYTES NFR BLD AUTO: 0.3 % (ref 0–0.5)
IMM GRANULOCYTES NFR BLD AUTO: 0.4 % (ref 0–0.5)
INHALED O2 CONCENTRATION: 36 %
IPAP: 18
IRON 24H UR-MRATE: 53 MCG/DL (ref 59–158)
IRON SATN MFR SERPL: 12 % (ref 20–50)
KETONES UR QL STRIP: ABNORMAL
LACTATE HOLD SPECIMEN: NORMAL
LDH SERPL-CCNC: 146 U/L (ref 135–225)
LDLC SERPL CALC-MCNC: 74 MG/DL (ref 0–100)
LDLC/HDLC SERPL: 1.23 {RATIO}
LEUKOCYTE ESTERASE UR QL STRIP.AUTO: ABNORMAL
LYMPHOCYTES # BLD AUTO: 1.05 10*3/MM3 (ref 0.7–3.1)
LYMPHOCYTES # BLD AUTO: 1.25 10*3/MM3 (ref 0.7–3.1)
LYMPHOCYTES # BLD AUTO: 1.63 10*3/MM3 (ref 0.7–3.1)
LYMPHOCYTES # BLD MANUAL: 1.67 10*3/MM3 (ref 0.7–3.1)
LYMPHOCYTES NFR BLD AUTO: 13.6 % (ref 19.6–45.3)
LYMPHOCYTES NFR BLD AUTO: 16.6 % (ref 19.6–45.3)
LYMPHOCYTES NFR BLD AUTO: 21.7 % (ref 19.6–45.3)
LYMPHOCYTES NFR BLD MANUAL: 12.4 % (ref 5–12)
LYMPHOCYTES NFR BLD MANUAL: 20.6 % (ref 19.6–45.3)
Lab: ABNORMAL
MAGNESIUM SERPL-MCNC: 1.8 MG/DL (ref 1.6–2.4)
MAGNESIUM SERPL-MCNC: 1.8 MG/DL (ref 1.6–2.4)
MCH RBC QN AUTO: 26.8 PG (ref 26.6–33)
MCH RBC QN AUTO: 26.8 PG (ref 26.6–33)
MCH RBC QN AUTO: 26.9 PG (ref 26.6–33)
MCH RBC QN AUTO: 27.7 PG (ref 26.6–33)
MCHC RBC AUTO-ENTMCNC: 28.5 G/DL (ref 31.5–35.7)
MCHC RBC AUTO-ENTMCNC: 29.5 G/DL (ref 31.5–35.7)
MCHC RBC AUTO-ENTMCNC: 29.8 G/DL (ref 31.5–35.7)
MCHC RBC AUTO-ENTMCNC: 30.3 G/DL (ref 31.5–35.7)
MCV RBC AUTO: 88.3 FL (ref 79–97)
MCV RBC AUTO: 91 FL (ref 79–97)
MCV RBC AUTO: 92.9 FL (ref 79–97)
MCV RBC AUTO: 93.9 FL (ref 79–97)
METHADONE UR QL SCN: NEGATIVE
MODALITY: ABNORMAL
MODALITY: ABNORMAL
MONOCYTES # BLD AUTO: 0.85 10*3/MM3 (ref 0.1–0.9)
MONOCYTES # BLD AUTO: 1 10*3/MM3 (ref 0.1–0.9)
MONOCYTES # BLD AUTO: 1.16 10*3/MM3 (ref 0.1–0.9)
MONOCYTES # BLD AUTO: 1.22 10*3/MM3 (ref 0.1–0.9)
MONOCYTES NFR BLD AUTO: 11 % (ref 5–12)
MONOCYTES NFR BLD AUTO: 15.4 % (ref 5–12)
MONOCYTES NFR BLD AUTO: 16.2 % (ref 5–12)
NEUTROPHILS # BLD AUTO: 5.01 10*3/MM3 (ref 1.7–7)
NEUTROPHILS NFR BLD AUTO: 4.66 10*3/MM3 (ref 1.7–7)
NEUTROPHILS NFR BLD AUTO: 5.02 10*3/MM3 (ref 1.7–7)
NEUTROPHILS NFR BLD AUTO: 5.57 10*3/MM3 (ref 1.7–7)
NEUTROPHILS NFR BLD AUTO: 62.1 % (ref 42.7–76)
NEUTROPHILS NFR BLD AUTO: 66.8 % (ref 42.7–76)
NEUTROPHILS NFR BLD AUTO: 72.3 % (ref 42.7–76)
NEUTROPHILS NFR BLD MANUAL: 61.9 % (ref 42.7–76)
NITRITE UR QL STRIP: NEGATIVE
NOTIFIED BY: ABNORMAL
NOTIFIED BY: ABNORMAL
NOTIFIED WHO: ABNORMAL
NOTIFIED WHO: ABNORMAL
NRBC BLD AUTO-RTO: 0 /100 WBC (ref 0–0.2)
NT-PROBNP SERPL-MCNC: 75.9 PG/ML (ref 0–900)
NT-PROBNP SERPL-MCNC: 85.8 PG/ML (ref 0–900)
OPIATES UR QL: NEGATIVE
OXYCODONE UR QL SCN: POSITIVE
PCO2 BLDA: 83.6 MM HG (ref 35–45)
PCO2 BLDA: >102 MM HG (ref 35–45)
PCO2 TEMP ADJ BLD: 83.6 MM HG (ref 35–45)
PCO2 TEMP ADJ BLD: >102 MM HG (ref 35–45)
PCP UR QL SCN: NEGATIVE
PH BLDA: 7.26 PH UNITS (ref 7.35–7.45)
PH BLDA: 7.36 PH UNITS (ref 7.35–7.45)
PH UR STRIP.AUTO: 8 [PH] (ref 5–8)
PH, TEMP CORRECTED: 7.26 PH UNITS (ref 7.35–7.45)
PH, TEMP CORRECTED: 7.36 PH UNITS (ref 7.35–7.45)
PLATELET # BLD AUTO: 111 10*3/MM3 (ref 140–450)
PLATELET # BLD AUTO: 111 10*3/MM3 (ref 140–450)
PLATELET # BLD AUTO: 114 10*3/MM3 (ref 140–450)
PLATELET # BLD AUTO: 82 10*3/MM3 (ref 140–450)
PMV BLD AUTO: 10.7 FL (ref 6–12)
PMV BLD AUTO: 11.6 FL (ref 6–12)
PMV BLD AUTO: 11.7 FL (ref 6–12)
PMV BLD AUTO: 11.8 FL (ref 6–12)
PO2 BLDA: 120 MM HG (ref 83–108)
PO2 BLDA: 66.3 MM HG (ref 83–108)
PO2 TEMP ADJ BLD: 120 MM HG (ref 83–108)
PO2 TEMP ADJ BLD: 66.3 MM HG (ref 83–108)
POTASSIUM SERPL-SCNC: 3.2 MMOL/L (ref 3.5–5.2)
POTASSIUM SERPL-SCNC: 3.4 MMOL/L (ref 3.5–5.2)
POTASSIUM SERPL-SCNC: 3.6 MMOL/L (ref 3.5–5.2)
POTASSIUM SERPL-SCNC: 3.9 MMOL/L (ref 3.5–5.2)
POTASSIUM SERPL-SCNC: 4.4 MMOL/L (ref 3.5–5.2)
PROPOXYPH UR QL: NEGATIVE
PROT SERPL-MCNC: 6.8 G/DL (ref 6–8.5)
PROT SERPL-MCNC: 6.9 G/DL (ref 6–8.5)
PROT SERPL-MCNC: 7.5 G/DL (ref 6–8.5)
PROT UR QL STRIP: ABNORMAL
PSA SERPL-MCNC: 0.39 NG/ML (ref 0–4)
QT INTERVAL: 324 MS
QTC INTERVAL: 444 MS
RBC # BLD AUTO: 3.24 10*6/MM3 (ref 4.14–5.8)
RBC # BLD AUTO: 3.25 10*6/MM3 (ref 4.14–5.8)
RBC # BLD AUTO: 3.62 10*6/MM3 (ref 4.14–5.8)
RBC # BLD AUTO: 3.68 10*6/MM3 (ref 4.14–5.8)
RBC # UR: ABNORMAL /HPF
RBC MORPH BLD: NORMAL
REF LAB TEST METHOD: ABNORMAL
SAO2 % BLDCOA: 94.6 % (ref 94–99)
SAO2 % BLDCOA: 99 % (ref 94–99)
SARS-COV-2 RNA PNL SPEC NAA+PROBE: NOT DETECTED
SET MECH RESP RATE: 16
SMALL PLATELETS BLD QL SMEAR: ABNORMAL
SODIUM SERPL-SCNC: 138 MMOL/L (ref 136–145)
SODIUM SERPL-SCNC: 141 MMOL/L (ref 136–145)
SODIUM SERPL-SCNC: 141 MMOL/L (ref 136–145)
SODIUM SERPL-SCNC: 144 MMOL/L (ref 136–145)
SODIUM SERPL-SCNC: 144 MMOL/L (ref 136–145)
SP GR UR STRIP: >1.03 (ref 1–1.03)
SQUAMOUS #/AREA URNS HPF: ABNORMAL /HPF
TIBC SERPL-MCNC: 454 MCG/DL (ref 298–536)
TRANSFERRIN SERPL-MCNC: 305 MG/DL (ref 200–360)
TRICYCLICS UR QL SCN: NEGATIVE
TRIGL SERPL-MCNC: 195 MG/DL (ref 0–150)
TROPONIN T SERPL-MCNC: <0.01 NG/ML (ref 0–0.03)
UROBILINOGEN UR QL STRIP: ABNORMAL
VENTILATOR MODE: ABNORMAL
VENTILATOR MODE: ABNORMAL
VIT B12 BLD-MCNC: 403 PG/ML (ref 211–946)
VLDLC SERPL-MCNC: 39 MG/DL
WBC # BLD AUTO: 7.51 10*3/MM3 (ref 3.4–10.8)
WBC # BLD AUTO: 7.52 10*3/MM3 (ref 3.4–10.8)
WBC # BLD AUTO: 7.71 10*3/MM3 (ref 3.4–10.8)
WBC # BLD AUTO: 8.09 10*3/MM3 (ref 3.4–10.8)
WBC MORPH BLD: NORMAL
WBC UR QL AUTO: ABNORMAL /HPF
WHOLE BLOOD HOLD SPECIMEN: NORMAL

## 2020-01-01 PROCEDURE — 94799 UNLISTED PULMONARY SVC/PX: CPT

## 2020-01-01 PROCEDURE — 85025 COMPLETE CBC W/AUTO DIFF WBC: CPT | Performed by: NURSE PRACTITIONER

## 2020-01-01 PROCEDURE — 80048 BASIC METABOLIC PNL TOTAL CA: CPT | Performed by: INTERNAL MEDICINE

## 2020-01-01 PROCEDURE — 85025 COMPLETE CBC W/AUTO DIFF WBC: CPT | Performed by: INTERNAL MEDICINE

## 2020-01-01 PROCEDURE — 63710000001 PREDNISONE PER 1 MG: Performed by: NURSE PRACTITIONER

## 2020-01-01 PROCEDURE — 25010000002 ENOXAPARIN PER 10 MG: Performed by: NURSE PRACTITIONER

## 2020-01-01 PROCEDURE — 74176 CT ABD & PELVIS W/O CONTRAST: CPT

## 2020-01-01 PROCEDURE — 83036 HEMOGLOBIN GLYCOSYLATED A1C: CPT | Performed by: INTERNAL MEDICINE

## 2020-01-01 PROCEDURE — 71275 CT ANGIOGRAPHY CHEST: CPT

## 2020-01-01 PROCEDURE — 83605 ASSAY OF LACTIC ACID: CPT | Performed by: EMERGENCY MEDICINE

## 2020-01-01 PROCEDURE — 84484 ASSAY OF TROPONIN QUANT: CPT | Performed by: EMERGENCY MEDICINE

## 2020-01-01 PROCEDURE — 85379 FIBRIN DEGRADATION QUANT: CPT | Performed by: EMERGENCY MEDICINE

## 2020-01-01 PROCEDURE — 87635 SARS-COV-2 COVID-19 AMP PRB: CPT | Performed by: EMERGENCY MEDICINE

## 2020-01-01 PROCEDURE — 83735 ASSAY OF MAGNESIUM: CPT | Performed by: NURSE PRACTITIONER

## 2020-01-01 PROCEDURE — 71045 X-RAY EXAM CHEST 1 VIEW: CPT

## 2020-01-01 PROCEDURE — 85025 COMPLETE CBC W/AUTO DIFF WBC: CPT | Performed by: EMERGENCY MEDICINE

## 2020-01-01 PROCEDURE — 83540 ASSAY OF IRON: CPT | Performed by: NURSE PRACTITIONER

## 2020-01-01 PROCEDURE — 93005 ELECTROCARDIOGRAM TRACING: CPT | Performed by: EMERGENCY MEDICINE

## 2020-01-01 PROCEDURE — 82803 BLOOD GASES ANY COMBINATION: CPT

## 2020-01-01 PROCEDURE — 94660 CPAP INITIATION&MGMT: CPT

## 2020-01-01 PROCEDURE — 80053 COMPREHEN METABOLIC PANEL: CPT | Performed by: INTERNAL MEDICINE

## 2020-01-01 PROCEDURE — 85025 COMPLETE CBC W/AUTO DIFF WBC: CPT | Performed by: FAMILY MEDICINE

## 2020-01-01 PROCEDURE — 84466 ASSAY OF TRANSFERRIN: CPT | Performed by: NURSE PRACTITIONER

## 2020-01-01 PROCEDURE — 83735 ASSAY OF MAGNESIUM: CPT | Performed by: EMERGENCY MEDICINE

## 2020-01-01 PROCEDURE — 87086 URINE CULTURE/COLONY COUNT: CPT | Performed by: INTERNAL MEDICINE

## 2020-01-01 PROCEDURE — 25010000002 IOPAMIDOL 61 % SOLUTION: Performed by: FAMILY MEDICINE

## 2020-01-01 PROCEDURE — 87040 BLOOD CULTURE FOR BACTERIA: CPT | Performed by: EMERGENCY MEDICINE

## 2020-01-01 PROCEDURE — 25010000002 METHYLPREDNISOLONE PER 125 MG: Performed by: EMERGENCY MEDICINE

## 2020-01-01 PROCEDURE — 83880 ASSAY OF NATRIURETIC PEPTIDE: CPT | Performed by: NURSE PRACTITIONER

## 2020-01-01 PROCEDURE — 36600 WITHDRAWAL OF ARTERIAL BLOOD: CPT

## 2020-01-01 PROCEDURE — 82607 VITAMIN B-12: CPT | Performed by: NURSE PRACTITIONER

## 2020-01-01 PROCEDURE — 81001 URINALYSIS AUTO W/SCOPE: CPT | Performed by: INTERNAL MEDICINE

## 2020-01-01 PROCEDURE — 93010 ELECTROCARDIOGRAM REPORT: CPT | Performed by: INTERNAL MEDICINE

## 2020-01-01 PROCEDURE — 97161 PT EVAL LOW COMPLEX 20 MIN: CPT | Performed by: PHYSICAL THERAPIST

## 2020-01-01 PROCEDURE — 83615 LACTATE (LD) (LDH) ENZYME: CPT | Performed by: NURSE PRACTITIONER

## 2020-01-01 PROCEDURE — 97530 THERAPEUTIC ACTIVITIES: CPT

## 2020-01-01 PROCEDURE — 94640 AIRWAY INHALATION TREATMENT: CPT

## 2020-01-01 PROCEDURE — A9552 F18 FDG: HCPCS | Performed by: PHYSICIAN ASSISTANT

## 2020-01-01 PROCEDURE — 0 IOPAMIDOL PER 1 ML: Performed by: EMERGENCY MEDICINE

## 2020-01-01 PROCEDURE — 80306 DRUG TEST PRSMV INSTRMNT: CPT | Performed by: INTERNAL MEDICINE

## 2020-01-01 PROCEDURE — 82043 UR ALBUMIN QUANTITATIVE: CPT | Performed by: INTERNAL MEDICINE

## 2020-01-01 PROCEDURE — 82378 CARCINOEMBRYONIC ANTIGEN: CPT | Performed by: NURSE PRACTITIONER

## 2020-01-01 PROCEDURE — 83605 ASSAY OF LACTIC ACID: CPT | Performed by: NURSE PRACTITIONER

## 2020-01-01 PROCEDURE — 99285 EMERGENCY DEPT VISIT HI MDM: CPT

## 2020-01-01 PROCEDURE — 80053 COMPREHEN METABOLIC PANEL: CPT | Performed by: FAMILY MEDICINE

## 2020-01-01 PROCEDURE — G0103 PSA SCREENING: HCPCS | Performed by: INTERNAL MEDICINE

## 2020-01-01 PROCEDURE — 80048 BASIC METABOLIC PNL TOTAL CA: CPT | Performed by: NURSE PRACTITIONER

## 2020-01-01 PROCEDURE — 78815 PET IMAGE W/CT SKULL-THIGH: CPT

## 2020-01-01 PROCEDURE — 80053 COMPREHEN METABOLIC PANEL: CPT | Performed by: EMERGENCY MEDICINE

## 2020-01-01 PROCEDURE — 80061 LIPID PANEL: CPT | Performed by: INTERNAL MEDICINE

## 2020-01-01 PROCEDURE — 0 FLUDEOXYGLUCOSE F18 SOLUTION: Performed by: PHYSICIAN ASSISTANT

## 2020-01-01 PROCEDURE — 83880 ASSAY OF NATRIURETIC PEPTIDE: CPT | Performed by: EMERGENCY MEDICINE

## 2020-01-01 RX ORDER — PANTOPRAZOLE SODIUM 40 MG/1
40 TABLET, DELAYED RELEASE ORAL
Status: DISCONTINUED | OUTPATIENT
Start: 2020-01-01 | End: 2020-01-01 | Stop reason: HOSPADM

## 2020-01-01 RX ORDER — BUPROPION HYDROCHLORIDE 150 MG/1
150 TABLET ORAL DAILY
Status: DISCONTINUED | OUTPATIENT
Start: 2020-01-01 | End: 2020-01-01 | Stop reason: HOSPADM

## 2020-01-01 RX ORDER — IPRATROPIUM BROMIDE AND ALBUTEROL SULFATE 2.5; .5 MG/3ML; MG/3ML
3 SOLUTION RESPIRATORY (INHALATION)
Qty: 360 ML | Refills: 3 | Status: SHIPPED | OUTPATIENT
Start: 2020-01-01

## 2020-01-01 RX ORDER — OXYBUTYNIN CHLORIDE 5 MG/1
5 TABLET ORAL DAILY
Status: DISCONTINUED | OUTPATIENT
Start: 2020-01-01 | End: 2020-01-01 | Stop reason: HOSPADM

## 2020-01-01 RX ORDER — DOXYCYCLINE 100 MG/1
100 TABLET ORAL EVERY 12 HOURS SCHEDULED
Qty: 10 TABLET | Refills: 0 | Status: SHIPPED | OUTPATIENT
Start: 2020-01-01 | End: 2020-01-01

## 2020-01-01 RX ORDER — PROPRANOLOL HYDROCHLORIDE 40 MG/1
20 TABLET ORAL DAILY
Status: DISCONTINUED | OUTPATIENT
Start: 2020-01-01 | End: 2020-01-01 | Stop reason: HOSPADM

## 2020-01-01 RX ORDER — PREDNISONE 20 MG/1
40 TABLET ORAL
Qty: 9 TABLET | Refills: 0 | Status: SHIPPED | OUTPATIENT
Start: 2020-01-01 | End: 2020-01-01

## 2020-01-01 RX ORDER — TAMSULOSIN HYDROCHLORIDE 0.4 MG/1
0.4 CAPSULE ORAL DAILY
Status: DISCONTINUED | OUTPATIENT
Start: 2020-01-01 | End: 2020-01-01 | Stop reason: HOSPADM

## 2020-01-01 RX ORDER — FEXOFENADINE HCL 180 MG/1
180 TABLET ORAL DAILY
COMMUNITY
End: 2020-01-01 | Stop reason: HOSPADM

## 2020-01-01 RX ORDER — METHYLPREDNISOLONE SODIUM SUCCINATE 125 MG/2ML
125 INJECTION, POWDER, LYOPHILIZED, FOR SOLUTION INTRAMUSCULAR; INTRAVENOUS ONCE
Status: COMPLETED | OUTPATIENT
Start: 2020-01-01 | End: 2020-01-01

## 2020-01-01 RX ORDER — POTASSIUM CHLORIDE 20 MEQ/1
20 TABLET, EXTENDED RELEASE ORAL DAILY
Status: ON HOLD | COMMUNITY
End: 2020-01-01

## 2020-01-01 RX ORDER — BUDESONIDE AND FORMOTEROL FUMARATE DIHYDRATE 80; 4.5 UG/1; UG/1
2 AEROSOL RESPIRATORY (INHALATION)
Qty: 1 INHALER | Refills: 12 | Status: SHIPPED | OUTPATIENT
Start: 2020-01-01

## 2020-01-01 RX ORDER — POTASSIUM CHLORIDE 750 MG/1
20 CAPSULE, EXTENDED RELEASE ORAL DAILY
Status: DISCONTINUED | OUTPATIENT
Start: 2020-01-01 | End: 2020-01-01 | Stop reason: HOSPADM

## 2020-01-01 RX ORDER — FUROSEMIDE 20 MG/1
40 TABLET ORAL DAILY
Qty: 60 TABLET | Refills: 3 | Status: SHIPPED | OUTPATIENT
Start: 2020-01-01

## 2020-01-01 RX ORDER — DOXYCYCLINE 100 MG/1
100 TABLET ORAL EVERY 12 HOURS SCHEDULED
Status: DISCONTINUED | OUTPATIENT
Start: 2020-01-01 | End: 2020-01-01 | Stop reason: HOSPADM

## 2020-01-01 RX ORDER — ACETAMINOPHEN 325 MG/1
650 TABLET ORAL EVERY 6 HOURS PRN
Status: DISCONTINUED | OUTPATIENT
Start: 2020-01-01 | End: 2020-01-01 | Stop reason: HOSPADM

## 2020-01-01 RX ORDER — IPRATROPIUM BROMIDE AND ALBUTEROL SULFATE 2.5; .5 MG/3ML; MG/3ML
3 SOLUTION RESPIRATORY (INHALATION)
Qty: 360 ML | Refills: 3 | Status: SHIPPED | OUTPATIENT
Start: 2020-01-01 | End: 2020-01-01

## 2020-01-01 RX ORDER — OXYCODONE AND ACETAMINOPHEN 7.5; 325 MG/1; MG/1
1 TABLET ORAL EVERY 8 HOURS PRN
Status: DISCONTINUED | OUTPATIENT
Start: 2020-01-01 | End: 2020-01-01 | Stop reason: HOSPADM

## 2020-01-01 RX ORDER — SODIUM CHLORIDE 0.9 % (FLUSH) 0.9 %
10 SYRINGE (ML) INJECTION AS NEEDED
Status: DISCONTINUED | OUTPATIENT
Start: 2020-01-01 | End: 2020-01-01 | Stop reason: HOSPADM

## 2020-01-01 RX ORDER — FUROSEMIDE 40 MG/1
40 TABLET ORAL DAILY
Status: DISCONTINUED | OUTPATIENT
Start: 2020-01-01 | End: 2020-01-01 | Stop reason: HOSPADM

## 2020-01-01 RX ORDER — SERTRALINE HYDROCHLORIDE 100 MG/1
100 TABLET, FILM COATED ORAL DAILY
Status: DISCONTINUED | OUTPATIENT
Start: 2020-01-01 | End: 2020-01-01 | Stop reason: HOSPADM

## 2020-01-01 RX ORDER — TRAZODONE HYDROCHLORIDE 50 MG/1
50 TABLET ORAL NIGHTLY PRN
COMMUNITY
End: 2021-01-01 | Stop reason: HOSPADM

## 2020-01-01 RX ORDER — OXYCODONE AND ACETAMINOPHEN 7.5; 325 MG/1; MG/1
1 TABLET ORAL 2 TIMES DAILY PRN
COMMUNITY
End: 2021-01-01 | Stop reason: HOSPADM

## 2020-01-01 RX ORDER — BUPROPION HYDROCHLORIDE 150 MG/1
150 TABLET ORAL DAILY
Status: DISCONTINUED | OUTPATIENT
Start: 2020-01-01 | End: 2020-01-01

## 2020-01-01 RX ORDER — IPRATROPIUM BROMIDE AND ALBUTEROL SULFATE 2.5; .5 MG/3ML; MG/3ML
3 SOLUTION RESPIRATORY (INHALATION)
Status: DISCONTINUED | OUTPATIENT
Start: 2020-01-01 | End: 2020-01-01 | Stop reason: HOSPADM

## 2020-01-01 RX ORDER — CETIRIZINE HYDROCHLORIDE 10 MG/1
10 TABLET ORAL DAILY
Status: DISCONTINUED | OUTPATIENT
Start: 2020-01-01 | End: 2020-01-01 | Stop reason: HOSPADM

## 2020-01-01 RX ORDER — GUAIFENESIN/DEXTROMETHORPHAN 100-10MG/5
10 SYRUP ORAL EVERY 6 HOURS PRN
Status: DISCONTINUED | OUTPATIENT
Start: 2020-01-01 | End: 2020-01-01 | Stop reason: HOSPADM

## 2020-01-01 RX ORDER — BUDESONIDE AND FORMOTEROL FUMARATE DIHYDRATE 80; 4.5 UG/1; UG/1
2 AEROSOL RESPIRATORY (INHALATION)
Status: DISCONTINUED | OUTPATIENT
Start: 2020-01-01 | End: 2020-01-01 | Stop reason: HOSPADM

## 2020-01-01 RX ORDER — POTASSIUM CHLORIDE 20MEQ/15ML
20 LIQUID (ML) ORAL DAILY
Status: ON HOLD | COMMUNITY
End: 2021-01-01

## 2020-01-01 RX ORDER — SERTRALINE HYDROCHLORIDE 100 MG/1
100 TABLET, FILM COATED ORAL DAILY
COMMUNITY

## 2020-01-01 RX ORDER — PREDNISONE 20 MG/1
40 TABLET ORAL
Status: DISCONTINUED | OUTPATIENT
Start: 2020-01-01 | End: 2020-01-01 | Stop reason: HOSPADM

## 2020-01-01 RX ADMIN — IPRATROPIUM BROMIDE AND ALBUTEROL SULFATE 3 ML: 2.5; .5 SOLUTION RESPIRATORY (INHALATION) at 13:54

## 2020-01-01 RX ADMIN — IOPAMIDOL 100 ML: 755 INJECTION, SOLUTION INTRAVENOUS at 10:00

## 2020-01-01 RX ADMIN — TAMSULOSIN HYDROCHLORIDE 0.4 MG: 0.4 CAPSULE ORAL at 09:03

## 2020-01-01 RX ADMIN — IPRATROPIUM BROMIDE AND ALBUTEROL SULFATE 3 ML: 2.5; .5 SOLUTION RESPIRATORY (INHALATION) at 07:00

## 2020-01-01 RX ADMIN — OXYBUTYNIN CHLORIDE 5 MG: 5 TABLET ORAL at 15:00

## 2020-01-01 RX ADMIN — TAMSULOSIN HYDROCHLORIDE 0.4 MG: 0.4 CAPSULE ORAL at 09:43

## 2020-01-01 RX ADMIN — FLUDEOXYGLUCOSE F18 1 DOSE: 300 INJECTION INTRAVENOUS at 08:31

## 2020-01-01 RX ADMIN — ENOXAPARIN SODIUM 40 MG: 100 INJECTION SUBCUTANEOUS at 18:20

## 2020-01-01 RX ADMIN — PROPRANOLOL HYDROCHLORIDE 20 MG: 40 TABLET ORAL at 15:01

## 2020-01-01 RX ADMIN — DOXYCYCLINE 100 MG: 100 INJECTION, POWDER, LYOPHILIZED, FOR SOLUTION INTRAVENOUS at 20:22

## 2020-01-01 RX ADMIN — BUPROPION HYDROCHLORIDE 150 MG: 150 TABLET, FILM COATED, EXTENDED RELEASE ORAL at 09:42

## 2020-01-01 RX ADMIN — SERTRALINE 100 MG: 100 TABLET, FILM COATED ORAL at 15:01

## 2020-01-01 RX ADMIN — POTASSIUM CHLORIDE 20 MEQ: 750 CAPSULE, EXTENDED RELEASE ORAL at 09:03

## 2020-01-01 RX ADMIN — POTASSIUM CHLORIDE 20 MEQ: 750 CAPSULE, EXTENDED RELEASE ORAL at 15:01

## 2020-01-01 RX ADMIN — FUROSEMIDE 40 MG: 40 TABLET ORAL at 09:03

## 2020-01-01 RX ADMIN — PROPRANOLOL HYDROCHLORIDE 20 MG: 40 TABLET ORAL at 09:03

## 2020-01-01 RX ADMIN — SERTRALINE 100 MG: 100 TABLET, FILM COATED ORAL at 09:03

## 2020-01-01 RX ADMIN — TAMSULOSIN HYDROCHLORIDE 0.4 MG: 0.4 CAPSULE ORAL at 15:01

## 2020-01-01 RX ADMIN — FUROSEMIDE 40 MG: 40 TABLET ORAL at 08:56

## 2020-01-01 RX ADMIN — BUDESONIDE AND FORMOTEROL FUMARATE DIHYDRATE 2 PUFF: 80; 4.5 AEROSOL RESPIRATORY (INHALATION) at 06:23

## 2020-01-01 RX ADMIN — BUDESONIDE AND FORMOTEROL FUMARATE DIHYDRATE 2 PUFF: 80; 4.5 AEROSOL RESPIRATORY (INHALATION) at 18:42

## 2020-01-01 RX ADMIN — PANTOPRAZOLE SODIUM 40 MG: 40 TABLET, DELAYED RELEASE ORAL at 15:00

## 2020-01-01 RX ADMIN — SERTRALINE 100 MG: 100 TABLET, FILM COATED ORAL at 09:42

## 2020-01-01 RX ADMIN — CETIRIZINE HYDROCHLORIDE 10 MG: 10 TABLET ORAL at 15:00

## 2020-01-01 RX ADMIN — BUPROPION HYDROCHLORIDE 150 MG: 150 TABLET, FILM COATED, EXTENDED RELEASE ORAL at 09:03

## 2020-01-01 RX ADMIN — DOXYCYCLINE 100 MG: 100 TABLET, FILM COATED ORAL at 09:03

## 2020-01-01 RX ADMIN — OXYBUTYNIN CHLORIDE 5 MG: 5 TABLET ORAL at 08:56

## 2020-01-01 RX ADMIN — BUDESONIDE AND FORMOTEROL FUMARATE DIHYDRATE 2 PUFF: 80; 4.5 AEROSOL RESPIRATORY (INHALATION) at 18:49

## 2020-01-01 RX ADMIN — PREDNISONE 40 MG: 20 TABLET ORAL at 09:42

## 2020-01-01 RX ADMIN — PANTOPRAZOLE SODIUM 40 MG: 40 TABLET, DELAYED RELEASE ORAL at 09:12

## 2020-01-01 RX ADMIN — DOXYCYCLINE 100 MG: 100 INJECTION, POWDER, LYOPHILIZED, FOR SOLUTION INTRAVENOUS at 20:34

## 2020-01-01 RX ADMIN — CETIRIZINE HYDROCHLORIDE 10 MG: 10 TABLET ORAL at 09:03

## 2020-01-01 RX ADMIN — PROPRANOLOL HYDROCHLORIDE 20 MG: 40 TABLET ORAL at 08:56

## 2020-01-01 RX ADMIN — POTASSIUM CHLORIDE 20 MEQ: 750 CAPSULE, EXTENDED RELEASE ORAL at 08:56

## 2020-01-01 RX ADMIN — OXYBUTYNIN CHLORIDE 5 MG: 5 TABLET ORAL at 09:03

## 2020-01-01 RX ADMIN — IPRATROPIUM BROMIDE AND ALBUTEROL SULFATE 3 ML: 2.5; .5 SOLUTION RESPIRATORY (INHALATION) at 00:36

## 2020-01-01 RX ADMIN — IPRATROPIUM BROMIDE AND ALBUTEROL SULFATE 3 ML: 2.5; .5 SOLUTION RESPIRATORY (INHALATION) at 18:49

## 2020-01-01 RX ADMIN — DOXYCYCLINE 100 MG: 100 INJECTION, POWDER, LYOPHILIZED, FOR SOLUTION INTRAVENOUS at 20:27

## 2020-01-01 RX ADMIN — FUROSEMIDE 40 MG: 40 TABLET ORAL at 09:43

## 2020-01-01 RX ADMIN — IPRATROPIUM BROMIDE AND ALBUTEROL SULFATE 3 ML: 2.5; .5 SOLUTION RESPIRATORY (INHALATION) at 06:23

## 2020-01-01 RX ADMIN — FUROSEMIDE 40 MG: 40 TABLET ORAL at 15:00

## 2020-01-01 RX ADMIN — BUDESONIDE AND FORMOTEROL FUMARATE DIHYDRATE 2 PUFF: 80; 4.5 AEROSOL RESPIRATORY (INHALATION) at 19:12

## 2020-01-01 RX ADMIN — CETIRIZINE HYDROCHLORIDE 10 MG: 10 TABLET ORAL at 08:56

## 2020-01-01 RX ADMIN — IPRATROPIUM BROMIDE AND ALBUTEROL SULFATE 3 ML: 2.5; .5 SOLUTION RESPIRATORY (INHALATION) at 00:11

## 2020-01-01 RX ADMIN — DOXYCYCLINE 100 MG: 100 INJECTION, POWDER, LYOPHILIZED, FOR SOLUTION INTRAVENOUS at 09:43

## 2020-01-01 RX ADMIN — SODIUM CHLORIDE, PRESERVATIVE FREE 10 ML: 5 INJECTION INTRAVENOUS at 09:43

## 2020-01-01 RX ADMIN — PANTOPRAZOLE SODIUM 40 MG: 40 TABLET, DELAYED RELEASE ORAL at 09:42

## 2020-01-01 RX ADMIN — DOXYCYCLINE 100 MG: 100 INJECTION, POWDER, LYOPHILIZED, FOR SOLUTION INTRAVENOUS at 15:01

## 2020-01-01 RX ADMIN — SERTRALINE 100 MG: 100 TABLET, FILM COATED ORAL at 08:56

## 2020-01-01 RX ADMIN — PROPRANOLOL HYDROCHLORIDE 20 MG: 40 TABLET ORAL at 09:42

## 2020-01-01 RX ADMIN — BUPROPION HYDROCHLORIDE 150 MG: 150 TABLET, FILM COATED, EXTENDED RELEASE ORAL at 15:01

## 2020-01-01 RX ADMIN — OXYBUTYNIN CHLORIDE 5 MG: 5 TABLET ORAL at 09:42

## 2020-01-01 RX ADMIN — BUPROPION HYDROCHLORIDE 150 MG: 150 TABLET, FILM COATED, EXTENDED RELEASE ORAL at 08:56

## 2020-01-01 RX ADMIN — TAMSULOSIN HYDROCHLORIDE 0.4 MG: 0.4 CAPSULE ORAL at 08:56

## 2020-01-01 RX ADMIN — IPRATROPIUM BROMIDE AND ALBUTEROL SULFATE 3 ML: 2.5; .5 SOLUTION RESPIRATORY (INHALATION) at 13:21

## 2020-01-01 RX ADMIN — DOXYCYCLINE 100 MG: 100 INJECTION, POWDER, LYOPHILIZED, FOR SOLUTION INTRAVENOUS at 08:56

## 2020-01-01 RX ADMIN — PREDNISONE 40 MG: 20 TABLET ORAL at 08:56

## 2020-01-01 RX ADMIN — IPRATROPIUM BROMIDE AND ALBUTEROL SULFATE 3 ML: 2.5; .5 SOLUTION RESPIRATORY (INHALATION) at 06:52

## 2020-01-01 RX ADMIN — ENOXAPARIN SODIUM 40 MG: 100 INJECTION SUBCUTANEOUS at 17:07

## 2020-01-01 RX ADMIN — PANTOPRAZOLE SODIUM 40 MG: 40 TABLET, DELAYED RELEASE ORAL at 08:56

## 2020-01-01 RX ADMIN — IPRATROPIUM BROMIDE AND ALBUTEROL SULFATE 3 ML: 2.5; .5 SOLUTION RESPIRATORY (INHALATION) at 19:12

## 2020-01-01 RX ADMIN — METHYLPREDNISOLONE SODIUM SUCCINATE 125 MG: 125 INJECTION, POWDER, FOR SOLUTION INTRAMUSCULAR; INTRAVENOUS at 08:48

## 2020-01-01 RX ADMIN — IPRATROPIUM BROMIDE AND ALBUTEROL SULFATE 3 ML: 2.5; .5 SOLUTION RESPIRATORY (INHALATION) at 18:36

## 2020-01-01 RX ADMIN — IPRATROPIUM BROMIDE AND ALBUTEROL SULFATE 3 ML: 2.5; .5 SOLUTION RESPIRATORY (INHALATION) at 01:17

## 2020-01-01 RX ADMIN — IOPAMIDOL 50 ML: 612 INJECTION, SOLUTION INTRAVENOUS at 08:56

## 2020-01-01 RX ADMIN — BUDESONIDE AND FORMOTEROL FUMARATE DIHYDRATE 2 PUFF: 80; 4.5 AEROSOL RESPIRATORY (INHALATION) at 07:07

## 2020-01-01 RX ADMIN — POTASSIUM CHLORIDE 20 MEQ: 750 CAPSULE, EXTENDED RELEASE ORAL at 09:42

## 2020-01-01 RX ADMIN — PREDNISONE 40 MG: 20 TABLET ORAL at 10:52

## 2020-01-01 RX ADMIN — SODIUM CHLORIDE, PRESERVATIVE FREE 10 ML: 5 INJECTION INTRAVENOUS at 20:22

## 2020-01-01 RX ADMIN — ENOXAPARIN SODIUM 40 MG: 100 INJECTION SUBCUTANEOUS at 17:49

## 2020-01-01 RX ADMIN — BUDESONIDE AND FORMOTEROL FUMARATE DIHYDRATE 2 PUFF: 80; 4.5 AEROSOL RESPIRATORY (INHALATION) at 06:52

## 2020-01-01 RX ADMIN — CETIRIZINE HYDROCHLORIDE 10 MG: 10 TABLET ORAL at 09:43

## 2020-01-01 RX ADMIN — IPRATROPIUM BROMIDE AND ALBUTEROL SULFATE 3 ML: 2.5; .5 SOLUTION RESPIRATORY (INHALATION) at 14:28

## 2020-02-12 ENCOUNTER — OFFICE VISIT (OUTPATIENT)
Dept: OTOLARYNGOLOGY | Facility: CLINIC | Age: 70
End: 2020-02-12

## 2020-02-12 VITALS
BODY MASS INDEX: 32.8 KG/M2 | SYSTOLIC BLOOD PRESSURE: 148 MMHG | RESPIRATION RATE: 20 BRPM | TEMPERATURE: 98.8 F | HEIGHT: 67 IN | DIASTOLIC BLOOD PRESSURE: 77 MMHG | HEART RATE: 75 BPM | WEIGHT: 209 LBS

## 2020-02-12 DIAGNOSIS — R59.0 LYMPHADENOPATHY OF RIGHT CERVICAL REGION: ICD-10-CM

## 2020-02-12 DIAGNOSIS — J33.0 NASOPHARYNGEAL POLYP: ICD-10-CM

## 2020-02-12 DIAGNOSIS — R49.9 HOARSENESS OR CHANGING VOICE: Primary | ICD-10-CM

## 2020-02-12 DIAGNOSIS — R07.0 THROAT PAIN: ICD-10-CM

## 2020-02-12 PROCEDURE — 31575 DIAGNOSTIC LARYNGOSCOPY: CPT | Performed by: OTOLARYNGOLOGY

## 2020-02-12 PROCEDURE — 99214 OFFICE O/P EST MOD 30 MIN: CPT | Performed by: OTOLARYNGOLOGY

## 2020-02-12 RX ORDER — OXYCODONE HYDROCHLORIDE AND ACETAMINOPHEN 5; 325 MG/1; MG/1
TABLET ORAL
Status: ON HOLD | COMMUNITY
Start: 2020-02-06 | End: 2020-01-01

## 2020-02-12 RX ORDER — TRAZODONE HYDROCHLORIDE 100 MG/1
TABLET ORAL
Status: ON HOLD | COMMUNITY
Start: 2020-02-11 | End: 2020-01-01

## 2020-02-12 NOTE — PROGRESS NOTES
OPERATIVE NOTE:    PREPROCEDURE DIAGNOSIS: Hoarseness of voice, right throat pain, right neck pain    POSTPROCEDURE DIAGNOSIS: Nasal pharyngeal polyp, right.  Normal glottis.    PROCEDURE:   Flexible Fiberoptic Laryngoscopy    ANESTHESIA:  None    REASON FOR PROCEDURE:  Procedure was recommend for inability to fully visualize the glottis on mirror laryngoscopy.  Risks, benefits and alternatives were discussed.      DETAILS of OPERATION:  The patient was seated in the exam chair.  A flexible fiberoptic laryngoscopy was performed through the left nasal cavity to visualize the flap.  Crusting was noted and some dried blood.  No obvious evidence of recurrent tumor was present.  The scope was withdrawn and passed into the right nasal cavity.  Examining the structures of the oropharynx, base of tongue, vallecula, supraglottic larynx, glottic larynx, and hypopharynx.      FINDINGS:  Mucosal surfaces:   The mucosal surfaces demonstrated normal mucosa surfaces without inflammation    Nasopharynx: There is a small (4 to 5 mm) polypoid lesion of the right nasopharynx.    Base of tongue:  The base of tongue was found to have no mass or lesion.    Epiglottis:  The epiglottis was found to have  no mass or lesion.    Aryepligottic fold:  The AE folds were found to have mild to moderate inter-arytenoid edema with erythema.    False Vocal Fold:  The false cords were found to have mild to moderate inter-arytenoid edema with erythema.    True Vocal Cord:  The true vocal cords were found to have no mass or lesion.  Normal mobility.    Arytenoid:   The arytenoids were found to have mild to moderate inter-arytenoid edema with erythema.    Hypopharynx:  The hypopharynx was found to have no mass or lesion.    The patient tolerated procedure well.      Izaiah Ceron MD  02/12/20  11:32 AM

## 2020-02-12 NOTE — PROGRESS NOTES
PRIMARY CARE PROVIDER: Reid Farooq MD  REFERRING PROVIDER: No ref. provider found    Chief Complaint   Patient presents with   • Basal Cell Carcinoma     Left ala (follow up)   • Hoarse       Subjective   History of Present Illness:  Brett Ortiz is a  69 y.o. male returns to the office for evaluation and treatment of his hoarseness.  This is localized to his throat.  This is been present since May 2018.  This is moderate and severity, and constant in nature.  He initially thought this was due to multiple intubations while being hospitalized.  Nothing has made this better.  He denies postnasal drip heartburn or acid reflux.  When seen in September 2019, he was placed on Allegra and twice daily PPI.  He stopped the Zantac due to a recall.  He now complains of associated right-sided sore throat.  He was not giving the nasal nasal steroid spray due to his nasal cannula oxygen use.   He reports continued hoarseness, and moderate right-sided throat pain.  He points to the right jugulodigastric node for this.  This is been present for a few months.  Nothing is made this better, nor worse.    He is status post repair of a Mohs defect with left paramedian forehead flap, left cheek advancement flap, ipsilateral septal mucosal hinge flap, conchal cartilage graft from the right ear to the left nose, and complex closure of the skin of the forehead in October 2018.  He has had some mild bleeding of the left side of the nose.  He denies any new lesions of the face, scalp, or neck.    Review of Systems:  Review of Systems   Constitutional: Negative for chills, fatigue and unexpected weight change.   HENT: Positive for nosebleeds, sore throat, trouble swallowing and voice change. Negative for congestion.    Eyes: Negative for visual disturbance.   Respiratory: Positive for shortness of breath.    Musculoskeletal: Positive for gait problem.        Wheelchair   Skin: Negative for color change and wound.   Neurological:  Positive for facial asymmetry.   Hematological: Positive for adenopathy (Right SAL node).   Psychiatric/Behavioral: Negative for agitation and confusion.       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    • Nevus    • Perforation of colon (CMS/HCC) 2018   • Venous insufficiency    • Weakness of extremity     LEFT SIDE   • Wheelchair dependent      Past Surgical History:   Procedure Laterality Date   • COLON SURGERY      x4   • EYE SURGERY Bilateral     cataracts    • FLAP HEAD/NECK Left 10/23/2018    Procedure: LEFT CHEEK ADVANCEMENT FLAP CLOSURE OF LIP AND CHEEK,5CM X 6CM IPSILATERAL LEFT SEPTAL MUCOSAL HINGE FLAP,2CM X 3CM;  Surgeon: Izaiah Ceron MD;  Location: [facility] OR;  Service: ENT   • FLAP HEAD/NECK Bilateral 2018    Procedure: Pedicle division and flap inset of nose;  Surgeon: Izaiah Ceron MD;  Location: [facility] OR;  Service: ENT   • HEAD/NECK LESION/CYST EXCISION Left 10/23/2018    Procedure: LEFT PARAMEDIAN FOREHEAD FLAP WITH PRESERVATION OF VASCULAR PEDICLE;  Surgeon: Izaiah Ceron MD;  Location: [facility] OR;  Service: ENT   • SKIN FULL THICKNESS GRAFT Left 10/23/2018    Procedure: Conchal cartilage graft from right ear to left nose with complex closure of skin of forehead;  Surgeon: Izaiah Ceron MD;  Location:  [facility] OR;  Service: ENT     Family History   Problem Relation Age of Onset   • Cancer Mother    • Diabetes Mother    • Cancer Father      Social History     Tobacco Use   • Smoking status: Former Smoker     Years: 50.00     Types: Cigarettes     Last attempt to quit: 2018     Years since quittin.8   • Smokeless tobacco: Former User      Types: Chew   Substance Use Topics   • Alcohol use: No     Comment: quit April 14 2018/ hx of alchohlism    • Drug use: No     Allergies:  Patient has no known allergies.    Current Outpatient Medications:   •  buPROPion XL (WELLBUTRIN XL) 150 MG 24 hr tablet, Take 150 mg by mouth Every Morning., Disp: , Rfl:   •  fexofenadine (ALLEGRA) 180 MG tablet, TAKE ONE TABLET BY MOUTH EVERY DAY FOR ALLERGY SYMPTOMS, Disp: 30 tablet, Rfl: 0  •  furosemide (LASIX) 20 MG tablet, Take 20 mg by mouth Daily., Disp: , Rfl:   •  oxybutynin (DITROPAN) 5 MG tablet, Take 5 mg by mouth Daily., Disp: , Rfl:   •  pantoprazole (PROTONIX) 40 MG EC tablet, Take 40 mg by mouth Daily., Disp: , Rfl:   •  propranolol (INDERAL) 20 MG tablet, Take 20 mg by mouth Daily., Disp: , Rfl:   •  tamsulosin (FLOMAX) 0.4 MG capsule 24 hr capsule, Take 1 capsule by mouth Daily., Disp: , Rfl:   •  tiotropium bromide-olodaterol (STIOLTO RESPIMAT) 2.5-2.5 MCG/ACT aerosol solution inhaler, Inhale 2 puffs Daily., Disp: , Rfl:   •  traZODone (DESYREL) 100 MG tablet, , Disp: , Rfl:   •  HYDROcodone-acetaminophen (NORCO) 7.5-325 MG per tablet, Take 1 tablet by mouth Every 4 (Four) Hours As Needed for Moderate Pain  or Severe Pain  (Pain)., Disp: 20 tablet, Rfl: 0  •  HYDROcodone-acetaminophen (NORCO) 7.5-325 MG per tablet, Take 1 tablet by mouth Every 4 (Four) Hours As Needed for Moderate Pain  or Severe Pain  (Pain)., Disp: 20 tablet, Rfl: 0  •  oxyCODONE-acetaminophen (PERCOCET) 5-325 MG per tablet, , Disp: , Rfl:   •  TRAZODONE HCL PO, Take 100 mg by mouth every night at bedtime., Disp: , Rfl:       Objective     Vital Signs:  Temp:  [98.8 °F (37.1 °C)] 98.8 °F (37.1 °C)  Heart Rate:  [75] 75  Resp:  [20] 20  BP: (148)/(77) 148/77    Physical Exam:  Physical Exam   Constitutional: He is oriented to person, place, and time. He appears well-developed and well-nourished. He is cooperative. No distress.   HENT:   Head: Normocephalic and atraumatic.   Right Ear:  External ear normal.   Left Ear: External ear normal.   Nose: Nose normal.       Eyes: Pupils are equal, round, and reactive to light. Conjunctivae and EOM are normal. Right eye exhibits no discharge. Left eye exhibits no discharge. No scleral icterus.   Neck: Normal range of motion. Neck supple. No JVD present. No tracheal deviation present. No thyromegaly present.       Pulmonary/Chest: Effort normal. No stridor.   Musculoskeletal: Normal range of motion. He exhibits no edema or deformity.   Lymphadenopathy:     He has no cervical adenopathy.   Neurological: He is alert and oriented to person, place, and time. He has normal strength. No cranial nerve deficit. Coordination normal.   Skin: Skin is warm and dry. No rash noted. He is not diaphoretic. No erythema. No pallor.   Psychiatric: He has a normal mood and affect. His speech is normal and behavior is normal. Judgment and thought content normal. Cognition and memory are normal.   Nursing note and vitals reviewed.        Assessment   Assessment:  1. Hoarseness or changing voice    2. Throat pain    3. Lymphadenopathy of right cervical region    4. Nasopharyngeal polyp        Plan   Plan:  Due to his persistent hoarseness, normal flexible fiberoptic laryngoscopy, and new right sided neck/throat pain, I have offered a contrasted CT scan of the neck.  He is in agreement.  We will see him back following that to his few the results.  He also has what appears to be a polyp in the nasopharynx.    Return in about 4 weeks (around 3/11/2020).    My findings and recommendations were discussed and questions were answered.     Izaiah Ceron MD  02/12/20  11:34 AM

## 2020-02-17 ENCOUNTER — HOSPITAL ENCOUNTER (OUTPATIENT)
Dept: CT IMAGING | Facility: HOSPITAL | Age: 70
Discharge: HOME | End: 2020-02-17
Admitting: OTOLARYNGOLOGY

## 2020-02-17 LAB — CREAT BLDA-MCNC: 1 MG/DL (ref 0.6–1.3)

## 2020-02-17 PROCEDURE — 25010000002 IOPAMIDOL 61 % SOLUTION: Performed by: OTOLARYNGOLOGY

## 2020-02-17 PROCEDURE — 70492 CT SFT TSUE NCK W/O & W/DYE: CPT

## 2020-02-17 PROCEDURE — 82565 ASSAY OF CREATININE: CPT

## 2020-02-17 RX ADMIN — IOPAMIDOL 100 ML: 612 INJECTION, SOLUTION INTRAVENOUS at 14:26

## 2020-03-03 ENCOUNTER — TELEPHONE (OUTPATIENT)
Dept: OTOLARYNGOLOGY | Facility: CLINIC | Age: 70
End: 2020-03-03

## 2020-03-03 NOTE — TELEPHONE ENCOUNTER
Patient has been contacted with date and time of his follow up with Dr Ceron on 03/13/20 @ 10:30am

## 2020-03-11 DIAGNOSIS — I65.23 CAROTID STENOSIS, ASYMPTOMATIC, BILATERAL: Primary | ICD-10-CM

## 2020-11-27 PROBLEM — J44.1 CHRONIC OBSTRUCTIVE PULMONARY DISEASE WITH ACUTE EXACERBATION (HCC): Status: ACTIVE | Noted: 2020-01-01

## 2020-11-27 PROBLEM — J96.12 CHRONIC RESPIRATORY FAILURE WITH HYPERCAPNIA (HCC): Status: ACTIVE | Noted: 2020-01-01

## 2020-11-27 PROBLEM — R91.8 PULMONARY NODULES/LESIONS, MULTIPLE: Status: ACTIVE | Noted: 2020-01-01

## 2020-11-27 PROBLEM — Z93.2 PRESENCE OF ILEOSTOMY (HCC): Status: ACTIVE | Noted: 2020-01-01

## 2020-11-29 PROBLEM — J96.22 ACUTE ON CHRONIC RESPIRATORY FAILURE WITH HYPOXIA AND HYPERCAPNIA (HCC): Status: ACTIVE | Noted: 2020-01-01

## 2020-11-29 PROBLEM — J96.21 ACUTE ON CHRONIC RESPIRATORY FAILURE WITH HYPOXIA AND HYPERCAPNIA (HCC): Status: ACTIVE | Noted: 2020-01-01

## 2020-11-29 PROBLEM — D63.8 ANEMIA, CHRONIC DISEASE: Status: ACTIVE | Noted: 2020-01-01

## 2020-11-29 PROBLEM — E87.29 RESPIRATORY ACIDOSIS: Status: ACTIVE | Noted: 2020-01-01

## 2020-12-03 NOTE — OUTREACH NOTE
COPD/PN Week 1 Survey      Responses   [facility], [place], operated by [facility] patient discharged from?  [place]   Does the patient have one of the following disease processes/diagnoses(primary or secondary)?  COPD/Pneumonia   Was the primary reason for admission:  COPD exacerbation   Week 1 attempt successful?  Yes   Call start time  1559   Call end time  1603   Discharge diagnosis   Acute on chronic respiratory failure with hypoxia and hypercapnia    Is patient permission given to speak with other caregiver?  Yes   List who call center can speak with  wife    Person spoke with today (if not patient) and relationship  wife    Meds reviewed with patient/caregiver?  Yes   Is the patient having any side effects they believe may be caused by any medication additions or changes?  No   Does the patient have all medications ordered at discharge?  Yes   Is the patient taking all medications as directed (includes completed medication regime)?  Yes   Does the patient have a primary care provider?   Yes   Does the patient have an appointment with their PCP or specialist within 7 days of discharge?  Yes   Comments regarding PCP  He sees Dr. Orr   Has the patient kept scheduled appointments due by today?  N/A   What is the Home health agency?   [facility]    Has home health visited the patient within 72 hours of discharge?  Yes   Pulse Ox monitoring  Intermittent   Pulse Ox device source  Patient, Other   O2 Sat comments  Patient and  - 97%   O2 Sat: education provided  Sat levels, Monitoring frequency, When to seek care   Psychosocial issues?  No   Did the patient receive a copy of their discharge instructions?  Yes   Nursing interventions  Reviewed instructions with patient   What is the patient's perception of their health status since discharge?  Improving   Nursing Interventions  Nurse provided patient education   Are the patient's immunizations up to date?   No   Nursing interventions  Educated on importance of maintaining up to date immunizations  as advised by provider [He has all his immunizations except for Shingles vacccine. ]   If the patient is a current smoker, are they able to teach back resources for cessation?  Not a smoker   Is the patient/caregiver able to teach back the hierarchy of who to call/visit for symptoms/problems? PCP, Specialist, Home health nurse, Urgent Care, ED, 911  Yes   Is the patient able to teach back COPD zones?  Yes   Nursing interventions  Education provided on various zones   Patient reports what zone on this call?  Green Zone [Green to Yellow zone per wife ]   Green Zone  Reports doing well, Usual amount of phlegm/mucus without difficulty coughing up, Appetite is good   Green Zone interventions:  Take daily medications, Continue regular exercise/diet plan, Use oxygen as prescribed, Avoid indoor/outdoor triggers   Week 1 call completed?  Yes          Kelly Boone RN

## 2020-12-10 NOTE — OUTREACH NOTE
COPD/PN Week 2 Survey      Responses   [facility] patient discharged from?  [place]   Does the patient have one of the following disease processes/diagnoses(primary or secondary)?  COPD/Pneumonia   Was the primary reason for admission:  COPD exacerbation   Week 2 attempt successful?  Yes   Call start time  1703   Call end time  1708   Discharge diagnosis   Acute on chronic respiratory failure with hypoxia and hypercapnia    Person spoke with today (if not patient) and relationship  wife    Meds reviewed with patient/caregiver?  Yes   Is the patient having any side effects they believe may be caused by any medication additions or changes?  No   Does the patient have all medications ordered at discharge?  Yes   Is the patient taking all medications as directed (includes completed medication regime)?  Yes   Has the patient kept scheduled appointments due by today?  Yes   Comments  Had a Televisit since being home   What is the Home health agency?   [facility]    What DME was ordered?  O2 @ 2l/m 24/7     Nebulizer   Pulse Ox monitoring  Intermittent   O2 Sat comments  97% with O2   What is the patient's perception of their health status since discharge?  Improving   Are the patient's immunizations up to date?   Yes [Had pneumonia shot last year had flu shot this season]   Is the patient able to teach back COPD zones?  Yes   Patient reports what zone on this call?  Yellow Zone   Week 2 call completed?  Yes          Kirsten Tellez RN

## 2021-01-01 ENCOUNTER — TELEPHONE (OUTPATIENT)
Dept: CARDIAC SURGERY | Facility: CLINIC | Age: 71
End: 2021-01-01

## 2021-01-01 ENCOUNTER — HOSPITAL ENCOUNTER (OUTPATIENT)
Dept: NUCLEAR MEDICINE | Facility: HOSPITAL | Age: 71
Discharge: HOME | End: 2021-04-23

## 2021-01-01 ENCOUNTER — CONSULT (OUTPATIENT)
Dept: ONCOLOGY | Facility: CLINIC | Age: 71
End: 2021-01-01

## 2021-01-01 ENCOUNTER — APPOINTMENT (OUTPATIENT)
Dept: LAB | Facility: HOSPITAL | Age: 71
End: 2021-01-01

## 2021-01-01 ENCOUNTER — LAB (OUTPATIENT)
Dept: LAB | Facility: HOSPITAL | Age: 71
End: 2021-01-01

## 2021-01-01 ENCOUNTER — HOSPITAL ENCOUNTER (OUTPATIENT)
Dept: CT IMAGING | Facility: HOSPITAL | Age: 71
End: 2021-01-01

## 2021-01-01 ENCOUNTER — HOSPITAL ENCOUNTER (INPATIENT)
Facility: HOSPITAL | Age: 71
LOS: 12 days | Discharge: NURSING, REHAB OR LONG-TERM CARE | End: 2021-06-02
Attending: EMERGENCY MEDICINE | Admitting: INTERNAL MEDICINE

## 2021-01-01 ENCOUNTER — HOSPITAL ENCOUNTER (OUTPATIENT)
Dept: RADIATION ONCOLOGY | Facility: HOSPITAL | Age: 71
Setting detail: RADIATION/ONCOLOGY SERIES
End: 2021-01-01

## 2021-01-01 ENCOUNTER — APPOINTMENT (OUTPATIENT)
Dept: CARDIOLOGY | Facility: HOSPITAL | Age: 71
End: 2021-01-01

## 2021-01-01 ENCOUNTER — APPOINTMENT (OUTPATIENT)
Dept: PULMONOLOGY | Facility: HOSPITAL | Age: 71
End: 2021-01-01

## 2021-01-01 ENCOUNTER — APPOINTMENT (OUTPATIENT)
Dept: RADIATION ONCOLOGY | Facility: HOSPITAL | Age: 71
End: 2021-01-01

## 2021-01-01 ENCOUNTER — OFFICE VISIT (OUTPATIENT)
Dept: CARDIAC SURGERY | Facility: CLINIC | Age: 71
End: 2021-01-01

## 2021-01-01 ENCOUNTER — APPOINTMENT (OUTPATIENT)
Dept: ONCOLOGY | Facility: HOSPITAL | Age: 71
End: 2021-01-01

## 2021-01-01 ENCOUNTER — TELEPHONE (OUTPATIENT)
Dept: ONCOLOGY | Facility: CLINIC | Age: 71
End: 2021-01-01

## 2021-01-01 ENCOUNTER — HOSPITAL ENCOUNTER (OUTPATIENT)
Dept: CT IMAGING | Facility: HOSPITAL | Age: 71
Discharge: HOME | End: 2021-04-23

## 2021-01-01 ENCOUNTER — INFUSION (OUTPATIENT)
Dept: ONCOLOGY | Facility: HOSPITAL | Age: 71
End: 2021-01-01

## 2021-01-01 ENCOUNTER — OFFICE VISIT (OUTPATIENT)
Dept: ONCOLOGY | Facility: CLINIC | Age: 71
End: 2021-01-01

## 2021-01-01 ENCOUNTER — APPOINTMENT (OUTPATIENT)
Dept: GENERAL RADIOLOGY | Facility: HOSPITAL | Age: 71
End: 2021-01-01

## 2021-01-01 ENCOUNTER — HOSPITAL ENCOUNTER (OUTPATIENT)
Facility: HOSPITAL | Age: 71
Setting detail: HOSPITAL OUTPATIENT SURGERY
Discharge: HOME | End: 2021-05-07
Attending: SPECIALIST | Admitting: SPECIALIST

## 2021-01-01 ENCOUNTER — HOSPITAL ENCOUNTER (OUTPATIENT)
Dept: CT IMAGING | Facility: HOSPITAL | Age: 71
Discharge: HOME | End: 2021-03-03
Admitting: NURSE PRACTITIONER

## 2021-01-01 ENCOUNTER — HOSPITAL ENCOUNTER (OUTPATIENT)
Dept: MRI IMAGING | Facility: HOSPITAL | Age: 71
Discharge: HOME | End: 2021-04-20
Admitting: INTERNAL MEDICINE

## 2021-01-01 ENCOUNTER — TRANSCRIBE ORDERS (OUTPATIENT)
Dept: ADMINISTRATIVE | Facility: HOSPITAL | Age: 71
End: 2021-01-01

## 2021-01-01 ENCOUNTER — TRANSCRIBE ORDERS (OUTPATIENT)
Dept: LAB | Facility: HOSPITAL | Age: 71
End: 2021-01-01

## 2021-01-01 ENCOUNTER — CONSULT (OUTPATIENT)
Dept: RADIATION ONCOLOGY | Facility: HOSPITAL | Age: 71
End: 2021-01-01

## 2021-01-01 ENCOUNTER — DOCUMENTATION (OUTPATIENT)
Dept: RADIATION ONCOLOGY | Facility: HOSPITAL | Age: 71
End: 2021-01-01

## 2021-01-01 ENCOUNTER — HOSPITAL ENCOUNTER (OUTPATIENT)
Dept: RADIATION ONCOLOGY | Facility: HOSPITAL | Age: 71
Setting detail: RADIATION/ONCOLOGY SERIES
Discharge: HOME | End: 2021-06-03

## 2021-01-01 ENCOUNTER — HOSPITAL ENCOUNTER (OUTPATIENT)
Dept: CT IMAGING | Facility: HOSPITAL | Age: 71
Discharge: HOME | End: 2021-03-17

## 2021-01-01 ENCOUNTER — APPOINTMENT (OUTPATIENT)
Dept: ULTRASOUND IMAGING | Facility: HOSPITAL | Age: 71
End: 2021-01-01

## 2021-01-01 ENCOUNTER — HOSPITAL ENCOUNTER (OUTPATIENT)
Dept: RADIATION ONCOLOGY | Facility: HOSPITAL | Age: 71
Setting detail: RADIATION/ONCOLOGY SERIES
Discharge: HOME | End: 2021-05-17

## 2021-01-01 ENCOUNTER — PRE-ADMISSION TESTING (OUTPATIENT)
Dept: PREADMISSION TESTING | Facility: HOSPITAL | Age: 71
End: 2021-01-01

## 2021-01-01 ENCOUNTER — TELEPHONE (OUTPATIENT)
Dept: RADIATION ONCOLOGY | Facility: HOSPITAL | Age: 71
End: 2021-01-01

## 2021-01-01 ENCOUNTER — HOSPITAL ENCOUNTER (OUTPATIENT)
Dept: RADIATION ONCOLOGY | Facility: HOSPITAL | Age: 71
Setting detail: RADIATION/ONCOLOGY SERIES
Discharge: HOME | End: 2021-06-07

## 2021-01-01 ENCOUNTER — ANESTHESIA (OUTPATIENT)
Dept: PERIOP | Facility: HOSPITAL | Age: 71
End: 2021-01-01

## 2021-01-01 ENCOUNTER — APPOINTMENT (OUTPATIENT)
Dept: CT IMAGING | Facility: HOSPITAL | Age: 71
End: 2021-01-01

## 2021-01-01 ENCOUNTER — HOSPITAL ENCOUNTER (OUTPATIENT)
Dept: RADIATION ONCOLOGY | Facility: HOSPITAL | Age: 71
Setting detail: RADIATION/ONCOLOGY SERIES
Discharge: HOME | End: 2021-06-04

## 2021-01-01 ENCOUNTER — ANESTHESIA EVENT (OUTPATIENT)
Dept: PERIOP | Facility: HOSPITAL | Age: 71
End: 2021-01-01

## 2021-01-01 ENCOUNTER — HOSPITAL ENCOUNTER (OUTPATIENT)
Dept: GENERAL RADIOLOGY | Facility: HOSPITAL | Age: 71
Discharge: HOME | End: 2021-03-17

## 2021-01-01 VITALS
RESPIRATION RATE: 16 BRPM | WEIGHT: 217.56 LBS | OXYGEN SATURATION: 96 % | BODY MASS INDEX: 32.97 KG/M2 | SYSTOLIC BLOOD PRESSURE: 120 MMHG | HEIGHT: 68 IN | DIASTOLIC BLOOD PRESSURE: 56 MMHG | TEMPERATURE: 97.6 F | HEART RATE: 81 BPM

## 2021-01-01 VITALS
HEART RATE: 86 BPM | SYSTOLIC BLOOD PRESSURE: 128 MMHG | DIASTOLIC BLOOD PRESSURE: 70 MMHG | TEMPERATURE: 98 F | RESPIRATION RATE: 16 BRPM | HEIGHT: 68 IN | OXYGEN SATURATION: 91 % | BODY MASS INDEX: 34.82 KG/M2

## 2021-01-01 VITALS
BODY MASS INDEX: 34.43 KG/M2 | TEMPERATURE: 97.3 F | HEIGHT: 68 IN | SYSTOLIC BLOOD PRESSURE: 114 MMHG | RESPIRATION RATE: 18 BRPM | DIASTOLIC BLOOD PRESSURE: 64 MMHG | OXYGEN SATURATION: 90 % | HEART RATE: 76 BPM

## 2021-01-01 VITALS
OXYGEN SATURATION: 96 % | RESPIRATION RATE: 16 BRPM | BODY MASS INDEX: 35.63 KG/M2 | SYSTOLIC BLOOD PRESSURE: 154 MMHG | TEMPERATURE: 97.3 F | HEART RATE: 73 BPM | DIASTOLIC BLOOD PRESSURE: 66 MMHG | HEIGHT: 67 IN

## 2021-01-01 VITALS
DIASTOLIC BLOOD PRESSURE: 68 MMHG | HEIGHT: 68 IN | WEIGHT: 229 LBS | HEART RATE: 100 BPM | BODY MASS INDEX: 34.71 KG/M2 | SYSTOLIC BLOOD PRESSURE: 134 MMHG | OXYGEN SATURATION: 90 %

## 2021-01-01 VITALS
TEMPERATURE: 97.5 F | OXYGEN SATURATION: 84 % | WEIGHT: 228.3 LBS | HEART RATE: 76 BPM | RESPIRATION RATE: 16 BRPM | SYSTOLIC BLOOD PRESSURE: 122 MMHG | BODY MASS INDEX: 34.6 KG/M2 | DIASTOLIC BLOOD PRESSURE: 68 MMHG | HEIGHT: 68 IN

## 2021-01-01 VITALS
HEIGHT: 60 IN | OXYGEN SATURATION: 95 % | HEART RATE: 88 BPM | RESPIRATION RATE: 12 BRPM | TEMPERATURE: 98 F | SYSTOLIC BLOOD PRESSURE: 110 MMHG | BODY MASS INDEX: 44.37 KG/M2 | WEIGHT: 226 LBS | DIASTOLIC BLOOD PRESSURE: 50 MMHG

## 2021-01-01 VITALS
SYSTOLIC BLOOD PRESSURE: 117 MMHG | HEART RATE: 81 BPM | DIASTOLIC BLOOD PRESSURE: 64 MMHG | OXYGEN SATURATION: 94 % | BODY MASS INDEX: 36.07 KG/M2 | HEIGHT: 68 IN | WEIGHT: 238 LBS

## 2021-01-01 VITALS
DIASTOLIC BLOOD PRESSURE: 53 MMHG | OXYGEN SATURATION: 99 % | WEIGHT: 229 LBS | RESPIRATION RATE: 20 BRPM | SYSTOLIC BLOOD PRESSURE: 131 MMHG | TEMPERATURE: 96.9 F | HEART RATE: 68 BPM | HEIGHT: 67 IN | BODY MASS INDEX: 35.94 KG/M2

## 2021-01-01 VITALS — BODY MASS INDEX: 34.56 KG/M2 | WEIGHT: 228 LBS | HEIGHT: 68 IN

## 2021-01-01 VITALS
DIASTOLIC BLOOD PRESSURE: 64 MMHG | TEMPERATURE: 96.7 F | SYSTOLIC BLOOD PRESSURE: 136 MMHG | RESPIRATION RATE: 22 BRPM | HEART RATE: 91 BPM | OXYGEN SATURATION: 100 %

## 2021-01-01 VITALS
DIASTOLIC BLOOD PRESSURE: 64 MMHG | BODY MASS INDEX: 35.94 KG/M2 | SYSTOLIC BLOOD PRESSURE: 132 MMHG | BODY MASS INDEX: 34.31 KG/M2 | SYSTOLIC BLOOD PRESSURE: 140 MMHG | HEART RATE: 97 BPM | OXYGEN SATURATION: 98 % | WEIGHT: 229 LBS | WEIGHT: 226.4 LBS | OXYGEN SATURATION: 94 % | HEIGHT: 67 IN | HEART RATE: 75 BPM | TEMPERATURE: 98.3 F | DIASTOLIC BLOOD PRESSURE: 86 MMHG | HEIGHT: 68 IN | RESPIRATION RATE: 18 BRPM

## 2021-01-01 VITALS
HEART RATE: 76 BPM | DIASTOLIC BLOOD PRESSURE: 71 MMHG | RESPIRATION RATE: 20 BRPM | WEIGHT: 229.72 LBS | BODY MASS INDEX: 34.93 KG/M2 | OXYGEN SATURATION: 90 % | SYSTOLIC BLOOD PRESSURE: 134 MMHG

## 2021-01-01 VITALS
HEART RATE: 69 BPM | HEIGHT: 68 IN | TEMPERATURE: 97.5 F | BODY MASS INDEX: 34.82 KG/M2 | RESPIRATION RATE: 18 BRPM | SYSTOLIC BLOOD PRESSURE: 100 MMHG | DIASTOLIC BLOOD PRESSURE: 58 MMHG | OXYGEN SATURATION: 92 %

## 2021-01-01 VITALS
HEIGHT: 67 IN | TEMPERATURE: 97 F | BODY MASS INDEX: 35.87 KG/M2 | RESPIRATION RATE: 16 BRPM | DIASTOLIC BLOOD PRESSURE: 82 MMHG | OXYGEN SATURATION: 89 % | SYSTOLIC BLOOD PRESSURE: 122 MMHG | HEART RATE: 72 BPM

## 2021-01-01 VITALS
HEART RATE: 92 BPM | RESPIRATION RATE: 19 BRPM | SYSTOLIC BLOOD PRESSURE: 119 MMHG | TEMPERATURE: 96.6 F | BODY MASS INDEX: 33.08 KG/M2 | OXYGEN SATURATION: 98 % | DIASTOLIC BLOOD PRESSURE: 59 MMHG | HEIGHT: 68 IN

## 2021-01-01 DIAGNOSIS — C34.90 NON-SMALL CELL LUNG CANCER, UNSPECIFIED LATERALITY (HCC): ICD-10-CM

## 2021-01-01 DIAGNOSIS — F10.929 ALCOHOLIC INTOXICATION WITH COMPLICATION (HCC): ICD-10-CM

## 2021-01-01 DIAGNOSIS — J96.21 ACUTE ON CHRONIC RESPIRATORY FAILURE WITH HYPOXIA AND HYPERCAPNIA (HCC): ICD-10-CM

## 2021-01-01 DIAGNOSIS — D64.9 ANEMIA, UNSPECIFIED TYPE: ICD-10-CM

## 2021-01-01 DIAGNOSIS — D64.9 ANEMIA, UNSPECIFIED TYPE: Primary | ICD-10-CM

## 2021-01-01 DIAGNOSIS — Z93.2 PRESENCE OF ILEOSTOMY (HCC): ICD-10-CM

## 2021-01-01 DIAGNOSIS — C34.90 MALIGNANT NEOPLASM OF LUNG, UNSPECIFIED LATERALITY, UNSPECIFIED PART OF LUNG (HCC): Primary | ICD-10-CM

## 2021-01-01 DIAGNOSIS — M25.559 HIP PAIN: ICD-10-CM

## 2021-01-01 DIAGNOSIS — R53.83 FATIGUE, UNSPECIFIED TYPE: ICD-10-CM

## 2021-01-01 DIAGNOSIS — Z74.09 IMPAIRED FUNCTIONAL MOBILITY AND ACTIVITY TOLERANCE: ICD-10-CM

## 2021-01-01 DIAGNOSIS — G89.3 PAIN FROM BONE METASTASES (HCC): Primary | ICD-10-CM

## 2021-01-01 DIAGNOSIS — C34.90 MALIGNANT NEOPLASM OF LUNG, UNSPECIFIED LATERALITY, UNSPECIFIED PART OF LUNG (HCC): ICD-10-CM

## 2021-01-01 DIAGNOSIS — J42 CHRONIC BRONCHITIS, UNSPECIFIED CHRONIC BRONCHITIS TYPE (HCC): Primary | ICD-10-CM

## 2021-01-01 DIAGNOSIS — J96.22 ACUTE ON CHRONIC RESPIRATORY FAILURE WITH HYPOXIA AND HYPERCAPNIA (HCC): ICD-10-CM

## 2021-01-01 DIAGNOSIS — Z78.9 IMPAIRED MOBILITY AND ADLS: ICD-10-CM

## 2021-01-01 DIAGNOSIS — R91.8 PULMONARY NODULES/LESIONS, MULTIPLE: Primary | ICD-10-CM

## 2021-01-01 DIAGNOSIS — D63.8 ANEMIA, CHRONIC DISEASE: ICD-10-CM

## 2021-01-01 DIAGNOSIS — J42 CHRONIC BRONCHITIS, UNSPECIFIED CHRONIC BRONCHITIS TYPE (HCC): ICD-10-CM

## 2021-01-01 DIAGNOSIS — C34.00 MALIGNANT NEOPLASM OF HILUS OF LUNG, UNSPECIFIED LATERALITY (HCC): ICD-10-CM

## 2021-01-01 DIAGNOSIS — K70.31 ALCOHOLIC CIRRHOSIS OF LIVER WITH ASCITES (HCC): ICD-10-CM

## 2021-01-01 DIAGNOSIS — R53.1 GENERALIZED WEAKNESS: ICD-10-CM

## 2021-01-01 DIAGNOSIS — J96.12 CHRONIC RESPIRATORY FAILURE WITH HYPERCAPNIA (HCC): ICD-10-CM

## 2021-01-01 DIAGNOSIS — R65.21 SEPTIC SHOCK (HCC): ICD-10-CM

## 2021-01-01 DIAGNOSIS — M89.8X9 BONE PAIN: ICD-10-CM

## 2021-01-01 DIAGNOSIS — R91.8 PULMONARY NODULES/LESIONS, MULTIPLE: ICD-10-CM

## 2021-01-01 DIAGNOSIS — Z74.09 IMPAIRED MOBILITY AND ADLS: ICD-10-CM

## 2021-01-01 DIAGNOSIS — R06.02 SHORTNESS OF BREATH: Primary | ICD-10-CM

## 2021-01-01 DIAGNOSIS — Z01.818 PRE-OP TESTING: Primary | ICD-10-CM

## 2021-01-01 DIAGNOSIS — R22.1 NECK MASS: ICD-10-CM

## 2021-01-01 DIAGNOSIS — Z11.59 SCREENING FOR VIRAL DISEASE: Primary | ICD-10-CM

## 2021-01-01 DIAGNOSIS — C34.80 MALIGNANT NEOPLASM OF OVERLAPPING SITES OF LUNG, UNSPECIFIED LATERALITY (HCC): ICD-10-CM

## 2021-01-01 DIAGNOSIS — J96.12 CHRONIC RESPIRATORY FAILURE WITH HYPERCAPNIA (HCC): Primary | ICD-10-CM

## 2021-01-01 DIAGNOSIS — C79.51 PAIN FROM BONE METASTASES (HCC): Primary | ICD-10-CM

## 2021-01-01 DIAGNOSIS — J44.1 CHRONIC OBSTRUCTIVE PULMONARY DISEASE WITH ACUTE EXACERBATION (HCC): ICD-10-CM

## 2021-01-01 DIAGNOSIS — C34.92 MALIGNANT NEOPLASM OF LEFT LUNG, UNSPECIFIED PART OF LUNG (HCC): ICD-10-CM

## 2021-01-01 DIAGNOSIS — A41.9 SEPTIC SHOCK (HCC): ICD-10-CM

## 2021-01-01 DIAGNOSIS — Z01.818 PREOPERATIVE TESTING: ICD-10-CM

## 2021-01-01 DIAGNOSIS — Z87.891 HISTORY OF TOBACCO USE: ICD-10-CM

## 2021-01-01 DIAGNOSIS — Z87.891 FORMER SMOKER: ICD-10-CM

## 2021-01-01 DIAGNOSIS — R91.8 MULTIPLE LUNG NODULES ON CT: Primary | ICD-10-CM

## 2021-01-01 DIAGNOSIS — Z01.818 PREOPERATIVE TESTING: Primary | ICD-10-CM

## 2021-01-01 DIAGNOSIS — C34.91 NON-SMALL CELL CANCER OF RIGHT LUNG (HCC): Primary | ICD-10-CM

## 2021-01-01 DIAGNOSIS — R63.4 WEIGHT LOSS: ICD-10-CM

## 2021-01-01 LAB
ABO GROUP BLD: NORMAL
ACTH PLAS-MCNC: 1.9 PG/ML (ref 7.2–63.3)
ACTH PLAS-MCNC: 3.6 PG/ML (ref 7.2–63.3)
ALBUMIN SERPL-MCNC: 2.6 G/DL (ref 3.5–5.2)
ALBUMIN SERPL-MCNC: 2.7 G/DL (ref 3.5–5.2)
ALBUMIN SERPL-MCNC: 2.7 G/DL (ref 3.5–5.2)
ALBUMIN SERPL-MCNC: 2.8 G/DL (ref 3.5–5.2)
ALBUMIN SERPL-MCNC: 2.8 G/DL (ref 3.5–5.2)
ALBUMIN SERPL-MCNC: 2.9 G/DL (ref 3.5–5.2)
ALBUMIN SERPL-MCNC: 3.1 G/DL (ref 3.5–5.2)
ALBUMIN SERPL-MCNC: 3.4 G/DL (ref 3.5–5.2)
ALBUMIN SERPL-MCNC: 3.5 G/DL (ref 3.5–5.2)
ALBUMIN SERPL-MCNC: 3.6 G/DL (ref 3.5–5.2)
ALBUMIN/GLOB SERPL: 0.8 G/DL
ALBUMIN/GLOB SERPL: 0.9 G/DL
ALBUMIN/GLOB SERPL: 1 G/DL
ALP SERPL-CCNC: 101 U/L (ref 39–117)
ALP SERPL-CCNC: 112 U/L (ref 39–117)
ALP SERPL-CCNC: 114 U/L (ref 39–117)
ALP SERPL-CCNC: 114 U/L (ref 39–117)
ALP SERPL-CCNC: 115 U/L (ref 39–117)
ALP SERPL-CCNC: 117 U/L (ref 39–117)
ALP SERPL-CCNC: 119 U/L (ref 39–117)
ALP SERPL-CCNC: 120 U/L (ref 39–117)
ALP SERPL-CCNC: 121 U/L (ref 39–117)
ALP SERPL-CCNC: 121 U/L (ref 39–117)
ALP SERPL-CCNC: 126 U/L (ref 39–117)
ALP SERPL-CCNC: 127 U/L (ref 39–117)
ALP SERPL-CCNC: 127 U/L (ref 39–117)
ALP SERPL-CCNC: 98 U/L (ref 39–117)
ALT SERPL W P-5'-P-CCNC: 10 U/L (ref 1–41)
ALT SERPL W P-5'-P-CCNC: 12 U/L (ref 1–41)
ALT SERPL W P-5'-P-CCNC: 15 U/L (ref 1–41)
ALT SERPL W P-5'-P-CCNC: 15 U/L (ref 1–41)
ALT SERPL W P-5'-P-CCNC: 16 U/L (ref 1–41)
ALT SERPL W P-5'-P-CCNC: 16 U/L (ref 1–41)
ALT SERPL W P-5'-P-CCNC: 17 U/L (ref 1–41)
ALT SERPL W P-5'-P-CCNC: 18 U/L (ref 1–41)
ALT SERPL W P-5'-P-CCNC: 18 U/L (ref 1–41)
ALT SERPL W P-5'-P-CCNC: 20 U/L (ref 1–41)
ALT SERPL W P-5'-P-CCNC: 23 U/L (ref 1–41)
ALT SERPL W P-5'-P-CCNC: 25 U/L (ref 1–41)
ALT SERPL W P-5'-P-CCNC: 7 U/L (ref 1–41)
ALT SERPL W P-5'-P-CCNC: 9 U/L (ref 1–41)
ANION GAP SERPL CALCULATED.3IONS-SCNC: 1 MMOL/L (ref 5–15)
ANION GAP SERPL CALCULATED.3IONS-SCNC: 2 MMOL/L (ref 5–15)
ANION GAP SERPL CALCULATED.3IONS-SCNC: 3 MMOL/L (ref 5–15)
ANION GAP SERPL CALCULATED.3IONS-SCNC: 4 MMOL/L (ref 5–15)
ANION GAP SERPL CALCULATED.3IONS-SCNC: 5 MMOL/L (ref 5–15)
ANION GAP SERPL CALCULATED.3IONS-SCNC: 6 MMOL/L (ref 5–15)
ANION GAP SERPL CALCULATED.3IONS-SCNC: 8 MMOL/L (ref 5–15)
ANION GAP SERPL CALCULATED.3IONS-SCNC: 8 MMOL/L (ref 5–15)
ANION GAP SERPL CALCULATED.3IONS-SCNC: ABNORMAL MMOL/L
ANISOCYTOSIS BLD QL: ABNORMAL
ANISOCYTOSIS BLD QL: ABNORMAL
APTT PPP: 24.2 SECONDS (ref 24.1–35)
APTT PPP: 26.6 SECONDS (ref 24.1–35)
ARTERIAL PATENCY WRIST A: POSITIVE
ARTERIAL PATENCY WRIST A: POSITIVE
AST SERPL-CCNC: 12 U/L (ref 1–40)
AST SERPL-CCNC: 12 U/L (ref 1–40)
AST SERPL-CCNC: 13 U/L (ref 1–40)
AST SERPL-CCNC: 13 U/L (ref 1–40)
AST SERPL-CCNC: 15 U/L (ref 1–40)
AST SERPL-CCNC: 16 U/L (ref 1–40)
AST SERPL-CCNC: 16 U/L (ref 1–40)
AST SERPL-CCNC: 17 U/L (ref 1–40)
AST SERPL-CCNC: 18 U/L (ref 1–40)
AST SERPL-CCNC: 20 U/L (ref 1–40)
AST SERPL-CCNC: 21 U/L (ref 1–40)
AST SERPL-CCNC: 21 U/L (ref 1–40)
ATMOSPHERIC PRESS: 753 MMHG
ATMOSPHERIC PRESS: 760 MMHG
BACTERIA SPEC AEROBE CULT: NORMAL
BACTERIA SPEC AEROBE CULT: NORMAL
BASE EXCESS BLDA CALC-SCNC: 22.7 MMOL/L (ref 0–2)
BASE EXCESS BLDA CALC-SCNC: 30.7 MMOL/L (ref 0–2)
BASOPHILS # BLD AUTO: 0.01 10*3/MM3 (ref 0–0.2)
BASOPHILS # BLD AUTO: 0.02 10*3/MM3 (ref 0–0.2)
BASOPHILS # BLD AUTO: 0.03 10*3/MM3 (ref 0–0.2)
BASOPHILS NFR BLD AUTO: 0.1 % (ref 0–1.5)
BASOPHILS NFR BLD AUTO: 0.2 % (ref 0–1.5)
BASOPHILS NFR BLD AUTO: 0.3 % (ref 0–1.5)
BASOPHILS NFR BLD AUTO: 0.3 % (ref 0–1.5)
BDY SITE: ABNORMAL
BDY SITE: ABNORMAL
BH BB BLOOD EXPIRATION DATE: NORMAL
BH BB BLOOD TYPE BARCODE: 7300
BH BB DISPENSE STATUS: NORMAL
BH BB PRODUCT CODE: NORMAL
BH BB UNIT NUMBER: NORMAL
BH CV ECHO MEAS - AO MAX PG (FULL): 7.5 MMHG
BH CV ECHO MEAS - AO MAX PG: 13.1 MMHG
BH CV ECHO MEAS - AO MEAN PG (FULL): 5 MMHG
BH CV ECHO MEAS - AO MEAN PG: 7 MMHG
BH CV ECHO MEAS - AO ROOT AREA (BSA CORRECTED): 1.5
BH CV ECHO MEAS - AO ROOT AREA: 9.1 CM^2
BH CV ECHO MEAS - AO ROOT DIAM: 3.4 CM
BH CV ECHO MEAS - AO V2 MAX: 181 CM/SEC
BH CV ECHO MEAS - AO V2 MEAN: 124 CM/SEC
BH CV ECHO MEAS - AO V2 VTI: 38.8 CM
BH CV ECHO MEAS - AVA(I,A): 1.9 CM^2
BH CV ECHO MEAS - AVA(I,D): 1.9 CM^2
BH CV ECHO MEAS - AVA(V,A): 2 CM^2
BH CV ECHO MEAS - AVA(V,D): 2 CM^2
BH CV ECHO MEAS - BSA(HAYCOCK): 2.4 M^2
BH CV ECHO MEAS - BSA(HAYCOCK): 2.4 M^2
BH CV ECHO MEAS - BSA: 2.2 M^2
BH CV ECHO MEAS - BSA: 2.2 M^2
BH CV ECHO MEAS - BZI_BMI: 37.1 KILOGRAMS/M^2
BH CV ECHO MEAS - BZI_BMI: 37.1 KILOGRAMS/M^2
BH CV ECHO MEAS - BZI_METRIC_HEIGHT: 172.7 CM
BH CV ECHO MEAS - BZI_METRIC_HEIGHT: 172.7 CM
BH CV ECHO MEAS - BZI_METRIC_WEIGHT: 110.7 KG
BH CV ECHO MEAS - BZI_METRIC_WEIGHT: 110.7 KG
BH CV ECHO MEAS - EDV(CUBED): 88.1 ML
BH CV ECHO MEAS - EDV(MOD-SP4): 120 ML
BH CV ECHO MEAS - EDV(MOD-SP4): 78.4 ML
BH CV ECHO MEAS - EDV(TEICH): 90.1 ML
BH CV ECHO MEAS - EF(CUBED): 71.5 %
BH CV ECHO MEAS - EF(MOD-SP4): 65.7 %
BH CV ECHO MEAS - EF(MOD-SP4): 68.2 %
BH CV ECHO MEAS - EF(TEICH): 63.3 %
BH CV ECHO MEAS - ESV(CUBED): 25.2 ML
BH CV ECHO MEAS - ESV(MOD-SP4): 24.9 ML
BH CV ECHO MEAS - ESV(MOD-SP4): 41.2 ML
BH CV ECHO MEAS - ESV(TEICH): 33 ML
BH CV ECHO MEAS - FS: 34.2 %
BH CV ECHO MEAS - IVS/LVPW: 1.2
BH CV ECHO MEAS - IVSD: 0.89 CM
BH CV ECHO MEAS - LA DIMENSION: 3.5 CM
BH CV ECHO MEAS - LA/AO: 1
BH CV ECHO MEAS - LAT PEAK E' VEL: 7.7 CM/SEC
BH CV ECHO MEAS - LV DIASTOLIC VOL/BSA (35-75): 35.2 ML/M^2
BH CV ECHO MEAS - LV DIASTOLIC VOL/BSA (35-75): 54 ML/M^2
BH CV ECHO MEAS - LV MASS(C)D: 116.9 GRAMS
BH CV ECHO MEAS - LV MASS(C)DI: 52.5 GRAMS/M^2
BH CV ECHO MEAS - LV MAX PG: 5.6 MMHG
BH CV ECHO MEAS - LV MEAN PG: 2 MMHG
BH CV ECHO MEAS - LV SYSTOLIC VOL/BSA (12-30): 11.2 ML/M^2
BH CV ECHO MEAS - LV SYSTOLIC VOL/BSA (12-30): 18.5 ML/M^2
BH CV ECHO MEAS - LV V1 MAX: 118 CM/SEC
BH CV ECHO MEAS - LV V1 MEAN: 69 CM/SEC
BH CV ECHO MEAS - LV V1 VTI: 23.9 CM
BH CV ECHO MEAS - LVIDD: 4.5 CM
BH CV ECHO MEAS - LVIDS: 2.9 CM
BH CV ECHO MEAS - LVLD AP4: 7.8 CM
BH CV ECHO MEAS - LVLD AP4: 8.3 CM
BH CV ECHO MEAS - LVLS AP4: 6.4 CM
BH CV ECHO MEAS - LVLS AP4: 7 CM
BH CV ECHO MEAS - LVOT AREA (M): 3.1 CM^2
BH CV ECHO MEAS - LVOT AREA: 3.1 CM^2
BH CV ECHO MEAS - LVOT DIAM: 2 CM
BH CV ECHO MEAS - LVPWD: 0.77 CM
BH CV ECHO MEAS - MED PEAK E' VEL: 6.2 CM/SEC
BH CV ECHO MEAS - MV A MAX VEL: 133 CM/SEC
BH CV ECHO MEAS - MV DEC SLOPE: 637 CM/SEC^2
BH CV ECHO MEAS - MV DEC TIME: 0.17 SEC
BH CV ECHO MEAS - MV E MAX VEL: 116 CM/SEC
BH CV ECHO MEAS - MV E/A: 0.87
BH CV ECHO MEAS - MV P1/2T MAX VEL: 138 CM/SEC
BH CV ECHO MEAS - MV P1/2T: 63.5 MSEC
BH CV ECHO MEAS - MVA P1/2T LCG: 1.6 CM^2
BH CV ECHO MEAS - MVA(P1/2T): 3.5 CM^2
BH CV ECHO MEAS - PA MAX PG: 8 MMHG
BH CV ECHO MEAS - PA V2 MAX: 139.5 CM/SEC
BH CV ECHO MEAS - RAP SYSTOLE: 5 MMHG
BH CV ECHO MEAS - RVSP: 31.4 MMHG
BH CV ECHO MEAS - SI(AO): 158.4 ML/M^2
BH CV ECHO MEAS - SI(CUBED): 28.3 ML/M^2
BH CV ECHO MEAS - SI(LVOT): 33.8 ML/M^2
BH CV ECHO MEAS - SI(MOD-SP4): 24.1 ML/M^2
BH CV ECHO MEAS - SI(MOD-SP4): 35.4 ML/M^2
BH CV ECHO MEAS - SI(TEICH): 25.6 ML/M^2
BH CV ECHO MEAS - SV(AO): 352.3 ML
BH CV ECHO MEAS - SV(CUBED): 63 ML
BH CV ECHO MEAS - SV(LVOT): 75.1 ML
BH CV ECHO MEAS - SV(MOD-SP4): 53.5 ML
BH CV ECHO MEAS - SV(MOD-SP4): 78.8 ML
BH CV ECHO MEAS - SV(TEICH): 57 ML
BH CV ECHO MEAS - TR MAX VEL: 257 CM/SEC
BH CV ECHO MEASUREMENTS AVERAGE E/E' RATIO: 16.69
BILIRUB CONJ SERPL-MCNC: 0.2 MG/DL (ref 0–0.3)
BILIRUB INDIRECT SERPL-MCNC: 0.4 MG/DL
BILIRUB SERPL-MCNC: 0.2 MG/DL (ref 0–1.2)
BILIRUB SERPL-MCNC: 0.3 MG/DL (ref 0–1.2)
BILIRUB SERPL-MCNC: 0.5 MG/DL (ref 0–1.2)
BILIRUB SERPL-MCNC: 0.6 MG/DL (ref 0–1.2)
BLD GP AB SCN SERPL QL: NEGATIVE
BODY TEMPERATURE: 37 C
BODY TEMPERATURE: 37 C
BUN SERPL-MCNC: 10 MG/DL (ref 8–23)
BUN SERPL-MCNC: 10 MG/DL (ref 8–23)
BUN SERPL-MCNC: 11 MG/DL (ref 8–23)
BUN SERPL-MCNC: 13 MG/DL (ref 8–23)
BUN SERPL-MCNC: 13 MG/DL (ref 8–23)
BUN SERPL-MCNC: 14 MG/DL (ref 8–23)
BUN SERPL-MCNC: 15 MG/DL (ref 8–23)
BUN SERPL-MCNC: 15 MG/DL (ref 8–23)
BUN SERPL-MCNC: 16 MG/DL (ref 8–23)
BUN SERPL-MCNC: 5 MG/DL (ref 8–23)
BUN SERPL-MCNC: 9 MG/DL (ref 8–23)
BUN/CREAT SERPL: 13 (ref 7–25)
BUN/CREAT SERPL: 15.1 (ref 7–25)
BUN/CREAT SERPL: 17.9 (ref 7–25)
BUN/CREAT SERPL: 20.4 (ref 7–25)
BUN/CREAT SERPL: 21.6 (ref 7–25)
BUN/CREAT SERPL: 22.2 (ref 7–25)
BUN/CREAT SERPL: 22.2 (ref 7–25)
BUN/CREAT SERPL: 22.4 (ref 7–25)
BUN/CREAT SERPL: 22.8 (ref 7–25)
BUN/CREAT SERPL: 24.1 (ref 7–25)
BUN/CREAT SERPL: 24.6 (ref 7–25)
BUN/CREAT SERPL: 25.9 (ref 7–25)
BUN/CREAT SERPL: 26.5 (ref 7–25)
BUN/CREAT SERPL: 26.9 (ref 7–25)
BUN/CREAT SERPL: 27.1 (ref 7–25)
BUN/CREAT SERPL: 31.1 (ref 7–25)
BUN/CREAT SERPL: 7.6 (ref 7–25)
CALCIUM SPEC-SCNC: 8.6 MG/DL (ref 8.6–10.5)
CALCIUM SPEC-SCNC: 8.6 MG/DL (ref 8.6–10.5)
CALCIUM SPEC-SCNC: 8.7 MG/DL (ref 8.6–10.5)
CALCIUM SPEC-SCNC: 8.7 MG/DL (ref 8.6–10.5)
CALCIUM SPEC-SCNC: 8.8 MG/DL (ref 8.6–10.5)
CALCIUM SPEC-SCNC: 9 MG/DL (ref 8.6–10.5)
CALCIUM SPEC-SCNC: 9 MG/DL (ref 8.6–10.5)
CALCIUM SPEC-SCNC: 9.2 MG/DL (ref 8.6–10.5)
CALCIUM SPEC-SCNC: 9.3 MG/DL (ref 8.6–10.5)
CALCIUM SPEC-SCNC: 9.3 MG/DL (ref 8.6–10.5)
CALCIUM SPEC-SCNC: 9.5 MG/DL (ref 8.6–10.5)
CALCIUM SPEC-SCNC: 9.6 MG/DL (ref 8.6–10.5)
CALCIUM SPEC-SCNC: 9.6 MG/DL (ref 8.6–10.5)
CHLORIDE SERPL-SCNC: 100 MMOL/L (ref 98–107)
CHLORIDE SERPL-SCNC: 82 MMOL/L (ref 98–107)
CHLORIDE SERPL-SCNC: 85 MMOL/L (ref 98–107)
CHLORIDE SERPL-SCNC: 86 MMOL/L (ref 98–107)
CHLORIDE SERPL-SCNC: 87 MMOL/L (ref 98–107)
CHLORIDE SERPL-SCNC: 87 MMOL/L (ref 98–107)
CHLORIDE SERPL-SCNC: 91 MMOL/L (ref 98–107)
CHLORIDE SERPL-SCNC: 93 MMOL/L (ref 98–107)
CHLORIDE SERPL-SCNC: 93 MMOL/L (ref 98–107)
CHLORIDE SERPL-SCNC: 94 MMOL/L (ref 98–107)
CHLORIDE SERPL-SCNC: 94 MMOL/L (ref 98–107)
CHLORIDE SERPL-SCNC: 95 MMOL/L (ref 98–107)
CHLORIDE SERPL-SCNC: 98 MMOL/L (ref 98–107)
CHLORIDE SERPL-SCNC: 99 MMOL/L (ref 98–107)
CO2 SERPL-SCNC: 31 MMOL/L (ref 22–29)
CO2 SERPL-SCNC: 33 MMOL/L (ref 22–29)
CO2 SERPL-SCNC: 33 MMOL/L (ref 22–29)
CO2 SERPL-SCNC: 35 MMOL/L (ref 22–29)
CO2 SERPL-SCNC: 38 MMOL/L (ref 22–29)
CO2 SERPL-SCNC: 40 MMOL/L (ref 22–29)
CO2 SERPL-SCNC: 42 MMOL/L (ref 22–29)
CO2 SERPL-SCNC: 43 MMOL/L (ref 22–29)
CO2 SERPL-SCNC: 44 MMOL/L (ref 22–29)
CO2 SERPL-SCNC: 45 MMOL/L (ref 22–29)
CO2 SERPL-SCNC: 45 MMOL/L (ref 22–29)
CO2 SERPL-SCNC: 46 MMOL/L (ref 22–29)
CO2 SERPL-SCNC: 47 MMOL/L (ref 22–29)
CO2 SERPL-SCNC: >50 MMOL/L (ref 22–29)
CREAT BLDA-MCNC: 0.7 MG/DL (ref 0.6–1.3)
CREAT BLDA-MCNC: 0.7 MG/DL (ref 0.6–1.3)
CREAT SERPL-MCNC: 0.45 MG/DL (ref 0.76–1.27)
CREAT SERPL-MCNC: 0.45 MG/DL (ref 0.76–1.27)
CREAT SERPL-MCNC: 0.49 MG/DL (ref 0.76–1.27)
CREAT SERPL-MCNC: 0.51 MG/DL (ref 0.76–1.27)
CREAT SERPL-MCNC: 0.52 MG/DL (ref 0.76–1.27)
CREAT SERPL-MCNC: 0.54 MG/DL (ref 0.76–1.27)
CREAT SERPL-MCNC: 0.56 MG/DL (ref 0.76–1.27)
CREAT SERPL-MCNC: 0.57 MG/DL (ref 0.76–1.27)
CREAT SERPL-MCNC: 0.57 MG/DL (ref 0.76–1.27)
CREAT SERPL-MCNC: 0.58 MG/DL (ref 0.76–1.27)
CREAT SERPL-MCNC: 0.58 MG/DL (ref 0.76–1.27)
CREAT SERPL-MCNC: 0.59 MG/DL (ref 0.76–1.27)
CREAT SERPL-MCNC: 0.63 MG/DL (ref 0.76–1.27)
CREAT SERPL-MCNC: 0.66 MG/DL (ref 0.76–1.27)
CREAT SERPL-MCNC: 0.67 MG/DL (ref 0.76–1.27)
CREAT SERPL-MCNC: 0.69 MG/DL (ref 0.76–1.27)
CREAT SERPL-MCNC: 0.73 MG/DL (ref 0.76–1.27)
CROSSMATCH INTERPRETATION: NORMAL
CYTO UR: NORMAL
CYTOLOGIST CVX/VAG CYTO: NORMAL
D DIMER PPP FEU-MCNC: 1.21 MG/L (FEU) (ref 0–0.5)
D-LACTATE SERPL-SCNC: 1.5 MMOL/L (ref 0.5–2)
DEPRECATED RDW RBC AUTO: 45.2 FL (ref 37–54)
DEPRECATED RDW RBC AUTO: 46.5 FL (ref 37–54)
DEPRECATED RDW RBC AUTO: 48.3 FL (ref 37–54)
DEPRECATED RDW RBC AUTO: 49.1 FL (ref 37–54)
DEPRECATED RDW RBC AUTO: 50.3 FL (ref 37–54)
DEPRECATED RDW RBC AUTO: 50.4 FL (ref 37–54)
DEPRECATED RDW RBC AUTO: 50.7 FL (ref 37–54)
DEPRECATED RDW RBC AUTO: 51.7 FL (ref 37–54)
DEPRECATED RDW RBC AUTO: 51.7 FL (ref 37–54)
DEPRECATED RDW RBC AUTO: 53.5 FL (ref 37–54)
DEPRECATED RDW RBC AUTO: 55.9 FL (ref 37–54)
DEPRECATED RDW RBC AUTO: 57.1 FL (ref 37–54)
DEPRECATED RDW RBC AUTO: 59.2 FL (ref 37–54)
EOSINOPHIL # BLD AUTO: 0.04 10*3/MM3 (ref 0–0.4)
EOSINOPHIL # BLD AUTO: 0.04 10*3/MM3 (ref 0–0.4)
EOSINOPHIL # BLD AUTO: 0.09 10*3/MM3 (ref 0–0.4)
EOSINOPHIL # BLD AUTO: 0.17 10*3/MM3 (ref 0–0.4)
EOSINOPHIL # BLD AUTO: 0.18 10*3/MM3 (ref 0–0.4)
EOSINOPHIL # BLD AUTO: 0.49 10*3/MM3 (ref 0–0.4)
EOSINOPHIL # BLD MANUAL: 0.34 10*3/MM3 (ref 0–0.4)
EOSINOPHIL NFR BLD AUTO: 0.2 % (ref 0.3–6.2)
EOSINOPHIL NFR BLD AUTO: 0.3 % (ref 0.3–6.2)
EOSINOPHIL NFR BLD AUTO: 1 % (ref 0.3–6.2)
EOSINOPHIL NFR BLD AUTO: 1.9 % (ref 0.3–6.2)
EOSINOPHIL NFR BLD AUTO: 2.2 % (ref 0.3–6.2)
EOSINOPHIL NFR BLD AUTO: 5.4 % (ref 0.3–6.2)
EOSINOPHIL NFR BLD MANUAL: 2 % (ref 0.3–6.2)
ERYTHROCYTE [DISTWIDTH] IN BLOOD BY AUTOMATED COUNT: 14.3 % (ref 12.3–15.4)
ERYTHROCYTE [DISTWIDTH] IN BLOOD BY AUTOMATED COUNT: 15.2 % (ref 12.3–15.4)
ERYTHROCYTE [DISTWIDTH] IN BLOOD BY AUTOMATED COUNT: 15.4 % (ref 12.3–15.4)
ERYTHROCYTE [DISTWIDTH] IN BLOOD BY AUTOMATED COUNT: 15.9 % (ref 12.3–15.4)
ERYTHROCYTE [DISTWIDTH] IN BLOOD BY AUTOMATED COUNT: 16.5 % (ref 12.3–15.4)
ERYTHROCYTE [DISTWIDTH] IN BLOOD BY AUTOMATED COUNT: 16.6 % (ref 12.3–15.4)
ERYTHROCYTE [DISTWIDTH] IN BLOOD BY AUTOMATED COUNT: 16.9 % (ref 12.3–15.4)
ERYTHROCYTE [DISTWIDTH] IN BLOOD BY AUTOMATED COUNT: 17 % (ref 12.3–15.4)
ERYTHROCYTE [DISTWIDTH] IN BLOOD BY AUTOMATED COUNT: 17.3 % (ref 12.3–15.4)
ERYTHROCYTE [DISTWIDTH] IN BLOOD BY AUTOMATED COUNT: 17.7 % (ref 12.3–15.4)
ERYTHROCYTE [DISTWIDTH] IN BLOOD BY AUTOMATED COUNT: 18.5 % (ref 12.3–15.4)
ERYTHROCYTE [DISTWIDTH] IN BLOOD BY AUTOMATED COUNT: 19 % (ref 12.3–15.4)
ERYTHROCYTE [DISTWIDTH] IN BLOOD BY AUTOMATED COUNT: 19.4 % (ref 12.3–15.4)
ERYTHROCYTE [SEDIMENTATION RATE] IN BLOOD: 104 MM/HR (ref 0–15)
ERYTHROCYTE [SEDIMENTATION RATE] IN BLOOD: 29 MM/HR (ref 0–15)
ERYTHROCYTE [SEDIMENTATION RATE] IN BLOOD: 59 MM/HR (ref 0–15)
ERYTHROCYTE [SEDIMENTATION RATE] IN BLOOD: 72 MM/HR (ref 0–15)
ERYTHROCYTE [SEDIMENTATION RATE] IN BLOOD: 84 MM/HR (ref 0–15)
FERRITIN SERPL-MCNC: 23.95 NG/ML (ref 30–400)
GAS FLOW AIRWAY: 2 LPM
GAS FLOW AIRWAY: 3 LPM
GFR SERPL CREATININE-BSD FRML MDRD: 106 ML/MIN/1.73
GFR SERPL CREATININE-BSD FRML MDRD: 113 ML/MIN/1.73
GFR SERPL CREATININE-BSD FRML MDRD: 117 ML/MIN/1.73
GFR SERPL CREATININE-BSD FRML MDRD: 119 ML/MIN/1.73
GFR SERPL CREATININE-BSD FRML MDRD: 126 ML/MIN/1.73
GFR SERPL CREATININE-BSD FRML MDRD: 136 ML/MIN/1.73
GFR SERPL CREATININE-BSD FRML MDRD: 139 ML/MIN/1.73
GFR SERPL CREATININE-BSD FRML MDRD: 139 ML/MIN/1.73
GFR SERPL CREATININE-BSD FRML MDRD: 141 ML/MIN/1.73
GFR SERPL CREATININE-BSD FRML MDRD: 141 ML/MIN/1.73
GFR SERPL CREATININE-BSD FRML MDRD: 144 ML/MIN/1.73
GFR SERPL CREATININE-BSD FRML MDRD: 150 ML/MIN/1.73
GFR SERPL CREATININE-BSD FRML MDRD: >150 ML/MIN/1.73
GIANT PLATELETS: ABNORMAL
GLOBULIN UR ELPH-MCNC: 2.9 GM/DL
GLOBULIN UR ELPH-MCNC: 3 GM/DL
GLOBULIN UR ELPH-MCNC: 3.1 GM/DL
GLOBULIN UR ELPH-MCNC: 3.2 GM/DL
GLOBULIN UR ELPH-MCNC: 3.3 GM/DL
GLOBULIN UR ELPH-MCNC: 3.4 GM/DL
GLOBULIN UR ELPH-MCNC: 3.5 GM/DL
GLOBULIN UR ELPH-MCNC: 3.6 GM/DL
GLOBULIN UR ELPH-MCNC: 4.2 GM/DL
GLUCOSE BLDC GLUCOMTR-MCNC: 215 MG/DL (ref 70–130)
GLUCOSE SERPL-MCNC: 106 MG/DL (ref 65–99)
GLUCOSE SERPL-MCNC: 112 MG/DL (ref 65–99)
GLUCOSE SERPL-MCNC: 117 MG/DL (ref 65–99)
GLUCOSE SERPL-MCNC: 120 MG/DL (ref 65–99)
GLUCOSE SERPL-MCNC: 131 MG/DL (ref 65–99)
GLUCOSE SERPL-MCNC: 139 MG/DL (ref 65–99)
GLUCOSE SERPL-MCNC: 142 MG/DL (ref 65–99)
GLUCOSE SERPL-MCNC: 145 MG/DL (ref 65–99)
GLUCOSE SERPL-MCNC: 148 MG/DL (ref 65–99)
GLUCOSE SERPL-MCNC: 159 MG/DL (ref 65–99)
GLUCOSE SERPL-MCNC: 161 MG/DL (ref 65–99)
GLUCOSE SERPL-MCNC: 174 MG/DL (ref 65–99)
GLUCOSE SERPL-MCNC: 78 MG/DL (ref 65–99)
GLUCOSE SERPL-MCNC: 82 MG/DL (ref 65–99)
GLUCOSE SERPL-MCNC: 84 MG/DL (ref 65–99)
GLUCOSE SERPL-MCNC: 98 MG/DL (ref 65–99)
GLUCOSE SERPL-MCNC: 99 MG/DL (ref 65–99)
HBA1C MFR BLD: 5.6 % (ref 4.8–5.6)
HCO3 BLDA-SCNC: 48.5 MMOL/L (ref 20–26)
HCO3 BLDA-SCNC: 59.6 MMOL/L (ref 20–26)
HCT VFR BLD AUTO: 24 % (ref 37.5–51)
HCT VFR BLD AUTO: 28.1 % (ref 37.5–51)
HCT VFR BLD AUTO: 28.4 % (ref 37.5–51)
HCT VFR BLD AUTO: 29.7 % (ref 37.5–51)
HCT VFR BLD AUTO: 31.1 % (ref 37.5–51)
HCT VFR BLD AUTO: 31.2 % (ref 37.5–51)
HCT VFR BLD AUTO: 31.3 % (ref 37.5–51)
HCT VFR BLD AUTO: 31.5 % (ref 37.5–51)
HCT VFR BLD AUTO: 32.3 % (ref 37.5–51)
HCT VFR BLD AUTO: 32.5 % (ref 37.5–51)
HCT VFR BLD AUTO: 32.7 % (ref 37.5–51)
HCT VFR BLD AUTO: 32.7 % (ref 37.5–51)
HCT VFR BLD AUTO: 34.4 % (ref 37.5–51)
HGB BLD-MCNC: 6.8 G/DL (ref 13–17.7)
HGB BLD-MCNC: 7.7 G/DL (ref 13–17.7)
HGB BLD-MCNC: 8.3 G/DL (ref 13–17.7)
HGB BLD-MCNC: 8.8 G/DL (ref 13–17.7)
HGB BLD-MCNC: 8.8 G/DL (ref 13–17.7)
HGB BLD-MCNC: 9 G/DL (ref 13–17.7)
HGB BLD-MCNC: 9.1 G/DL (ref 13–17.7)
HGB BLD-MCNC: 9.1 G/DL (ref 13–17.7)
HGB BLD-MCNC: 9.3 G/DL (ref 13–17.7)
HGB BLD-MCNC: 9.4 G/DL (ref 13–17.7)
HGB BLD-MCNC: 9.5 G/DL (ref 13–17.7)
HGB BLD-MCNC: 9.5 G/DL (ref 13–17.7)
HGB BLD-MCNC: 9.6 G/DL (ref 13–17.7)
HOLD SPECIMEN: NORMAL
HYPOCHROMIA BLD QL: ABNORMAL
IMM GRANULOCYTES # BLD AUTO: 0.03 10*3/MM3 (ref 0–0.05)
IMM GRANULOCYTES # BLD AUTO: 0.04 10*3/MM3 (ref 0–0.05)
IMM GRANULOCYTES # BLD AUTO: 0.04 10*3/MM3 (ref 0–0.05)
IMM GRANULOCYTES # BLD AUTO: 0.11 10*3/MM3 (ref 0–0.05)
IMM GRANULOCYTES # BLD AUTO: 0.14 10*3/MM3 (ref 0–0.05)
IMM GRANULOCYTES NFR BLD AUTO: 0.3 % (ref 0–0.5)
IMM GRANULOCYTES NFR BLD AUTO: 0.4 % (ref 0–0.5)
IMM GRANULOCYTES NFR BLD AUTO: 0.5 % (ref 0–0.5)
IMM GRANULOCYTES NFR BLD AUTO: 0.8 % (ref 0–0.5)
IMM GRANULOCYTES NFR BLD AUTO: 1.2 % (ref 0–0.5)
INR PPP: 1.09 (ref 0.91–1.09)
INR PPP: 1.09 (ref 0.91–1.09)
IRON 24H UR-MRATE: 19 MCG/DL (ref 59–158)
IRON SATN MFR SERPL: 4 % (ref 20–50)
LAB AP CASE REPORT: NORMAL
LAB AP DIAGNOSIS COMMENT: NORMAL
LEFT ATRIUM VOLUME INDEX: 42 ML/M2
LIPASE SERPL-CCNC: 22 U/L (ref 13–60)
LV EF 2D ECHO EST: 65 %
LYMPHOCYTES # BLD AUTO: 0.57 10*3/MM3 (ref 0.7–3.1)
LYMPHOCYTES # BLD AUTO: 0.82 10*3/MM3 (ref 0.7–3.1)
LYMPHOCYTES # BLD AUTO: 1.01 10*3/MM3 (ref 0.7–3.1)
LYMPHOCYTES # BLD AUTO: 1.08 10*3/MM3 (ref 0.7–3.1)
LYMPHOCYTES # BLD AUTO: 1.1 10*3/MM3 (ref 0.7–3.1)
LYMPHOCYTES # BLD AUTO: 1.23 10*3/MM3 (ref 0.7–3.1)
LYMPHOCYTES # BLD MANUAL: 0.35 10*3/MM3 (ref 0.7–3.1)
LYMPHOCYTES # BLD MANUAL: 0.54 10*3/MM3 (ref 0.7–3.1)
LYMPHOCYTES # BLD MANUAL: 0.6 10*3/MM3 (ref 0.7–3.1)
LYMPHOCYTES # BLD MANUAL: 1.88 10*3/MM3 (ref 0.7–3.1)
LYMPHOCYTES NFR BLD AUTO: 11.2 % (ref 19.6–45.3)
LYMPHOCYTES NFR BLD AUTO: 11.2 % (ref 19.6–45.3)
LYMPHOCYTES NFR BLD AUTO: 6.4 % (ref 19.6–45.3)
LYMPHOCYTES NFR BLD AUTO: 7.3 % (ref 19.6–45.3)
LYMPHOCYTES NFR BLD AUTO: 8 % (ref 19.6–45.3)
LYMPHOCYTES NFR BLD AUTO: 8.9 % (ref 19.6–45.3)
LYMPHOCYTES NFR BLD MANUAL: 11 % (ref 19.6–45.3)
LYMPHOCYTES NFR BLD MANUAL: 11 % (ref 5–12)
LYMPHOCYTES NFR BLD MANUAL: 13.1 % (ref 19.6–45.3)
LYMPHOCYTES NFR BLD MANUAL: 2 % (ref 19.6–45.3)
LYMPHOCYTES NFR BLD MANUAL: 21.2 % (ref 5–12)
LYMPHOCYTES NFR BLD MANUAL: 23.7 % (ref 19.6–45.3)
LYMPHOCYTES NFR BLD MANUAL: 31.3 % (ref 5–12)
LYMPHOCYTES NFR BLD MANUAL: 44.3 % (ref 5–12)
Lab: ABNORMAL
Lab: NORMAL
MAGNESIUM SERPL-MCNC: 1.5 MG/DL (ref 1.6–2.4)
MAGNESIUM SERPL-MCNC: 1.5 MG/DL (ref 1.6–2.4)
MAGNESIUM SERPL-MCNC: 2 MG/DL (ref 1.6–2.4)
MAXIMAL PREDICTED HEART RATE: 150 BPM
MAXIMAL PREDICTED HEART RATE: 150 BPM
MCH RBC QN AUTO: 23.4 PG (ref 26.6–33)
MCH RBC QN AUTO: 23.4 PG (ref 26.6–33)
MCH RBC QN AUTO: 23.6 PG (ref 26.6–33)
MCH RBC QN AUTO: 23.9 PG (ref 26.6–33)
MCH RBC QN AUTO: 24 PG (ref 26.6–33)
MCH RBC QN AUTO: 24.1 PG (ref 26.6–33)
MCH RBC QN AUTO: 24.1 PG (ref 26.6–33)
MCH RBC QN AUTO: 24.2 PG (ref 26.6–33)
MCH RBC QN AUTO: 24.2 PG (ref 26.6–33)
MCH RBC QN AUTO: 24.5 PG (ref 26.6–33)
MCH RBC QN AUTO: 24.6 PG (ref 26.6–33)
MCH RBC QN AUTO: 25.4 PG (ref 26.6–33)
MCH RBC QN AUTO: 25.7 PG (ref 26.6–33)
MCHC RBC AUTO-ENTMCNC: 27.4 G/DL (ref 31.5–35.7)
MCHC RBC AUTO-ENTMCNC: 27.6 G/DL (ref 31.5–35.7)
MCHC RBC AUTO-ENTMCNC: 27.8 G/DL (ref 31.5–35.7)
MCHC RBC AUTO-ENTMCNC: 28.1 G/DL (ref 31.5–35.7)
MCHC RBC AUTO-ENTMCNC: 28.3 G/DL (ref 31.5–35.7)
MCHC RBC AUTO-ENTMCNC: 28.3 G/DL (ref 31.5–35.7)
MCHC RBC AUTO-ENTMCNC: 28.6 G/DL (ref 31.5–35.7)
MCHC RBC AUTO-ENTMCNC: 28.8 G/DL (ref 31.5–35.7)
MCHC RBC AUTO-ENTMCNC: 29.1 G/DL (ref 31.5–35.7)
MCHC RBC AUTO-ENTMCNC: 29.1 G/DL (ref 31.5–35.7)
MCHC RBC AUTO-ENTMCNC: 29.2 G/DL (ref 31.5–35.7)
MCHC RBC AUTO-ENTMCNC: 30.5 G/DL (ref 31.5–35.7)
MCHC RBC AUTO-ENTMCNC: 30.6 G/DL (ref 31.5–35.7)
MCV RBC AUTO: 83 FL (ref 79–97)
MCV RBC AUTO: 83 FL (ref 79–97)
MCV RBC AUTO: 83.2 FL (ref 79–97)
MCV RBC AUTO: 83.3 FL (ref 79–97)
MCV RBC AUTO: 84 FL (ref 79–97)
MCV RBC AUTO: 84.2 FL (ref 79–97)
MCV RBC AUTO: 84.3 FL (ref 79–97)
MCV RBC AUTO: 84.5 FL (ref 79–97)
MCV RBC AUTO: 84.7 FL (ref 79–97)
MCV RBC AUTO: 85 FL (ref 79–97)
MCV RBC AUTO: 85.4 FL (ref 79–97)
MCV RBC AUTO: 86 FL (ref 79–97)
MCV RBC AUTO: 86.7 FL (ref 79–97)
METAMYELOCYTES NFR BLD MANUAL: 1 % (ref 0–0)
METAMYELOCYTES NFR BLD MANUAL: 4 % (ref 0–0)
MICROCYTES BLD QL: ABNORMAL
MODALITY: ABNORMAL
MODALITY: ABNORMAL
MONOCYTES # BLD AUTO: 0.87 10*3/MM3 (ref 0.1–0.9)
MONOCYTES # BLD AUTO: 1.01 10*3/MM3 (ref 0.1–0.9)
MONOCYTES # BLD AUTO: 1.01 10*3/MM3 (ref 0.1–0.9)
MONOCYTES # BLD AUTO: 1.04 10*3/MM3 (ref 0.1–0.9)
MONOCYTES # BLD AUTO: 1.13 10*3/MM3 (ref 0.1–0.9)
MONOCYTES # BLD AUTO: 1.13 10*3/MM3 (ref 0.1–0.9)
MONOCYTES # BLD AUTO: 1.43 10*3/MM3 (ref 0.1–0.9)
MONOCYTES # BLD AUTO: 1.75 10*3/MM3 (ref 0.1–0.9)
MONOCYTES # BLD AUTO: 1.88 10*3/MM3 (ref 0.1–0.9)
MONOCYTES # BLD AUTO: 3.69 10*3/MM3 (ref 0.1–0.9)
MONOCYTES NFR BLD AUTO: 10.2 % (ref 5–12)
MONOCYTES NFR BLD AUTO: 11.1 % (ref 5–12)
MONOCYTES NFR BLD AUTO: 11.3 % (ref 5–12)
MONOCYTES NFR BLD AUTO: 11.7 % (ref 5–12)
MONOCYTES NFR BLD AUTO: 12.5 % (ref 5–12)
MONOCYTES NFR BLD AUTO: 6.5 % (ref 5–12)
MYELOCYTES NFR BLD MANUAL: 1 % (ref 0–0)
NEUTROPHILS # BLD AUTO: 0.73 10*3/MM3 (ref 1.7–7)
NEUTROPHILS # BLD AUTO: 12.99 10*3/MM3 (ref 1.7–7)
NEUTROPHILS # BLD AUTO: 13.01 10*3/MM3 (ref 1.7–7)
NEUTROPHILS # BLD AUTO: 2.3 10*3/MM3 (ref 1.7–7)
NEUTROPHILS NFR BLD AUTO: 13.09 10*3/MM3 (ref 1.7–7)
NEUTROPHILS NFR BLD AUTO: 14.04 10*3/MM3 (ref 1.7–7)
NEUTROPHILS NFR BLD AUTO: 6.14 10*3/MM3 (ref 1.7–7)
NEUTROPHILS NFR BLD AUTO: 6.32 10*3/MM3 (ref 1.7–7)
NEUTROPHILS NFR BLD AUTO: 7.12 10*3/MM3 (ref 1.7–7)
NEUTROPHILS NFR BLD AUTO: 7.22 10*3/MM3 (ref 1.7–7)
NEUTROPHILS NFR BLD AUTO: 70.3 % (ref 42.7–76)
NEUTROPHILS NFR BLD AUTO: 74.8 % (ref 42.7–76)
NEUTROPHILS NFR BLD AUTO: 77.3 % (ref 42.7–76)
NEUTROPHILS NFR BLD AUTO: 78.6 % (ref 42.7–76)
NEUTROPHILS NFR BLD AUTO: 82.3 % (ref 42.7–76)
NEUTROPHILS NFR BLD AUTO: 84.6 % (ref 42.7–76)
NEUTROPHILS NFR BLD MANUAL: 25.8 % (ref 42.7–76)
NEUTROPHILS NFR BLD MANUAL: 29.3 % (ref 42.7–76)
NEUTROPHILS NFR BLD MANUAL: 63.6 % (ref 42.7–76)
NEUTROPHILS NFR BLD MANUAL: 76 % (ref 42.7–76)
NEUTS BAND NFR BLD MANUAL: 11.1 % (ref 0–5)
NEUTS BAND NFR BLD MANUAL: 21.2 % (ref 0–5)
NEUTS BAND NFR BLD MANUAL: 6.2 % (ref 0–5)
NOTIFIED BY: ABNORMAL
NOTIFIED WHO: ABNORMAL
NRBC BLD AUTO-RTO: 0 /100 WBC (ref 0–0.2)
NT-PROBNP SERPL-MCNC: 114.5 PG/ML (ref 0–900)
NT-PROBNP SERPL-MCNC: 363 PG/ML (ref 0–900)
PATH INTERP BLD-IMP: NORMAL
PATH REPORT.FINAL DX SPEC: NORMAL
PATH REPORT.GROSS SPEC: NORMAL
PCO2 BLDA: 60.6 MM HG (ref 35–45)
PCO2 BLDA: 97.4 MM HG (ref 35–45)
PCO2 TEMP ADJ BLD: 60.6 MM HG (ref 35–45)
PCO2 TEMP ADJ BLD: 97.4 MM HG (ref 35–45)
PH BLDA: 7.39 PH UNITS (ref 7.35–7.45)
PH BLDA: 7.51 PH UNITS (ref 7.35–7.45)
PH, TEMP CORRECTED: 7.39 PH UNITS (ref 7.35–7.45)
PH, TEMP CORRECTED: 7.51 PH UNITS (ref 7.35–7.45)
PHOSPHATE SERPL-MCNC: 3 MG/DL (ref 2.5–4.5)
PHOSPHATE SERPL-MCNC: 3.3 MG/DL (ref 2.5–4.5)
PLAT MORPH BLD: NORMAL
PLAT MORPH BLD: NORMAL
PLATELET # BLD AUTO: 109 10*3/MM3 (ref 140–450)
PLATELET # BLD AUTO: 111 10*3/MM3 (ref 140–450)
PLATELET # BLD AUTO: 115 10*3/MM3 (ref 140–450)
PLATELET # BLD AUTO: 117 10*3/MM3 (ref 140–450)
PLATELET # BLD AUTO: 117 10*3/MM3 (ref 140–450)
PLATELET # BLD AUTO: 124 10*3/MM3 (ref 140–450)
PLATELET # BLD AUTO: 141 10*3/MM3 (ref 140–450)
PLATELET # BLD AUTO: 143 10*3/MM3 (ref 140–450)
PLATELET # BLD AUTO: 151 10*3/MM3 (ref 140–450)
PLATELET # BLD AUTO: 152 10*3/MM3 (ref 140–450)
PLATELET # BLD AUTO: 161 10*3/MM3 (ref 140–450)
PLATELET # BLD AUTO: 166 10*3/MM3 (ref 140–450)
PLATELET # BLD AUTO: 171 10*3/MM3 (ref 140–450)
PLATELET # BLD AUTO: 98 10*3/MM3 (ref 140–450)
PMV BLD AUTO: 10 FL (ref 6–12)
PMV BLD AUTO: 10 FL (ref 6–12)
PMV BLD AUTO: 10.1 FL (ref 6–12)
PMV BLD AUTO: 10.4 FL (ref 6–12)
PMV BLD AUTO: 10.7 FL (ref 6–12)
PMV BLD AUTO: 10.7 FL (ref 6–12)
PMV BLD AUTO: 10.8 FL (ref 6–12)
PMV BLD AUTO: 10.8 FL (ref 6–12)
PMV BLD AUTO: 11.1 FL (ref 6–12)
PMV BLD AUTO: 11.2 FL (ref 6–12)
PMV BLD AUTO: 11.3 FL (ref 6–12)
PMV BLD AUTO: 12.7 FL (ref 6–12)
PMV BLD AUTO: 9.5 FL (ref 6–12)
PO2 BLDA: 82.7 MM HG (ref 83–108)
PO2 BLDA: 85.1 MM HG (ref 83–108)
PO2 TEMP ADJ BLD: 82.7 MM HG (ref 83–108)
PO2 TEMP ADJ BLD: 85.1 MM HG (ref 83–108)
POIKILOCYTOSIS BLD QL SMEAR: ABNORMAL
POLYCHROMASIA BLD QL SMEAR: ABNORMAL
POTASSIUM SERPL-SCNC: 2.6 MMOL/L (ref 3.5–5.2)
POTASSIUM SERPL-SCNC: 3 MMOL/L (ref 3.5–5.2)
POTASSIUM SERPL-SCNC: 3 MMOL/L (ref 3.5–5.2)
POTASSIUM SERPL-SCNC: 3.1 MMOL/L (ref 3.5–5.2)
POTASSIUM SERPL-SCNC: 3.2 MMOL/L (ref 3.5–5.2)
POTASSIUM SERPL-SCNC: 3.4 MMOL/L (ref 3.5–5.2)
POTASSIUM SERPL-SCNC: 3.4 MMOL/L (ref 3.5–5.2)
POTASSIUM SERPL-SCNC: 3.5 MMOL/L (ref 3.5–5.2)
POTASSIUM SERPL-SCNC: 3.6 MMOL/L (ref 3.5–5.2)
POTASSIUM SERPL-SCNC: 3.6 MMOL/L (ref 3.5–5.2)
POTASSIUM SERPL-SCNC: 3.7 MMOL/L (ref 3.5–5.2)
POTASSIUM SERPL-SCNC: 3.9 MMOL/L (ref 3.5–5.2)
POTASSIUM SERPL-SCNC: 4.1 MMOL/L (ref 3.5–5.2)
POTASSIUM SERPL-SCNC: 4.2 MMOL/L (ref 3.5–5.2)
POTASSIUM SERPL-SCNC: 4.3 MMOL/L (ref 3.5–5.2)
POTASSIUM SERPL-SCNC: 4.3 MMOL/L (ref 3.5–5.2)
POTASSIUM SERPL-SCNC: 4.4 MMOL/L (ref 3.5–5.2)
PROCALCITONIN SERPL-MCNC: 0.27 NG/ML (ref 0–0.25)
PROMYELOCYTES NFR BLD MANUAL: 1 % (ref 0–0)
PROT SERPL-MCNC: 5.7 G/DL (ref 6–8.5)
PROT SERPL-MCNC: 5.8 G/DL (ref 6–8.5)
PROT SERPL-MCNC: 5.9 G/DL (ref 6–8.5)
PROT SERPL-MCNC: 6 G/DL (ref 6–8.5)
PROT SERPL-MCNC: 6.1 G/DL (ref 6–8.5)
PROT SERPL-MCNC: 6.3 G/DL (ref 6–8.5)
PROT SERPL-MCNC: 6.5 G/DL (ref 6–8.5)
PROT SERPL-MCNC: 6.9 G/DL (ref 6–8.5)
PROT SERPL-MCNC: 7.2 G/DL (ref 6–8.5)
PROT SERPL-MCNC: 7.7 G/DL (ref 6–8.5)
PROTHROMBIN TIME: 13.3 SECONDS (ref 11.5–13.4)
PROTHROMBIN TIME: 13.7 SECONDS (ref 11.9–14.6)
QT INTERVAL: 334 MS
QTC INTERVAL: 435 MS
RBC # BLD AUTO: 2.88 10*6/MM3 (ref 4.14–5.8)
RBC # BLD AUTO: 3.29 10*6/MM3 (ref 4.14–5.8)
RBC # BLD AUTO: 3.37 10*6/MM3 (ref 4.14–5.8)
RBC # BLD AUTO: 3.58 10*6/MM3 (ref 4.14–5.8)
RBC # BLD AUTO: 3.64 10*6/MM3 (ref 4.14–5.8)
RBC # BLD AUTO: 3.67 10*6/MM3 (ref 4.14–5.8)
RBC # BLD AUTO: 3.68 10*6/MM3 (ref 4.14–5.8)
RBC # BLD AUTO: 3.74 10*6/MM3 (ref 4.14–5.8)
RBC # BLD AUTO: 3.77 10*6/MM3 (ref 4.14–5.8)
RBC # BLD AUTO: 3.87 10*6/MM3 (ref 4.14–5.8)
RBC # BLD AUTO: 3.88 10*6/MM3 (ref 4.14–5.8)
RBC # BLD AUTO: 3.94 10*6/MM3 (ref 4.14–5.8)
RBC # BLD AUTO: 4.06 10*6/MM3 (ref 4.14–5.8)
RH BLD: POSITIVE
SAO2 % BLDCOA: 96.8 % (ref 94–99)
SAO2 % BLDCOA: 97.6 % (ref 94–99)
SARS-COV-2 ORF1AB RESP QL NAA+PROBE: NOT DETECTED
SARS-COV-2 ORF1AB RESP QL NAA+PROBE: NOT DETECTED
SARS-COV-2 RDRP RESP QL NAA+PROBE: NORMAL
SMALL PLATELETS BLD QL SMEAR: ABNORMAL
SODIUM SERPL-SCNC: 135 MMOL/L (ref 136–145)
SODIUM SERPL-SCNC: 136 MMOL/L (ref 136–145)
SODIUM SERPL-SCNC: 137 MMOL/L (ref 136–145)
SODIUM SERPL-SCNC: 138 MMOL/L (ref 136–145)
SODIUM SERPL-SCNC: 138 MMOL/L (ref 136–145)
SODIUM SERPL-SCNC: 139 MMOL/L (ref 136–145)
SODIUM SERPL-SCNC: 140 MMOL/L (ref 136–145)
SODIUM SERPL-SCNC: 141 MMOL/L (ref 136–145)
SODIUM SERPL-SCNC: 141 MMOL/L (ref 136–145)
SODIUM SERPL-SCNC: 142 MMOL/L (ref 136–145)
SODIUM SERPL-SCNC: 142 MMOL/L (ref 136–145)
STOMATOCYTES BLD QL SMEAR: ABNORMAL
STRESS TARGET HR: 128 BPM
STRESS TARGET HR: 128 BPM
T&S EXPIRATION DATE: NORMAL
T4 FREE SERPL-MCNC: 0.87 NG/DL (ref 0.93–1.7)
T4 FREE SERPL-MCNC: 1.19 NG/DL (ref 0.93–1.7)
TIBC SERPL-MCNC: 495 MCG/DL (ref 298–536)
TOXIC GRANULATION: ABNORMAL
TRANSFERRIN SERPL-MCNC: 332 MG/DL (ref 200–360)
TROPONIN T SERPL-MCNC: <0.01 NG/ML (ref 0–0.03)
TSH SERPL DL<=0.05 MIU/L-ACNC: 0.16 UIU/ML (ref 0.27–4.2)
TSH SERPL DL<=0.05 MIU/L-ACNC: 0.69 UIU/ML (ref 0.27–4.2)
TSH SERPL DL<=0.05 MIU/L-ACNC: 0.69 UIU/ML (ref 0.27–4.2)
TSH SERPL DL<=0.05 MIU/L-ACNC: 0.72 UIU/ML (ref 0.27–4.2)
TSH SERPL DL<=0.05 MIU/L-ACNC: 0.97 UIU/ML (ref 0.27–4.2)
UNIT  ABO: NORMAL
UNIT  RH: NORMAL
VANCOMYCIN TROUGH SERPL-MCNC: 16.2 MCG/ML (ref 5–20)
VENTILATOR MODE: ABNORMAL
VENTILATOR MODE: ABNORMAL
WBC # BLD AUTO: 11.61 10*3/MM3 (ref 3.4–10.8)
WBC # BLD AUTO: 15.47 10*3/MM3 (ref 3.4–10.8)
WBC # BLD AUTO: 17.09 10*3/MM3 (ref 3.4–10.8)
WBC # BLD AUTO: 17.41 10*3/MM3 (ref 3.4–10.8)
WBC # BLD AUTO: 2.28 10*3/MM3 (ref 3.4–10.8)
WBC # BLD AUTO: 4.56 10*3/MM3 (ref 3.4–10.8)
WBC # BLD AUTO: 7.81 10*3/MM3 (ref 3.4–10.8)
WBC # BLD AUTO: 8.14 10*3/MM3 (ref 3.4–10.8)
WBC # BLD AUTO: 8.75 10*3/MM3 (ref 3.4–10.8)
WBC # BLD AUTO: 8.82 10*3/MM3 (ref 3.4–10.8)
WBC # BLD AUTO: 9.01 10*3/MM3 (ref 3.4–10.8)
WBC # BLD AUTO: 9.21 10*3/MM3 (ref 3.4–10.8)
WBC # BLD AUTO: 9.65 10*3/MM3 (ref 3.4–10.8)
WBC MORPH BLD: NORMAL
WHOLE BLOOD HOLD SPECIMEN: NORMAL

## 2021-01-01 PROCEDURE — 83735 ASSAY OF MAGNESIUM: CPT

## 2021-01-01 PROCEDURE — 84100 ASSAY OF PHOSPHORUS: CPT | Performed by: INTERNAL MEDICINE

## 2021-01-01 PROCEDURE — 25010000002 ENOXAPARIN PER 10 MG: Performed by: INTERNAL MEDICINE

## 2021-01-01 PROCEDURE — 99204 OFFICE O/P NEW MOD 45 MIN: CPT | Performed by: THORACIC SURGERY (CARDIOTHORACIC VASCULAR SURGERY)

## 2021-01-01 PROCEDURE — 85025 COMPLETE CBC W/AUTO DIFF WBC: CPT

## 2021-01-01 PROCEDURE — 77334 RADIATION TREATMENT AID(S): CPT | Performed by: RADIOLOGY

## 2021-01-01 PROCEDURE — 97530 THERAPEUTIC ACTIVITIES: CPT

## 2021-01-01 PROCEDURE — 94799 UNLISTED PULMONARY SVC/PX: CPT

## 2021-01-01 PROCEDURE — 84443 ASSAY THYROID STIM HORMONE: CPT

## 2021-01-01 PROCEDURE — 86900 BLOOD TYPING SEROLOGIC ABO: CPT | Performed by: INTERNAL MEDICINE

## 2021-01-01 PROCEDURE — 80053 COMPREHEN METABOLIC PANEL: CPT

## 2021-01-01 PROCEDURE — 97166 OT EVAL MOD COMPLEX 45 MIN: CPT | Performed by: OCCUPATIONAL THERAPIST

## 2021-01-01 PROCEDURE — 82565 ASSAY OF CREATININE: CPT

## 2021-01-01 PROCEDURE — 25010000002 NIVOLUMAB 100 MG/10ML SOLUTION 10 ML VIAL: Performed by: INTERNAL MEDICINE

## 2021-01-01 PROCEDURE — 96413 CHEMO IV INFUSION 1 HR: CPT

## 2021-01-01 PROCEDURE — 25010000002 ONDANSETRON PER 1 MG: Performed by: INTERNAL MEDICINE

## 2021-01-01 PROCEDURE — 25010000002 MAGNESIUM SULFATE 2 GM/50ML SOLUTION: Performed by: INTERNAL MEDICINE

## 2021-01-01 PROCEDURE — 84484 ASSAY OF TROPONIN QUANT: CPT | Performed by: EMERGENCY MEDICINE

## 2021-01-01 PROCEDURE — 99443 PR PHYS/QHP TELEPHONE EVALUATION 21-30 MIN: CPT | Performed by: NURSE PRACTITIONER

## 2021-01-01 PROCEDURE — 99214 OFFICE O/P EST MOD 30 MIN: CPT | Performed by: INTERNAL MEDICINE

## 2021-01-01 PROCEDURE — 80202 ASSAY OF VANCOMYCIN: CPT | Performed by: INTERNAL MEDICINE

## 2021-01-01 PROCEDURE — P9016 RBC LEUKOCYTES REDUCED: HCPCS

## 2021-01-01 PROCEDURE — 82803 BLOOD GASES ANY COMBINATION: CPT

## 2021-01-01 PROCEDURE — 25010000002 CEFEPIME PER 500 MG: Performed by: INTERNAL MEDICINE

## 2021-01-01 PROCEDURE — 83540 ASSAY OF IRON: CPT

## 2021-01-01 PROCEDURE — 25010000002 FENTANYL CITRATE (PF) 100 MCG/2ML SOLUTION: Performed by: NURSE ANESTHETIST, CERTIFIED REGISTERED

## 2021-01-01 PROCEDURE — 25010000002 METHYLPREDNISOLONE PER 125 MG: Performed by: INTERNAL MEDICINE

## 2021-01-01 PROCEDURE — 99215 OFFICE O/P EST HI 40 MIN: CPT | Performed by: INTERNAL MEDICINE

## 2021-01-01 PROCEDURE — 25010000002 PACLITAXEL PER 1 MG: Performed by: INTERNAL MEDICINE

## 2021-01-01 PROCEDURE — 36600 WITHDRAWAL OF ARTERIAL BLOOD: CPT

## 2021-01-01 PROCEDURE — 25010000002 IPILIMUMAB 50 MG/10ML SOLUTION 10 ML VIAL: Performed by: INTERNAL MEDICINE

## 2021-01-01 PROCEDURE — 77417 THER RADIOLOGY PORT IMAGE(S): CPT | Performed by: RADIOLOGY

## 2021-01-01 PROCEDURE — 71045 X-RAY EXAM CHEST 1 VIEW: CPT

## 2021-01-01 PROCEDURE — 93970 EXTREMITY STUDY: CPT | Performed by: SURGERY

## 2021-01-01 PROCEDURE — 94640 AIRWAY INHALATION TREATMENT: CPT

## 2021-01-01 PROCEDURE — 80076 HEPATIC FUNCTION PANEL: CPT | Performed by: INTERNAL MEDICINE

## 2021-01-01 PROCEDURE — 85060 BLOOD SMEAR INTERPRETATION: CPT | Performed by: EMERGENCY MEDICINE

## 2021-01-01 PROCEDURE — 63710000001 PREDNISONE PER 1 MG: Performed by: INTERNAL MEDICINE

## 2021-01-01 PROCEDURE — 86850 RBC ANTIBODY SCREEN: CPT | Performed by: INTERNAL MEDICINE

## 2021-01-01 PROCEDURE — 25010000002 DIPHENHYDRAMINE PER 50 MG: Performed by: INTERNAL MEDICINE

## 2021-01-01 PROCEDURE — 25010000002 FUROSEMIDE PER 20 MG: Performed by: INTERNAL MEDICINE

## 2021-01-01 PROCEDURE — 85027 COMPLETE CBC AUTOMATED: CPT | Performed by: INTERNAL MEDICINE

## 2021-01-01 PROCEDURE — 93010 ELECTROCARDIOGRAM REPORT: CPT | Performed by: INTERNAL MEDICINE

## 2021-01-01 PROCEDURE — 93306 TTE W/DOPPLER COMPLETE: CPT

## 2021-01-01 PROCEDURE — 84466 ASSAY OF TRANSFERRIN: CPT

## 2021-01-01 PROCEDURE — 99232 SBSQ HOSP IP/OBS MODERATE 35: CPT | Performed by: INTERNAL MEDICINE

## 2021-01-01 PROCEDURE — 77412 RADIATION TX DELIVERY LVL 3: CPT | Performed by: RADIOLOGY

## 2021-01-01 PROCEDURE — 96367 TX/PROPH/DG ADDL SEQ IV INF: CPT

## 2021-01-01 PROCEDURE — 71275 CT ANGIOGRAPHY CHEST: CPT

## 2021-01-01 PROCEDURE — 84439 ASSAY OF FREE THYROXINE: CPT

## 2021-01-01 PROCEDURE — 36430 TRANSFUSION BLD/BLD COMPNT: CPT

## 2021-01-01 PROCEDURE — 85610 PROTHROMBIN TIME: CPT | Performed by: RADIOLOGY

## 2021-01-01 PROCEDURE — 70491 CT SOFT TISSUE NECK W/DYE: CPT

## 2021-01-01 PROCEDURE — U0005 INFEC AGEN DETEC AMPLI PROBE: HCPCS | Performed by: NURSE PRACTITIONER

## 2021-01-01 PROCEDURE — 94660 CPAP INITIATION&MGMT: CPT

## 2021-01-01 PROCEDURE — U0004 COV-19 TEST NON-CDC HGH THRU: HCPCS | Performed by: SPECIALIST

## 2021-01-01 PROCEDURE — 74177 CT ABD & PELVIS W/CONTRAST: CPT

## 2021-01-01 PROCEDURE — 83880 ASSAY OF NATRIURETIC PEPTIDE: CPT | Performed by: INTERNAL MEDICINE

## 2021-01-01 PROCEDURE — 25010000002 FOSAPREPITANT PER 1 MG: Performed by: INTERNAL MEDICINE

## 2021-01-01 PROCEDURE — 85610 PROTHROMBIN TIME: CPT | Performed by: SPECIALIST

## 2021-01-01 PROCEDURE — 25010000002 FILGRASTIM PER 480 MCG: Performed by: INTERNAL MEDICINE

## 2021-01-01 PROCEDURE — 85651 RBC SED RATE NONAUTOMATED: CPT

## 2021-01-01 PROCEDURE — 86900 BLOOD TYPING SEROLOGIC ABO: CPT

## 2021-01-01 PROCEDURE — U0005 INFEC AGEN DETEC AMPLI PROBE: HCPCS | Performed by: SPECIALIST

## 2021-01-01 PROCEDURE — 80048 BASIC METABOLIC PNL TOTAL CA: CPT | Performed by: INTERNAL MEDICINE

## 2021-01-01 PROCEDURE — C9803 HOPD COVID-19 SPEC COLLECT: HCPCS | Performed by: SPECIALIST

## 2021-01-01 PROCEDURE — 36415 COLL VENOUS BLD VENIPUNCTURE: CPT

## 2021-01-01 PROCEDURE — 85730 THROMBOPLASTIN TIME PARTIAL: CPT | Performed by: RADIOLOGY

## 2021-01-01 PROCEDURE — 97110 THERAPEUTIC EXERCISES: CPT

## 2021-01-01 PROCEDURE — 85025 COMPLETE CBC W/AUTO DIFF WBC: CPT | Performed by: INTERNAL MEDICINE

## 2021-01-01 PROCEDURE — 25010000002 VANCOMYCIN 1 G RECONSTITUTED SOLUTION 1 EACH VIAL: Performed by: SPECIALIST

## 2021-01-01 PROCEDURE — 99214 OFFICE O/P EST MOD 30 MIN: CPT | Performed by: THORACIC SURGERY (CARDIOTHORACIC VASCULAR SURGERY)

## 2021-01-01 PROCEDURE — 63710000001 ONDANSETRON PER 8 MG: Performed by: INTERNAL MEDICINE

## 2021-01-01 PROCEDURE — 88341 IMHCHEM/IMCYTCHM EA ADD ANTB: CPT | Performed by: NURSE PRACTITIONER

## 2021-01-01 PROCEDURE — 25010000002 CARBOPLATIN PER 50 MG: Performed by: INTERNAL MEDICINE

## 2021-01-01 PROCEDURE — 96417 CHEMO IV INFUS EACH ADDL SEQ: CPT

## 2021-01-01 PROCEDURE — 87635 SARS-COV-2 COVID-19 AMP PRB: CPT | Performed by: EMERGENCY MEDICINE

## 2021-01-01 PROCEDURE — 71260 CT THORAX DX C+: CPT

## 2021-01-01 PROCEDURE — 84443 ASSAY THYROID STIM HORMONE: CPT | Performed by: INTERNAL MEDICINE

## 2021-01-01 PROCEDURE — 85007 BL SMEAR W/DIFF WBC COUNT: CPT | Performed by: INTERNAL MEDICINE

## 2021-01-01 PROCEDURE — 25010000002 IOPAMIDOL 61 % SOLUTION: Performed by: NURSE PRACTITIONER

## 2021-01-01 PROCEDURE — 93308 TTE F-UP OR LMTD: CPT

## 2021-01-01 PROCEDURE — 99204 OFFICE O/P NEW MOD 45 MIN: CPT | Performed by: INTERNAL MEDICINE

## 2021-01-01 PROCEDURE — 88305 TISSUE EXAM BY PATHOLOGIST: CPT | Performed by: NURSE PRACTITIONER

## 2021-01-01 PROCEDURE — 77280 THER RAD SIMULAJ FIELD SMPL: CPT | Performed by: RADIOLOGY

## 2021-01-01 PROCEDURE — 80053 COMPREHEN METABOLIC PANEL: CPT | Performed by: INTERNAL MEDICINE

## 2021-01-01 PROCEDURE — 77290 THER RAD SIMULAJ FIELD CPLX: CPT | Performed by: RADIOLOGY

## 2021-01-01 PROCEDURE — 93005 ELECTROCARDIOGRAM TRACING: CPT | Performed by: EMERGENCY MEDICINE

## 2021-01-01 PROCEDURE — 96375 TX/PRO/DX INJ NEW DRUG ADDON: CPT

## 2021-01-01 PROCEDURE — 83605 ASSAY OF LACTIC ACID: CPT | Performed by: EMERGENCY MEDICINE

## 2021-01-01 PROCEDURE — 84145 PROCALCITONIN (PCT): CPT | Performed by: INTERNAL MEDICINE

## 2021-01-01 PROCEDURE — 83690 ASSAY OF LIPASE: CPT | Performed by: EMERGENCY MEDICINE

## 2021-01-01 PROCEDURE — 25010000002 METHYLPREDNISOLONE PER 40 MG: Performed by: INTERNAL MEDICINE

## 2021-01-01 PROCEDURE — 83036 HEMOGLOBIN GLYCOSYLATED A1C: CPT | Performed by: INTERNAL MEDICINE

## 2021-01-01 PROCEDURE — 25010000002 PERFLUTREN 6.52 MG/ML SUSPENSION: Performed by: INTERNAL MEDICINE

## 2021-01-01 PROCEDURE — 25010000002 HEPARIN LOCK FLUSH PER 10 UNITS: Performed by: SPECIALIST

## 2021-01-01 PROCEDURE — 25010000002 NIVOLUMAB 40 MG/4ML SOLUTION 4 ML VIAL: Performed by: INTERNAL MEDICINE

## 2021-01-01 PROCEDURE — 63710000001 PREDNISONE PER 5 MG: Performed by: INTERNAL MEDICINE

## 2021-01-01 PROCEDURE — 96368 THER/DIAG CONCURRENT INF: CPT

## 2021-01-01 PROCEDURE — 83880 ASSAY OF NATRIURETIC PEPTIDE: CPT | Performed by: EMERGENCY MEDICINE

## 2021-01-01 PROCEDURE — G2212 PROLONG OUTPT/OFFICE VIS: HCPCS | Performed by: INTERNAL MEDICINE

## 2021-01-01 PROCEDURE — 77336 RADIATION PHYSICS CONSULT: CPT | Performed by: RADIOLOGY

## 2021-01-01 PROCEDURE — 96415 CHEMO IV INFUSION ADDL HR: CPT

## 2021-01-01 PROCEDURE — 25010000002 VANCOMYCIN 10 G RECONSTITUTED SOLUTION: Performed by: INTERNAL MEDICINE

## 2021-01-01 PROCEDURE — 78306 BONE IMAGING WHOLE BODY: CPT

## 2021-01-01 PROCEDURE — 25010000002 PALONOSETRON PER 25 MCG: Performed by: INTERNAL MEDICINE

## 2021-01-01 PROCEDURE — 25010000002 HEPARIN LOCK FLUSH PER 10 UNITS: Performed by: INTERNAL MEDICINE

## 2021-01-01 PROCEDURE — 82962 GLUCOSE BLOOD TEST: CPT

## 2021-01-01 PROCEDURE — 85049 AUTOMATED PLATELET COUNT: CPT | Performed by: RADIOLOGY

## 2021-01-01 PROCEDURE — 87040 BLOOD CULTURE FOR BACTERIA: CPT | Performed by: EMERGENCY MEDICINE

## 2021-01-01 PROCEDURE — 82728 ASSAY OF FERRITIN: CPT

## 2021-01-01 PROCEDURE — 70553 MRI BRAIN STEM W/O & W/DYE: CPT

## 2021-01-01 PROCEDURE — 84100 ASSAY OF PHOSPHORUS: CPT

## 2021-01-01 PROCEDURE — 97162 PT EVAL MOD COMPLEX 30 MIN: CPT

## 2021-01-01 PROCEDURE — 82024 ASSAY OF ACTH: CPT

## 2021-01-01 PROCEDURE — 83735 ASSAY OF MAGNESIUM: CPT | Performed by: INTERNAL MEDICINE

## 2021-01-01 PROCEDURE — 85651 RBC SED RATE NONAUTOMATED: CPT | Performed by: INTERNAL MEDICINE

## 2021-01-01 PROCEDURE — 77307 TELETHX ISODOSE PLAN CPLX: CPT | Performed by: RADIOLOGY

## 2021-01-01 PROCEDURE — 85025 COMPLETE CBC W/AUTO DIFF WBC: CPT | Performed by: EMERGENCY MEDICINE

## 2021-01-01 PROCEDURE — 85379 FIBRIN DEGRADATION QUANT: CPT | Performed by: EMERGENCY MEDICINE

## 2021-01-01 PROCEDURE — 25010000003 CEFAZOLIN PER 500 MG: Performed by: NURSE ANESTHETIST, CERTIFIED REGISTERED

## 2021-01-01 PROCEDURE — 86901 BLOOD TYPING SEROLOGIC RH(D): CPT | Performed by: INTERNAL MEDICINE

## 2021-01-01 PROCEDURE — 25010000003 LIDOCAINE 1 % SOLUTION: Performed by: SPECIALIST

## 2021-01-01 PROCEDURE — 88342 IMHCHEM/IMCYTCHM 1ST ANTB: CPT | Performed by: NURSE PRACTITIONER

## 2021-01-01 PROCEDURE — 0 TECHNETIUM OXIDRONATE KIT: Performed by: INTERNAL MEDICINE

## 2021-01-01 PROCEDURE — 99222 1ST HOSP IP/OBS MODERATE 55: CPT | Performed by: INTERNAL MEDICINE

## 2021-01-01 PROCEDURE — 25010000002 PROPOFOL 10 MG/ML EMULSION: Performed by: NURSE ANESTHETIST, CERTIFIED REGISTERED

## 2021-01-01 PROCEDURE — C1788 PORT, INDWELLING, IMP: HCPCS | Performed by: SPECIALIST

## 2021-01-01 PROCEDURE — 86920 COMPATIBILITY TEST SPIN: CPT

## 2021-01-01 PROCEDURE — A9561 TC99M OXIDRONATE: HCPCS | Performed by: INTERNAL MEDICINE

## 2021-01-01 PROCEDURE — 93970 EXTREMITY STUDY: CPT

## 2021-01-01 PROCEDURE — 88333 PATH CONSLTJ SURG CYTO XM 1: CPT | Performed by: NURSE PRACTITIONER

## 2021-01-01 PROCEDURE — 80053 COMPREHEN METABOLIC PANEL: CPT | Performed by: EMERGENCY MEDICINE

## 2021-01-01 PROCEDURE — 76000 FLUOROSCOPY <1 HR PHYS/QHP: CPT

## 2021-01-01 PROCEDURE — C9803 HOPD COVID-19 SPEC COLLECT: HCPCS | Performed by: NURSE PRACTITIONER

## 2021-01-01 PROCEDURE — 93306 TTE W/DOPPLER COMPLETE: CPT | Performed by: INTERNAL MEDICINE

## 2021-01-01 PROCEDURE — 0 IOPAMIDOL PER 1 ML: Performed by: EMERGENCY MEDICINE

## 2021-01-01 PROCEDURE — 93308 TTE F-UP OR LMTD: CPT | Performed by: INTERNAL MEDICINE

## 2021-01-01 PROCEDURE — 99285 EMERGENCY DEPT VISIT HI MDM: CPT

## 2021-01-01 PROCEDURE — 85730 THROMBOPLASTIN TIME PARTIAL: CPT | Performed by: SPECIALIST

## 2021-01-01 PROCEDURE — U0004 COV-19 TEST NON-CDC HGH THRU: HCPCS | Performed by: NURSE PRACTITIONER

## 2021-01-01 PROCEDURE — 25010000002 DEXAMETHASONE SODIUM PHOSPHATE 100 MG/10ML SOLUTION: Performed by: INTERNAL MEDICINE

## 2021-01-01 PROCEDURE — 25010000002 IOPAMIDOL 61 % SOLUTION: Performed by: INTERNAL MEDICINE

## 2021-01-01 PROCEDURE — 85007 BL SMEAR W/DIFF WBC COUNT: CPT | Performed by: EMERGENCY MEDICINE

## 2021-01-01 PROCEDURE — 74018 RADEX ABDOMEN 1 VIEW: CPT

## 2021-01-01 PROCEDURE — 85007 BL SMEAR W/DIFF WBC COUNT: CPT

## 2021-01-01 PROCEDURE — 84295 ASSAY OF SERUM SODIUM: CPT | Performed by: EMERGENCY MEDICINE

## 2021-01-01 DEVICE — POWERPORT M.R.I. IMPLANTABLE PORT WITH ATTACHABLE 9.6F OPEN-ENDED SINGLE-LUMEN VENOUS CATHETER INTERMEDIATE KIT (WITH SUTURE PLUGS)
Type: IMPLANTABLE DEVICE | Site: CHEST | Status: FUNCTIONAL
Brand: POWERPORT M.R.I.

## 2021-01-01 RX ORDER — POTASSIUM CHLORIDE 750 MG/1
40 CAPSULE, EXTENDED RELEASE ORAL EVERY 4 HOURS
Status: COMPLETED | OUTPATIENT
Start: 2021-01-01 | End: 2021-01-01

## 2021-01-01 RX ORDER — NALOXONE HCL 0.4 MG/ML
0.4 VIAL (ML) INJECTION AS NEEDED
Status: DISCONTINUED | OUTPATIENT
Start: 2021-01-01 | End: 2021-01-01 | Stop reason: HOSPADM

## 2021-01-01 RX ORDER — SODIUM CHLORIDE 9 MG/ML
250 INJECTION, SOLUTION INTRAVENOUS ONCE
Status: CANCELLED | OUTPATIENT
Start: 2021-01-01

## 2021-01-01 RX ORDER — OXYCODONE AND ACETAMINOPHEN 7.5; 325 MG/1; MG/1
1 TABLET ORAL EVERY 4 HOURS PRN
Status: DISCONTINUED | OUTPATIENT
Start: 2021-01-01 | End: 2021-01-01 | Stop reason: HOSPADM

## 2021-01-01 RX ORDER — SODIUM CHLORIDE 0.9 % (FLUSH) 0.9 %
3 SYRINGE (ML) INJECTION EVERY 12 HOURS SCHEDULED
Status: DISCONTINUED | OUTPATIENT
Start: 2021-01-01 | End: 2021-01-01 | Stop reason: HOSPADM

## 2021-01-01 RX ORDER — OXYCODONE AND ACETAMINOPHEN 7.5; 325 MG/1; MG/1
1 TABLET ORAL ONCE AS NEEDED
Status: COMPLETED | OUTPATIENT
Start: 2021-01-01 | End: 2021-01-01

## 2021-01-01 RX ORDER — FUROSEMIDE 40 MG/1
40 TABLET ORAL DAILY
Status: DISCONTINUED | OUTPATIENT
Start: 2021-01-01 | End: 2021-01-01 | Stop reason: HOSPADM

## 2021-01-01 RX ORDER — DEXAMETHASONE 2 MG/1
2 TABLET ORAL 2 TIMES DAILY WITH MEALS
Qty: 30 TABLET | Refills: 2 | Status: SHIPPED | OUTPATIENT
Start: 2021-01-01 | End: 2021-01-01 | Stop reason: HOSPADM

## 2021-01-01 RX ORDER — PALONOSETRON 0.05 MG/ML
0.25 INJECTION, SOLUTION INTRAVENOUS ONCE
Status: CANCELLED | OUTPATIENT
Start: 2021-01-01

## 2021-01-01 RX ORDER — PREDNISONE 10 MG/1
TABLET ORAL
Qty: 21 TABLET | Refills: 0
Start: 2021-01-01

## 2021-01-01 RX ORDER — POTASSIUM CHLORIDE 750 MG/1
40 CAPSULE, EXTENDED RELEASE ORAL ONCE
Status: COMPLETED | OUTPATIENT
Start: 2021-01-01 | End: 2021-01-01

## 2021-01-01 RX ORDER — POTASSIUM CHLORIDE 1.5 G/1.77G
40 POWDER, FOR SOLUTION ORAL AS NEEDED
Status: DISCONTINUED | OUTPATIENT
Start: 2021-01-01 | End: 2021-01-01 | Stop reason: HOSPADM

## 2021-01-01 RX ORDER — OXYCODONE HYDROCHLORIDE AND ACETAMINOPHEN 5; 325 MG/1; MG/1
1.5 TABLET ORAL 3 TIMES DAILY PRN
Status: DISCONTINUED | OUTPATIENT
Start: 2021-01-01 | End: 2021-01-01

## 2021-01-01 RX ORDER — SODIUM CHLORIDE 0.9 % (FLUSH) 0.9 %
10 SYRINGE (ML) INJECTION AS NEEDED
Status: CANCELLED | OUTPATIENT
Start: 2021-01-01

## 2021-01-01 RX ORDER — ACETAMINOPHEN 325 MG/1
650 TABLET ORAL EVERY 4 HOURS PRN
Start: 2021-01-01

## 2021-01-01 RX ORDER — GUAIFENESIN 600 MG/1
1200 TABLET, EXTENDED RELEASE ORAL EVERY 12 HOURS SCHEDULED
Status: DISCONTINUED | OUTPATIENT
Start: 2021-01-01 | End: 2021-01-01 | Stop reason: HOSPADM

## 2021-01-01 RX ORDER — HEPARIN SODIUM (PORCINE) LOCK FLUSH IV SOLN 100 UNIT/ML 100 UNIT/ML
500 SOLUTION INTRAVENOUS AS NEEDED
Status: CANCELLED | OUTPATIENT
Start: 2021-01-01

## 2021-01-01 RX ORDER — ONDANSETRON 2 MG/ML
4 INJECTION INTRAMUSCULAR; INTRAVENOUS EVERY 6 HOURS PRN
Status: DISCONTINUED | OUTPATIENT
Start: 2021-01-01 | End: 2021-01-01 | Stop reason: HOSPADM

## 2021-01-01 RX ORDER — LIDOCAINE HYDROCHLORIDE 10 MG/ML
INJECTION, SOLUTION INFILTRATION; PERINEURAL AS NEEDED
Status: DISCONTINUED | OUTPATIENT
Start: 2021-01-01 | End: 2021-01-01 | Stop reason: HOSPADM

## 2021-01-01 RX ORDER — HEPARIN SODIUM (PORCINE) LOCK FLUSH IV SOLN 100 UNIT/ML 100 UNIT/ML
SOLUTION INTRAVENOUS AS NEEDED
Status: DISCONTINUED | OUTPATIENT
Start: 2021-01-01 | End: 2021-01-01 | Stop reason: HOSPADM

## 2021-01-01 RX ORDER — LIDOCAINE HYDROCHLORIDE 10 MG/ML
0.5 INJECTION, SOLUTION EPIDURAL; INFILTRATION; INTRACAUDAL; PERINEURAL ONCE AS NEEDED
Status: DISCONTINUED | OUTPATIENT
Start: 2021-01-01 | End: 2021-01-01 | Stop reason: HOSPADM

## 2021-01-01 RX ORDER — LIDOCAINE HYDROCHLORIDE 20 MG/ML
INJECTION, SOLUTION EPIDURAL; INFILTRATION; INTRACAUDAL; PERINEURAL AS NEEDED
Status: DISCONTINUED | OUTPATIENT
Start: 2021-01-01 | End: 2021-01-01 | Stop reason: SURG

## 2021-01-01 RX ORDER — BUSPIRONE HYDROCHLORIDE 5 MG/1
5 TABLET ORAL 3 TIMES DAILY
COMMUNITY

## 2021-01-01 RX ORDER — SODIUM CHLORIDE 0.9 % (FLUSH) 0.9 %
10 SYRINGE (ML) INJECTION EVERY 12 HOURS SCHEDULED
Status: DISCONTINUED | OUTPATIENT
Start: 2021-01-01 | End: 2021-01-01 | Stop reason: HOSPADM

## 2021-01-01 RX ORDER — FAMOTIDINE 10 MG/ML
20 INJECTION, SOLUTION INTRAVENOUS AS NEEDED
Status: DISCONTINUED | OUTPATIENT
Start: 2021-01-01 | End: 2021-01-01 | Stop reason: HOSPADM

## 2021-01-01 RX ORDER — OXYCODONE AND ACETAMINOPHEN 7.5; 325 MG/1; MG/1
1 TABLET ORAL EVERY 4 HOURS PRN
Qty: 12 TABLET | Refills: 0 | Status: SHIPPED | OUTPATIENT
Start: 2021-01-01

## 2021-01-01 RX ORDER — BUPROPION HYDROCHLORIDE 150 MG/1
150 TABLET ORAL DAILY
Start: 2021-01-01

## 2021-01-01 RX ORDER — SODIUM CHLORIDE, SODIUM LACTATE, POTASSIUM CHLORIDE, CALCIUM CHLORIDE 600; 310; 30; 20 MG/100ML; MG/100ML; MG/100ML; MG/100ML
1000 INJECTION, SOLUTION INTRAVENOUS CONTINUOUS
Status: DISCONTINUED | OUTPATIENT
Start: 2021-01-01 | End: 2021-01-01 | Stop reason: HOSPADM

## 2021-01-01 RX ORDER — FUROSEMIDE 10 MG/ML
40 INJECTION INTRAMUSCULAR; INTRAVENOUS
Status: DISCONTINUED | OUTPATIENT
Start: 2021-01-01 | End: 2021-01-01

## 2021-01-01 RX ORDER — FAMOTIDINE 10 MG/ML
20 INJECTION, SOLUTION INTRAVENOUS AS NEEDED
Status: CANCELLED | OUTPATIENT
Start: 2021-01-01

## 2021-01-01 RX ORDER — METOPROLOL TARTRATE 5 MG/5ML
2 INJECTION INTRAVENOUS ONCE AS NEEDED
Status: COMPLETED | OUTPATIENT
Start: 2021-01-01 | End: 2021-01-01

## 2021-01-01 RX ORDER — DIPHENHYDRAMINE HYDROCHLORIDE 50 MG/ML
50 INJECTION INTRAMUSCULAR; INTRAVENOUS AS NEEDED
Status: CANCELLED | OUTPATIENT
Start: 2021-01-01

## 2021-01-01 RX ORDER — METHYLPREDNISOLONE SODIUM SUCCINATE 40 MG/ML
40 INJECTION, POWDER, LYOPHILIZED, FOR SOLUTION INTRAMUSCULAR; INTRAVENOUS EVERY 8 HOURS
Status: DISCONTINUED | OUTPATIENT
Start: 2021-01-01 | End: 2021-01-01

## 2021-01-01 RX ORDER — FAMOTIDINE 10 MG/ML
20 INJECTION, SOLUTION INTRAVENOUS ONCE
Status: CANCELLED | OUTPATIENT
Start: 2021-01-01

## 2021-01-01 RX ORDER — SODIUM CHLORIDE 0.9 % (FLUSH) 0.9 %
10 SYRINGE (ML) INJECTION AS NEEDED
Status: DISCONTINUED | OUTPATIENT
Start: 2021-01-01 | End: 2021-01-01 | Stop reason: HOSPADM

## 2021-01-01 RX ORDER — MAGNESIUM SULFATE HEPTAHYDRATE 40 MG/ML
2 INJECTION, SOLUTION INTRAVENOUS ONCE
Status: COMPLETED | OUTPATIENT
Start: 2021-01-01 | End: 2021-01-01

## 2021-01-01 RX ORDER — CEFDINIR 300 MG/1
300 CAPSULE ORAL EVERY 12 HOURS SCHEDULED
Status: COMPLETED | OUTPATIENT
Start: 2021-01-01 | End: 2021-01-01

## 2021-01-01 RX ORDER — GUAIFENESIN 600 MG/1
1200 TABLET, EXTENDED RELEASE ORAL EVERY 12 HOURS SCHEDULED
Start: 2021-01-01

## 2021-01-01 RX ORDER — METOPROLOL SUCCINATE 25 MG/1
25 TABLET, EXTENDED RELEASE ORAL
Status: DISCONTINUED | OUTPATIENT
Start: 2021-01-01 | End: 2021-01-01 | Stop reason: HOSPADM

## 2021-01-01 RX ORDER — OXYCODONE HYDROCHLORIDE AND ACETAMINOPHEN 5; 325 MG/1; MG/1
1 TABLET ORAL ONCE AS NEEDED
Status: DISCONTINUED | OUTPATIENT
Start: 2021-01-01 | End: 2021-01-01 | Stop reason: HOSPADM

## 2021-01-01 RX ORDER — PALONOSETRON 0.05 MG/ML
0.25 INJECTION, SOLUTION INTRAVENOUS ONCE
Status: COMPLETED | OUTPATIENT
Start: 2021-01-01 | End: 2021-01-01

## 2021-01-01 RX ORDER — LIDOCAINE 50 MG/G
1 PATCH TOPICAL DAILY PRN
Status: ON HOLD | COMMUNITY
End: 2021-01-01

## 2021-01-01 RX ORDER — ONDANSETRON 2 MG/ML
4 INJECTION INTRAMUSCULAR; INTRAVENOUS ONCE AS NEEDED
Status: DISCONTINUED | OUTPATIENT
Start: 2021-01-01 | End: 2021-01-01 | Stop reason: HOSPADM

## 2021-01-01 RX ORDER — NITROGLYCERIN 0.4 MG/1
0.4 TABLET SUBLINGUAL
Refills: 12
Start: 2021-01-01

## 2021-01-01 RX ORDER — DIPHENHYDRAMINE HYDROCHLORIDE 50 MG/ML
50 INJECTION INTRAMUSCULAR; INTRAVENOUS AS NEEDED
Status: DISCONTINUED | OUTPATIENT
Start: 2021-01-01 | End: 2021-01-01 | Stop reason: HOSPADM

## 2021-01-01 RX ORDER — PROPOFOL 10 MG/ML
VIAL (ML) INTRAVENOUS AS NEEDED
Status: DISCONTINUED | OUTPATIENT
Start: 2021-01-01 | End: 2021-01-01 | Stop reason: SURG

## 2021-01-01 RX ORDER — ONDANSETRON HYDROCHLORIDE 8 MG/1
8 TABLET, FILM COATED ORAL EVERY 8 HOURS PRN
Qty: 30 TABLET | Refills: 3 | Status: ON HOLD | OUTPATIENT
Start: 2021-01-01 | End: 2021-01-01

## 2021-01-01 RX ORDER — FENTANYL CITRATE 50 UG/ML
25 INJECTION, SOLUTION INTRAMUSCULAR; INTRAVENOUS
Status: DISCONTINUED | OUTPATIENT
Start: 2021-01-01 | End: 2021-01-01 | Stop reason: HOSPADM

## 2021-01-01 RX ORDER — SODIUM CHLORIDE 0.9 % (FLUSH) 0.9 %
20 SYRINGE (ML) INJECTION AS NEEDED
Status: DISCONTINUED | OUTPATIENT
Start: 2021-01-01 | End: 2021-01-01 | Stop reason: HOSPADM

## 2021-01-01 RX ORDER — CEFDINIR 300 MG/1
300 CAPSULE ORAL 2 TIMES DAILY
COMMUNITY
Start: 2021-01-01 | End: 2021-01-01 | Stop reason: HOSPADM

## 2021-01-01 RX ORDER — NITROGLYCERIN 0.4 MG/1
0.4 TABLET SUBLINGUAL
Status: DISCONTINUED | OUTPATIENT
Start: 2021-01-01 | End: 2021-01-01 | Stop reason: HOSPADM

## 2021-01-01 RX ORDER — HEPARIN SODIUM (PORCINE) LOCK FLUSH IV SOLN 100 UNIT/ML 100 UNIT/ML
5 SOLUTION INTRAVENOUS AS NEEDED
Status: DISCONTINUED | OUTPATIENT
Start: 2021-01-01 | End: 2021-01-01 | Stop reason: HOSPADM

## 2021-01-01 RX ORDER — DIAZEPAM 10 MG/1
10 TABLET ORAL ONCE
COMMUNITY
End: 2021-01-01

## 2021-01-01 RX ORDER — ONDANSETRON 4 MG/1
4 TABLET, FILM COATED ORAL EVERY 6 HOURS PRN
Start: 2021-01-01 | End: 2021-01-01 | Stop reason: DRUGHIGH

## 2021-01-01 RX ORDER — LIDOCAINE AND PRILOCAINE 25; 25 MG/G; MG/G
CREAM TOPICAL
Qty: 30 G | Refills: 3 | Status: SHIPPED | OUTPATIENT
Start: 2021-01-01

## 2021-01-01 RX ORDER — FUROSEMIDE 40 MG/1
40 TABLET ORAL
Status: DISCONTINUED | OUTPATIENT
Start: 2021-01-01 | End: 2021-01-01

## 2021-01-01 RX ORDER — ONDANSETRON 4 MG/1
4 TABLET, FILM COATED ORAL EVERY 6 HOURS PRN
Status: DISCONTINUED | OUTPATIENT
Start: 2021-01-01 | End: 2021-01-01 | Stop reason: HOSPADM

## 2021-01-01 RX ORDER — OXYCODONE HYDROCHLORIDE 15 MG/1
15 TABLET, FILM COATED, EXTENDED RELEASE ORAL EVERY 12 HOURS SCHEDULED
Qty: 6 TABLET | Refills: 0 | Status: SHIPPED | OUTPATIENT
Start: 2021-01-01

## 2021-01-01 RX ORDER — HEPARIN SODIUM (PORCINE) LOCK FLUSH IV SOLN 100 UNIT/ML 100 UNIT/ML
500 SOLUTION INTRAVENOUS AS NEEDED
Status: DISCONTINUED | OUTPATIENT
Start: 2021-01-01 | End: 2021-01-01 | Stop reason: HOSPADM

## 2021-01-01 RX ORDER — LOSARTAN POTASSIUM 25 MG/1
25 TABLET ORAL
Start: 2021-01-01

## 2021-01-01 RX ORDER — HYDROCODONE BITARTRATE AND ACETAMINOPHEN 7.5; 325 MG/1; MG/1
1 TABLET ORAL EVERY 4 HOURS PRN
Qty: 15 TABLET | Refills: 0 | Status: ON HOLD | OUTPATIENT
Start: 2021-01-01 | End: 2021-01-01

## 2021-01-01 RX ORDER — PREDNISONE 20 MG/1
40 TABLET ORAL
Status: DISCONTINUED | OUTPATIENT
Start: 2021-01-01 | End: 2021-01-01

## 2021-01-01 RX ORDER — PROMETHAZINE HYDROCHLORIDE 25 MG/1
12.5 TABLET ORAL ONCE AS NEEDED
Status: DISCONTINUED | OUTPATIENT
Start: 2021-01-01 | End: 2021-01-01 | Stop reason: HOSPADM

## 2021-01-01 RX ORDER — SERTRALINE HYDROCHLORIDE 100 MG/1
100 TABLET, FILM COATED ORAL DAILY
Status: DISCONTINUED | OUTPATIENT
Start: 2021-01-01 | End: 2021-01-01 | Stop reason: HOSPADM

## 2021-01-01 RX ORDER — CEFAZOLIN SODIUM 1 G/3ML
INJECTION, POWDER, FOR SOLUTION INTRAMUSCULAR; INTRAVENOUS AS NEEDED
Status: DISCONTINUED | OUTPATIENT
Start: 2021-01-01 | End: 2021-01-01 | Stop reason: SURG

## 2021-01-01 RX ORDER — SODIUM CHLORIDE 0.9 % (FLUSH) 0.9 %
10 SYRINGE (ML) INJECTION AS NEEDED
Status: DISCONTINUED | OUTPATIENT
Start: 2021-01-01 | End: 2021-01-01 | Stop reason: SDUPTHER

## 2021-01-01 RX ORDER — ONDANSETRON HYDROCHLORIDE 8 MG/1
8 TABLET, FILM COATED ORAL EVERY 8 HOURS PRN
Qty: 45 TABLET | Refills: 5 | Status: SHIPPED | OUTPATIENT
Start: 2021-01-01

## 2021-01-01 RX ORDER — IPRATROPIUM BROMIDE AND ALBUTEROL SULFATE 2.5; .5 MG/3ML; MG/3ML
3 SOLUTION RESPIRATORY (INHALATION)
Status: DISCONTINUED | OUTPATIENT
Start: 2021-01-01 | End: 2021-01-01 | Stop reason: HOSPADM

## 2021-01-01 RX ORDER — OXYCODONE HYDROCHLORIDE 15 MG/1
15 TABLET, FILM COATED, EXTENDED RELEASE ORAL EVERY 12 HOURS SCHEDULED
Status: DISCONTINUED | OUTPATIENT
Start: 2021-01-01 | End: 2021-01-01 | Stop reason: HOSPADM

## 2021-01-01 RX ORDER — SODIUM CHLORIDE 9 MG/ML
250 INJECTION, SOLUTION INTRAVENOUS ONCE
Status: COMPLETED | OUTPATIENT
Start: 2021-01-01 | End: 2021-01-01

## 2021-01-01 RX ORDER — DIPHENHYDRAMINE HYDROCHLORIDE 50 MG/ML
50 INJECTION INTRAMUSCULAR; INTRAVENOUS AS NEEDED
Status: DISCONTINUED | OUTPATIENT
Start: 2021-01-01 | End: 2021-01-01

## 2021-01-01 RX ORDER — SODIUM CHLORIDE 0.9 % (FLUSH) 0.9 %
3 SYRINGE (ML) INJECTION AS NEEDED
Status: DISCONTINUED | OUTPATIENT
Start: 2021-01-01 | End: 2021-01-01 | Stop reason: HOSPADM

## 2021-01-01 RX ORDER — ONDANSETRON 4 MG/1
4 TABLET, FILM COATED ORAL ONCE AS NEEDED
Status: DISCONTINUED | OUTPATIENT
Start: 2021-01-01 | End: 2021-01-01 | Stop reason: HOSPADM

## 2021-01-01 RX ORDER — FENTANYL CITRATE 50 UG/ML
INJECTION, SOLUTION INTRAMUSCULAR; INTRAVENOUS AS NEEDED
Status: DISCONTINUED | OUTPATIENT
Start: 2021-01-01 | End: 2021-01-01 | Stop reason: SURG

## 2021-01-01 RX ORDER — PREDNISONE 20 MG/1
20 TABLET ORAL
Status: DISCONTINUED | OUTPATIENT
Start: 2021-01-01 | End: 2021-01-01 | Stop reason: HOSPADM

## 2021-01-01 RX ORDER — PREDNISONE 20 MG/1
40 TABLET ORAL ONCE
Status: COMPLETED | OUTPATIENT
Start: 2021-01-01 | End: 2021-01-01

## 2021-01-01 RX ORDER — POTASSIUM CHLORIDE 750 MG/1
40 CAPSULE, EXTENDED RELEASE ORAL EVERY 4 HOURS
Status: DISCONTINUED | OUTPATIENT
Start: 2021-01-01 | End: 2021-01-01

## 2021-01-01 RX ORDER — FLUMAZENIL 0.1 MG/ML
0.2 INJECTION INTRAVENOUS AS NEEDED
Status: DISCONTINUED | OUTPATIENT
Start: 2021-01-01 | End: 2021-01-01 | Stop reason: HOSPADM

## 2021-01-01 RX ORDER — FAMOTIDINE 10 MG/ML
20 INJECTION, SOLUTION INTRAVENOUS ONCE
Status: COMPLETED | OUTPATIENT
Start: 2021-01-01 | End: 2021-01-01

## 2021-01-01 RX ORDER — METHYLPREDNISOLONE SODIUM SUCCINATE 40 MG/ML
20 INJECTION, POWDER, LYOPHILIZED, FOR SOLUTION INTRAMUSCULAR; INTRAVENOUS EVERY 12 HOURS
Status: DISCONTINUED | OUTPATIENT
Start: 2021-01-01 | End: 2021-01-01

## 2021-01-01 RX ORDER — ACETAMINOPHEN 325 MG/1
650 TABLET ORAL EVERY 4 HOURS PRN
Status: DISCONTINUED | OUTPATIENT
Start: 2021-01-01 | End: 2021-01-01 | Stop reason: HOSPADM

## 2021-01-01 RX ORDER — METOPROLOL SUCCINATE 25 MG/1
25 TABLET, EXTENDED RELEASE ORAL
Start: 2021-01-01

## 2021-01-01 RX ORDER — METHYLPREDNISOLONE SODIUM SUCCINATE 125 MG/2ML
60 INJECTION, POWDER, LYOPHILIZED, FOR SOLUTION INTRAMUSCULAR; INTRAVENOUS EVERY 8 HOURS SCHEDULED
Status: DISCONTINUED | OUTPATIENT
Start: 2021-01-01 | End: 2021-01-01

## 2021-01-01 RX ORDER — LOSARTAN POTASSIUM 50 MG/1
25 TABLET ORAL
Status: DISCONTINUED | OUTPATIENT
Start: 2021-01-01 | End: 2021-01-01 | Stop reason: HOSPADM

## 2021-01-01 RX ORDER — OXYCODONE HCL 10 MG/1
10 TABLET, FILM COATED, EXTENDED RELEASE ORAL EVERY 12 HOURS SCHEDULED
Status: DISCONTINUED | OUTPATIENT
Start: 2021-01-01 | End: 2021-01-01

## 2021-01-01 RX ORDER — SODIUM CHLORIDE 0.9 % (FLUSH) 0.9 %
3-10 SYRINGE (ML) INJECTION AS NEEDED
Status: DISCONTINUED | OUTPATIENT
Start: 2021-01-01 | End: 2021-01-01 | Stop reason: HOSPADM

## 2021-01-01 RX ORDER — POTASSIUM CHLORIDE 750 MG/1
40 CAPSULE, EXTENDED RELEASE ORAL
Status: COMPLETED | OUTPATIENT
Start: 2021-01-01 | End: 2021-01-01

## 2021-01-01 RX ORDER — FUROSEMIDE 10 MG/ML
40 INJECTION INTRAMUSCULAR; INTRAVENOUS ONCE
Status: COMPLETED | OUTPATIENT
Start: 2021-01-01 | End: 2021-01-01

## 2021-01-01 RX ORDER — BUDESONIDE AND FORMOTEROL FUMARATE DIHYDRATE 80; 4.5 UG/1; UG/1
2 AEROSOL RESPIRATORY (INHALATION)
Status: DISCONTINUED | OUTPATIENT
Start: 2021-01-01 | End: 2021-01-01 | Stop reason: HOSPADM

## 2021-01-01 RX ORDER — POTASSIUM CHLORIDE 7.45 MG/ML
10 INJECTION INTRAVENOUS
Status: DISCONTINUED | OUTPATIENT
Start: 2021-01-01 | End: 2021-01-01 | Stop reason: HOSPADM

## 2021-01-01 RX ORDER — PROCHLORPERAZINE MALEATE 10 MG
10 TABLET ORAL EVERY 6 HOURS PRN
Qty: 60 TABLET | Refills: 5 | Status: SHIPPED | OUTPATIENT
Start: 2021-01-01

## 2021-01-01 RX ORDER — LABETALOL HYDROCHLORIDE 5 MG/ML
5 INJECTION, SOLUTION INTRAVENOUS
Status: DISCONTINUED | OUTPATIENT
Start: 2021-01-01 | End: 2021-01-01 | Stop reason: HOSPADM

## 2021-01-01 RX ORDER — SODIUM CHLORIDE, SODIUM LACTATE, POTASSIUM CHLORIDE, CALCIUM CHLORIDE 600; 310; 30; 20 MG/100ML; MG/100ML; MG/100ML; MG/100ML
100 INJECTION, SOLUTION INTRAVENOUS CONTINUOUS
Status: DISCONTINUED | OUTPATIENT
Start: 2021-01-01 | End: 2021-01-01 | Stop reason: HOSPADM

## 2021-01-01 RX ORDER — OXYCODONE HYDROCHLORIDE AND ACETAMINOPHEN 5; 325 MG/1; MG/1
1 TABLET ORAL 2 TIMES DAILY PRN
Status: DISCONTINUED | OUTPATIENT
Start: 2021-01-01 | End: 2021-01-01

## 2021-01-01 RX ORDER — BUPROPION HYDROCHLORIDE 150 MG/1
150 TABLET ORAL DAILY
Status: DISCONTINUED | OUTPATIENT
Start: 2021-01-01 | End: 2021-01-01 | Stop reason: HOSPADM

## 2021-01-01 RX ORDER — HYDROMORPHONE HYDROCHLORIDE 1 MG/ML
0.5 INJECTION, SOLUTION INTRAMUSCULAR; INTRAVENOUS; SUBCUTANEOUS
Status: CANCELLED | OUTPATIENT
Start: 2021-01-01

## 2021-01-01 RX ORDER — OXYCODONE HYDROCHLORIDE AND ACETAMINOPHEN 5; 325 MG/1; MG/1
1.5 TABLET ORAL 2 TIMES DAILY PRN
Status: DISCONTINUED | OUTPATIENT
Start: 2021-01-01 | End: 2021-01-01

## 2021-01-01 RX ORDER — POTASSIUM CHLORIDE 750 MG/1
40 CAPSULE, EXTENDED RELEASE ORAL AS NEEDED
Status: DISCONTINUED | OUTPATIENT
Start: 2021-01-01 | End: 2021-01-01 | Stop reason: HOSPADM

## 2021-01-01 RX ADMIN — IPRATROPIUM BROMIDE AND ALBUTEROL SULFATE 3 ML: 2.5; .5 SOLUTION RESPIRATORY (INHALATION) at 06:37

## 2021-01-01 RX ADMIN — BUDESONIDE AND FORMOTEROL FUMARATE DIHYDRATE 2 PUFF: 80; 4.5 AEROSOL RESPIRATORY (INHALATION) at 07:24

## 2021-01-01 RX ADMIN — IPRATROPIUM BROMIDE AND ALBUTEROL SULFATE 3 ML: 2.5; .5 SOLUTION RESPIRATORY (INHALATION) at 19:49

## 2021-01-01 RX ADMIN — SODIUM CHLORIDE, PRESERVATIVE FREE 10 ML: 5 INJECTION INTRAVENOUS at 21:01

## 2021-01-01 RX ADMIN — BUPROPION HYDROCHLORIDE 150 MG: 150 TABLET, FILM COATED, EXTENDED RELEASE ORAL at 08:23

## 2021-01-01 RX ADMIN — ENOXAPARIN SODIUM 40 MG: 40 INJECTION SUBCUTANEOUS at 18:07

## 2021-01-01 RX ADMIN — CEFEPIME HYDROCHLORIDE 2 G: 2 INJECTION, POWDER, FOR SOLUTION INTRAVENOUS at 20:31

## 2021-01-01 RX ADMIN — FAMOTIDINE 20 MG: 10 INJECTION INTRAVENOUS at 11:20

## 2021-01-01 RX ADMIN — ACETAMINOPHEN 650 MG: 325 TABLET, FILM COATED ORAL at 18:24

## 2021-01-01 RX ADMIN — SERTRALINE 100 MG: 100 TABLET, FILM COATED ORAL at 08:04

## 2021-01-01 RX ADMIN — PREDNISONE 30 MG: 20 TABLET ORAL at 09:32

## 2021-01-01 RX ADMIN — BUDESONIDE AND FORMOTEROL FUMARATE DIHYDRATE 2 PUFF: 80; 4.5 AEROSOL RESPIRATORY (INHALATION) at 18:54

## 2021-01-01 RX ADMIN — FENTANYL CITRATE 25 MCG: 50 INJECTION, SOLUTION INTRAMUSCULAR; INTRAVENOUS at 14:26

## 2021-01-01 RX ADMIN — OXYCODONE HYDROCHLORIDE AND ACETAMINOPHEN 1.5 TABLET: 5; 325 TABLET ORAL at 22:06

## 2021-01-01 RX ADMIN — PREDNISONE 20 MG: 20 TABLET ORAL at 08:23

## 2021-01-01 RX ADMIN — METOPROLOL SUCCINATE 25 MG: 25 TABLET, EXTENDED RELEASE ORAL at 13:01

## 2021-01-01 RX ADMIN — POTASSIUM CHLORIDE 40 MEQ: 750 CAPSULE, EXTENDED RELEASE ORAL at 17:16

## 2021-01-01 RX ADMIN — SODIUM CHLORIDE, PRESERVATIVE FREE 10 ML: 5 INJECTION INTRAVENOUS at 09:17

## 2021-01-01 RX ADMIN — ONDANSETRON HYDROCHLORIDE 4 MG: 2 SOLUTION INTRAMUSCULAR; INTRAVENOUS at 18:27

## 2021-01-01 RX ADMIN — DEXAMETHASONE SODIUM PHOSPHATE 20 MG: 10 INJECTION, SOLUTION INTRAMUSCULAR; INTRAVENOUS at 11:45

## 2021-01-01 RX ADMIN — IPRATROPIUM BROMIDE AND ALBUTEROL SULFATE 3 ML: 2.5; .5 SOLUTION RESPIRATORY (INHALATION) at 10:04

## 2021-01-01 RX ADMIN — METOPROLOL TARTRATE 2 MG: 5 INJECTION INTRAVENOUS at 13:45

## 2021-01-01 RX ADMIN — SERTRALINE 100 MG: 100 TABLET, FILM COATED ORAL at 09:32

## 2021-01-01 RX ADMIN — CEFDINIR 300 MG: 300 CAPSULE ORAL at 09:23

## 2021-01-01 RX ADMIN — BUPROPION HYDROCHLORIDE 150 MG: 150 TABLET, FILM COATED, EXTENDED RELEASE ORAL at 08:07

## 2021-01-01 RX ADMIN — FUROSEMIDE 40 MG: 40 TABLET ORAL at 08:32

## 2021-01-01 RX ADMIN — OXYCODONE HYDROCHLORIDE AND ACETAMINOPHEN 1.5 TABLET: 5; 325 TABLET ORAL at 08:28

## 2021-01-01 RX ADMIN — IPRATROPIUM BROMIDE AND ALBUTEROL SULFATE 3 ML: 2.5; .5 SOLUTION RESPIRATORY (INHALATION) at 10:52

## 2021-01-01 RX ADMIN — OXYCODONE HYDROCHLORIDE AND ACETAMINOPHEN 1 TABLET: 7.5; 325 TABLET ORAL at 08:17

## 2021-01-01 RX ADMIN — CEFAZOLIN 1 G: 1 INJECTION, POWDER, FOR SOLUTION INTRAMUSCULAR; INTRAVENOUS; PARENTERAL at 14:09

## 2021-01-01 RX ADMIN — ENOXAPARIN SODIUM 40 MG: 40 INJECTION SUBCUTANEOUS at 17:38

## 2021-01-01 RX ADMIN — CEFDINIR 300 MG: 300 CAPSULE ORAL at 20:44

## 2021-01-01 RX ADMIN — GUAIFENESIN 1200 MG: 600 TABLET, EXTENDED RELEASE ORAL at 21:22

## 2021-01-01 RX ADMIN — SODIUM CHLORIDE 360 MG: 9 INJECTION, SOLUTION INTRAVENOUS at 09:54

## 2021-01-01 RX ADMIN — SODIUM CHLORIDE, PRESERVATIVE FREE 10 ML: 5 INJECTION INTRAVENOUS at 08:16

## 2021-01-01 RX ADMIN — SERTRALINE 100 MG: 100 TABLET, FILM COATED ORAL at 09:05

## 2021-01-01 RX ADMIN — IPRATROPIUM BROMIDE AND ALBUTEROL SULFATE 3 ML: 2.5; .5 SOLUTION RESPIRATORY (INHALATION) at 10:50

## 2021-01-01 RX ADMIN — OXYCODONE HYDROCHLORIDE AND ACETAMINOPHEN 1 TABLET: 7.5; 325 TABLET ORAL at 21:40

## 2021-01-01 RX ADMIN — FUROSEMIDE 40 MG: 40 TABLET ORAL at 08:22

## 2021-01-01 RX ADMIN — SODIUM CHLORIDE, PRESERVATIVE FREE 10 ML: 5 INJECTION INTRAVENOUS at 11:33

## 2021-01-01 RX ADMIN — SODIUM CHLORIDE, PRESERVATIVE FREE 10 ML: 5 INJECTION INTRAVENOUS at 21:20

## 2021-01-01 RX ADMIN — SODIUM CHLORIDE 250 ML: 9 INJECTION, SOLUTION INTRAVENOUS at 09:29

## 2021-01-01 RX ADMIN — IPRATROPIUM BROMIDE AND ALBUTEROL SULFATE 3 ML: 2.5; .5 SOLUTION RESPIRATORY (INHALATION) at 06:32

## 2021-01-01 RX ADMIN — METHYLPREDNISOLONE SODIUM SUCCINATE 60 MG: 125 INJECTION, POWDER, FOR SOLUTION INTRAMUSCULAR; INTRAVENOUS at 06:42

## 2021-01-01 RX ADMIN — FENTANYL CITRATE 25 MCG: 50 INJECTION, SOLUTION INTRAMUSCULAR; INTRAVENOUS at 14:20

## 2021-01-01 RX ADMIN — FOSAPREPITANT 150 MG: 150 INJECTION, POWDER, LYOPHILIZED, FOR SOLUTION INTRAVENOUS at 12:03

## 2021-01-01 RX ADMIN — OXYCODONE HYDROCHLORIDE AND ACETAMINOPHEN 1.5 TABLET: 5; 325 TABLET ORAL at 06:08

## 2021-01-01 RX ADMIN — IPRATROPIUM BROMIDE AND ALBUTEROL SULFATE 3 ML: 2.5; .5 SOLUTION RESPIRATORY (INHALATION) at 19:20

## 2021-01-01 RX ADMIN — GUAIFENESIN 1200 MG: 600 TABLET, EXTENDED RELEASE ORAL at 08:07

## 2021-01-01 RX ADMIN — HEPARIN 500 UNITS: 100 SYRINGE at 11:34

## 2021-01-01 RX ADMIN — ENOXAPARIN SODIUM 40 MG: 40 INJECTION SUBCUTANEOUS at 17:56

## 2021-01-01 RX ADMIN — LOSARTAN POTASSIUM 25 MG: 50 TABLET, FILM COATED ORAL at 13:01

## 2021-01-01 RX ADMIN — PREDNISONE 40 MG: 20 TABLET ORAL at 11:54

## 2021-01-01 RX ADMIN — PALONOSETRON 0.25 MG: 0.05 INJECTION, SOLUTION INTRAVENOUS at 09:41

## 2021-01-01 RX ADMIN — BUPROPION HYDROCHLORIDE 150 MG: 150 TABLET, FILM COATED, EXTENDED RELEASE ORAL at 09:19

## 2021-01-01 RX ADMIN — OXYCODONE HYDROCHLORIDE 10 MG: 10 TABLET, FILM COATED, EXTENDED RELEASE ORAL at 20:08

## 2021-01-01 RX ADMIN — IPRATROPIUM BROMIDE AND ALBUTEROL SULFATE 3 ML: 2.5; .5 SOLUTION RESPIRATORY (INHALATION) at 07:24

## 2021-01-01 RX ADMIN — OXYCODONE HYDROCHLORIDE 10 MG: 10 TABLET, FILM COATED, EXTENDED RELEASE ORAL at 21:51

## 2021-01-01 RX ADMIN — CEFEPIME HYDROCHLORIDE 2 G: 2 INJECTION, POWDER, FOR SOLUTION INTRAVENOUS at 03:05

## 2021-01-01 RX ADMIN — GUAIFENESIN 1200 MG: 600 TABLET, EXTENDED RELEASE ORAL at 08:23

## 2021-01-01 RX ADMIN — OXYCODONE HYDROCHLORIDE AND ACETAMINOPHEN 1.5 TABLET: 5; 325 TABLET ORAL at 14:16

## 2021-01-01 RX ADMIN — OXYCODONE HYDROCHLORIDE AND ACETAMINOPHEN 1 TABLET: 7.5; 325 TABLET ORAL at 07:27

## 2021-01-01 RX ADMIN — CEFEPIME HYDROCHLORIDE 2 G: 2 INJECTION, POWDER, FOR SOLUTION INTRAVENOUS at 18:18

## 2021-01-01 RX ADMIN — PREDNISONE 40 MG: 20 TABLET ORAL at 08:12

## 2021-01-01 RX ADMIN — GUAIFENESIN 1200 MG: 600 TABLET, EXTENDED RELEASE ORAL at 08:50

## 2021-01-01 RX ADMIN — ENOXAPARIN SODIUM 40 MG: 40 INJECTION SUBCUTANEOUS at 18:09

## 2021-01-01 RX ADMIN — FUROSEMIDE 40 MG: 40 TABLET ORAL at 09:22

## 2021-01-01 RX ADMIN — GUAIFENESIN 1200 MG: 600 TABLET, EXTENDED RELEASE ORAL at 09:19

## 2021-01-01 RX ADMIN — FUROSEMIDE 40 MG: 10 INJECTION, SOLUTION INTRAMUSCULAR; INTRAVENOUS at 15:18

## 2021-01-01 RX ADMIN — SODIUM CHLORIDE 360 MG: 9 INJECTION, SOLUTION INTRAVENOUS at 12:37

## 2021-01-01 RX ADMIN — BUDESONIDE AND FORMOTEROL FUMARATE DIHYDRATE 2 PUFF: 80; 4.5 AEROSOL RESPIRATORY (INHALATION) at 18:56

## 2021-01-01 RX ADMIN — Medication 500 UNITS: at 14:24

## 2021-01-01 RX ADMIN — FUROSEMIDE 40 MG: 40 TABLET ORAL at 09:19

## 2021-01-01 RX ADMIN — LIDOCAINE HYDROCHLORIDE 40 MG: 20 INJECTION, SOLUTION EPIDURAL; INFILTRATION; INTRACAUDAL; PERINEURAL at 14:06

## 2021-01-01 RX ADMIN — OXYCODONE HYDROCHLORIDE AND ACETAMINOPHEN 1.5 TABLET: 5; 325 TABLET ORAL at 17:10

## 2021-01-01 RX ADMIN — FUROSEMIDE 40 MG: 40 TABLET ORAL at 08:23

## 2021-01-01 RX ADMIN — BUDESONIDE AND FORMOTEROL FUMARATE DIHYDRATE 2 PUFF: 80; 4.5 AEROSOL RESPIRATORY (INHALATION) at 20:03

## 2021-01-01 RX ADMIN — IPRATROPIUM BROMIDE AND ALBUTEROL SULFATE 3 ML: 2.5; .5 SOLUTION RESPIRATORY (INHALATION) at 10:48

## 2021-01-01 RX ADMIN — POTASSIUM CHLORIDE 40 MEQ: 750 CAPSULE, EXTENDED RELEASE ORAL at 18:25

## 2021-01-01 RX ADMIN — IPRATROPIUM BROMIDE AND ALBUTEROL SULFATE 3 ML: 2.5; .5 SOLUTION RESPIRATORY (INHALATION) at 10:11

## 2021-01-01 RX ADMIN — SODIUM CHLORIDE, PRESERVATIVE FREE 10 ML: 5 INJECTION INTRAVENOUS at 20:21

## 2021-01-01 RX ADMIN — IPRATROPIUM BROMIDE AND ALBUTEROL SULFATE 3 ML: 2.5; .5 SOLUTION RESPIRATORY (INHALATION) at 14:11

## 2021-01-01 RX ADMIN — BUDESONIDE AND FORMOTEROL FUMARATE DIHYDRATE 2 PUFF: 80; 4.5 AEROSOL RESPIRATORY (INHALATION) at 06:37

## 2021-01-01 RX ADMIN — OXYCODONE HYDROCHLORIDE AND ACETAMINOPHEN 1.5 TABLET: 5; 325 TABLET ORAL at 09:32

## 2021-01-01 RX ADMIN — SERTRALINE 100 MG: 100 TABLET, FILM COATED ORAL at 08:12

## 2021-01-01 RX ADMIN — BUDESONIDE AND FORMOTEROL FUMARATE DIHYDRATE 2 PUFF: 80; 4.5 AEROSOL RESPIRATORY (INHALATION) at 06:39

## 2021-01-01 RX ADMIN — IPRATROPIUM BROMIDE AND ALBUTEROL SULFATE 3 ML: 2.5; .5 SOLUTION RESPIRATORY (INHALATION) at 06:25

## 2021-01-01 RX ADMIN — IPRATROPIUM BROMIDE AND ALBUTEROL SULFATE 3 ML: 2.5; .5 SOLUTION RESPIRATORY (INHALATION) at 14:13

## 2021-01-01 RX ADMIN — PALONOSETRON HYDROCHLORIDE 0.25 MG: 0.25 INJECTION, SOLUTION INTRAVENOUS at 11:23

## 2021-01-01 RX ADMIN — METHYLPREDNISOLONE SODIUM SUCCINATE 60 MG: 125 INJECTION, POWDER, FOR SOLUTION INTRAMUSCULAR; INTRAVENOUS at 21:25

## 2021-01-01 RX ADMIN — FUROSEMIDE 40 MG: 40 TABLET ORAL at 09:05

## 2021-01-01 RX ADMIN — IPRATROPIUM BROMIDE AND ALBUTEROL SULFATE 3 ML: 2.5; .5 SOLUTION RESPIRATORY (INHALATION) at 20:03

## 2021-01-01 RX ADMIN — ENOXAPARIN SODIUM 40 MG: 40 INJECTION SUBCUTANEOUS at 17:54

## 2021-01-01 RX ADMIN — IPRATROPIUM BROMIDE AND ALBUTEROL SULFATE 3 ML: 2.5; .5 SOLUTION RESPIRATORY (INHALATION) at 19:07

## 2021-01-01 RX ADMIN — OXYCODONE HYDROCHLORIDE AND ACETAMINOPHEN 1.5 TABLET: 5; 325 TABLET ORAL at 08:16

## 2021-01-01 RX ADMIN — SODIUM CHLORIDE, PRESERVATIVE FREE 10 ML: 5 INJECTION INTRAVENOUS at 08:50

## 2021-01-01 RX ADMIN — ENOXAPARIN SODIUM 40 MG: 40 INJECTION SUBCUTANEOUS at 17:30

## 2021-01-01 RX ADMIN — BUPROPION HYDROCHLORIDE 150 MG: 150 TABLET, FILM COATED, EXTENDED RELEASE ORAL at 09:05

## 2021-01-01 RX ADMIN — FUROSEMIDE 40 MG: 40 TABLET ORAL at 08:07

## 2021-01-01 RX ADMIN — PREDNISONE 20 MG: 20 TABLET ORAL at 09:19

## 2021-01-01 RX ADMIN — OXYCODONE HYDROCHLORIDE AND ACETAMINOPHEN 1.5 TABLET: 5; 325 TABLET ORAL at 21:00

## 2021-01-01 RX ADMIN — SODIUM CHLORIDE, PRESERVATIVE FREE 10 ML: 5 INJECTION INTRAVENOUS at 16:40

## 2021-01-01 RX ADMIN — ENOXAPARIN SODIUM 40 MG: 40 INJECTION SUBCUTANEOUS at 16:49

## 2021-01-01 RX ADMIN — BUDESONIDE AND FORMOTEROL FUMARATE DIHYDRATE 2 PUFF: 80; 4.5 AEROSOL RESPIRATORY (INHALATION) at 07:10

## 2021-01-01 RX ADMIN — IPRATROPIUM BROMIDE AND ALBUTEROL SULFATE 3 ML: 2.5; .5 SOLUTION RESPIRATORY (INHALATION) at 10:12

## 2021-01-01 RX ADMIN — OXYCODONE HYDROCHLORIDE 10 MG: 10 TABLET, FILM COATED, EXTENDED RELEASE ORAL at 09:13

## 2021-01-01 RX ADMIN — VANCOMYCIN HYDROCHLORIDE 1250 MG: 10 INJECTION, POWDER, LYOPHILIZED, FOR SOLUTION INTRAVENOUS at 20:31

## 2021-01-01 RX ADMIN — BUDESONIDE AND FORMOTEROL FUMARATE DIHYDRATE 2 PUFF: 80; 4.5 AEROSOL RESPIRATORY (INHALATION) at 19:48

## 2021-01-01 RX ADMIN — CARBOPLATIN 450 MG: 10 INJECTION, SOLUTION INTRAVENOUS at 15:17

## 2021-01-01 RX ADMIN — IPRATROPIUM BROMIDE AND ALBUTEROL SULFATE 3 ML: 2.5; .5 SOLUTION RESPIRATORY (INHALATION) at 10:35

## 2021-01-01 RX ADMIN — PACLITAXEL 290 MG: 6 INJECTION, SOLUTION INTRAVENOUS at 12:19

## 2021-01-01 RX ADMIN — ONDANSETRON HYDROCHLORIDE 4 MG: 2 SOLUTION INTRAMUSCULAR; INTRAVENOUS at 11:06

## 2021-01-01 RX ADMIN — SODIUM CHLORIDE, POTASSIUM CHLORIDE, SODIUM LACTATE AND CALCIUM CHLORIDE 1000 ML: 600; 310; 30; 20 INJECTION, SOLUTION INTRAVENOUS at 13:00

## 2021-01-01 RX ADMIN — OXYCODONE HYDROCHLORIDE AND ACETAMINOPHEN 1.5 TABLET: 5; 325 TABLET ORAL at 22:21

## 2021-01-01 RX ADMIN — IPRATROPIUM BROMIDE AND ALBUTEROL SULFATE 3 ML: 2.5; .5 SOLUTION RESPIRATORY (INHALATION) at 14:19

## 2021-01-01 RX ADMIN — IPRATROPIUM BROMIDE AND ALBUTEROL SULFATE 3 ML: 2.5; .5 SOLUTION RESPIRATORY (INHALATION) at 06:39

## 2021-01-01 RX ADMIN — IPRATROPIUM BROMIDE AND ALBUTEROL SULFATE 3 ML: 2.5; .5 SOLUTION RESPIRATORY (INHALATION) at 15:31

## 2021-01-01 RX ADMIN — CEFDINIR 300 MG: 300 CAPSULE ORAL at 21:01

## 2021-01-01 RX ADMIN — SODIUM CHLORIDE 100 MG: 9 INJECTION, SOLUTION INTRAVENOUS at 11:38

## 2021-01-01 RX ADMIN — METHYLPREDNISOLONE SODIUM SUCCINATE 40 MG: 40 INJECTION, POWDER, FOR SOLUTION INTRAMUSCULAR; INTRAVENOUS at 12:38

## 2021-01-01 RX ADMIN — POTASSIUM CHLORIDE 40 MEQ: 750 CAPSULE, EXTENDED RELEASE ORAL at 13:07

## 2021-01-01 RX ADMIN — Medication 500 UNITS: at 15:56

## 2021-01-01 RX ADMIN — GUAIFENESIN 1200 MG: 600 TABLET, EXTENDED RELEASE ORAL at 09:33

## 2021-01-01 RX ADMIN — IPRATROPIUM BROMIDE AND ALBUTEROL SULFATE 3 ML: 2.5; .5 SOLUTION RESPIRATORY (INHALATION) at 10:16

## 2021-01-01 RX ADMIN — SERTRALINE 100 MG: 100 TABLET, FILM COATED ORAL at 08:17

## 2021-01-01 RX ADMIN — OXYCODONE HYDROCHLORIDE AND ACETAMINOPHEN 1 TABLET: 7.5; 325 TABLET ORAL at 15:32

## 2021-01-01 RX ADMIN — SERTRALINE 100 MG: 100 TABLET, FILM COATED ORAL at 11:54

## 2021-01-01 RX ADMIN — FUROSEMIDE 40 MG: 40 TABLET ORAL at 09:33

## 2021-01-01 RX ADMIN — SODIUM CHLORIDE, PRESERVATIVE FREE 10 ML: 5 INJECTION INTRAVENOUS at 08:32

## 2021-01-01 RX ADMIN — OXYCODONE HYDROCHLORIDE AND ACETAMINOPHEN 1 TABLET: 5; 325 TABLET ORAL at 14:17

## 2021-01-01 RX ADMIN — OXYCODONE HYDROCHLORIDE AND ACETAMINOPHEN 1 TABLET: 7.5; 325 TABLET ORAL at 12:15

## 2021-01-01 RX ADMIN — OXYCODONE HYDROCHLORIDE AND ACETAMINOPHEN 1.5 TABLET: 5; 325 TABLET ORAL at 10:22

## 2021-01-01 RX ADMIN — SODIUM CHLORIDE, PRESERVATIVE FREE 10 ML: 5 INJECTION INTRAVENOUS at 09:20

## 2021-01-01 RX ADMIN — BUDESONIDE AND FORMOTEROL FUMARATE DIHYDRATE 2 PUFF: 80; 4.5 AEROSOL RESPIRATORY (INHALATION) at 06:56

## 2021-01-01 RX ADMIN — FUROSEMIDE 40 MG: 10 INJECTION, SOLUTION INTRAMUSCULAR; INTRAVENOUS at 08:02

## 2021-01-01 RX ADMIN — BUPROPION HYDROCHLORIDE 150 MG: 150 TABLET, FILM COATED, EXTENDED RELEASE ORAL at 09:23

## 2021-01-01 RX ADMIN — CEFDINIR 300 MG: 300 CAPSULE ORAL at 09:32

## 2021-01-01 RX ADMIN — OXYCODONE HYDROCHLORIDE AND ACETAMINOPHEN 1 TABLET: 7.5; 325 TABLET ORAL at 12:50

## 2021-01-01 RX ADMIN — SODIUM CHLORIDE, PRESERVATIVE FREE 10 ML: 5 INJECTION INTRAVENOUS at 09:23

## 2021-01-01 RX ADMIN — IPRATROPIUM BROMIDE AND ALBUTEROL SULFATE 3 ML: 2.5; .5 SOLUTION RESPIRATORY (INHALATION) at 18:53

## 2021-01-01 RX ADMIN — ENOXAPARIN SODIUM 40 MG: 40 INJECTION SUBCUTANEOUS at 17:13

## 2021-01-01 RX ADMIN — PREDNISONE 20 MG: 20 TABLET ORAL at 09:05

## 2021-01-01 RX ADMIN — METHYLPREDNISOLONE SODIUM SUCCINATE 60 MG: 125 INJECTION, POWDER, FOR SOLUTION INTRAMUSCULAR; INTRAVENOUS at 15:18

## 2021-01-01 RX ADMIN — CEFEPIME HYDROCHLORIDE 2 G: 2 INJECTION, POWDER, FOR SOLUTION INTRAVENOUS at 10:45

## 2021-01-01 RX ADMIN — PERFLUTREN 8.48 MG: 6.52 INJECTION, SUSPENSION INTRAVENOUS at 13:26

## 2021-01-01 RX ADMIN — MAGNESIUM SULFATE IN WATER 2 G: 40 INJECTION, SOLUTION INTRAVENOUS at 14:04

## 2021-01-01 RX ADMIN — TECHNETIUM TC 99M OXIDRONATE 1 DOSE: 3.15 INJECTION, POWDER, LYOPHILIZED, FOR SOLUTION INTRAVENOUS at 09:00

## 2021-01-01 RX ADMIN — OXYCODONE HYDROCHLORIDE AND ACETAMINOPHEN 1 TABLET: 7.5; 325 TABLET ORAL at 16:48

## 2021-01-01 RX ADMIN — ONDANSETRON HYDROCHLORIDE 4 MG: 2 SOLUTION INTRAMUSCULAR; INTRAVENOUS at 11:59

## 2021-01-01 RX ADMIN — FUROSEMIDE 40 MG: 40 TABLET ORAL at 08:50

## 2021-01-01 RX ADMIN — IPRATROPIUM BROMIDE AND ALBUTEROL SULFATE 3 ML: 2.5; .5 SOLUTION RESPIRATORY (INHALATION) at 14:07

## 2021-01-01 RX ADMIN — GUAIFENESIN 1200 MG: 600 TABLET, EXTENDED RELEASE ORAL at 20:08

## 2021-01-01 RX ADMIN — ONDANSETRON 4 MG: 4 TABLET, FILM COATED ORAL at 06:07

## 2021-01-01 RX ADMIN — ENOXAPARIN SODIUM 40 MG: 40 INJECTION SUBCUTANEOUS at 18:25

## 2021-01-01 RX ADMIN — POTASSIUM CHLORIDE 40 MEQ: 750 CAPSULE, EXTENDED RELEASE ORAL at 10:40

## 2021-01-01 RX ADMIN — CEFEPIME HYDROCHLORIDE 2 G: 2 INJECTION, POWDER, FOR SOLUTION INTRAVENOUS at 18:48

## 2021-01-01 RX ADMIN — METOPROLOL SUCCINATE 25 MG: 25 TABLET, EXTENDED RELEASE ORAL at 08:22

## 2021-01-01 RX ADMIN — SODIUM CHLORIDE 100 MG: 9 INJECTION, SOLUTION INTRAVENOUS at 10:36

## 2021-01-01 RX ADMIN — GUAIFENESIN 1200 MG: 600 TABLET, EXTENDED RELEASE ORAL at 22:21

## 2021-01-01 RX ADMIN — BUDESONIDE AND FORMOTEROL FUMARATE DIHYDRATE 2 PUFF: 80; 4.5 AEROSOL RESPIRATORY (INHALATION) at 19:40

## 2021-01-01 RX ADMIN — SODIUM CHLORIDE 250 ML: 9 INJECTION, SOLUTION INTRAVENOUS at 09:30

## 2021-01-01 RX ADMIN — CEFEPIME HYDROCHLORIDE 2 G: 2 INJECTION, POWDER, FOR SOLUTION INTRAVENOUS at 11:53

## 2021-01-01 RX ADMIN — SODIUM CHLORIDE 360 MG: 9 INJECTION, SOLUTION INTRAVENOUS at 11:00

## 2021-01-01 RX ADMIN — METOPROLOL SUCCINATE 25 MG: 25 TABLET, EXTENDED RELEASE ORAL at 08:07

## 2021-01-01 RX ADMIN — PROPOFOL 300 MG: 10 INJECTION, EMULSION INTRAVENOUS at 14:06

## 2021-01-01 RX ADMIN — GUAIFENESIN 1200 MG: 600 TABLET, EXTENDED RELEASE ORAL at 21:20

## 2021-01-01 RX ADMIN — SODIUM CHLORIDE, PRESERVATIVE FREE 10 ML: 5 INJECTION INTRAVENOUS at 08:23

## 2021-01-01 RX ADMIN — BUDESONIDE AND FORMOTEROL FUMARATE DIHYDRATE 2 PUFF: 80; 4.5 AEROSOL RESPIRATORY (INHALATION) at 19:37

## 2021-01-01 RX ADMIN — METHYLPREDNISOLONE SODIUM SUCCINATE 60 MG: 125 INJECTION, POWDER, FOR SOLUTION INTRAMUSCULAR; INTRAVENOUS at 21:30

## 2021-01-01 RX ADMIN — OXYCODONE HYDROCHLORIDE AND ACETAMINOPHEN 1.5 TABLET: 5; 325 TABLET ORAL at 08:49

## 2021-01-01 RX ADMIN — OXYCODONE HYDROCHLORIDE AND ACETAMINOPHEN 1.5 TABLET: 5; 325 TABLET ORAL at 21:14

## 2021-01-01 RX ADMIN — IPRATROPIUM BROMIDE AND ALBUTEROL SULFATE 3 ML: 2.5; .5 SOLUTION RESPIRATORY (INHALATION) at 14:39

## 2021-01-01 RX ADMIN — IPRATROPIUM BROMIDE AND ALBUTEROL SULFATE 3 ML: 2.5; .5 SOLUTION RESPIRATORY (INHALATION) at 06:55

## 2021-01-01 RX ADMIN — OXYCODONE HYDROCHLORIDE 10 MG: 10 TABLET, FILM COATED, EXTENDED RELEASE ORAL at 11:09

## 2021-01-01 RX ADMIN — BUPROPION HYDROCHLORIDE 150 MG: 150 TABLET, FILM COATED, EXTENDED RELEASE ORAL at 08:50

## 2021-01-01 RX ADMIN — ENOXAPARIN SODIUM 40 MG: 40 INJECTION SUBCUTANEOUS at 17:11

## 2021-01-01 RX ADMIN — OXYCODONE HYDROCHLORIDE AND ACETAMINOPHEN 1.5 TABLET: 5; 325 TABLET ORAL at 22:01

## 2021-01-01 RX ADMIN — SODIUM CHLORIDE, PRESERVATIVE FREE 10 ML: 5 INJECTION INTRAVENOUS at 20:44

## 2021-01-01 RX ADMIN — OXYCODONE HYDROCHLORIDE AND ACETAMINOPHEN 1 TABLET: 7.5; 325 TABLET ORAL at 14:08

## 2021-01-01 RX ADMIN — OXYCODONE HYDROCHLORIDE AND ACETAMINOPHEN 1.5 TABLET: 5; 325 TABLET ORAL at 20:44

## 2021-01-01 RX ADMIN — BUDESONIDE AND FORMOTEROL FUMARATE DIHYDRATE 2 PUFF: 80; 4.5 AEROSOL RESPIRATORY (INHALATION) at 19:05

## 2021-01-01 RX ADMIN — ENOXAPARIN SODIUM 40 MG: 40 INJECTION SUBCUTANEOUS at 18:00

## 2021-01-01 RX ADMIN — FOSAPREPITANT 150 MG: 150 INJECTION, POWDER, LYOPHILIZED, FOR SOLUTION INTRAVENOUS at 10:32

## 2021-01-01 RX ADMIN — PACLITAXEL 345 MG: 6 INJECTION, SOLUTION INTRAVENOUS at 13:11

## 2021-01-01 RX ADMIN — IPRATROPIUM BROMIDE AND ALBUTEROL SULFATE 3 ML: 2.5; .5 SOLUTION RESPIRATORY (INHALATION) at 14:36

## 2021-01-01 RX ADMIN — LOSARTAN POTASSIUM 25 MG: 50 TABLET, FILM COATED ORAL at 08:22

## 2021-01-01 RX ADMIN — ONDANSETRON HYDROCHLORIDE 4 MG: 2 SOLUTION INTRAMUSCULAR; INTRAVENOUS at 12:48

## 2021-01-01 RX ADMIN — IPRATROPIUM BROMIDE AND ALBUTEROL SULFATE 3 ML: 2.5; .5 SOLUTION RESPIRATORY (INHALATION) at 07:10

## 2021-01-01 RX ADMIN — CEFEPIME HYDROCHLORIDE 2 G: 2 INJECTION, POWDER, FOR SOLUTION INTRAVENOUS at 20:21

## 2021-01-01 RX ADMIN — ENOXAPARIN SODIUM 40 MG: 40 INJECTION SUBCUTANEOUS at 18:08

## 2021-01-01 RX ADMIN — IOPAMIDOL 100 ML: 755 INJECTION, SOLUTION INTRAVENOUS at 11:29

## 2021-01-01 RX ADMIN — FUROSEMIDE 40 MG: 10 INJECTION, SOLUTION INTRAMUSCULAR; INTRAVENOUS at 14:30

## 2021-01-01 RX ADMIN — ONDANSETRON HYDROCHLORIDE 4 MG: 2 SOLUTION INTRAMUSCULAR; INTRAVENOUS at 15:17

## 2021-01-01 RX ADMIN — SERTRALINE 100 MG: 100 TABLET, FILM COATED ORAL at 08:50

## 2021-01-01 RX ADMIN — PREDNISONE 40 MG: 20 TABLET ORAL at 09:22

## 2021-01-01 RX ADMIN — LOSARTAN POTASSIUM 25 MG: 50 TABLET, FILM COATED ORAL at 09:19

## 2021-01-01 RX ADMIN — CEFEPIME HYDROCHLORIDE 2 G: 2 INJECTION, POWDER, FOR SOLUTION INTRAVENOUS at 10:41

## 2021-01-01 RX ADMIN — GUAIFENESIN 1200 MG: 600 TABLET, EXTENDED RELEASE ORAL at 08:17

## 2021-01-01 RX ADMIN — DIPHENHYDRAMINE HYDROCHLORIDE 50 MG: 50 INJECTION, SOLUTION INTRAMUSCULAR; INTRAVENOUS at 09:44

## 2021-01-01 RX ADMIN — BUPROPION HYDROCHLORIDE 150 MG: 150 TABLET, FILM COATED, EXTENDED RELEASE ORAL at 08:22

## 2021-01-01 RX ADMIN — OXYCODONE HYDROCHLORIDE AND ACETAMINOPHEN 1 TABLET: 7.5; 325 TABLET ORAL at 08:04

## 2021-01-01 RX ADMIN — METHYLPREDNISOLONE SODIUM SUCCINATE 40 MG: 40 INJECTION, POWDER, FOR SOLUTION INTRAMUSCULAR; INTRAVENOUS at 05:48

## 2021-01-01 RX ADMIN — OXYCODONE HYDROCHLORIDE AND ACETAMINOPHEN 1 TABLET: 7.5; 325 TABLET ORAL at 22:16

## 2021-01-01 RX ADMIN — OXYCODONE HYDROCHLORIDE AND ACETAMINOPHEN 1 TABLET: 7.5; 325 TABLET ORAL at 16:17

## 2021-01-01 RX ADMIN — PREDNISONE 20 MG: 20 TABLET ORAL at 08:07

## 2021-01-01 RX ADMIN — IOPAMIDOL 100 ML: 612 INJECTION, SOLUTION INTRAVENOUS at 09:38

## 2021-01-01 RX ADMIN — DEXAMETHASONE SODIUM PHOSPHATE 20 MG: 10 INJECTION, SOLUTION INTRAMUSCULAR; INTRAVENOUS at 10:05

## 2021-01-01 RX ADMIN — BUPROPION HYDROCHLORIDE 150 MG: 150 TABLET, FILM COATED, EXTENDED RELEASE ORAL at 09:32

## 2021-01-01 RX ADMIN — SODIUM CHLORIDE 250 ML: 9 INJECTION, SOLUTION INTRAVENOUS at 11:20

## 2021-01-01 RX ADMIN — POTASSIUM CHLORIDE 40 MEQ: 750 CAPSULE, EXTENDED RELEASE ORAL at 14:43

## 2021-01-01 RX ADMIN — IPRATROPIUM BROMIDE AND ALBUTEROL SULFATE 3 ML: 2.5; .5 SOLUTION RESPIRATORY (INHALATION) at 06:54

## 2021-01-01 RX ADMIN — OXYCODONE HYDROCHLORIDE AND ACETAMINOPHEN 1 TABLET: 5; 325 TABLET ORAL at 03:05

## 2021-01-01 RX ADMIN — GUAIFENESIN 1200 MG: 600 TABLET, EXTENDED RELEASE ORAL at 21:28

## 2021-01-01 RX ADMIN — CARBOPLATIN 360 MG: 10 INJECTION, SOLUTION INTRAVENOUS at 16:00

## 2021-01-01 RX ADMIN — OXYCODONE HYDROCHLORIDE AND ACETAMINOPHEN 1.5 TABLET: 5; 325 TABLET ORAL at 08:12

## 2021-01-01 RX ADMIN — VANCOMYCIN HYDROCHLORIDE 1250 MG: 10 INJECTION, POWDER, LYOPHILIZED, FOR SOLUTION INTRAVENOUS at 06:42

## 2021-01-01 RX ADMIN — SODIUM CHLORIDE, PRESERVATIVE FREE 10 ML: 5 INJECTION INTRAVENOUS at 13:00

## 2021-01-01 RX ADMIN — METHYLPREDNISOLONE SODIUM SUCCINATE 60 MG: 125 INJECTION, POWDER, FOR SOLUTION INTRAMUSCULAR; INTRAVENOUS at 14:30

## 2021-01-01 RX ADMIN — SODIUM CHLORIDE, PRESERVATIVE FREE 10 ML: 5 INJECTION INTRAVENOUS at 21:08

## 2021-01-01 RX ADMIN — POTASSIUM CHLORIDE 40 MEQ: 750 CAPSULE, EXTENDED RELEASE ORAL at 06:07

## 2021-01-01 RX ADMIN — OXYCODONE HYDROCHLORIDE AND ACETAMINOPHEN 1 TABLET: 7.5; 325 TABLET ORAL at 02:20

## 2021-01-01 RX ADMIN — GUAIFENESIN 1200 MG: 600 TABLET, EXTENDED RELEASE ORAL at 21:51

## 2021-01-01 RX ADMIN — FENTANYL CITRATE 50 MCG: 50 INJECTION, SOLUTION INTRAMUSCULAR; INTRAVENOUS at 14:06

## 2021-01-01 RX ADMIN — IPRATROPIUM BROMIDE AND ALBUTEROL SULFATE 3 ML: 2.5; .5 SOLUTION RESPIRATORY (INHALATION) at 10:23

## 2021-01-01 RX ADMIN — IPRATROPIUM BROMIDE AND ALBUTEROL SULFATE 3 ML: 2.5; .5 SOLUTION RESPIRATORY (INHALATION) at 19:05

## 2021-01-01 RX ADMIN — IOPAMIDOL 100 ML: 612 INJECTION, SOLUTION INTRAVENOUS at 13:45

## 2021-01-01 RX ADMIN — SODIUM CHLORIDE, PRESERVATIVE FREE 10 ML: 5 INJECTION INTRAVENOUS at 15:55

## 2021-01-01 RX ADMIN — METHYLPREDNISOLONE SODIUM SUCCINATE 20 MG: 40 INJECTION, POWDER, FOR SOLUTION INTRAMUSCULAR; INTRAVENOUS at 01:57

## 2021-01-01 RX ADMIN — POTASSIUM CHLORIDE 40 MEQ: 750 CAPSULE, EXTENDED RELEASE ORAL at 14:30

## 2021-01-01 RX ADMIN — FUROSEMIDE 40 MG: 40 TABLET ORAL at 17:30

## 2021-01-01 RX ADMIN — IPRATROPIUM BROMIDE AND ALBUTEROL SULFATE 3 ML: 2.5; .5 SOLUTION RESPIRATORY (INHALATION) at 14:10

## 2021-01-01 RX ADMIN — FAMOTIDINE 20 MG: 10 INJECTION INTRAVENOUS at 09:41

## 2021-01-01 RX ADMIN — FUROSEMIDE 40 MG: 40 TABLET ORAL at 14:05

## 2021-01-01 RX ADMIN — IPRATROPIUM BROMIDE AND ALBUTEROL SULFATE 3 ML: 2.5; .5 SOLUTION RESPIRATORY (INHALATION) at 19:40

## 2021-01-01 RX ADMIN — VANCOMYCIN HYDROCHLORIDE 1250 MG: 10 INJECTION, POWDER, LYOPHILIZED, FOR SOLUTION INTRAVENOUS at 08:05

## 2021-01-01 RX ADMIN — ONDANSETRON HYDROCHLORIDE 4 MG: 2 SOLUTION INTRAMUSCULAR; INTRAVENOUS at 09:10

## 2021-01-01 RX ADMIN — IPRATROPIUM BROMIDE AND ALBUTEROL SULFATE 3 ML: 2.5; .5 SOLUTION RESPIRATORY (INHALATION) at 06:28

## 2021-01-01 RX ADMIN — CEFAZOLIN 1 G: 330 INJECTION, POWDER, FOR SOLUTION INTRAMUSCULAR; INTRAVENOUS at 14:09

## 2021-01-01 RX ADMIN — IPRATROPIUM BROMIDE AND ALBUTEROL SULFATE 3 ML: 2.5; .5 SOLUTION RESPIRATORY (INHALATION) at 18:56

## 2021-01-01 RX ADMIN — MAGNESIUM SULFATE HEPTAHYDRATE 2 G: 40 INJECTION, SOLUTION INTRAVENOUS at 15:52

## 2021-01-01 RX ADMIN — IPRATROPIUM BROMIDE AND ALBUTEROL SULFATE 3 ML: 2.5; .5 SOLUTION RESPIRATORY (INHALATION) at 19:37

## 2021-01-01 RX ADMIN — LOSARTAN POTASSIUM 25 MG: 50 TABLET, FILM COATED ORAL at 08:07

## 2021-01-01 RX ADMIN — GUAIFENESIN 1200 MG: 600 TABLET, EXTENDED RELEASE ORAL at 09:25

## 2021-01-01 RX ADMIN — LOSARTAN POTASSIUM 25 MG: 50 TABLET, FILM COATED ORAL at 09:06

## 2021-01-01 RX ADMIN — MAGNESIUM SULFATE IN WATER 2 G: 40 INJECTION, SOLUTION INTRAVENOUS at 13:10

## 2021-01-01 RX ADMIN — IPRATROPIUM BROMIDE AND ALBUTEROL SULFATE 3 ML: 2.5; .5 SOLUTION RESPIRATORY (INHALATION) at 10:19

## 2021-01-01 RX ADMIN — IPRATROPIUM BROMIDE AND ALBUTEROL SULFATE 3 ML: 2.5; .5 SOLUTION RESPIRATORY (INHALATION) at 14:43

## 2021-01-01 RX ADMIN — GUAIFENESIN 1200 MG: 600 TABLET, EXTENDED RELEASE ORAL at 09:05

## 2021-01-01 RX ADMIN — METOPROLOL SUCCINATE 25 MG: 25 TABLET, EXTENDED RELEASE ORAL at 09:05

## 2021-01-01 RX ADMIN — PREDNISONE 20 MG: 20 TABLET ORAL at 08:17

## 2021-01-01 RX ADMIN — IPRATROPIUM BROMIDE AND ALBUTEROL SULFATE 3 ML: 2.5; .5 SOLUTION RESPIRATORY (INHALATION) at 14:08

## 2021-01-01 RX ADMIN — METOPROLOL SUCCINATE 25 MG: 25 TABLET, EXTENDED RELEASE ORAL at 09:19

## 2021-01-01 RX ADMIN — DIPHENHYDRAMINE HYDROCHLORIDE 50 MG: 50 INJECTION, SOLUTION INTRAMUSCULAR; INTRAVENOUS at 11:25

## 2021-01-01 RX ADMIN — CEFEPIME HYDROCHLORIDE 2 G: 2 INJECTION, POWDER, FOR SOLUTION INTRAVENOUS at 02:40

## 2021-01-01 RX ADMIN — PREDNISONE 30 MG: 20 TABLET ORAL at 08:50

## 2021-01-01 RX ADMIN — OXYCODONE HYDROCHLORIDE 10 MG: 10 TABLET, FILM COATED, EXTENDED RELEASE ORAL at 09:18

## 2021-01-01 RX ADMIN — SERTRALINE 100 MG: 100 TABLET, FILM COATED ORAL at 08:23

## 2021-01-01 RX ADMIN — POTASSIUM CHLORIDE 40 MEQ: 750 CAPSULE, EXTENDED RELEASE ORAL at 18:09

## 2021-01-01 RX ADMIN — Medication 500 UNITS: at 16:41

## 2021-01-01 RX ADMIN — VANCOMYCIN HYDROCHLORIDE 1250 MG: 10 INJECTION, POWDER, LYOPHILIZED, FOR SOLUTION INTRAVENOUS at 18:17

## 2021-01-01 RX ADMIN — IPRATROPIUM BROMIDE AND ALBUTEROL SULFATE 3 ML: 2.5; .5 SOLUTION RESPIRATORY (INHALATION) at 10:15

## 2021-01-01 RX ADMIN — IPRATROPIUM BROMIDE AND ALBUTEROL SULFATE 3 ML: 2.5; .5 SOLUTION RESPIRATORY (INHALATION) at 19:10

## 2021-01-01 RX ADMIN — BUDESONIDE AND FORMOTEROL FUMARATE DIHYDRATE 2 PUFF: 80; 4.5 AEROSOL RESPIRATORY (INHALATION) at 19:00

## 2021-01-01 RX ADMIN — GUAIFENESIN 1200 MG: 600 TABLET, EXTENDED RELEASE ORAL at 20:44

## 2021-01-01 RX ADMIN — FILGRASTIM 480 MCG: 480 INJECTION, SOLUTION INTRAVENOUS; SUBCUTANEOUS at 17:55

## 2021-01-01 RX ADMIN — SODIUM CHLORIDE, PRESERVATIVE FREE 10 ML: 5 INJECTION INTRAVENOUS at 20:32

## 2021-01-01 RX ADMIN — IPRATROPIUM BROMIDE AND ALBUTEROL SULFATE 3 ML: 2.5; .5 SOLUTION RESPIRATORY (INHALATION) at 06:48

## 2021-01-01 RX ADMIN — BUDESONIDE AND FORMOTEROL FUMARATE DIHYDRATE 2 PUFF: 80; 4.5 AEROSOL RESPIRATORY (INHALATION) at 06:52

## 2021-01-01 RX ADMIN — OXYCODONE HYDROCHLORIDE AND ACETAMINOPHEN 1 TABLET: 7.5; 325 TABLET ORAL at 12:41

## 2021-01-01 RX ADMIN — SERTRALINE 100 MG: 100 TABLET, FILM COATED ORAL at 09:19

## 2021-01-01 RX ADMIN — SERTRALINE 100 MG: 100 TABLET, FILM COATED ORAL at 09:22

## 2021-01-01 RX ADMIN — FILGRASTIM 480 MCG: 480 INJECTION, SOLUTION INTRAVENOUS; SUBCUTANEOUS at 17:30

## 2021-01-01 RX ADMIN — CEFEPIME HYDROCHLORIDE 2 G: 2 INJECTION, POWDER, FOR SOLUTION INTRAVENOUS at 02:00

## 2021-01-01 RX ADMIN — IPRATROPIUM BROMIDE AND ALBUTEROL SULFATE 3 ML: 2.5; .5 SOLUTION RESPIRATORY (INHALATION) at 19:00

## 2021-01-14 NOTE — TELEPHONE ENCOUNTER
Pt's wife called regarding testing that she states Dr Riddle said the patient would be needing. Last OV was 1/5/21. She can be reached at [phone].

## 2021-01-25 NOTE — TELEPHONE ENCOUNTER
Wife left  message stating pt due for COVID test tomorrow and bx on Friday.  She is wondering if this could be resched for 2-3 weeks from now.  States it is too hard for pt to get down the steps at this time.  Can reach her at #[phone]/[email or web address]

## 2021-02-08 NOTE — TELEPHONE ENCOUNTER
Relayed message to Dr. Riddle as patient and wife have rescheduled needle biopsy several times already. Dr. Riddle called and discussed with patient and wife over speaker phone and discussed the concern with rescheduling needle biopsy of lung nodule worrisome for malignancy. Patient states he is essentially wheelchair bound and transportation is very difficult at this time. Patient and wife verbalize understanding of the risks of disease progression with prolonging biopsy and wish to have biopsy in 1 month (beginning of march). Will repeat CT scan of the chest prior to needle biopsy in march and arrange follow up office visit  Patient and wife verbalize understanding.     CT chest contrasted ordered. Please schedule and arrange office visit after scan for the first week of march per patient request. Will reschedule needle biopsy after review of repeat imaging in March.

## 2021-02-08 NOTE — TELEPHONE ENCOUNTER
Wife calling to resched PFT & bx until first week of March.  States they are waiting for a wheelchair ramp to be built for their home and they should have this done by then.  Can reach her at #[phone]/brigida

## 2021-02-12 NOTE — TELEPHONE ENCOUNTER
Called wife to explain that daughter had called with these questions. Daughter is not listed on verbal release form therefore we are unable to speak with her regarding this. Wife states she will let her daughter know why bx was canceled and what the plan is going forward. Patient will be seen in office 3/3 with CT.

## 2021-02-12 NOTE — TELEPHONE ENCOUNTER
Wife left vm mess that her daughter told them she saw on pt's MyChart acct that the bx appt for 3-1-21 was cx'd and she is calling to find out why.  Can reach her at #[phone]/brigida

## 2021-02-15 PROBLEM — Z87.891 HISTORY OF TOBACCO USE: Status: ACTIVE | Noted: 2021-01-01

## 2021-02-15 NOTE — PROGRESS NOTES
Chief Complaint   Patient presents with   • Lung Nodule     New patient from [place].          Subjective     History of Present Illness    Mr. Ortiz is a 70-year-old male with a history of COPD with hypercapnia, cirrhosis secondary to alcohol intake, history of previous cardiac arrest, history of perforated colon with ileostomy in place, previous tobacco smoker quit in 2018 but continues with oral chew, and is wheelchair-bound presents for the evaluation of multiple lung nodules identified during his last admission for shortness of breath and chest pain.  He denies unintended weight loss, hoarseness of voice, chest pain, hemoptysis, history of pneumonia, or cough.  It is noted that he has had recent COVID-19 infection.  With aforementioned I been asked to evaluate his bilateral lung nodules.        Review of Systems   Constitutional: Positive for fatigue. Negative for activity change, appetite change, chills, diaphoresis, fever and unexpected weight change.   HENT: Negative for dental problem, hearing loss, nosebleeds, sore throat, trouble swallowing and voice change.    Eyes: Negative for photophobia, redness and visual disturbance.   Respiratory: Positive for shortness of breath. Negative for apnea, cough, chest tightness, wheezing and stridor.    Cardiovascular: Positive for leg swelling. Negative for chest pain and palpitations.   Gastrointestinal: Positive for abdominal distention. Negative for abdominal pain, blood in stool, constipation, diarrhea, nausea and vomiting.   Endocrine: Negative for cold intolerance, heat intolerance, polyphagia and polyuria.   Genitourinary: Negative for decreased urine volume, difficulty urinating, dysuria, flank pain, frequency, hematuria and urgency.   Musculoskeletal: Positive for back pain and gait problem. Negative for arthralgias, joint swelling, myalgias and neck pain.   Skin: Negative for pallor, rash and wound.   Allergic/Immunologic: Negative for immunocompromised  state.   Neurological: Positive for weakness. Negative for dizziness, tremors, seizures, syncope, speech difficulty, light-headedness, numbness and headaches.   Hematological: Does not bruise/bleed easily.   Psychiatric/Behavioral: Negative for confusion, sleep disturbance and suicidal ideas. The patient is not nervous/anxious.           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    • Nevus    • Perforation of colon (CMS/HCC) 2018   • Venous insufficiency    • Weakness of extremity     LEFT SIDE   • Wheelchair dependent      Past Surgical History:   Procedure Laterality Date   • COLON SURGERY      x4   • EYE SURGERY Bilateral     cataracts    • FLAP HEAD/NECK Left 10/23/2018    Procedure: LEFT CHEEK ADVANCEMENT FLAP CLOSURE OF LIP AND CHEEK,5CM X 6CM IPSILATERAL LEFT SEPTAL MUCOSAL HINGE FLAP,2CM X 3CM;  Surgeon: Izaiah Ceron MD;  Location: [facility] OR;  Service: ENT   • FLAP HEAD/NECK Bilateral 11/13/2018    Procedure: Pedicle division and flap inset of nose;  Surgeon: Izaiah Ceron MD;  Location: [facility] OR;  Service: ENT   • HEAD/NECK LESION/CYST EXCISION Left 10/23/2018    Procedure: LEFT PARAMEDIAN FOREHEAD FLAP WITH PRESERVATION OF VASCULAR PEDICLE;  Surgeon: Izaiah Ceron MD;  Location:  [facility] OR;  Service: ENT   • SKIN FULL THICKNESS GRAFT Left 10/23/2018    Procedure: Conchal cartilage graft from right ear to left nose with complex closure of skin of forehead;  Surgeon: Izaiah Ceron MD;  Location:  [facility] OR;  Service: ENT     Family History   Problem Relation Age of Onset   • Cancer Mother    • Diabetes Mother    • Cancer Father      Social History     Tobacco Use   • Smoking  "status: Former Smoker     Years: 50.00     Types: Cigarettes     Quit date: 2018     Years since quittin.8   • Smokeless tobacco: Former User     Types: Chew   Substance Use Topics   • Alcohol use: No     Comment: quit 2018/ hx of alchohlism    • Drug use: No     Current Outpatient Medications   Medication Sig Dispense Refill   • budesonide-formoterol (SYMBICORT) 80-4.5 MCG/ACT inhaler Inhale 2 puffs 2 (Two) Times a Day. 1 inhaler 12   • buPROPion XL (WELLBUTRIN XL) 150 MG 24 hr tablet Take 150 mg by mouth Daily.     • furosemide (LASIX) 20 MG tablet Take 2 tablets by mouth Daily. 60 tablet 3   • ipratropium-albuterol (DUO-NEB) 0.5-2.5 mg/3 ml nebulizer Take 3 mL by nebulization Every 6 (Six) Hours. For COPD J44.9 360 mL 3   • oxybutynin (DITROPAN) 5 MG tablet Take 5 mg by mouth Daily.     • oxyCODONE-acetaminophen (PERCOCET) 7.5-325 MG per tablet Take 1 tablet by mouth 2 (two) times a day.     • pantoprazole (PROTONIX) 40 MG EC tablet Take 40 mg by mouth Daily.     • potassium chloride (KAYCIEL) 20 MEQ/15ML (10%) solution Take 20 mEq by mouth Daily.     • propranolol (INDERAL) 20 MG tablet Take 20 mg by mouth Daily.     • sertraline (ZOLOFT) 100 MG tablet Take 100 mg by mouth Daily.     • tamsulosin (FLOMAX) 0.4 MG capsule 24 hr capsule Take 0.4 mg by mouth Daily.     • traZODone (DESYREL) 50 MG tablet Take 50 mg by mouth At Night As Needed for Sleep.       No current facility-administered medications for this visit.      Allergies:  Patient has no known allergies.    Objective      Vital Signs  Visit Vitals  /64 (BP Location: Right arm, Patient Position: Sitting, Cuff Size: Large Adult)   Pulse 81   Ht 172.7 cm (68\")   Wt 108 kg (238 lb)   SpO2 94%   BMI 36.19 kg/m²         Physical Exam  Constitutional:       Appearance: He is well-developed.   HENT:      Head: Normocephalic and atraumatic.   Eyes:      Pupils: Pupils are equal, round, and reactive to light.   Neck:      Musculoskeletal: " Normal range of motion and neck supple.      Thyroid: No thyromegaly.      Vascular: No JVD.      Trachea: No tracheal deviation.   Cardiovascular:      Rate and Rhythm: Normal rate and regular rhythm.      Heart sounds: Normal heart sounds. No murmur. No friction rub. No gallop.    Pulmonary:      Effort: Pulmonary effort is normal. No respiratory distress.      Breath sounds: Normal breath sounds. No wheezing or rales.   Chest:      Chest wall: No tenderness.   Abdominal:      General: There is no distension.      Palpations: Abdomen is soft.      Tenderness: There is no abdominal tenderness.   Musculoskeletal: Normal range of motion.   Lymphadenopathy:      Cervical: No cervical adenopathy.   Skin:     General: Skin is warm and dry.   Neurological:      Mental Status: He is alert and oriented to person, place, and time.      Cranial Nerves: No cranial nerve deficit.         Results Review:     EXAMINATION: CT ANGIOGRAM CHEST- 11/27/2020 10:41 AM CST     HISTORY: Shortness of breath, chest pain, possible nodule     COMPARISON: Chest x-ray 11/27/2020     DOSE: 509 mGy-cm     TECHNIQUE: Sequential imaging was performed from the thoracic inlet  through the upper abdomen following the administration of IV contrast.   Sagittal and coronal reformations were made from the original source  data and reviewed. Additionally, 3-D MIPS reconstructions of the vessels  were made per CTA protocol. Automated exposure control was also utilized  to decrease patient radiation dose.     FINDINGS:   The visualized thyroid gland is unremarkable. Trachea and main bronchi  appear widely patent and in normal anatomic position. The esophagus is  grossly normal in appearance.     There is bilateral gynecomastia.     No pathologically enlarged axillary, mediastinal, or hilar lymph nodes  are identified.     The heart appears normal in size. There is no pericardial or pleural  effusion. Coronary artery calcifications are present.  Atherosclerotic  calcifications are also seen in the aorta and its branch vessels. The  ascending thoracic aorta appears normal in caliber. Main pulmonary  artery is dilated, suggesting pulmonary arterial hypertension. There is  suboptimal contrast bolus timing for evaluation of the pulmonary  arteries. No central or proximal segmental filling defects are  identified to suggest PE.     There is diffuse bronchial wall thickening. There is a noncalcified  nodule in the perihilar left upper lobe which measures 1.7 x 1.7 cm in  size. There is pleural thickening/parenchymal scarring in the lingula.  There is scarring in the left lower lobe. There is a noncalcified nodule  in the right upper lobe which measures 2.5 x 1.7 cm in size. An  additional subpleural nodule posteriorly in the right upper lobe  measures 7 mm in size. A pleural-based nodule in the right upper lobe  measures 9 mm in size. A tiny subpleural nodule in the right lower lobe  measures 4 mm in size.     Review of the visualized portion of the upper abdomen demonstrates  cirrhotic liver morphology. Numerous stones are seen in the gallbladder.  There is mild mesenteric edema.     Review of the visualized osseous structures demonstrates a superior  endplate lucency at T11 of uncertain significance. Degenerative spurring  is noted in the spine. There is evidence of an old sternal fracture. Old  left-sided rib fractures are evident. Tiny round sclerotic foci are seen  in multiple left-sided ribs which are too small to characterize.     IMPRESSION:  1. Limited evaluation of the pulmonary arteries due to contrast bolus  timing. No evidence of PE or acute aortic pathology when allowing for  this.     2. Atherosclerosis of the aorta and coronary arteries.  3. Findings suggestive of pulmonary arterial hypertension.  4. Multiple noncalcified pulmonary nodules bilaterally concerning for  metastatic disease.  5. Cirrhotic liver morphology.  6. Cholelithiasis.          "  This report was finalized on 11/27/2020 10:47 by Dr. Jeet Bellamy MD.    Study Result    EXAM: NM PET SKULL BASE TO MID THIGH- - 12/7/2020 8:12 AM CST     HISTORY: R91.1; R91.1-Solitary pulmonary nodule; R91.8-Other nonspecific  abnormal finding of lung field. On chart review, history of terminal  ileum and subtotal colectomy with pathology report for 20/7/2019  indicating \"ischemic colon with perforation in the cecum. Ischemic  changes primarily involve the cecum and ascending colon. Viable proximal  and distal resection margins. Unremarkable terminal ileum mucosa.  Unremarkable appendix. No evidence of malignancy.\"     COMPARISON: 11/27/2020, 11/20/2020.      TECHNIQUE:   PET CT from skull base to thighs was performed after the intravenous  administration of 10.63 mCi F-18 FDG. Additional administration details  recorded by technologist. CT evaluation is limited due to lack of  intravenous contrast as well as tidal breathing motion required for  accurate PET attenuation correction.     DOSE LENGTH PRODUCT: 1751 mGy cm. Automatic exposure control was  utilized to make radiation dose as low as reasonably achievable.     FINDINGS:      SKULL BASE and NECK:   No lymphadenopathy in the neck. Normal CT appearance of the thyroid.     There is mild asymmetric uptake of the left posterior nasopharynx or  tonsil with maximum SUV 5.3, and fused series slice 166.     CHEST:   Right upper lobe 1.7 cm pulmonary nodule with maximum SUV 19.2.     Left upper lobe 1.7 cm pulmonary nodule with maximum SUV 6.8.     There are other smaller pulmonary nodules, similar compared to prior,  not optimally evaluated due to small size.     Borderline heart size. Coronary artery calcifications. No pericardial  effusion.     Aorta normal in course and caliber with calcified atherosclerosis.  Normal caliber pulmonary artery.     Esophagus normal in course and caliber. No mediastinal adenopathy.     ABDOMEN and PELVIS:   Mildly heterogeneous " FDG uptake in the liver. Nodular liver contour.  Gallstones. No bile duct dilation. Unenhanced pancreas, spleen, adrenal  glands appear within normal limits.     No hydronephrosis or nephrolithiasis. Normal appearance of the urinary  bladder. Normal size prostate.     No free air or free fluid in the abdomen. Changes of prior partial  colectomy with ileostomy. Parastomal fat and bowel containing hernia. No  evidence of bowel obstruction.     There is a focus of FDG uptake along the colon suture line with maximum  SUV 6.0.     Aorta normal in course and caliber with calcified atherosclerosis. No  adenopathy.     MUSCULOSKELETAL:  Diffusely heterogeneous FDG uptake in the bones, concerning for bony  metastatic disease. No convincing bone lesion by CT.        IMPRESSION:  1. There are 2 FDG avid pulmonary nodules, which could represent primary  or metastatic malignancy.  2. Mild asymmetric uptake in the left posterior nasopharynx/tonsil.  Recommend direct visualization.  3. Focus of FDG uptake along the colon suture line with maximum SUV 6.0.     4. Diffusely heterogeneous FDG uptake in the bones, which could be seen  with bony metastatic disease or bone marrow activation. Correlate with  patient history. No convincing corollary CT lesions appreciated at this  time.   5. Nodular liver contour with heterogeneous FDG uptake suggests chronic  liver disease.  6. Gallstones.  This report was finalized on 12/07/2020 10:44 by Dr Amie Atkins MD.          I reviewed the patient's new clinical results.  Discussed with patient and wife      Assessment/Plan       Diagnoses and all orders for this visit:    1. Multiple lung nodules on CT (Primary)  -     Pulmonary Function Test; Future    2. Presence of ileostomy (CMS/HCC)    3. Acute on chronic respiratory failure with hypoxia and hypercapnia (CMS/HCC)    4. Chronic respiratory failure with hypercapnia (CMS/HCC)  -     Pulmonary Function Test; Future    5. History of tobacco  use    6. Pulmonary nodules/lesions, multiple          I discussed the patients findings and my recommendations with patient and wife.    We discussed the differential diagnoses possible with malignancy high in the differential.  We discussed the importance of staging such that best therapy can be selected and portend prognosis accurately.  We discussed options for care including continued surveillance verses efforts towards diagnosis and treatment.  We discussed options for diagnosis including bronchoscopy, CT-guided needle biopsy, or surgical biopsy with either cervical mediastinoscopy or thoracoscopy with resection.  We discussed the value of clinical staging with a CT/PET scan.  The pros and cons of each option were discussed at length.  I believe further efforts towards diagnosis are warranted.  Since he has bilateral disease I believe CT-guided needle biopsy with further evaluation of his lung function is warranted.  The risks of the procedure were discussed briefly.  I did defer and encourage discussion with the perform radiologist for a formal discussion of risk of procedure.    All questions have been answered to the best of my ability and he is agreeable to the aforementioned plan.    He is a non smoker.      Patient's Body mass index is 36.19 kg/m². BMI is above normal parameters. Recommendations include: exercise counseling, nutrition counseling and referral to primary care.    He is to return to see me after performing CT-guided needle biopsy.

## 2021-03-17 NOTE — INTERVAL H&P NOTE
H&P reviewed. The patient was examined and there are no changes to the H&P.    Risks, alternatives and benefits explained to the patient. All questions were answered prior to the exam. Pre procedure exam with no changes. Plan is for moderate sedation.

## 2021-03-17 NOTE — PROGRESS NOTES
Chief Complaint   Patient presents with   • Lung Nodule     Patient is here for a follow up w/ ct.          Subjective     History of Present Illness    Mr. Ortiz is a 70-year-old male with a history of COPD with hypercapnia, cirrhosis secondary to alcohol intake, history of previous cardiac arrest, history of perforated colon with ileostomy in place, previous tobacco smoker quit in 2018 but continues with oral chew, and is wheelchair-bound presents for the evaluation of multiple lung nodules identified during his last admission for shortness of breath and chest pain.  He has been scheduled for a CT guided needle biopsy but has canceled that procedure.  Efforts were made to schedule durably but not successful.  Ultimately I did asked the patient via telephone to present to my office to discuss options given my overriding concern that he has advanced metastatic malignancy or untreated disseminating infection.    He returns today with a new CT scan of the chest.  He is joined by his daughter.  He reports much anxiety leaving the home and did require specific anxiolytic meds to present to my office today.   He denies unintended weight loss, hoarseness of voice, chest pain, hemoptysis, history of pneumonia, or cough.  It is noted that he has had past COVID-19 infection.  With aforementioned I been asked to evaluate his bilateral lung nodules.  He remains a non-smoker.      Review of Systems   Constitutional: Positive for fatigue. Negative for activity change, appetite change, chills, diaphoresis, fever and unexpected weight change.   HENT: Negative for dental problem, hearing loss, nosebleeds, sore throat, trouble swallowing and voice change.    Eyes: Negative for photophobia, redness and visual disturbance.   Respiratory: Positive for shortness of breath. Negative for apnea, cough, chest tightness, wheezing and stridor.    Cardiovascular: Positive for leg swelling. Negative for chest pain and palpitations.    Gastrointestinal: Positive for abdominal distention. Negative for abdominal pain, blood in stool, constipation, diarrhea, nausea and vomiting.   Endocrine: Negative for cold intolerance, heat intolerance, polyphagia and polyuria.   Genitourinary: Negative for decreased urine volume, difficulty urinating, dysuria, flank pain, frequency, hematuria and urgency.   Musculoskeletal: Positive for back pain and gait problem. Negative for arthralgias, joint swelling, myalgias and neck pain.   Skin: Negative for pallor, rash and wound.   Allergic/Immunologic: Negative for immunocompromised state.   Neurological: Positive for weakness. Negative for dizziness, tremors, seizures, syncope, speech difficulty, light-headedness, numbness and headaches.   Hematological: Does not bruise/bleed easily.   Psychiatric/Behavioral: Negative for confusion, sleep disturbance and suicidal ideas. The patient is not nervous/anxious.           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    • Nevus    • Perforation of colon (CMS/HCC) 2018   • Venous insufficiency    • Weakness of extremity     LEFT SIDE   • Wheelchair dependent      Past Surgical History:   Procedure Laterality Date   • COLON SURGERY      x4   • EYE SURGERY Bilateral     cataracts    • FLAP HEAD/NECK Left 10/23/2018    Procedure: LEFT CHEEK ADVANCEMENT FLAP CLOSURE OF LIP AND CHEEK,5CM X 6CM IPSILATERAL LEFT SEPTAL MUCOSAL HINGE FLAP,2CM X 3CM;  Surgeon: Izaiah Ceron MD;  Location: [facility] OR;  Service: ENT   • FLAP HEAD/NECK Bilateral 11/13/2018    Procedure: Pedicle division and flap inset of nose;  Surgeon: Izaiah Ceron MD;  Location: [facility] OR;   Service: ENT   • HEAD/NECK LESION/CYST EXCISION Left 10/23/2018    Procedure: LEFT PARAMEDIAN FOREHEAD FLAP WITH PRESERVATION OF VASCULAR PEDICLE;  Surgeon: Izaiah Ceron MD;  Location:  [facility] OR;  Service: ENT   • SKIN FULL THICKNESS GRAFT Left 10/23/2018    Procedure: Conchal cartilage graft from right ear to left nose with complex closure of skin of forehead;  Surgeon: Izaiah Ceron MD;  Location:  [facility] OR;  Service: ENT     Family History   Problem Relation Age of Onset   • Cancer Mother    • Diabetes Mother    • Cancer Father      Social History     Tobacco Use   • Smoking status: Former Smoker     Years: 50.00     Types: Cigarettes     Quit date: 2018     Years since quittin.9   • Smokeless tobacco: Former User     Types: Chew   Vaping Use   • Vaping Use: Never used   Substance Use Topics   • Alcohol use: No     Comment: quit 2018/ hx of alchohlism    • Drug use: No     Current Outpatient Medications   Medication Sig Dispense Refill   • budesonide-formoterol (SYMBICORT) 80-4.5 MCG/ACT inhaler Inhale 2 puffs 2 (Two) Times a Day. 1 inhaler 12   • buPROPion XL (WELLBUTRIN XL) 150 MG 24 hr tablet Take 150 mg by mouth Daily.     • furosemide (LASIX) 20 MG tablet Take 2 tablets by mouth Daily. 60 tablet 3   • ipratropium-albuterol (DUO-NEB) 0.5-2.5 mg/3 ml nebulizer Take 3 mL by nebulization Every 6 (Six) Hours. For COPD J44.9 360 mL 3   • oxybutynin (DITROPAN) 5 MG tablet Take 5 mg by mouth Daily.     • oxyCODONE-acetaminophen (PERCOCET) 7.5-325 MG per tablet Take 1 tablet by mouth 2 (two) times a day.     • pantoprazole (PROTONIX) 40 MG EC tablet Take 40 mg by mouth Daily.     • potassium chloride (KAYCIEL) 20 MEQ/15ML (10%) solution Take 20 mEq by mouth Daily.     • propranolol (INDERAL) 20 MG tablet Take 20 mg by mouth Daily.     • sertraline (ZOLOFT) 100 MG tablet Take 100 mg by mouth Daily.     • tamsulosin (FLOMAX) 0.4 MG capsule 24 hr capsule Take 0.4 mg by mouth Daily.     • traZODone  "(DESYREL) 50 MG tablet Take 50 mg by mouth At Night As Needed for Sleep.       No current facility-administered medications for this visit.     Allergies:  Patient has no known allergies.    Objective      Vital Signs  Visit Vitals  /68 (BP Location: Right arm, Patient Position: Sitting, Cuff Size: Adult)   Pulse 100   Ht 172.7 cm (68\")   Wt 104 kg (229 lb)   SpO2 90%   BMI 34.82 kg/m²         Physical Exam  Constitutional:       Appearance: He is well-developed. He is ill-appearing.      Comments: In a wheelchair   HENT:      Head: Normocephalic and atraumatic.   Eyes:      Pupils: Pupils are equal, round, and reactive to light.   Neck:      Thyroid: No thyromegaly.      Vascular: No JVD.      Trachea: No tracheal deviation.   Cardiovascular:      Rate and Rhythm: Normal rate and regular rhythm.      Heart sounds: Normal heart sounds. No murmur heard.   No friction rub. No gallop.    Pulmonary:      Effort: Pulmonary effort is normal. No respiratory distress.      Breath sounds: Normal breath sounds. No wheezing or rales.   Chest:      Chest wall: No tenderness.   Abdominal:      General: There is no distension.      Palpations: Abdomen is soft.      Tenderness: There is no abdominal tenderness.   Musculoskeletal:         General: Normal range of motion.      Cervical back: Normal range of motion and neck supple.   Lymphadenopathy:      Cervical: No cervical adenopathy.   Skin:     General: Skin is warm and dry.   Neurological:      Mental Status: He is alert and oriented to person, place, and time.      Cranial Nerves: No cranial nerve deficit.         Results Review:     EXAMINATION: CT ANGIOGRAM CHEST- 11/27/2020 10:41 AM CST     HISTORY: Shortness of breath, chest pain, possible nodule     COMPARISON: Chest x-ray 11/27/2020     DOSE: 509 mGy-cm     TECHNIQUE: Sequential imaging was performed from the thoracic inlet  through the upper abdomen following the administration of IV contrast.   Sagittal and " coronal reformations were made from the original source  data and reviewed. Additionally, 3-D MIPS reconstructions of the vessels  were made per CTA protocol. Automated exposure control was also utilized  to decrease patient radiation dose.     FINDINGS:   The visualized thyroid gland is unremarkable. Trachea and main bronchi  appear widely patent and in normal anatomic position. The esophagus is  grossly normal in appearance.     There is bilateral gynecomastia.     No pathologically enlarged axillary, mediastinal, or hilar lymph nodes  are identified.     The heart appears normal in size. There is no pericardial or pleural  effusion. Coronary artery calcifications are present. Atherosclerotic  calcifications are also seen in the aorta and its branch vessels. The  ascending thoracic aorta appears normal in caliber. Main pulmonary  artery is dilated, suggesting pulmonary arterial hypertension. There is  suboptimal contrast bolus timing for evaluation of the pulmonary  arteries. No central or proximal segmental filling defects are  identified to suggest PE.     There is diffuse bronchial wall thickening. There is a noncalcified  nodule in the perihilar left upper lobe which measures 1.7 x 1.7 cm in  size. There is pleural thickening/parenchymal scarring in the lingula.  There is scarring in the left lower lobe. There is a noncalcified nodule  in the right upper lobe which measures 2.5 x 1.7 cm in size. An  additional subpleural nodule posteriorly in the right upper lobe  measures 7 mm in size. A pleural-based nodule in the right upper lobe  measures 9 mm in size. A tiny subpleural nodule in the right lower lobe  measures 4 mm in size.     Review of the visualized portion of the upper abdomen demonstrates  cirrhotic liver morphology. Numerous stones are seen in the gallbladder.  There is mild mesenteric edema.     Review of the visualized osseous structures demonstrates a superior  endplate lucency at T11 of uncertain  "significance. Degenerative spurring  is noted in the spine. There is evidence of an old sternal fracture. Old  left-sided rib fractures are evident. Tiny round sclerotic foci are seen  in multiple left-sided ribs which are too small to characterize.     IMPRESSION:  1. Limited evaluation of the pulmonary arteries due to contrast bolus  timing. No evidence of PE or acute aortic pathology when allowing for  this.     2. Atherosclerosis of the aorta and coronary arteries.  3. Findings suggestive of pulmonary arterial hypertension.  4. Multiple noncalcified pulmonary nodules bilaterally concerning for  metastatic disease.  5. Cirrhotic liver morphology.  6. Cholelithiasis.           This report was finalized on 11/27/2020 10:47 by Dr. Jeet Bellamy MD.    Study Result    EXAM: NM PET SKULL BASE TO MID THIGH- - 12/7/2020 8:12 AM CST     HISTORY: R91.1; R91.1-Solitary pulmonary nodule; R91.8-Other nonspecific  abnormal finding of lung field. On chart review, history of terminal  ileum and subtotal colectomy with pathology report for 20/7/2019  indicating \"ischemic colon with perforation in the cecum. Ischemic  changes primarily involve the cecum and ascending colon. Viable proximal  and distal resection margins. Unremarkable terminal ileum mucosa.  Unremarkable appendix. No evidence of malignancy.\"     COMPARISON: 11/27/2020, 11/20/2020.      TECHNIQUE:   PET CT from skull base to thighs was performed after the intravenous  administration of 10.63 mCi F-18 FDG. Additional administration details  recorded by technologist. CT evaluation is limited due to lack of  intravenous contrast as well as tidal breathing motion required for  accurate PET attenuation correction.     DOSE LENGTH PRODUCT: 1751 mGy cm. Automatic exposure control was  utilized to make radiation dose as low as reasonably achievable.     FINDINGS:      SKULL BASE and NECK:   No lymphadenopathy in the neck. Normal CT appearance of the thyroid.     There is " mild asymmetric uptake of the left posterior nasopharynx or  tonsil with maximum SUV 5.3, and fused series slice 166.     CHEST:   Right upper lobe 1.7 cm pulmonary nodule with maximum SUV 19.2.     Left upper lobe 1.7 cm pulmonary nodule with maximum SUV 6.8.     There are other smaller pulmonary nodules, similar compared to prior,  not optimally evaluated due to small size.     Borderline heart size. Coronary artery calcifications. No pericardial  effusion.     Aorta normal in course and caliber with calcified atherosclerosis.  Normal caliber pulmonary artery.     Esophagus normal in course and caliber. No mediastinal adenopathy.     ABDOMEN and PELVIS:   Mildly heterogeneous FDG uptake in the liver. Nodular liver contour.  Gallstones. No bile duct dilation. Unenhanced pancreas, spleen, adrenal  glands appear within normal limits.     No hydronephrosis or nephrolithiasis. Normal appearance of the urinary  bladder. Normal size prostate.     No free air or free fluid in the abdomen. Changes of prior partial  colectomy with ileostomy. Parastomal fat and bowel containing hernia. No  evidence of bowel obstruction.     There is a focus of FDG uptake along the colon suture line with maximum  SUV 6.0.     Aorta normal in course and caliber with calcified atherosclerosis. No  adenopathy.     MUSCULOSKELETAL:  Diffusely heterogeneous FDG uptake in the bones, concerning for bony  metastatic disease. No convincing bone lesion by CT.        IMPRESSION:  1. There are 2 FDG avid pulmonary nodules, which could represent primary  or metastatic malignancy.  2. Mild asymmetric uptake in the left posterior nasopharynx/tonsil.  Recommend direct visualization.  3. Focus of FDG uptake along the colon suture line with maximum SUV 6.0.     4. Diffusely heterogeneous FDG uptake in the bones, which could be seen  with bony metastatic disease or bone marrow activation. Correlate with  patient history. No convincing corollary CT lesions  appreciated at this  time.   5. Nodular liver contour with heterogeneous FDG uptake suggests chronic  liver disease.  6. Gallstones.  This report was finalized on 12/07/2020 10:44 by Dr Amie Atkins MD.       EXAMINATION:  CT CHEST W CONTRAST DIAGNOSTIC-  3/3/2021 1:33 PM CST     HISTORY: R91.8-Other nonspecific abnormal finding of lung field.  Shortness of air. Pulmonary nodules.     COMPARISON : 11/27/2020.     DLP: 630 mGy-cm. Automated dosage control was utilized.     TECHNIQUE: CT was performed of the chest with contrast. Sagittal and  coronal images were reconstructed.     MEDIASTINUM, HEART AND VASCULAR STRUCTURES: There is atheromatous  disease of the thoracic aorta and coronary arteries. The main pulmonary  artery segment measures 3.4 cm. There are small mediastinal lymph nodes.  There is fatty infiltration of the atrial septum.     LUNGS NODULES:   1. A 1 cm cavitary right upper lobe lesion image 50 of series 3  previously measured 7 mm.  2. A 3.8 cm necrotic appearing lesion in the right upper lobe anteriorly  image 71 of series 3 previously measured 2.5 cm.  3. A 3.1 cm necrotic appearing left upper lobe centrally located lesion  on image 74 of series 3 previously measured 1.9 cm.  4. New 5 mm left upper lobe nodule image 66 of series 3 centrally  located.  5. A 7 mm subpleural right upper lobe nodule posteriorly image 41 of  series 3 previously measured 1 cm.  6. A 3-4 mm subpleural right middle lobe nodule image 105 series 3,  stable.  7. A 9 mm right upper lobe subpleural nodule versus scar image 47 series  3, stable.     ADDITIONAL LUNG FINDINGS: There is mild peribronchial cuffing. There are  linear areas of atelectasis or scarring in the lingula and bilateral  lower lobes. There is no dense airspace consolidation. There is  paraseptal emphysema.     UPPER ABDOMEN: There is a macronodular appearance of the liver with  fatty infiltration. Images of the upper abdomen are otherwise  unremarkable.      BONES: There are degenerative changes of the spine. Vague lucency along  the superior endplate of T11 remains stable. No destructive appearing  rib lesions are appreciated. There is an old healed fracture of the  sternum.     IMPRESSION:  1. There has been progressive increase in some of the pulmonary  nodules/masses. The 2 largest in both of the upper lobes now appear to  be somewhat necrotic. A small right upper lobe lesion is cavitary.  Neoplastic disease and infection/inflammation are in the differential.  Neoplastic disease is favored. One of the right upper lobe subpleural  nodules is smaller in size. All of the other areas are stable or larger.  2. No enlarged mediastinal or hilar lymph nodes.  3. Atheromatous disease of the thoracic aorta and coronary arteries.  Dilated main pulmonary artery segment.  4. Macronodular appearance of the liver with fatty infiltration.  Possible liver cirrhosis. Correlate clinically.  5. Vague lucency in the superior endplate of T11 is stable. This is  nonspecific. No acute appearing bony abnormality is seen.          This report was finalized on 03/03/2021 14:39 by Dr. Kenneth Pugh MD.       I reviewed the patient's new clinical results.  Discussed with patient and wife      Assessment/Plan       Diagnoses and all orders for this visit:    1. BMI 34.0-34.9,adult (Primary)    2. Presence of ileostomy (CMS/HCC)    3. Chronic respiratory failure with hypercapnia (CMS/HCC)    4. Pulmonary nodules/lesions, multiple    5. History of tobacco use      Independent review of CT scan obtained on March 3, 2021 is performed by me demonstrating to dominant lung lesions that have certainly increased in size.  Overall remaining other nodules are either the same size or increase in size.  There is no mediastinal adenopathy.      I discussed the patients findings and my recommendations with patient and wife.    We discussed the differential diagnoses possible with malignancy high in the  differential.  We discussed the importance of staging such that best therapy can be selected and portend prognosis accurately.  We discussed options for care including continued surveillance verses efforts towards diagnosis and treatment.  We discussed options for diagnosis including bronchoscopy, CT-guided needle biopsy, or surgical biopsy with either cervical mediastinoscopy or thoracoscopy with resection.  We discussed the value of clinical staging with a CT/PET scan.  The pros and cons of each option were discussed at length.  I believe further efforts towards diagnosis are warranted.  Since he has bilateral disease I believe CT-guided needle biopsy.  Given the bilateral increase in size this is likely malignancy rather than infection.  Efforts are made towards diagnosis and likely referral to an oncologist for metastatic treatment.  He reports having marked anxiety with testing and leaving his home.  Reassurance was provided.  I did thereafter speak with Saira from Dr. Farooq's office his primary care providers with who will aid with treating his anxiety prior to procedure.  The risks of the procedure were discussed briefly.  I did defer and encourage discussion with the perform radiologist for a formal discussion of risk of procedure.    All questions have been answered to the best of my ability and he is agreeable to the aforementioned plan.    He is a non smoker.      Patient's Body mass index is 34.82 kg/m². BMI is above normal parameters. Recommendations include: exercise counseling, nutrition counseling and referral to primary care.    He is to return to see me after performing CT-guided needle biopsy.

## 2021-03-23 NOTE — PROGRESS NOTES
Dr. Riddle called and spoke with Mr. Ortiz and his family over the phone and speakerphone. Discussed pathology findings with recommendation for referral to oncology. Patient has no oncologist preference. Oncology referral placed. Patient requests a Wednesday appt. Please inform their office of the referral. No follow up required with our office.

## 2021-03-24 NOTE — TELEPHONE ENCOUNTER
PT: BREONNA QUINTERO     PT'S WIFE AIDA STEVENS ( GAVE ME PERMISSION TO TALK WITH HER)  CALLING TO SEE IF PT HAS TO HAVE LABS DRAWN FOR US IF THEY CAN SEND ORDERS FOR THE LABS TO DR. PILLO GODINEZ AS PT HAS AN APPT THERE April 1, AND HAS TO HAVE LABS DONE THERE THAT DAY.? TO SAVE PT FROM GETTING STUCK STUCK TOO MANY TIMES.    DR. GODINEZ [phone]    PT PHONE #: [phone]  [phone] CELL PHONE    PLEASE CALL PT & WIFE AND LET THEM KNOW IF THEY CAN DO THIS?  THANK YOU.

## 2021-04-01 ENCOUNTER — IMMUNIZATION (OUTPATIENT)
Age: 71
End: 2021-04-01
Payer: MEDICARE

## 2021-04-01 PROCEDURE — 91300 COVID-19, PFIZER VACCINE 30MCG/0.3ML DOSE: CPT | Performed by: FAMILY MEDICINE

## 2021-04-01 PROCEDURE — 0001A COVID-19, PFIZER VACCINE 30MCG/0.3ML DOSE: CPT | Performed by: FAMILY MEDICINE

## 2021-04-12 PROBLEM — C34.90 MALIGNANT NEOPLASM OF LUNG (HCC): Status: ACTIVE | Noted: 2021-01-01

## 2021-04-12 PROBLEM — R53.83 FATIGUE: Status: ACTIVE | Noted: 2021-01-01

## 2021-04-12 PROBLEM — R22.1 NECK MASS: Status: ACTIVE | Noted: 2021-01-01

## 2021-04-13 PROBLEM — M89.8X9 BONE PAIN: Status: ACTIVE | Noted: 2021-01-01

## 2021-04-13 NOTE — PROGRESS NOTES
[facility] GROUP HEMATOLOGY AND ONCOLOGY  [address]  [facility] [address] [phone]    Chief Complaint  Non small cell cancer (Here for initial consult), Nausea, and Fatigue    Subjective    1. Weakness  2. Bony pain  3. SOB          REASON FOR CONSULTATION: Brett Ortiz is a 70 y.o. male referred by AYLEEN Pereira for diagnostic and management recommendations for stage IV NSCLC .    HISTORY OF PRESENT ILLNESS:    Mr. Ortiz is a 70-year-old male with a history of COPD with hypercapnia, cirrhosis secondary to alcohol intake, history of previous cardiac arrest, history of perforated colon with ileostomy in place, previous tobacco smoker quit in 2018 but continues with oral chew, and is wheelchair-bound presents for the evaluation of multiple lung nodules identified during his last admission for shortness of breath and chest pain.  He was recently seen by Dr Riddle and had RUL Mass core BX. It did show a poorly diff SCCA. He was also previous examined for possible head neck malignancies. Now he is here for the next steps of systemic TX.        March 17th  1. RUL core BX  -Poorly differentiated SCCA    PET CT IMPRESSION Dec 2020:  1. There are 2 FDG avid pulmonary nodules, which could represent primary  or metastatic malignancy.  2. Mild asymmetric uptake in the left posterior nasopharynx/tonsil.  Recommend direct visualization.  3. Focus of FDG uptake along the colon suture line with maximum SUV 6.0.     4. Diffusely heterogeneous FDG uptake in the bones, which could be seen  with bony metastatic disease or bone marrow activation. Correlate with  patient history. No convincing corollary CT lesions appreciated at this  time.   5. Nodular liver contour with heterogeneous FDG uptake suggests chronic  liver disease.  6. Gallstones.    Oncology/Hematology History    No history exists.         PAST MEDICAL HISTORY:  ALLERGIES:  No Known  Allergies  CURRENT MEDICATIONS:  Outpatient Encounter Medications as of 4/12/2021   Medication Sig Dispense Refill   • budesonide-formoterol (SYMBICORT) 80-4.5 MCG/ACT inhaler Inhale 2 puffs 2 (Two) Times a Day. 1 inhaler 12   • buPROPion XL (WELLBUTRIN XL) 150 MG 24 hr tablet Take 150 mg by mouth Daily.     • diazePAM (VALIUM) 10 MG tablet Take 10 mg by mouth 1 (One) Time.     • furosemide (LASIX) 20 MG tablet Take 2 tablets by mouth Daily. 60 tablet 3   • ipratropium-albuterol (DUO-NEB) 0.5-2.5 mg/3 ml nebulizer Take 3 mL by nebulization Every 6 (Six) Hours. For COPD J44.9 360 mL 3   • oxybutynin (DITROPAN) 5 MG tablet Take 5 mg by mouth Daily.     • oxyCODONE-acetaminophen (PERCOCET) 7.5-325 MG per tablet Take 1 tablet by mouth 2 (two) times a day.     • pantoprazole (PROTONIX) 40 MG EC tablet Take 40 mg by mouth Daily.     • potassium chloride (KAYCIEL) 20 MEQ/15ML (10%) solution Take 20 mEq by mouth Daily.     • propranolol (INDERAL) 20 MG tablet Take 20 mg by mouth Daily.     • sertraline (ZOLOFT) 100 MG tablet Take 100 mg by mouth Daily.     • tamsulosin (FLOMAX) 0.4 MG capsule 24 hr capsule Take 0.4 mg by mouth Daily.     • traZODone (DESYREL) 50 MG tablet Take 50 mg by mouth At Night As Needed for Sleep.     • dexamethasone (DECADRON) 2 MG tablet Take 1 tablet by mouth 2 (Two) Times a Day With Meals. 30 tablet 2     No facility-administered encounter medications on file as of 4/12/2021.     ADULT ILLNESSES:  Patient Active Problem List   Diagnosis Code   • Basal cell carcinoma of left ala nasi C44.311   • BMI 34.0-34.9,adult Z68.34   • Open wound of nose S01.20XA   • Open wound of left cheek S01.402A   • Open wound of lip S01.501A   • Urinary retention R33.9   • Chronic obstructive pulmonary disease with acute exacerbation (CMS/HCC) J44.1   • Chronic respiratory failure with hypercapnia (CMS/HCC) J96.12   • Pulmonary nodules/lesions, multiple R91.8   • Presence of ileostomy (CMS/HCC) Z93.2   • Respiratory  acidosis E87.2   • Anemia, chronic disease D63.8   • Acute on chronic respiratory failure with hypoxia and hypercapnia (CMS/HCC) J96.21, J96.22   • History of tobacco use Z87.891   • Malignant neoplasm of lung (CMS/HCC) C34.90   • Neck mass R22.1   • Fatigue R53.83   • Bone pain M89.8X9     SURGERIES:  Past Surgical History:   Procedure Laterality Date   • COLON SURGERY      x4   • EYE SURGERY Bilateral     cataracts    • FLAP HEAD/NECK Left 10/23/2018    Procedure: LEFT CHEEK ADVANCEMENT FLAP CLOSURE OF LIP AND CHEEK,5CM X 6CM IPSILATERAL LEFT SEPTAL MUCOSAL HINGE FLAP,2CM X 3CM;  Surgeon: Izaiah Ceron MD;  Location:  [facility] OR;  Service: ENT   • FLAP HEAD/NECK Bilateral 11/13/2018    Procedure: Pedicle division and flap inset of nose;  Surgeon: Izaiah Ceron MD;  Location:  [facility] OR;  Service: ENT   • HEAD/NECK LESION/CYST EXCISION Left 10/23/2018    Procedure: LEFT PARAMEDIAN FOREHEAD FLAP WITH PRESERVATION OF VASCULAR PEDICLE;  Surgeon: Izaiah Ceron MD;  Location:  [facility];  Service: ENT   • SKIN FULL THICKNESS GRAFT Left 10/23/2018    Procedure: Conchal cartilage graft from right ear to left nose with complex closure of skin of forehead;  Surgeon: Izaiah Ceron MD;  Location:  [facility] OR;  Service: ENT     HEALTH MAINTENANCE ITEMS:  Health Maintenance Due   Topic Date Due   • COLONOSCOPY  Never done   • Hepatitis B (1 of 3 - Risk 3-dose series) Never done   • TDAP/TD VACCINES (1 - Tdap) Never done   • ZOSTER VACCINE (1 of 2) Never done   • Pneumococcal Vaccine 65+ (1 of 1 - PPSV23) Never done   • ANNUAL WELLNESS VISIT  09/11/2020       <no information>  Last Completed Colonoscopy       Status Date      COLONOSCOPY No completions recorded          There is no immunization history on file for this patient.  Last Completed Mammogram    Patient has no health maintenance due at this time           FAMILY HISTORY:  Family History   Problem Relation Age of Onset   • Cancer Mother    • Diabetes Mother    •  "Cancer Father      SOCIAL HISTORY:  Social History     Socioeconomic History   • Marital status:      Spouse name: Not on file   • Number of children: Not on file   • Years of education: Not on file   • Highest education level: Not on file   Tobacco Use   • Smoking status: Former Smoker     Years: 50.00     Types: Cigarettes     Quit date: 04/2018     Years since quitting: 3.0   • Smokeless tobacco: Former User     Types: Chew   Vaping Use   • Vaping Use: Never used   Substance and Sexual Activity   • Alcohol use: No     Comment: quit April 14 2018/ hx of alchohlism    • Drug use: No   • Sexual activity: Defer       REVIEW OF SYSTEMS:  Review of Systems   Constitutional: Positive for activity change and fatigue.   HENT: Negative.    Eyes: Negative.    Respiratory: Positive for shortness of breath.    Cardiovascular: Positive for chest pain.   Gastrointestinal: Negative.    Endocrine: Negative.    Genitourinary: Negative.    Musculoskeletal: Positive for back pain.   Neurological: Positive for weakness.   Psychiatric/Behavioral: Positive for stress.       /70   Pulse 86   Temp 98 °F (36.7 °C) (Temporal)   Resp 16   Ht 172.7 cm (68\")   SpO2 91%   BMI 34.82 kg/m²  Body surface area is 2.17 meters squared.  Pain Score    04/12/21 1049   PainSc: 0-No pain       Objective   Vital Signs:   /70   Pulse 86   Temp 98 °F (36.7 °C) (Temporal)   Resp 16   Ht 172.7 cm (68\")   SpO2 91%   BMI 34.82 kg/m²  Body surface area is 2.17 meters squared.  Pain Score    04/12/21 1049   PainSc: 0-No pain     Brett Ortiz reports a pain score of 0.  Given his pain assessment as noted, treatment options were discussed and the following options were decided upon as a follow-up plan to address the patient's pain: .      Patient's Body mass index is 34.82 kg/m². BMI is .      Physical Exam  Constitutional:       General: He is in acute distress.   HENT:      Head: Atraumatic.      Mouth/Throat:      Mouth: Mucous " membranes are dry.   Eyes:      Extraocular Movements: Extraocular movements intact.   Cardiovascular:      Rate and Rhythm: Normal rate.      Pulses: Normal pulses.   Pulmonary:      Effort: Respiratory distress present.   Abdominal:      General: There is no distension.      Palpations: There is no mass.   Musculoskeletal:         General: Swelling present. Normal range of motion.   Lymphadenopathy:      Cervical: Cervical adenopathy present.   Skin:     General: Skin is dry.   Neurological:      Mental Status: He is alert.   Psychiatric:         Mood and Affect: Mood normal.         Behavior: Behavior normal.            Result Review :     LABS    Lab Results - Last 18 Months   Lab Units 04/12/21  1204 03/17/21  0913 11/29/20  0235 11/28/20  0319 11/27/20  0842 10/07/20  1330   HEMOGLOBIN g/dL 9.1*  --  8.7* 8.7* 9.7* 10.2*   HEMATOCRIT % 32.7*  --  28.7* 29.5* 34.0* 34.2*   MCV fL 86.7  --  88.3 91.0 93.9 92.9   WBC 10*3/mm3 9.65  --  7.51 7.52 7.71 8.09   RDW % 14.3  --  14.3 13.4 13.2 13.7   MPV fL 10.8  --  11.6 11.7 10.7 11.8   PLATELETS 10*3/mm3 151 161 111* 111* 114* 82*   IMM GRAN % % 0.3  --  0.3 0.3 0.4  --    NEUTROS ABS 10*3/mm3 7.22*  --  4.66 5.02 5.57 5.01   LYMPHS ABS 10*3/mm3 1.08  --  1.63 1.25 1.05  --    MONOS ABS 10*3/mm3 1.13*  --  1.16* 1.22* 0.85  --    EOS ABS 10*3/mm3 0.18  --  0.03 0.00 0.18 0.42*   BASOS ABS 10*3/mm3 0.01  --  0.01 0.01 0.03  --    IMMATURE GRANS (ABS) 10*3/mm3 0.03  --  0.02 0.02 0.03  --    NRBC /100 WBC 0.0  --  0.0 0.0 0.0  --    NEUTROPHIL % %  --   --   --   --   --  61.9   MONOCYTES % %  --   --   --   --   --  12.4*       Lab Results - Last 18 Months   Lab Units 04/12/21  1204 03/03/21  1344 11/29/20  0235 11/28/20  0319 11/27/20  0842 10/22/20  1050 10/07/20  1330   GLUCOSE mg/dL 112*  --  94 113* 153* 138* 107*   SODIUM mmol/L 141  --  144 141 144 141 138   POTASSIUM mmol/L 4.3  --  3.4* 3.6 3.9 4.4 3.2*   CO2 mmol/L 45.0*  --  41.0* 43.0* 43.0* 39.0* 40.0*    CHLORIDE mmol/L 91*  --  96* 92* 97* 99 90*   ANION GAP mmol/L 5.0  --  7.0 6.0 4.0* 3.0* 8.0   CREATININE mg/dL 0.56* 0.70 0.67* 0.72* 0.59* 0.76 0.76   BUN mg/dL 10  --  13 12 6* 9 9   BUN / CREAT RATIO  17.9  --  19.4 16.7 10.2 11.8 11.8   CALCIUM mg/dL 9.6  --  9.4 9.8 9.2 9.0 9.2   EGFR IF NONAFRICN AM mL/min/1.73 144  --  117 108 136 102 102   ALK PHOS U/L 115  --  86  --  115  --  96   TOTAL PROTEIN g/dL 7.7  --  6.8  --  7.5  --  6.9   ALT (SGPT) U/L 7  --  10  --  11  --  10   AST (SGOT) U/L 16  --  19  --  16  --  14   BILIRUBIN mg/dL 0.3  --  0.4  --  0.4  --  0.2   ALBUMIN g/dL 3.50  --  3.60  --  3.90  --  3.80   GLOBULIN gm/dL 4.2  --  3.2  --  3.6  --  3.1       Lab Results - Last 18 Months   Lab Units 11/28/20  0319 11/27/20  1356   CEA ng/mL  --  2.80   LDH U/L 146  --        Lab Results - Last 18 Months   Lab Units 04/12/21  1204 11/28/20  0319   IRON mcg/dL  --  53*   TIBC mcg/dL  --  454   IRON SATURATION %  --  12*   TSH uIU/mL 0.720  --               Assessment and Plan    Problem List Items Addressed This Visit        Other    Malignant neoplasm of lung (CMS/HCC) - Primary    Current Assessment & Plan     1,. He clearly needs restaging and systemic treatment  2. I have discussed with him today about the low dose chemo with immunotherapy  3. The issues is that he has other comorbidity including liver cirrhosis and this limits TX options  4. I have explained to the daughter that systemic TX does carry some significant risk  5. He will need a complete new restaging           Relevant Orders    CBC & Differential (Completed)    Comprehensive Metabolic Panel (Completed)    Ferritin    CT chest w contrast    CT abdomen pelvis w contrast    NM Bone Scan Whole Body    MRI Brain With & Without Contrast    CT Soft Tissue Neck With Contrast    TSH (Completed)    Sedimentation Rate (Completed)    Neck mass    Current Assessment & Plan     1. He may have neck swelling  2. Will have neck CT          Relevant Orders    MRI Brain With & Without Contrast    CT Soft Tissue Neck With Contrast    Fatigue    Current Assessment & Plan     1. He has SOB and fatigue  2. Will start dexa 2 mg PO QD         Relevant Orders    TSH (Completed)    Bone pain    Current Assessment & Plan     1. I am suspicous of bone mets  2. Will arrange bone scan             In summary  1. He has stage IV NSCLC  2. Will arrange restaging  3. He needs systemic TX  4. His PS is poor  5. I have discussed about systemic TX options and he will be back for final decisions    Follow Up     Patient was given instructions and counseling regarding his condition or for health maintenance advice. Please see specific information pulled into the AVS if appropriate.

## 2021-04-13 NOTE — ASSESSMENT & PLAN NOTE
1,. He clearly needs restaging and systemic treatment  2. I have discussed with him today about the low dose chemo with immunotherapy  3. The issues is that he has other comorbidity including liver cirrhosis and this limits TX options  4. I have explained to the daughter that systemic TX does carry some significant risk  5. He will need a complete new restaging

## 2021-04-15 NOTE — TELEPHONE ENCOUNTER
Caller: Yonathan Ortiz    Relationship to patient: Emergency Contact    Best call back number: [phone]    Chief complaint:  CANC./MALIA.    Type of visit: FOLLOW UP  Requested date: 4/26/2021    If rescheduling, when is the original appointment: 4/19/2021    Additional notes: PATIENTS WIFE IS ASKING IF WE CAN MOVE PATIENTS APPT. FOR 4/19/2021 AS HE HAS A LOT OF APPTS. & HIS WIFE THINKS IT MAKES SENSE THAT HE SEES DR. AC AFTER ALL SCANS.  PLEASE CALL TO ADVISE

## 2021-04-22 ENCOUNTER — IMMUNIZATION (OUTPATIENT)
Age: 71
End: 2021-04-22
Payer: MEDICARE

## 2021-04-22 PROCEDURE — 0002A PR IMM ADMN SARSCOV2 30MCG/0.3ML DIL RECON 2ND DOSE: CPT | Performed by: FAMILY MEDICINE

## 2021-04-22 PROCEDURE — 91300 COVID-19, PFIZER VACCINE 30MCG/0.3ML DOSE: CPT | Performed by: FAMILY MEDICINE

## 2021-04-27 PROBLEM — J98.11 ATELECTASIS: Status: ACTIVE | Noted: 2018-04-20

## 2021-04-27 PROBLEM — E83.42 HYPOMAGNESEMIA: Status: ACTIVE | Noted: 2018-04-28

## 2021-04-27 PROBLEM — R63.4 WEIGHT LOSS: Status: ACTIVE | Noted: 2021-01-01

## 2021-04-27 PROBLEM — R65.21 SEPTIC SHOCK (HCC): Status: ACTIVE | Noted: 2018-04-28

## 2021-04-27 PROBLEM — A41.9 SEVERE SEPSIS (HCC): Status: ACTIVE | Noted: 2018-04-28

## 2021-04-27 PROBLEM — F17.200 SMOKER: Status: ACTIVE | Noted: 2018-04-15

## 2021-04-27 PROBLEM — D72.829 LEUKOCYTOSIS: Status: ACTIVE | Noted: 2018-04-28

## 2021-04-27 PROBLEM — E80.6 HYPERBILIRUBINEMIA: Status: ACTIVE | Noted: 2018-04-20

## 2021-04-27 PROBLEM — R19.8 PERFORATED VISCUS: Status: ACTIVE | Noted: 2018-04-27

## 2021-04-27 PROBLEM — K70.31 ALCOHOLIC CIRRHOSIS OF LIVER WITH ASCITES (HCC): Status: ACTIVE | Noted: 2018-04-20

## 2021-04-27 PROBLEM — A41.9 SEPTIC SHOCK (HCC): Status: ACTIVE | Noted: 2018-04-28

## 2021-04-27 PROBLEM — E87.20 LACTIC ACIDOSIS: Status: ACTIVE | Noted: 2018-04-15

## 2021-04-27 PROBLEM — M25.559 HIP PAIN: Status: ACTIVE | Noted: 2021-01-01

## 2021-04-27 PROBLEM — F10.929 ALCOHOLIC INTOXICATION WITH COMPLICATION (HCC): Status: ACTIVE | Noted: 2018-04-15

## 2021-04-27 PROBLEM — B95.62 MRSA BACTEREMIA: Status: ACTIVE | Noted: 2018-04-22

## 2021-04-27 PROBLEM — K63.89 COLON DISTENTION: Status: ACTIVE | Noted: 2018-04-26

## 2021-04-27 PROBLEM — R78.81 MRSA BACTEREMIA: Status: ACTIVE | Noted: 2018-04-22

## 2021-04-27 PROBLEM — E87.6 HYPOKALEMIA: Status: ACTIVE | Noted: 2021-01-01

## 2021-04-27 PROBLEM — R65.20 SEVERE SEPSIS (HCC): Status: ACTIVE | Noted: 2018-04-28

## 2021-04-27 PROBLEM — J42 CHRONIC BRONCHITIS (HCC): Status: ACTIVE | Noted: 2021-01-01

## 2021-04-27 PROBLEM — R79.89 LFTS ABNORMAL: Status: ACTIVE | Noted: 2021-01-01

## 2021-04-28 NOTE — PROGRESS NOTES
[facility]  [address]  [facility] [address] [phone]    Chief Complaint  Lung Cancer (Follow-up) and Neck Mass    Subjective    1. Weakness  2. Bony pain  3. SOB    Mr. Ortiz is a 70-year-old male with a history of COPD with hypercapnia, cirrhosis secondary to alcohol intake, history of previous cardiac arrest, history of perforated colon with ileostomy in place, previous tobacco smoker quit in 2018 but continues with oral chew, and is wheelchair-bound presents for the evaluation of multiple lung nodules identified during his last admission for shortness of breath and chest pain.  He was recently seen by Dr Riddle and had RUL Mass core BX. It did show a poorly diff SCCA. He was also previous examined for possible head neck malignancies.     Now he is here for the next steps of systemic TX. Since the initial consult, he has started dexamethasone low dose and his appetite and SOB have been significantly improved. Also, his appetite has been improved        Oncology/Hematology History    No history exists.         PAST MEDICAL HISTORY:  ALLERGIES:  No Known Allergies  CURRENT MEDICATIONS:  Outpatient Encounter Medications as of 4/27/2021   Medication Sig Dispense Refill   • budesonide-formoterol (SYMBICORT) 80-4.5 MCG/ACT inhaler Inhale 2 puffs 2 (Two) Times a Day. 1 inhaler 12   • buPROPion XL (WELLBUTRIN XL) 150 MG 24 hr tablet Take 150 mg by mouth Daily.     • dexamethasone (DECADRON) 2 MG tablet Take 1 tablet by mouth 2 (Two) Times a Day With Meals. 30 tablet 2   • furosemide (LASIX) 20 MG tablet Take 2 tablets by mouth Daily. 60 tablet 3   • ipratropium-albuterol (DUO-NEB) 0.5-2.5 mg/3 ml nebulizer Take 3 mL by nebulization Every 6 (Six) Hours. For COPD J44.9 360 mL 3   • oxybutynin (DITROPAN) 5 MG tablet Take 5 mg by mouth Daily.     • oxyCODONE-acetaminophen (PERCOCET) 7.5-325 MG per tablet Take 1 tablet by mouth  2 (Two) Times a Day As Needed.     • pantoprazole (PROTONIX) 40 MG EC tablet Take 40 mg by mouth Daily.     • potassium chloride (KAYCIEL) 20 MEQ/15ML (10%) solution Take 20 mEq by mouth Daily.     • propranolol (INDERAL) 20 MG tablet Take 20 mg by mouth Daily.     • sertraline (ZOLOFT) 100 MG tablet Take 100 mg by mouth Daily.     • tamsulosin (FLOMAX) 0.4 MG capsule 24 hr capsule Take 0.4 mg by mouth Daily.     • traZODone (DESYREL) 50 MG tablet Take 50 mg by mouth At Night As Needed for Sleep.     • [DISCONTINUED] diazePAM (VALIUM) 10 MG tablet Take 10 mg by mouth 1 (One) Time.       No facility-administered encounter medications on file as of 4/27/2021.     ADULT ILLNESSES:  Patient Active Problem List   Diagnosis Code   • Basal cell carcinoma of left ala nasi C44.311   • BMI 34.0-34.9,adult Z68.34   • Open wound of nose S01.20XA   • Open wound of left cheek S01.402A   • Open wound of lip S01.501A   • Urinary retention R33.9   • Chronic obstructive pulmonary disease with acute exacerbation (CMS/HCC) J44.1   • Chronic respiratory failure with hypercapnia (CMS/HCC) J96.12   • Pulmonary nodules/lesions, multiple R91.8   • Presence of ileostomy (CMS/HCC) Z93.2   • Respiratory acidosis E87.2   • Anemia, chronic disease D63.8   • Acute on chronic respiratory failure with hypoxia and hypercapnia (CMS/HCC) J96.21, J96.22   • History of tobacco use Z87.891   • Malignant neoplasm of lung (CMS/HCC) C34.90   • Neck mass R22.1   • Fatigue R53.83   • Bone pain M89.8X9   • Alcoholic cirrhosis of liver with ascites (CMS/HCC) K70.31   • Alcoholic intoxication with complication (CMS/HCC) F10.929   • Atelectasis J98.11   • Colon distention K63.89   • Hyperbilirubinemia E80.6   • Hypokalemia E87.6   • Hypomagnesemia E83.42   • Lactic acidosis E87.2   • Leukocytosis D72.829   • LFTs abnormal R94.5   • MRSA bacteremia R78.81, B95.62   • Perforated viscus R19.8   • Septic shock (CMS/HCC) A41.9, R65.21   • Severe sepsis (CMS/HCC)  A41.9, R65.20   • Smoker F17.200   • Weight loss R63.4   • Hip pain M25.559   • Chronic bronchitis (CMS/HCC) J42     SURGERIES:  Past Surgical History:   Procedure Laterality Date   • COLON SURGERY      x4   • EYE SURGERY Bilateral     cataracts    • FLAP HEAD/NECK Left 10/23/2018    Procedure: LEFT CHEEK ADVANCEMENT FLAP CLOSURE OF LIP AND CHEEK,5CM X 6CM IPSILATERAL LEFT SEPTAL MUCOSAL HINGE FLAP,2CM X 3CM;  Surgeon: Izaiah Ceron MD;  Location:  [facility] OR;  Service: ENT   • FLAP HEAD/NECK Bilateral 11/13/2018    Procedure: Pedicle division and flap inset of nose;  Surgeon: Izaiah Ceron MD;  Location:  [facility];  Service: ENT   • HEAD/NECK LESION/CYST EXCISION Left 10/23/2018    Procedure: LEFT PARAMEDIAN FOREHEAD FLAP WITH PRESERVATION OF VASCULAR PEDICLE;  Surgeon: Izaiah Ceron MD;  Location:  [facility];  Service: ENT   • SKIN FULL THICKNESS GRAFT Left 10/23/2018    Procedure: Conchal cartilage graft from right ear to left nose with complex closure of skin of forehead;  Surgeon: Izaiah Ceron MD;  Location:  [facility];  Service: ENT     HEALTH MAINTENANCE ITEMS:  Health Maintenance Due   Topic Date Due   • COLONOSCOPY  Never done   • Hepatitis B (1 of 3 - Risk 3-dose series) Never done   • TDAP/TD VACCINES (1 - Tdap) Never done   • ZOSTER VACCINE (1 of 2) Never done   • Pneumococcal Vaccine 65+ (1 of 1 - PPSV23) Never done   • ANNUAL WELLNESS VISIT  09/11/2020       <no information>  Last Completed Colonoscopy       Status Date      COLONOSCOPY No completions recorded        Immunization History   Administered Date(s) Administered   • COVID-19 (PFIZER) 04/01/2021, 04/22/2021     Last Completed Mammogram    Patient has no health maintenance due at this time           FAMILY HISTORY:  Family History   Problem Relation Age of Onset   • Diabetes Mother    • Squamous cell carcinoma Mother    • Lung cancer Mother    • Pancreatic cancer Father 67     SOCIAL HISTORY:  Social History     Socioeconomic History  "  • Marital status:      Spouse name: Not on file   • Number of children: Not on file   • Years of education: Not on file   • Highest education level: Not on file   Tobacco Use   • Smoking status: Former Smoker     Packs/day: 1.00     Years: 50.00     Pack years: 50.00     Types: Cigarettes     Quit date: 04/2018     Years since quitting: 3.0   • Smokeless tobacco: Former User     Types: Chew   Vaping Use   • Vaping Use: Never used   Substance and Sexual Activity   • Alcohol use: No     Comment: quit April 14 2018/ hx of alchohlism    • Drug use: No   • Sexual activity: Defer       REVIEW OF SYSTEMS:  Review of Systems   Constitutional: Positive for activity change and fatigue.   HENT: Negative.    Eyes: Negative.    Respiratory: Positive for shortness of breath.    Cardiovascular: Negative.    Gastrointestinal: Negative.    Genitourinary: Negative.    Musculoskeletal: Positive for arthralgias, back pain, joint swelling and myalgias.   Skin: Positive for dry skin.   Allergic/Immunologic: Negative.    Neurological: Positive for weakness.   Psychiatric/Behavioral: The patient is nervous/anxious.        /58   Pulse 69   Temp 97.5 °F (36.4 °C) (Infrared)   Resp 18   Ht 172.7 cm (68\")   SpO2 92% Comment: 3 liter of O2  BMI 34.82 kg/m²  Body surface area is 2.17 meters squared.  Pain Score    04/27/21 1426   PainSc: 0-No pain       Objective   Vital Signs:   /58   Pulse 69   Temp 97.5 °F (36.4 °C) (Infrared)   Resp 18   Ht 172.7 cm (68\")   SpO2 92% Comment: 3 liter of O2  BMI 34.82 kg/m²  Body surface area is 2.17 meters squared.  Pain Score    04/27/21 1426   PainSc: 0-No pain     Brett Ortiz reports a pain score of 0.  Given his pain assessment as noted, treatment options were discussed and the following options were decided upon as a follow-up plan to address the patient's pain: .      Patient's Body mass index is 34.82 kg/m². BMI is     Physical Exam  Constitutional:       General: He " is in acute distress.   HENT:      Head: Atraumatic.      Nose: Nose normal.      Mouth/Throat:      Mouth: Mucous membranes are dry.   Cardiovascular:      Rate and Rhythm: Normal rate.   Abdominal:      General: There is distension.      Palpations: Abdomen is soft.      Tenderness: There is abdominal tenderness.   Musculoskeletal:         General: Tenderness present.      Cervical back: Normal range of motion.   Skin:     General: Skin is dry.   Neurological:      General: No focal deficit present.      Mental Status: He is oriented to person, place, and time.            Result Review :     LABS    Lab Results - Last 18 Months   Lab Units 04/12/21  1204 03/17/21  0913 11/29/20  0235 11/28/20  0319 11/27/20  0842 10/07/20  1330   HEMOGLOBIN g/dL 9.1*  --  8.7* 8.7* 9.7* 10.2*   HEMATOCRIT % 32.7*  --  28.7* 29.5* 34.0* 34.2*   MCV fL 86.7  --  88.3 91.0 93.9 92.9   WBC 10*3/mm3 9.65  --  7.51 7.52 7.71 8.09   RDW % 14.3  --  14.3 13.4 13.2 13.7   MPV fL 10.8  --  11.6 11.7 10.7 11.8   PLATELETS 10*3/mm3 151 161 111* 111* 114* 82*   IMM GRAN % % 0.3  --  0.3 0.3 0.4  --    NEUTROS ABS 10*3/mm3 7.22*  --  4.66 5.02 5.57 5.01   LYMPHS ABS 10*3/mm3 1.08  --  1.63 1.25 1.05  --    MONOS ABS 10*3/mm3 1.13*  --  1.16* 1.22* 0.85  --    EOS ABS 10*3/mm3 0.18  --  0.03 0.00 0.18 0.42*   BASOS ABS 10*3/mm3 0.01  --  0.01 0.01 0.03  --    IMMATURE GRANS (ABS) 10*3/mm3 0.03  --  0.02 0.02 0.03  --    NRBC /100 WBC 0.0  --  0.0 0.0 0.0  --    NEUTROPHIL % %  --   --   --   --   --  61.9   MONOCYTES % %  --   --   --   --   --  12.4*       Lab Results - Last 18 Months   Lab Units 04/27/21  1534 04/20/21  1504 04/12/21  1204 03/03/21  1344 11/29/20  0235 11/28/20  0319 11/27/20  0842 10/22/20  1050 10/07/20  1330   GLUCOSE mg/dL 99  --  112*  --  94 113* 153* 138* 107*   SODIUM mmol/L 140  --  141  --  144 141 144 141 138   POTASSIUM mmol/L 4.2  --  4.3  --  3.4* 3.6 3.9 4.4 3.2*   CO2 mmol/L 43.0*  --  45.0*  --  41.0* 43.0*  43.0* 39.0* 40.0*   CHLORIDE mmol/L 94*  --  91*  --  96* 92* 97* 99 90*   ANION GAP mmol/L 3.0*  --  5.0  --  7.0 6.0 4.0* 3.0* 8.0   CREATININE mg/dL 0.49* 0.70 0.56* 0.70 0.67* 0.72* 0.59* 0.76 0.76   BUN mg/dL 13  --  10  --  13 12 6* 9 9   BUN / CREAT RATIO  26.5*  --  17.9  --  19.4 16.7 10.2 11.8 11.8   CALCIUM mg/dL 9.0  --  9.6  --  9.4 9.8 9.2 9.0 9.2   EGFR IF NONAFRICN AM mL/min/1.73 >150  --  144  --  117 108 136 102 102   ALK PHOS U/L 114  --  115  --  86  --  115  --  96   TOTAL PROTEIN g/dL 6.9  --  7.7  --  6.8  --  7.5  --  6.9   ALT (SGPT) U/L 25  --  7  --  10  --  11  --  10   AST (SGOT) U/L 16  --  16  --  19  --  16  --  14   BILIRUBIN mg/dL 0.2  --  0.3  --  0.4  --  0.4  --  0.2   ALBUMIN g/dL 3.40*  --  3.50  --  3.60  --  3.90  --  3.80   GLOBULIN gm/dL 3.5  --  4.2  --  3.2  --  3.6  --  3.1       Lab Results - Last 18 Months   Lab Units 11/28/20  0319 11/27/20  1356   CEA ng/mL  --  2.80   LDH U/L 146  --        Lab Results - Last 18 Months   Lab Units 04/27/21  1534 04/12/21  1204 11/28/20  0319   IRON mcg/dL 19*  --  53*   TIBC mcg/dL 495  --  454   IRON SATURATION % 4*  --  12*   FERRITIN ng/mL 23.95*  --   --    TSH uIU/mL  --  0.720  --               Assessment and Plan    Problem List Items Addressed This Visit        Other    Malignant neoplasm of lung (CMS/HCC) - Primary    Current Assessment & Plan     1. Today we have discussed about the TX plans  2. It seems that now he may be able tolerate dose reduced chemo plus immunotherapy  3. I have discussed this plan with the daughter and we all seems to agree on this  4. I will arrange the PORT placement and finalize the TX plan in return         Relevant Orders    Ambulatory Referral to General Surgery (Completed)    Ambulatory Referral to Radiation Oncology    Comprehensive Metabolic Panel (Completed)    Iron Profile (Completed)    Sedimentation Rate (Completed)    Neck mass    Current Assessment & Plan     1. His scan did not show  the presence of primary SCCA         Fatigue    Current Assessment & Plan     1. With the  Steroid , hsi fatigue and weakness have been improved remarkably         Alcoholic intoxication with complication (CMS/HCC)    Current Assessment & Plan     1. His LFT was acceptable for dose reduced taxane based chemo         Weight loss    Current Assessment & Plan     1, He is now gaining weight back         Hip pain    Chronic bronchitis (CMS/HCC)    Current Assessment & Plan     1. His SOB has been remarkably improved             In summary  1. He does not have brain mets or secon primary SCCA of neck  2. We have discussed chemo  3. He will see [facility] oncology  4. I will discuss about systemic TX in return visit    Follow Up     Patient was given instructions and counseling regarding his condition or for health maintenance advice. Please see specific information pulled into the AVS if appropriate.

## 2021-04-28 NOTE — TELEPHONE ENCOUNTER
Spoke with Melissa that we have to get a weight on her dad to be able to calculate his chemotherapy.  Will come if Friday at noon per Dr. Bassett.

## 2021-04-28 NOTE — ASSESSMENT & PLAN NOTE
1. Today we have discussed about the TX plans  2. It seems that now he may be able tolerate dose reduced chemo plus immunotherapy  3. I have discussed this plan with the daughter and we all seems to agree on this  4. I will arrange the PORT placement and finalize the TX plan in return

## 2021-04-30 NOTE — PROGRESS NOTES
[facility]AT[facility]OGY  [address]  [place] [address] [phone]    Chief Complaint  Lung Cancer (Here for f/u)    1. Weakness  2. Bony pain  3. SOB     Mr. Ortiz is a 70-year-old male with a history of COPD with hypercapnia, cirrhosis secondary to alcohol intake, history of previous cardiac arrest, history of perforated colon with ileostomy in place, previous tobacco smoker quit in 2018 but continues with oral chew, and is wheelchair-bound presents for the evaluation of multiple lung nodules identified during his last admission for shortness of breath and chest pain.  He was recently seen by Dr Riddle and had RUL Mass core BX. It did show a poorly diff SCCA. He was also previous examined for possible head neck malignancies.      Now he is here again for the next steps of systemic TX. Since the initial consult and FU, he has started dexamethasone low dose and his appetite and SOB have been significantly improved. Also, his appetite has been improved. We have discussed about the chemo schedule and side effects    Oncology/Hematology History   Malignant neoplasm of lung (CMS/HCC)   4/12/2021 Initial Diagnosis    Malignant neoplasm of lung (CMS/HCC)     5/6/2021 -  Chemotherapy    OP LUNG Nivolumab / Ipilimumab / PACLitaxel / CARBOplatin AUC=6            PAST MEDICAL HISTORY:  ALLERGIES:  No Known Allergies  CURRENT MEDICATIONS:  Outpatient Encounter Medications as of 4/30/2021   Medication Sig Dispense Refill   • budesonide-formoterol (SYMBICORT) 80-4.5 MCG/ACT inhaler Inhale 2 puffs 2 (Two) Times a Day. 1 inhaler 12   • buPROPion XL (WELLBUTRIN XL) 150 MG 24 hr tablet Take 150 mg by mouth Daily.     • dexamethasone (DECADRON) 2 MG tablet Take 1 tablet by mouth 2 (Two) Times a Day With Meals. 30 tablet 2   • furosemide (LASIX) 20 MG tablet Take 2 tablets by mouth Daily. 60 tablet 3   • ipratropium-albuterol (DUO-NEB) 0.5-2.5 mg/3  ml nebulizer Take 3 mL by nebulization Every 6 (Six) Hours. For COPD J44.9 360 mL 3   • oxybutynin (DITROPAN) 5 MG tablet Take 5 mg by mouth Daily.     • oxyCODONE-acetaminophen (PERCOCET) 7.5-325 MG per tablet Take 1 tablet by mouth 2 (Two) Times a Day As Needed.     • pantoprazole (PROTONIX) 40 MG EC tablet Take 40 mg by mouth Daily.     • potassium chloride (KAYCIEL) 20 MEQ/15ML (10%) solution Take 20 mEq by mouth Daily.     • propranolol (INDERAL) 20 MG tablet Take 20 mg by mouth Daily.     • sertraline (ZOLOFT) 100 MG tablet Take 100 mg by mouth Daily.     • tamsulosin (FLOMAX) 0.4 MG capsule 24 hr capsule Take 0.4 mg by mouth Daily.     • traZODone (DESYREL) 50 MG tablet Take 50 mg by mouth At Night As Needed for Sleep.       No facility-administered encounter medications on file as of 4/30/2021.     ADULT ILLNESSES:  Patient Active Problem List   Diagnosis Code   • Basal cell carcinoma of left ala nasi C44.311   • BMI 34.0-34.9,adult Z68.34   • Open wound of nose S01.20XA   • Open wound of left cheek S01.402A   • Open wound of lip S01.501A   • Urinary retention R33.9   • Chronic obstructive pulmonary disease with acute exacerbation (CMS/HCC) J44.1   • Chronic respiratory failure with hypercapnia (CMS/HCC) J96.12   • Pulmonary nodules/lesions, multiple R91.8   • Presence of ileostomy (CMS/HCC) Z93.2   • Respiratory acidosis E87.2   • Anemia, chronic disease D63.8   • Acute on chronic respiratory failure with hypoxia and hypercapnia (CMS/HCC) J96.21, J96.22   • History of tobacco use Z87.891   • Malignant neoplasm of lung (CMS/HCC) C34.90   • Neck mass R22.1   • Fatigue R53.83   • Bone pain M89.8X9   • Alcoholic cirrhosis of liver with ascites (CMS/HCC) K70.31   • Alcoholic intoxication with complication (CMS/HCC) F10.929   • Atelectasis J98.11   • Colon distention K63.89   • Hyperbilirubinemia E80.6   • Hypokalemia E87.6   • Hypomagnesemia E83.42   • Lactic acidosis E87.2   • Leukocytosis D72.829   • LFTs  abnormal R94.5   • MRSA bacteremia R78.81, B95.62   • Perforated viscus R19.8   • Septic shock (CMS/[facility]) A41.9, R65.21   • Severe sepsis (CMS/[facility]) A41.9, R65.20   • Smoker F17.200   • Weight loss R63.4   • Hip pain M25.559   • Chronic bronchitis (CMS/[facility]) J42     SURGERIES:  Past Surgical History:   Procedure Laterality Date   • COLON SURGERY      x4   • EYE SURGERY Bilateral     cataracts    • FLAP HEAD/NECK Left 10/23/2018    Procedure: LEFT CHEEK ADVANCEMENT FLAP CLOSURE OF LIP AND CHEEK,5CM X 6CM IPSILATERAL LEFT SEPTAL MUCOSAL HINGE FLAP,2CM X 3CM;  Surgeon: Izaiah Ceron MD;  Location:  [facility] OR;  Service: ENT   • FLAP HEAD/NECK Bilateral 11/13/2018    Procedure: Pedicle division and flap inset of nose;  Surgeon: Izaiah Ceron MD;  Location:  [facility] OR;  Service: ENT   • HEAD/NECK LESION/CYST EXCISION Left 10/23/2018    Procedure: LEFT PARAMEDIAN FOREHEAD FLAP WITH PRESERVATION OF VASCULAR PEDICLE;  Surgeon: Izaiah Ceron MD;  Location:  [facility] OR;  Service: ENT   • SKIN FULL THICKNESS GRAFT Left 10/23/2018    Procedure: Conchal cartilage graft from right ear to left nose with complex closure of skin of forehead;  Surgeon: Izaiah Ceron MD;  Location:  [facility] OR;  Service: ENT     HEALTH MAINTENANCE ITEMS:  Health Maintenance Due   Topic Date Due   • COLONOSCOPY  Never done   • Hepatitis B (1 of 3 - Risk 3-dose series) Never done   • TDAP/TD VACCINES (1 - Tdap) Never done   • ZOSTER VACCINE (1 of 2) Never done   • Pneumococcal Vaccine 65+ (1 of 1 - PPSV23) Never done   • ANNUAL WELLNESS VISIT  09/11/2020       <no information>  Last Completed Colonoscopy       Status Date      COLONOSCOPY No completions recorded        Immunization History   Administered Date(s) Administered   • COVID-19 (PFIZER) 04/01/2021, 04/22/2021     Last Completed Mammogram    Patient has no health maintenance due at this time           FAMILY HISTORY:  Family History   Problem Relation Age of Onset   • Diabetes Mother    •  "Squamous cell carcinoma Mother    • Lung cancer Mother    • Pancreatic cancer Father 67     SOCIAL HISTORY:  Social History     Socioeconomic History   • Marital status:      Spouse name: Not on file   • Number of children: Not on file   • Years of education: Not on file   • Highest education level: Not on file   Tobacco Use   • Smoking status: Former Smoker     Packs/day: 1.00     Years: 50.00     Pack years: 50.00     Types: Cigarettes     Quit date: 04/2018     Years since quitting: 3.0   • Smokeless tobacco: Former User     Types: Chew   Vaping Use   • Vaping Use: Never used   Substance and Sexual Activity   • Alcohol use: No     Comment: quit April 14 2018/ hx of alchohlism    • Drug use: No   • Sexual activity: Defer       REVIEW OF SYSTEMS:  Review of Systems   Constitutional: Positive for activity change.   HENT: Negative.    Eyes: Negative.    Respiratory: Positive for shortness of breath.    Cardiovascular: Positive for chest pain.   Endocrine: Negative.    Musculoskeletal: Positive for arthralgias and back pain.   Allergic/Immunologic: Negative.    Neurological: Negative.    Hematological: Negative.    Psychiatric/Behavioral: Positive for stress.       /68   Pulse 76   Temp 97.5 °F (36.4 °C) (Temporal)   Resp 16   Ht 172.7 cm (68\")   Wt 104 kg (228 lb 4.8 oz)   SpO2 (!) 84%   BMI 34.71 kg/m²  Body surface area is 2.17 meters squared.  Pain Score    04/30/21 1207   PainSc:   9   PainLoc: Hip       Objective   Vital Signs:   /68   Pulse 76   Temp 97.5 °F (36.4 °C) (Temporal)   Resp 16   Ht 172.7 cm (68\")   Wt 104 kg (228 lb 4.8 oz)   SpO2 (!) 84%   BMI 34.71 kg/m²  Body surface area is 2.17 meters squared.  Pain Score    04/30/21 1207   PainSc:   9   PainLoc: Hip     Brett Ortiz reports a pain score of 9.  Given his pain assessment as noted, treatment options were discussed and the following options were decided upon as a follow-up plan to address the patient's pain: " .      Patient's Body mass index is 34.71 kg/m². BMI is .      Physical Exam  Constitutional:       General: He is in acute distress.      Appearance: Normal appearance.   HENT:      Head: Atraumatic.      Nose: Nose normal.      Mouth/Throat:      Mouth: Mucous membranes are dry.   Eyes:      Extraocular Movements: Extraocular movements intact.   Cardiovascular:      Rate and Rhythm: Tachycardia present.   Pulmonary:      Effort: Respiratory distress present.      Breath sounds: Wheezing present.   Abdominal:      General: Abdomen is flat.   Musculoskeletal:         General: Swelling and tenderness present.      Cervical back: Normal range of motion. Rigidity present.   Skin:     General: Skin is dry.   Neurological:      General: No focal deficit present.      Mental Status: He is oriented to person, place, and time. Mental status is at baseline.   Psychiatric:         Mood and Affect: Mood normal.         Behavior: Behavior normal.            Result Review :     LABS    Lab Results - Last 18 Months   Lab Units 04/12/21  1204 03/17/21  0913 11/29/20  0235 11/28/20  0319 11/27/20  0842 10/07/20  1330   HEMOGLOBIN g/dL 9.1*  --  8.7* 8.7* 9.7* 10.2*   HEMATOCRIT % 32.7*  --  28.7* 29.5* 34.0* 34.2*   MCV fL 86.7  --  88.3 91.0 93.9 92.9   WBC 10*3/mm3 9.65  --  7.51 7.52 7.71 8.09   RDW % 14.3  --  14.3 13.4 13.2 13.7   MPV fL 10.8  --  11.6 11.7 10.7 11.8   PLATELETS 10*3/mm3 151 161 111* 111* 114* 82*   IMM GRAN % % 0.3  --  0.3 0.3 0.4  --    NEUTROS ABS 10*3/mm3 7.22*  --  4.66 5.02 5.57 5.01   LYMPHS ABS 10*3/mm3 1.08  --  1.63 1.25 1.05  --    MONOS ABS 10*3/mm3 1.13*  --  1.16* 1.22* 0.85  --    EOS ABS 10*3/mm3 0.18  --  0.03 0.00 0.18 0.42*   BASOS ABS 10*3/mm3 0.01  --  0.01 0.01 0.03  --    IMMATURE GRANS (ABS) 10*3/mm3 0.03  --  0.02 0.02 0.03  --    NRBC /100 WBC 0.0  --  0.0 0.0 0.0  --    NEUTROPHIL % %  --   --   --   --   --  61.9   MONOCYTES % %  --   --   --   --   --  12.4*       Lab Results -  Last 18 Months   Lab Units 04/27/21  1534 04/20/21  1504 04/12/21  1204 03/03/21  1344 11/29/20  0235 11/28/20  0319 11/27/20  0842 10/22/20  1050 10/07/20  1330   GLUCOSE mg/dL 99  --  112*  --  94 113* 153* 138* 107*   SODIUM mmol/L 140  --  141  --  144 141 144 141 138   POTASSIUM mmol/L 4.2  --  4.3  --  3.4* 3.6 3.9 4.4 3.2*   CO2 mmol/L 43.0*  --  45.0*  --  41.0* 43.0* 43.0* 39.0* 40.0*   CHLORIDE mmol/L 94*  --  91*  --  96* 92* 97* 99 90*   ANION GAP mmol/L 3.0*  --  5.0  --  7.0 6.0 4.0* 3.0* 8.0   CREATININE mg/dL 0.49* 0.70 0.56* 0.70 0.67* 0.72* 0.59* 0.76 0.76   BUN mg/dL 13  --  10  --  13 12 6* 9 9   BUN / CREAT RATIO  26.5*  --  17.9  --  19.4 16.7 10.2 11.8 11.8   CALCIUM mg/dL 9.0  --  9.6  --  9.4 9.8 9.2 9.0 9.2   EGFR IF NONAFRICN AM mL/min/1.73 >150  --  144  --  117 108 136 102 102   ALK PHOS U/L 114  --  115  --  86  --  115  --  96   TOTAL PROTEIN g/dL 6.9  --  7.7  --  6.8  --  7.5  --  6.9   ALT (SGPT) U/L 25  --  7  --  10  --  11  --  10   AST (SGOT) U/L 16  --  16  --  19  --  16  --  14   BILIRUBIN mg/dL 0.2  --  0.3  --  0.4  --  0.4  --  0.2   ALBUMIN g/dL 3.40*  --  3.50  --  3.60  --  3.90  --  3.80   GLOBULIN gm/dL 3.5  --  4.2  --  3.2  --  3.6  --  3.1       Lab Results - Last 18 Months   Lab Units 11/28/20  0319 11/27/20  1356   CEA ng/mL  --  2.80   LDH U/L 146  --        Lab Results - Last 18 Months   Lab Units 04/27/21  1534 04/12/21  1204 11/28/20  0319   IRON mcg/dL 19*  --  53*   TIBC mcg/dL 495  --  454   IRON SATURATION % 4*  --  12*   FERRITIN ng/mL 23.95*  --   --    TSH uIU/mL  --  0.720  --               Assessment and Plan   Problem List Items Addressed This Visit        Other    Chronic respiratory failure with hypercapnia (CMS/HCC)    Current Assessment & Plan     1. His overall breathing has been improved         Malignant neoplasm of lung (CMS/HCC)    Current Assessment & Plan     1. We have again discussed about the systemic TX options  2. We have looked at  the overall benefit and risk of doing chemo immunotherapy one more time  3. We have decided to do a 30 to 40 percent reduction of chemo dosing  4. This will start early next week  5. I will see as a FU in 1 week         Relevant Orders    CBC and Differential    Comprehensive metabolic panel    TSH    T4, free    ACTH    Lab Appointment Request    Clinic Appointment Request    Infusion Appointment Request    CBC and Differential    Comprehensive metabolic panel    Lab Appointment Request    Clinic Appointment Request    Infusion Appointment Request    Bone pain    Current Assessment & Plan     1. He is starting palliative RT very soon  2. His C1D1 chemo will be probably given before satrting RT         Chronic bronchitis (CMS/HCC) - Primary    Relevant Orders    CBC and Differential    Comprehensive metabolic panel    TSH    T4, free    ACTH    Lab Appointment Request    Clinic Appointment Request    Infusion Appointment Request    CBC and Differential    Comprehensive metabolic panel    Lab Appointment Request    Clinic Appointment Request    Infusion Appointment Request        In summary  1. He will start chemo next week  2. He will also start palliative RT soon    Follow Up     Patient was given instructions and counseling regarding his condition or for health maintenance advice. Please see specific information pulled into the AVS if appropriate.

## 2021-04-30 NOTE — ASSESSMENT & PLAN NOTE
1. We have again discussed about the systemic TX options  2. We have looked at the overall benefit and risk of doing chemo immunotherapy one more time  3. We have decided to do a 30 to 40 percent reduction of chemo dosing  4. This will start early next week  5. I will see as a FU in 1 week

## 2021-04-30 NOTE — ASSESSMENT & PLAN NOTE
1. He is starting palliative RT very soon  2. His C1D1 chemo will be probably given before satrting RT

## 2021-05-03 NOTE — TELEPHONE ENCOUNTER
Notified Melissa that Dr. Bassett did not want to wait on him getting the port placed to start his treatment.

## 2021-05-03 NOTE — TELEPHONE ENCOUNTER
Caller: ANASTASIA    Relationship: PATIENT    Best call back number: [phone]    What is the best time to reach you: ANYTIME    Who are you requesting to speak with (clinical staff, provider,  specific staff member): CLINICAL STAFF    What was the call regarding: ANASTASIA IS WANTING TO KNOW HOW PATIENT IS GOING TO GET HIS 1ST INFUSION ON 05/06 IF HE DOESN'T HAVE HIS PORT PUT IN YET. SHE SAYS HE IS SCHEDULED FOR A CONSULT TO GO OVER THE PORT TOMORROW 05/04 @ 10:45AM    Do you require a callback: YES

## 2021-05-03 NOTE — PROGRESS NOTES
CHEMOTHERAPY PREPARATION    Brett Ortiz  [id number]  1950     TELEPHONE VISIT  You have chosen to receive care through a telephone visit. Do you consent to use a telephone visit for your medical care today? Yes    Chief Complaint: chemo education     History of present illness:  Brett Ortiz is a 70 y.o. year old male who is here today for chemotherapy preparation and needs assessment.  The patient has been diagnosed with Nonsmall cell lung cancer and is scheduled to begin treatment with Carboplatin Taxol Yervoy and Opdivo.     Oncology History:    Oncology/Hematology History   Malignant neoplasm of lung (CMS/HCC)   12/7/2020 Imaging    PET  BHP  1. There are 2 FDG avid pulmonary nodules, which could represent primary  or metastatic malignancy.  2. Mild asymmetric uptake in the left posterior nasopharynx/tonsil.  Recommend direct visualization.  3. Focus of FDG uptake along the colon suture line with maximum SUV 6.0.     4. Diffusely heterogeneous FDG uptake in the bones, which could be seen  with bony metastatic disease or bone marrow activation. Correlate with  patient history. No convincing corollary CT lesions appreciated at this  time.   5. Nodular liver contour with heterogeneous FDG uptake suggests chronic  liver disease.  6. Gallstones.     3/3/2021 Imaging    03-  CT Chest  1. There has been progressive increase in some of the pulmonary  nodules/masses. The 2 largest in both of the upper lobes now appear to  be somewhat necrotic. A small right upper lobe lesion is cavitary.  Neoplastic disease and infection/inflammation are in the differential.  Neoplastic disease is favored. One of the right upper lobe subpleural  nodules is smaller in size. All of the other areas are stable or larger.  2. No enlarged mediastinal or hilar lymph nodes.  3. Atheromatous disease of the thoracic aorta and coronary arteries.  Dilated main pulmonary artery segment.  4. Macronodular appearance of the liver with  fatty infiltration.  Possible liver cirrhosis. Correlate clinically.  5. Vague lucency in the superior endplate of T11 is stable. This is  nonspecific. No acute appearing bony abnormality is seen.       3/17/2021 Biopsy    Final Diagnosis   Lung, right upper lobe mass, fine-needle core biopsies and touch preparation: Poorly differentiated non-small cell carcinoma, consistent with squamous cell carcinoma.        4/12/2021 Initial Diagnosis    Malignant neoplasm of lung (CMS/HCC)     4/20/2021 Imaging    04-  MRI Brain With & Without Contrast  1. No acute intracranial finding. Specifically, no evidence of metastatic disease. 2. Loss of the normal swallow tail sign at the cerebral peduncles, which in the appropriate clinical setting could be supportive of Parkinson's disease.      04-  CT Soft Tissue Neck With Contrast  1. No soft tissue mass or suspicious adenopathy in the neck. 2. Mild atheromatous plaque along the proximal internal carotid arteries. This does not appear flow limiting. 3. Breath-hold with closed glottis, which limits evaluation of the vocal folds.     04- CT Chest With Contrast Diagnostic  1.  Findings of disease progression. 2.  Enlarging 4.4 cm RIGHT upper lobe lung mass anteriorly, biopsy-proven neoplasm. 3.  Enlarging 3.5 cm LEFT upper lobe suprahilar mass, also likely a neoplasm (second primary versus metastasis). Enlarging satellite subcentimeter pulmonary nodules. 4.  New 10 mm cavitary pulmonary nodule in the RIGHT lower lobe superior segment, likely metastatic lesion.     04-  NM Bone Scan Whole Body   1.  LEFT iliac wing metastatic lesion, correlating with abnormality on same-day CT. 2.  No other suspicious bone lesion identified.     04-  CT Abdomen Pelvis With Contrast   1.  3.4 cm lytic lesion in the LEFT iliac wing, compatible with bone metastasis. 2.  No other evidence of metastatic disease in the abdomen or pelvis. 3.  Cirrhotic liver. Small  enhancing lesion in the caudate is indeterminate with differential including hemangioma and hepatocellular carcinoma. Further characterization can be obtained with MRI liver protocol. 4.  Cholelithiasis without evidence of cholecystitis.      5/6/2021 -  Chemotherapy    OP LUNG Nivolumab / Ipilimumab / PACLitaxel / CARBOplatin AUC=6      5/10/2021 -  Chemotherapy    OP CENTRAL VENOUS ACCESS DEVICE ACCESS, CARE, AND MAINTENANCE (CVAD)         The current medication list and allergy list were reviewed and reconciled.     Past Medical History, Past Surgical History, Social History, Family History have been reviewed and are without significant changes except as mentioned.    Review of Systems:    Review of Systems   Constitutional: Positive for activity change, appetite change and fatigue. Negative for chills, diaphoresis, fever and unexpected weight change.   HENT: Negative for congestion, facial swelling, mouth sores, nosebleeds, sore throat, trouble swallowing and voice change.    Eyes: Negative for discharge and redness.   Respiratory: Positive for cough and shortness of breath. Negative for chest tightness and wheezing.    Cardiovascular: Negative for chest pain, palpitations and leg swelling.   Gastrointestinal: Negative for abdominal distention, abdominal pain, blood in stool, constipation, diarrhea, nausea and vomiting.   Endocrine: Negative.    Genitourinary: Negative for difficulty urinating, dysuria, frequency, hematuria and urgency.   Musculoskeletal: Negative for arthralgias, gait problem and myalgias.   Skin: Negative for color change and rash.   Neurological: Positive for weakness. Negative for dizziness and headaches.   Hematological: Negative for adenopathy. Does not bruise/bleed easily.   Psychiatric/Behavioral: The patient is not nervous/anxious.        Physical Exam:    There were no vitals filed for this visit.  Vitals:    05/03/21 1553   PainSc: 0-No pain          ECOG: (3) Capable of Limited Self  Care, Confined to Bed or Chair Greater Than 50% of Waking Hours    The physical exam is limited as this is a telephone visit.  The patient was alert and oriented on the phone. The patient was able to answer questions appropriately.  The patient was in no distress.                NEEDS ASSESSMENTS  Psychosocial  The patient has completed a PHQ-9 Depression Screening but not the Distress Thermometer (DT) today as this was a telephone visit.  PHQ-9 results show 15-19 (Moderately Severe Depression). Our oncology social worker will be flagged for a DT score of 4 or above, and a same day call will be made for a score of 9 or 10.  A mental health referral is not recommended at this time. Copies of patient's questionnaires will be scanned into EMR for details and further reference.    Barriers to care  We discussed services offered by our facility to help him have adequate access to care. The patient was given the name and contact information for our Oncology Social Worker, Salo Catherine.  Based upon barriers assessment today, the patient will not require a follow-up call from the  to further discuss needs. A copy of the barriers form will also be scanned into EMR for details and further reference.     VAD Assessment  The patient and I discussed planned intervenous chemotherapy as well as other IV treatments that are often needed throughout the course of treatment. These may include, but are not limited to blood transfusions, antibiotics, and IV hydration. The patient has been scheduled for a port placement.     Advanced Care Planning  Advance Care Planning   ACP discussion was held with the patient during this visit. Patient does not have an advance directive, information provided.     Additional Referral needs  none      CHEMOTHERAPY EDUCATION    Booklets Given: Handouts on each agent from [employer] were discussed and will be given to the patient.                                             Chemotherapy  Regimen:   Treatment Plans     Name Type Plan Dates Plan Provider         Active    OP LUNG Nivolumab / Ipilimumab / PACLitaxel / CARBOplatin AUC=6  ONCOLOGY TREATMENT  5/5/2021 - Present Antonio Bassett MD                      TOPICS EDUCATION PROVIDED COMMENTS   ANEMIA:  role of RBC, cause, s/s, ways to manage, role of transfusion [x]  Advised patient will be checking her lab work weekly   THROMBOCYTOPENIA:  role of platelet, cause, s/s, ways to prevent bleeding, things to avoid, when to seek help [x]    NEUTROPENIA:  role of WBC, cause, infection precautions, s/s of infection, when to call MD [x]    NUTRITION & APPETITE CHANGES:  importance of maintaining healthy diet & weight, ways to manage to improve intake, dietary consult, exercise regimen [x]  Advised patient to eat small frequent meals   DIARRHEA:  causes, s/s of dehydration, ways to manage, dietary changes, when to call MD [x]  Imodium 1 to 2 tablets after each loose stool if no relief call the office   CONSTIPATION:  causes, ways to manage, dietary changes, when to call MD [x]  Any over-the-counter regimen for constipation is okay to take   NAUSEA & VOMITING:  cause, use of antiemetics, dietary changes, when to call MD [x]  Zofran has been sent to the pharmacy to take at home as needed for any nausea if no relief call the office   MOUTH SORES:  causes, oral care, ways to manage [x] Baking soda and salt rinses as needed for mouth soreness if no relief call the office   ALOPECIA:  cause, ways to manage, resources [x]    INFERTILITY & SEXUALITY:  causes, fertility preservation options, sexuality changes, ways to manage, importance of birth control []    NERVOUS SYSTEM CHANGES:  causes, s/s, neuropathies, cognitive changes, ways to manage [x] Discussed s/s of neuropathy   PAIN:  causes, ways to manage [x]    SKIN & NAIL CHANGES:  cause, s/s, ways to manage [x]    ORGAN TOXICITIES:  cause, s/s, need for diagnostic tests, labs, when to notify MD [x]  Advised the  patient will be checking chemistry panel weekly   SURVIVORSHIP:  distress, distress assessment, secondary malignancies, early/late effects, follow-up, social issues, social support [x]    HOME CARE:  use of spill kits, storing of PO chemo, how to manage bodily fluids [x]    MISCELLANEOUS:  drug interactions, administration, vesicant, et [x]  Educated the patient on administration of chemotherapy         Assessment and Plan:    Diagnoses and all orders for this visit:    1. Malignant neoplasm of lung, unspecified laterality, unspecified part of lung (CMS/HCC) (Primary)      The patient and I have reviewed their cancer diagnosis and scheduled treatment plan. Needs assessment was completed including psychosocial needs, barriers to care, VAD evaluation, and advanced care planning. Referrals have been ordered as appropriate based upon our evaluation and patient desires.     Chemotherapy teaching was also completed today as documented above. Adequate time was given to answer all questions to his satisfaction. Patient and family are aware of their care team members and contact information if they have questions or problems throughout the treatment course. The patient is adequately prepared to begin treatment as scheduled. He will sign consent upon return.     Reviewed with patient education regarding Zofran prescriptions sent to pharmacy.       I advised the patient that he can take Tylenol or Ibuprofen as needed for aches/pains related to cancer/treatment.  I also advised that him can use Senakot or Miralax as needed for constipation or Imodium as needed for diarrhea.       I reviewed with the patient the care team members. I also reviewed the option of calling our oncology office for evaluation and management of symptoms related to treatment.    I spent 45 minutes caring for Brett on this date of service. This time includes time spent by me in the following activities: preparing for the visit, reviewing tests, counseling  and educating the patient/family/caregiver, ordering medications, tests, or procedures and documenting information in the medical record.     Toyin Wiggins, APRN  05/03/2021

## 2021-05-04 NOTE — TELEPHONE ENCOUNTER
Caller: ANASTASIA MARC    Relationship: DAUGHTER    Best call back number:[phone]    Chief complaint: PT TO RESCHED 5/6 APPTS AS THEY ARE SEEING A FAMILY MEMBER OFF FOR ACTIVE DUTY    Type of visit: LAB/ INFUSION    Requested date: NOT 5/10    If rescheduling, when is the original appointment:5/6    Additional notes:

## 2021-05-05 NOTE — TELEPHONE ENCOUNTER
Caller: ANASTASIA MARC    Relationship to patient: DAUGHTER    Best call back number: [phone]    Type of visit: LAB AND FOLLOW UP    Requested date: 5/14/21    If rescheduling, when is the original appointment: 5/7/21    PLEASE CALL TO DISCUSS
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
## 2021-05-07 NOTE — ANESTHESIA POSTPROCEDURE EVALUATION
"Patient: Brett Ortiz    Procedure Summary     Date: 05/07/21 Room / Location:  [facility] OR 06 /  [facility]    Anesthesia Start: 1401 Anesthesia Stop: 1441    Procedure: SINGLE LUMEN PORT PLACEMENT (N/A Chin to Nipples) Diagnosis: (LUNG CANCER)    Surgeons: Apolonia Kirkland MD Provider: Saira Das CRNA    Anesthesia Type: MAC ASA Status: 3          Anesthesia Type: MAC    Vitals  Vitals Value Taken Time   /66 05/07/21 1504   Temp 97.3 °F (36.3 °C) 05/07/21 1438   Pulse 75 05/07/21 1510   Resp 16 05/07/21 1445   SpO2 98 % 05/07/21 1510   Vitals shown include unvalidated device data.        Post Anesthesia Care and Evaluation    Patient location during evaluation: PACU  Patient participation: complete - patient participated  Level of consciousness: awake and alert  Pain management: adequate  Airway patency: patent  Anesthetic complications: No anesthetic complications    Cardiovascular status: acceptable  Respiratory status: acceptable  Hydration status: acceptable    Comments: Blood pressure 154/66, pulse 73, temperature 97.3 °F (36.3 °C), temperature source Temporal, resp. rate 16, height 171 cm (67.32\"), SpO2 96 %.    Pt discharged from PACU based on oziel score >8      "

## 2021-05-10 PROBLEM — Z87.891 FORMER SMOKER: Status: ACTIVE | Noted: 2021-01-01

## 2021-05-10 NOTE — PROGRESS NOTES
RIKI met with Mr. Ortiz due to his high distress score. He is a 70 year old male who is starting chemo treatment. SW introduced self and explained role and source of support. Mr. Ortiz’s wife was also present during this conversation. Mr. Ortiz has multiple stressors including pain, transportation, depression, and worry. Mr. Ortiz lives with his wife. He has a strong support system including his wife and five children. His wife assists him with his ADLs. He daughter drove him to treatment today. He does not have any financial concerns regarding medical bills. RIKI provided him information and answered his questions on [employer] and [employer] transportation. He does have pain in his leg and states pain medication does provide him some relief. He also has a radiation consultation and hopes radiation may be able to assist with the pain. Mr. Ortiz states it is his norm to be anxious. He does take medication for anxiety but does not feel it is helping. This writer encouraged him to speak to his doctor due to this being out of scope for this writer. SW provided him with resources to help reduce anxiety and depression. He declined information on support groups. Mr. Ortiz plans to garden and drive his tractor which are activities that lower his stress level. Mr. Ortiz will contact this writer if further assistance is needed.

## 2021-05-10 NOTE — PROGRESS NOTES
0925 Labs noted per katelin Harmon for treatment per Sandy Santos RN. Sandy had patient sign chemo consent.-Autumn SWEENEY

## 2021-05-10 NOTE — TELEPHONE ENCOUNTER
PAIN MEDICATION REQUEST:  Received call from Nurse [facility] she calls to report patient Brett Ortiz is receiving his chemo txt today and has c/o pain which he rates a (9) on a scale of 1-10, pain located left side which radiates down into his pelvic area.   Patient informed per Nurse that he should bring his home medications, patient v/u and is asking for a one time dose during his treatment today,  Looking at patient chart, the only pain medication he is currently using is Percocet 7.5 mg prescribed by Dr Kirkland for possible pain control post port, this is the only pain medication I see in his chart?      Will relay message to DR Bassett.     Note:  PER DR BASSETT, GIVE ONE TIME DOSE (PERCOCET 7.5 MG) TODAY   ORDER TO BE PLACED.   Informed Kelly order to be placed.

## 2021-05-10 NOTE — PROGRESS NOTES
[facility] Grace Ribeiro BSN, PA-C  ____________________________________________________________                 Department of Radiation Oncology  [address] [place] [id number]  Office:  [phone]  Fax: [phone]    DATE: 05/12/2021  PATIENT: Brett Ortiz 1950   Medical record #: [id number]                                                       REASON FOR CONSULTATION:   Chief Complaint   Patient presents with   • Lung Cancer     bone metastasis     Brett Ortiz is a 70 y.o. male that has been referred to our clinic to be evaluated for consideration of radiotherapy for painful iliac metastasis from right lung cancer.. Reports fatigue, voice change, cough, SOB, chest pain, and extremity weakness. Denies activity change, appetite change, unexpected weight change, nausea/vomiting, hemoptysis, light-headedness, weakness, and headaches. He follows .            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  He now has pain he related to bearing weight on his left leg.  He takes 7.5 mg of hydrocodone with good mitigation of pain.  He uses a wheelchair and a walker device.  He is just started systemic treatment with carbo/taxol and nivolumab/ipilimumab.  He is being seen now in regard to palliative radiotherapy to painful apparently solitary left iliac osseous metastasis.    11/27/2020 - Presented to [facility] with SOB. Patient was admitted for further evaluation and treatment.     11/27/2020 - CT Angiogram Chest:  • There is a noncalcified nodule in the perihilar left upper lobe which measures 1.7 x 1.7 cm in size.   • There is pleural thickening/parenchymal scarring in the lingula.  • There is scarring in the left lower lobe.   • There is a noncalcified nodule in the right upper lobe which measures 2.5 x 1.7 cm in size.   • An additional subpleural nodule  posteriorly in the right upper lobe measures 7 mm in size.   • A pleural-based nodule in the right upper lobe measures 9 mm in size.   • A tiny subpleural nodule in the right lower lobe measures 4 mm in size.  • Review of the visualized portion of the upper abdomen demonstrates cirrhotic liver morphology.   • Numerous stones are seen in the gallbladder.  • There is mild mesenteric edema.  • Review of the visualized osseous structures demonstrates a superior endplate lucency at T11 of uncertain significance.   • Degenerative spurring is noted in the spine.   • Tiny round sclerotic foci are seen in multiple left-sided ribs which are too small to characterize.  Impression:   • Limited evaluation of the pulmonary arteries due to contrast bolus timing. No evidence of PE or acute aortic pathology when allowing for this.  • Atherosclerosis of the aorta and coronary arteries.  • Findings suggestive of pulmonary arterial hypertension.  • Multiple noncalcified pulmonary nodules bilaterally concerning for metastatic disease.  • Cirrhotic liver morphology.  • Cholelithiasis.    11/28/2020 - CT Abdomen/Pelvis without contrast:  • Macronodular appearance of the liver. Mild splenomegaly.  • Cholelithiasis.  • There is a 1.2 cm right renal lesion that is not fully evaluated. It could be a small cyst.  • Egan's pouch in the pelvis extending from the proximal to mid sigmoid colon to the rectum. Right lower quadrant ileostomy. Most of the colon has been resected.    11/30/2020 - Discharged from [facility] with follow up appointments scheduled.     12/07/2020 - PET Scan:  • There is mild asymmetric uptake of the left posterior nasopharynx or tonsil with maximum SUV 5.3  • Right upper lobe 1.7 cm pulmonary nodule with maximum SUV 19.2.  • Left upper lobe 1.7 cm pulmonary nodule with maximum SUV 6.8.   • There is a focus of FDG uptake along the colon suture line with maximum SUV 6.0.  Impression:  • There are 2 FDG avid pulmonary  nodules, which could represent primary or metastatic malignancy.  • Mild asymmetric uptake in the left posterior nasopharynx/tonsil. Recommend direct visualization.  • Focus of FDG uptake along the colon suture line with maximum SUV 6.0.  • Diffusely heterogeneous FDG uptake in the bones, which could be seen with bony metastatic disease or bone marrow activation. Correlate with patient history. No convincing corollary CT lesions appreciated at this time.   • Nodular liver contour with heterogeneous FDG uptake suggests chronic liver disease.  • Gallstones.    01/05/2021 - Appointment with  - Cardiothoracic Surgery:  • We discussed the differential diagnoses possible with malignancy high in the differential.    • We discussed the importance of staging such that best therapy can be selected and portend prognosis accurately.    • We discussed options for care including continued surveillance verses efforts towards diagnosis and treatment.    • We discussed options for diagnosis including bronchoscopy, CT-guided needle biopsy, or surgical biopsy with either cervical mediastinoscopy or thoracoscopy with resection.    • We discussed the value of clinical staging with a CT/PET scan.    • The pros and cons of each option were discussed at length.  I believe further efforts towards diagnosis are warranted.    • Since he has bilateral disease I believe CT-guided needle biopsy with further evaluation of his lung function is warranted.  The risks of the procedure were discussed briefly.    • I did defer and encourage discussion with the perform radiologist for a formal discussion of risk of procedure.    • All questions have been answered to the best of my ability and he is agreeable to the aforementioned plan.  • He is to return to see me after performing CT-guided needle biopsy.    03/03/2021 - CT Chest with contrast:  • The main pulmonary artery segment measures 3.4 cm.   • A 1 cm cavitary right upper lobe lesion  image 50 of series 3 previously measured 7 mm.  • A 3.8 cm necrotic appearing lesion in the right upper lobe anteriorly image 71 of series 3 previously measured 2.5 cm.  • A 3.1 cm necrotic appearing left upper lobe centrally located lesion on image 74 of series 3 previously measured 1.9 cm.  • New 5 mm left upper lobe nodule image 66 of series 3 centrally located.  • A 7 mm subpleural right upper lobe nodule posteriorly image 41 of series 3 previously measured 1 cm.  • A 3-4 mm subpleural right middle lobe nodule image 105 series 3, stable.  • A 9 mm right upper lobe subpleural nodule versus scar image 47 series 3, stable.  • There are degenerative changes of the spine.   • Vague lucency along the superior endplate of T11 remains stable.   • No destructive appearing rib lesions are appreciated.   • There is an old healed fracture of the sternum.  Impression:  • There has been progressive increase in some of the pulmonary nodules/masses. The 2 largest in both of the upper lobes now appear to be somewhat necrotic. A small right upper lobe lesion is cavitary. Neoplastic disease and infection/inflammation are in the differential. Neoplastic disease is favored. One of the right upper lobe subpleural nodules is smaller in size. All of the other areas are stable or larger.  • No enlarged mediastinal or hilar lymph nodes.  • Atheromatous disease of the thoracic aorta and coronary arteries. Dilated main pulmonary artery segment.  • Macronodular appearance of the liver with fatty infiltration. Possible liver cirrhosis. Correlate clinically.  • Vague lucency in the superior endplate of T11 is stable. This is nonspecific. No acute appearing bony abnormality is seen.      03/03/2021 - Appointment with :  • Independent review of CT scan obtained on March 3, 2021 is performed by me demonstrating to dominant lung lesions that have certainly increased in size.  Overall remaining other nodules are either the same size or  increase in size.  There is no mediastinal adenopathy.  • I discussed the patients findings and my recommendations with patient and wife.  • We discussed the differential diagnoses possible with malignancy high in the differential.  We discussed the importance of staging such that best therapy can be selected and portend prognosis accurately.  We discussed options for care including continued surveillance verses efforts towards diagnosis and treatment.  We discussed options for diagnosis including bronchoscopy, CT-guided needle biopsy, or surgical biopsy with either cervical mediastinoscopy or thoracoscopy with resection.  We discussed the value of clinical staging with a CT/PET scan.  The pros and cons of each option were discussed at length.  I believe further efforts towards diagnosis are warranted.  Since he has bilateral disease I believe CT-guided needle biopsy.  Given the bilateral increase in size this is likely malignancy rather than infection.  Efforts are made towards diagnosis and likely referral to an oncologist for metastatic treatment.  He reports having marked anxiety with testing and leaving his home.  Reassurance was provided.  I did thereafter speak with Saira from Dr. Farooq's office his primary care providers with who will aid with treating his anxiety prior to procedure.  The risks of the procedure were discussed briefly.  I did defer and encourage discussion with the perform radiologist for a formal discussion of risk of procedure.    • He is to return to see me after performing CT-guided needle biopsy.    03/17/2021 - Lung, right upper lobe mass, fine-needle core biopsies and touch preparation:   • Poorly differentiated non-small cell carcinoma, consistent with squamous cell carcinoma.    04/12/2021 - Consult with :  • He has stage IV NSCLC  • Will arrange restaging  • He needs systemic TX  • His PS is poor  • I have discussed about systemic TX options and he will be back for final  decisions     04/20/2021 - MRI Brain With & Without Contrast:  • No acute intracranial finding. Specifically, no evidence of metastatic disease. 2. Loss of the normal swallow tail sign at the cerebral peduncles, which in the appropriate clinical setting could be supportive of Parkinson's disease.      04/23/2021 - CT Soft Tissue Neck With Contrast:  • No soft tissue mass or suspicious adenopathy in the neck.   • Mild atheromatous plaque along the proximal internal carotid arteries. This does not appear flow limiting.   • Breath-hold with closed glottis, which limits evaluation of the vocal folds.     04/23/2021 CT Chest with Contrast:  • Enlarging RIGHT upper lobe anterior biopsy-proven neoplasm now measuring 2.9 x 4.4 cm (previously 2.4 x 3.8 cm). There is also increased abutment of the RIGHT anterior chest wall and RIGHT anterior third rib.  • Slight increase in size of 3.5 x 2.7 cm LEFT upper lobe mass (previously 3.1 x 2.5 cm). This mass again extends into the RIGHT suprahilar region with vascular and mediastinal abutment.   • Multiple enlarging satellite nodules including an 8 mm LEFT upper lobe nodule on image 58 which previously was 5 mm.  • New 10 mm cavitary pulmonary nodule in the RIGHT lower lobe superior segment.   • Stable 3 mm RIGHT middle lobe pulmonary nodule on image 94.  • Thoracic aorta is ectatic measuring 4.1 cm diameter.   Impression:  • Findings of disease progression.   • Enlarging 4.4 cm RIGHT upper lobe lung mass anteriorly, biopsy-proven neoplasm.   • Enlarging 3.5 cm LEFT upper lobe suprahilar mass, also likely a neoplasm (second primary versus metastasis). Enlarging satellite subcentimeter pulmonary nodules.   • New 10 mm cavitary pulmonary nodule in the RIGHT lower lobe superior segment, likely metastatic lesion.     04/23/2021 - Bone Scan:  • LEFT iliac wing metastatic lesion, correlating with abnormality on same-day CT.   • No other suspicious bone lesion identified.     04/23/2021 -  CT Abdomen/Pelvis With Contrast:  • 3.4 cm lytic lesion in the LEFT iliac wing, compatible with bone metastasis.   • No other evidence of metastatic disease in the abdomen or pelvis.   • Cirrhotic liver. Small enhancing lesion in the caudate is indeterminate with differential including hemangioma and hepatocellular carcinoma. Further characterization can be obtained with MRI liver protocol.  • Cholelithiasis without evidence of cholecystitis.     04/30/2021 - Appointment with :  • He will start chemo next week  • He will also start palliative RT soon    05/07/2021 - Port placement per .     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    • Nevus    • Oxygen dependent    • Perforation of colon (CMS/HCC) 2018   • Venous insufficiency    • Weakness of extremity     LEFT SIDE   • Wheelchair dependent       PAST SURGICAL HISTORY  Past Surgical History:   Procedure Laterality Date   • COLON SURGERY      x4   • EYE SURGERY Bilateral     cataracts    • FLAP HEAD/NECK Left 10/23/2018    Procedure: LEFT CHEEK ADVANCEMENT FLAP CLOSURE OF LIP AND CHEEK,5CM X 6CM IPSILATERAL LEFT SEPTAL MUCOSAL HINGE FLAP,2CM X 3CM;  Surgeon: Izaiah Ceron MD;  Location:  [facility] OR;  Service: ENT   • FLAP HEAD/NECK Bilateral 11/13/2018    Procedure: Pedicle division and flap inset of nose;  Surgeon: Izaiah Ceron MD;  Location:  [facility] OR;  Service: ENT   • HEAD/NECK LESION/CYST EXCISION Left 10/23/2018    Procedure: LEFT PARAMEDIAN  FOREHEAD FLAP WITH PRESERVATION OF VASCULAR PEDICLE;  Surgeon: Izaiah Ceron MD;  Location:  [facility] OR;  Service: ENT   • SKIN FULL THICKNESS GRAFT Left 10/23/2018    Procedure: Conchal cartilage graft from right ear to left nose with complex closure of skin of forehead;  Surgeon: Izaiah Ceron MD;  Location:  [facility] OR;  Service: ENT   • VENOUS ACCESS DEVICE (PORT) INSERTION N/A 5/7/2021    Procedure: SINGLE LUMEN PORT PLACEMENT;  Surgeon: Apolonia Kirkland MD;  Location:  [facility] OR;  Service: General;  Laterality: N/A;      FAMILY HISTORY  family history includes Diabetes in his mother; Lung cancer in his mother; Pancreatic cancer (age of onset: 67) in his father; Squamous cell carcinoma in his mother.    SOCIAL HISTORY  Social History     Tobacco Use   • Smoking status: Former Smoker     Packs/day: 1.00     Years: 50.00     Pack years: 50.00     Types: Cigarettes     Quit date: 04/2018     Years since quitting: 3.1   • Smokeless tobacco: Former User     Types: Chew   Vaping Use   • Vaping Use: Never used   Substance Use Topics   • Alcohol use: No     Comment: quit April 14 2018/ hx of alchohlism    • Drug use: No      ALLERGIES  Patient has no known allergies.      MEDICATIONS  Current Outpatient Medications   Medication Instructions   • budesonide-formoterol (SYMBICORT) 80-4.5 MCG/ACT inhaler 2 puffs, Inhalation, 2 Times Daily - RT   • buPROPion XL (WELLBUTRIN XL) 150 mg, Oral, Daily   • dexamethasone (DECADRON) 2 mg, Oral, 2 Times Daily With Meals   • furosemide (LASIX) 40 mg, Oral, Daily   • HYDROcodone-acetaminophen (NORCO) 7.5-325 MG per tablet 1 tablet, Oral, Every 4 Hours PRN   • ipratropium-albuterol (DUO-NEB) 0.5-2.5 mg/3 ml nebulizer 3 mL, Nebulization, Every 6 Hours - RT, For COPD J44.9   • lidocaine (LIDODERM) 5 % 1 patch, Transdermal, Daily PRN, Remove & Discard patch within 12 hours or as directed by [facility] lower quadrant    • O2 (OXYGEN) 3 L/min, Inhalation, Once, 24 hours a day    • ondansetron  "(ZOFRAN) 8 mg, Oral, Every 8 Hours PRN   • oxybutynin (DITROPAN) 5 mg, Oral, Daily   • oxyCODONE-acetaminophen (PERCOCET) 7.5-325 MG per tablet 1 tablet, Oral, 2 Times Daily PRN   • pantoprazole (PROTONIX) 40 mg, Oral, Daily   • potassium chloride (KAYCIEL) 20 MEQ/15ML (10%) solution 20 mEq, Oral, Daily   • propranolol (INDERAL) 20 mg, Oral, Daily   • sertraline (ZOLOFT) 100 mg, Oral, Daily   • tamsulosin (FLOMAX) 0.4 mg, Oral, Daily   • traZODone (DESYREL) 50 mg, Oral, Nightly PRN     Current outpatient and discharge medications have been reconciled for the patient.  Reviewed by: Jaziel Scherer MD    Cancer-related family history includes Lung cancer in his mother; Pancreatic cancer (age of onset: 67) in his father.    The following portions of the patient's history were reviewed and updated as appropriate: allergies, current medications, past family history, past medical history, past social history, past surgical history and problem list.    REVIEW OF SYSTEMS  Review of Systems   Constitutional: Positive for fatigue.   HENT:   Positive for voice change (hoarsness).    Respiratory: Positive for cough (clear sputum) and shortness of breath (with very minimal activity). Negative for hemoptysis.    Cardiovascular: Positive for chest pain (Left sided port tenderness).   Gastrointestinal: Negative.         Colostomy bag in place - good output   Endocrine: Negative.    Genitourinary: Negative.     Musculoskeletal:        Left hip/leg pain   Skin: Negative.    Neurological: Positive for extremity weakness.   Hematological: Negative.    Psychiatric/Behavioral: Negative.      I have reviewed and confirmed the accuracy of the ROS as documented by the MA/LPN/RN Jaziel Scherer MD    PHYSICAL EXAM  VITAL SIGNS:   Vitals:    05/12/21 1133   BP: 140/86   Pulse: 75   SpO2: 94%  Comment: 3L   Weight: 104 kg (229 lb)   Height: 171 cm (67.32\")   PainSc:   9   PainLoc: Leg  Comment: left hip/leg     General:  Alert and oriented, " no acute distress, well developed, Vitals reviewed.  Head:  Normocephalic, without obvious abnormality    Nose/Sinuses:   Patient wearing a mask.  Mouth/Throat:   Patient wearing a mask.  Neck:  supple, No evidence of adenopathy in the cervical or supraclavicular areas.  Eyes: No gross abnormalities   Ears: Ears intact with no external abnormalities noted.   Chest:   Lungs are clear to auscultation and percussion with normal but distant breath sounds..  Cardiovascular: Regular rate and rhythm; heart sounds are normal without murmurs rubs or gallops.  Abdomen:  Soft, non-tender, normal bowel sounds; no CVA tenderness   Extremities:  LAWRENCE well, warm to touch, no evidence of cyanosis or edema.  Skin: No suspicious lesions or rashes of concern  Neurologic:  Alert and oriented, non focal exam, strength and sensation grossly normal  Psych: Mood and affect are appropriate    Performance Status: ECOG (0) Fully active, able to carry on all predisease performance without restriction    Clinical Quality Measures  -Pain Documented by Standardized Tool, FPS Brett SUNITA Ortiz reports a pain score of .  Given his pain assessment as noted, treatment options were discussed and the following options were decided upon as a follow-up plan to address the patient's pain: continuation of current treatment plan for pain and use of non-medical modalities (ice, heat, stretching and/or behavior modifications).     Pain Medications             buPROPion XL (WELLBUTRIN XL) 150 MG 24 hr tablet Take 150 mg by mouth Daily.    dexamethasone (DECADRON) 2 MG tablet Take 1 tablet by mouth 2 (Two) Times a Day With Meals.    HYDROcodone-acetaminophen (NORCO) 7.5-325 MG per tablet Take 1 tablet by mouth Every 4 (Four) Hours As Needed for Moderate Pain  (Pain).    oxyCODONE-acetaminophen (PERCOCET) 7.5-325 MG per tablet Take 1 tablet by mouth 2 (Two) Times a Day As Needed.    sertraline (ZOLOFT) 100 MG tablet Take 100 mg by mouth Daily.    traZODone (DESYREL)  50 MG tablet Take 50 mg by mouth At Night As Needed for Sleep.        -Advanced Care Planning Advance Care Planning   ACP discussion was held with the patient during this visit. Patient does not have an advance directive, information provided.    -Body Mass Index Screening and Follow-Up Plan Patient's Body mass index is 35.53 kg/m². indicating that he is obese (BMI >30). Obesity-related health conditions include the following: obstructive sleep apnea, hypertension, coronary heart disease and diabetes mellitus. Obesity is unchanged. BMI is is above average; BMI management plan is completed. We discussed portion control and increasing exercise..    -Tobacco Use: Screening and Cessation Intervention Social History    Tobacco Use      Smoking status: Former Smoker        Packs/day: 1.00        Years: 50.00        Pack years: 50        Types: Cigarettes        Quit date: 04/2018        Years since quitting: 3.1      Smokeless tobacco: Former User        Types: Chew    ASSESSMENT AND PLAN  1. Pain from bone metastases (CMS/HCC)    2. Malignant neoplasm of overlapping sites of lung, unspecified laterality (CMS/HCC)    3. Chronic obstructive pulmonary disease with acute exacerbation (CMS/HCC)    4. Former smoker      RECOMMENDATIONS: Brett Ortiz was diagnosed with Stage IV lung carcinoma with painful bone metastasis. Currently being treated with chemotherapy and immunotherapy under the direction of Dr. Bassett. Recent Bone scan on 04/23/2021 revealed a LEFT iliac wing metastatic lesion, 3.4 cm    I have extensively reviewed the risks, benefits and alternatives of therapy with this diagnosis. The risks of radiation therapy includes but is not limited to radiation induced pulmonary fibrosis, progression of disease in spite of therapy with either local or systemic failure. I have seen, examined and reviewed this patient's medication list, appropriate labs and imaging studies as well as other physician notes. We discussed the  goals and plans of care with the patient and family and answered all questions.     Following this discussion and in consideration of the evaluation of the patient, palliative radiation therapy is recommended for pain management and to maximize local tumor control. I anticipate 3000 cGy over 10 treatments, final course pending. Will simulate treatment fields today to begin the planning process, final course pending.    Continue ongoing management per primary care physician and other specialists. Thank you for allowing me to assist in this patients care.     Time Spent: I spent 60 minutes caring for Brett on this date of service. This time includes time spent by me in the following activities: preparing for the visit, reviewing tests, obtaining and/or reviewing a separately obtained history, performing a medically appropriate examination and/or evaluation, counseling and educating the patient/family/caregiver and documenting information in the medical record.   Jaziel Scherer MD  05/12/2021

## 2021-05-12 NOTE — PATIENT INSTRUCTIONS
Bone Metastasis    Bone metastasis is cancer that has spread from the part of the body where it started to the bones. A person may have bone metastasis in one bone or in more than one bone. Cancer that spreads to the bones is different from cancer that starts in the bones (primary bone cancer). Bone metastasis is more common than primary bone cancer.  The spine is the most common area for bone metastasis to occur. Other common areas include:  · Hip bone (pelvis).  · Ribs.  · Skull.  · Long bones of the arm or leg.  Bone metastasis is painful. It also damages and weakens bones so that they may break easier, even from a minor injury.  What are the causes?  This condition is caused by cancer cells that spread to the bone. Cancer cells can spread to the rest of the body in two ways:  · Through the bloodstream.  · Through the vessels that carry white blood cells in the body (lymphatic system).  What increases the risk?  This condition is more likely to develop in people who have an advanced type of cancer that is known to spread to bone. Cancers that often spread to bone include:  · Breast cancer.  · Prostate cancer.  · Lung cancer.  · Thyroid cancer.  · Kidney cancer.  · Multiple myeloma.  · Lymphoma.  What are the signs or symptoms?  The most common symptom of this condition is bone pain, especially while you are resting. Other symptoms include:  · A broken bone (fracture) that happens with little or no trauma.  · Low number of red blood cells (anemia). Bone destruction may damage the spongy tissue (bone marrow) in the center of bones where red blood cells are produced. Anemia can cause:  ? Weakness.  ? Shortness of breath.  ? Headache.  ? Dizziness.  · Back or neck pain with numbness or weakness.  · High levels of calcium in your blood (hypercalcemia). When bone is destroyed, calcium is released into your blood. Symptoms of hypercalcemia include:  ? Constipation.  ? Thirst.  ? Nausea.  ? Sleepiness.  How is this  diagnosed?  This condition may be diagnosed based on:  · Your symptoms and medical history. Your health care provider may suspect this condition if you are being treated for cancer or have had cancer treatment in the past.  · A physical exam.  · Imaging studies, such as:  ? Bone X-rays, especially in the area where you have pain.  ? CT scan.  ? Bone scan.  ? MRI.  ? PET scan.  · Blood tests.  · Urine tests.  · A procedure to remove a piece of bone so it can be examined under a microscope (biopsy).  How is this treated?  Treatment for this condition depends on your overall health, the type of cancer you have, and how much the cancer has spread. You will work with a team of health care providers to determine which treatment is best for you. Treatment will focus on managing pain, preventing bone weakness, and slowing the spread of the cancer. Treatment may include:  · Radiation therapy. This treatment uses X-rays to kill cancer cells. It is most effective for reducing pain, stopping tumor growth, and lowering the risk of fractures.  · Radioisotope therapy. This treatment uses a radioactive medicine that is injected into your blood. The medicine travels to areas where cancer cells are active and kills them.  · Chemotherapy. For this treatment, you are given cancer-killing medicines. You may have chemotherapy in cycles, with rest periods in between.  · Medicines that:  ? Help build bone (bisphosphonates and denosumab). These medicines are used to make bones stronger and control bone pain. They may also help to reduce hypercalcemia.  ? Reduce pain (opiates).  · Endocrine therapies. These therapies slow cancer growth by blocking specific chemical messengers (hormones). Some types of cancer, including breast and prostate cancers, may grow because of hormones in the body.  · Targeted therapy. These drugs are used to block the growth and spread of cancer cells. These drugs target a specific part of the cancer cell and usually  cause fewer side effects than chemotherapy.  · Immunotherapies. These therapies use the body's defense system (immune system) to fight cancer cells.  · Surgery. You may have surgery to remove bone cancer or to prevent or repair a fracture.  Follow these instructions at home:    · Take medicines only as directed by your health care provider.  · If you are taking prescription pain medicine, take actions to prevent or treat constipation. Your health care provider may recommend that you:  ? Drink enough fluid to keep your urine pale yellow.  ? Eat foods that are high in fiber, such as fresh fruits and vegetables, whole grains, and beans.  ? Limit foods that are high in fat and processed sugars, such as fried and sweet foods.  ? Take an over-the-counter or prescription medicine for constipation.  · Use devices to help you move around (mobility aids) as needed, such as canes, walkers, or scooters.  · Do not drive or use heavy machinery while taking pain medicine.  · Do not drink alcohol.  · Do not use any products that contain nicotine or tobacco, such as cigarettes and e-cigarettes. If you need help quitting, ask your health care provider.  · Keep all follow-up visits as told by your health care provider. This is important.  Contact a health care provider if:  · Your pain medicine is not helping.  · You are not able to care for yourself at home.  Get help right away if:  · You fall or have an injury.  · Your pain suddenly gets worse.  · You have trouble walking.  · You have numbness or tingling in your legs.  · You lose control of your bowels or your bladder.  · You are very sleepy or confused.  Summary  · Bone metastasis is cancer that has spread from the part of the body where it started to the bones. This condition is more common than cancer that starts in the bones (primary bone cancer).  · Bone metastasis is painful and damages and weakens the bones. It can also cause anemia and hypercalcemia.  · Treatment for this  condition depends on your overall health, the type of cancer you have, and how much the cancer has spread.  · Work with your health care team to determine which treatment is best for you. Take all medicines only as told by your health care provider.  This information is not intended to replace advice given to you by your health care provider. Make sure you discuss any questions you have with your health care provider.  Document Revised: 09/06/2019 Document Reviewed: 01/15/2019  [employer] Patient Education © 2021 [employer]    External Beam Radiation Therapy  External beam radiation therapy is a type of radiation treatment. This type of radiation therapy can deliver radiation to a fairly large area. This is the most common type of radiation therapy for cancer. It may be done for several purposes:  · Treating cancer. This is done by:  ? Destroying cancer cells. Radiation delivered during the treatment damages cancer cells. It may also damage normal cells, but normal cells have the DNA to repair themselves and cancer cells do not.  ? Helping with symptoms of a person's cancer.  ? Stopping the growth of any remaining cancer cells after surgery.  ? Preventing cancer cells from growing in areas that do not have cancer (prophylactic radiation therapy).  · Treating or shrinking a tumor that is not cancerous (is benign).  · Reducing pain (palliative therapy).  The amount of radiation a person receives and the length of therapy depend on the person's medical condition. The person should not feel the radiation being delivered or any pain during the therapy.  Let your health care provider know about:  · Any allergies you have.  · All medicines you are taking, including vitamins, herbs, eye drops, creams, and over-the-counter medicines.  · Any blood disorders you have.  · Any surgeries you have had.  · Any medical conditions you have.  · Whether you are pregnant or may be pregnant.  What are the risks?  Generally, this is a  safe procedure. However, radiation therapy can place a person at a higher risk for a second cancer later in life.  Most people will have side effects from the therapy. Side effects depend on the amount of radiation and the part of the body that was exposed to radiation. The most common side effects include:  · Skin changes.  · Hair loss.  · Tiredness (fatigue).  · Nausea and vomiting.  What happens before the procedure?  Medicines  · Ask your health care provider about:  ? Changing or stopping your regular medicines.  ? Taking over-the-counter medicines, vitamins, herbs, and supplements.  General instructions  · You will have a planning session (simulation). During the session:  ? Your health care provider will plan exactly where the radiation will be delivered. This area is called the treatment field.  ? You will be positioned for your therapy. The goal is to have a position that can be reproduced for each therapy session.  ? Temporary marks may be drawn on your body. Permanent marks may also be drawn on your body in order for you to be positioned the same way for each therapy session.  ? A tool that holds a body part in place (immobilization device) may be used to keep the area of treatment in the correct position.  · Follow instructions from your health care provider about eating or drinking restrictions.  · You may want to plan to have someone take you home from the hospital or clinic.  What happens during the procedure?    · You will either lie on a table or sit in a chair in the position determined for your therapy.  · You may have a heavy shield placed on you to protect tissues and organs that are not being treated.  · The radiation machine (linear accelerator) will move around you to deliver the radiation in exact doses from different angles. The machine will not touch you.  · If you had a heavy shield placed on you, it will be removed when the machine is finished delivering radiation.  The procedure may  vary among health care providers and hospitals.  What happens after the procedure?  · You may return to your normal routine--including diet, activities, and medicines--as told by your health care provider.  · You may feel very tired.  Summary  · External beam radiation therapy is the most common type of radiation treatment for cancer.  · The amount of radiation a person will receive and the length of therapy depend on the person's medical condition.  · Most people have side effects from the therapy. Side effects depend on the amount of radiation and the part of the body that was exposed to radiation.  This information is not intended to replace advice given to you by your health care provider. Make sure you discuss any questions you have with your health care provider.  Document Revised: 08/31/2020 Document Reviewed: 08/31/2020  [employer] Patient Education © 2021 [employer]

## 2021-05-16 NOTE — PROGRESS NOTES
[facility] HEMATOLOGY AND ONCOLOGY  [address]  [place] [address] [phone]    Chief Complaint  Lung Cancer (here for /u)    .Lung Cancer (Here for f/u)     1. Weakness  2. Bony pain  3. SOB     Mr. Ortiz is a 70-year-old male with a history of COPD with hypercapnia, cirrhosis secondary to alcohol intake, history of previous cardiac arrest, history of perforated colon with ileostomy in place, previous tobacco smoker quit in 2018 but continues with oral chew, and is wheelchair-bound presents for the evaluation of multiple lung nodules identified during his last admission for shortness of breath and chest pain.  He was recently seen by Dr Riddle and had RUL Mass core BX. It did show a poorly diff SCCA. He was also previous examined for possible head neck malignancies.      Now he is here again for the next steps of systemic TX. Since the initial consult and FU, he has started dexamethasone low dose and his appetite and SOB have been significantly improved. Also, his appetite has been improved. We have discussed about the chemo schedule and side effects    Oncology/Hematology History   Malignant neoplasm of lung (CMS/HCC)    Initial Diagnosis    Malignant neoplasm of lung (CMS/HCC)     11/27/2020 Imaging    CXR:     IMPRESSION:  1.  Patchy consolidation in the LEFT lung base, laterally could  represent pneumonia or atelectasis. Linear band of atelectasis in the LEFT lung base is also noted.   2.  There is a rounded density in the LEFT perihilar region which maysimply represent a prominent pulmonary vessel on end; however, needs  further evaluation with CT chest with contrast to exclude a pulmonary  nodule.     12/7/2020 Imaging    PET  BHP  1. There are 2 FDG avid pulmonary nodules, which could represent primaryor metastatic malignancy.  2. Mild asymmetric uptake in the left posterior nasopharynx/tonsil.  Recommend direct visualization.  3.  Focus of FDG uptake along the colon suture line with maximum SUV 6.0.  4. Diffusely heterogeneous FDG uptake in the bones, which could be seen with bony metastatic disease or bone marrow activation. Correlate with patient history. No convincing corollary CT lesions appreciated at this time.   5. Nodular liver contour with heterogeneous FDG uptake suggests chronic liver disease.  6. Gallstones.     3/3/2021 Imaging    CT Chest  1. There has been progressive increase in some of the pulmonary  nodules/masses. The 2 largest in both of the upper lobes now appear to be somewhat necrotic. A small right upper lobe lesion is cavitary. Neoplastic disease and infection/inflammation are in the differential. Neoplastic disease is favored. One of the right upper lobe subpleural nodules is smaller in size. All of the other areas are stable or larger.  2. No enlarged mediastinal or hilar lymph nodes.  3. Atheromatous disease of the thoracic aorta and coronary arteries. Dilated main pulmonary artery segment.  4. Macronodular appearance of the liver with fatty infiltration.  Possible liver cirrhosis. Correlate clinically.  5. Vague lucency in the superior endplate of T11 is stable. This is  nonspecific. No acute appearing bony abnormality is seen.       3/17/2021 Biopsy    Final Diagnosis   Lung, right upper lobe mass, fine-needle core biopsies and touch preparation: Poorly differentiated non-small cell carcinoma, consistent with squamous cell carcinoma.        4/20/2021 Imaging    MRI Brain With & Without Contrast  1. No acute intracranial finding. Specifically, no evidence of metastatic disease. 2. Loss of the normal swallow tail sign at the cerebral peduncles, which in the appropriate clinical setting could be supportive of Parkinson's disease.       4/23/2021 Imaging    CT Chest:  IMPRESSION:  1.  Findings of disease progression.  2.  Enlarging 4.4 cm RIGHT upper lobe lung mass anteriorly,  biopsy-proven neoplasm.  3.  Enlarging  3.5 cm LEFT upper lobe suprahilar mass, also likely a  neoplasm (second primary versus metastasis). Enlarging satellite  subcentimeter pulmonary nodules.  4.  New 10 mm cavitary pulmonary nodule in the RIGHT lower lobe superior segment, likely metastatic lesion.    CT Abd/Pelvis:  IMPRESSION:  1.  3.4 cm lytic lesion in the LEFT iliac wing, compatible with bone  metastasis.  2.  No other evidence of metastatic disease in the abdomen or pelvis.  3.  Cirrhotic liver. Small enhancing lesion in the caudate is  indeterminate with differential including hemangioma and hepatocellular carcinoma. Further characterization can be obtained with MRI liver protocol.  4.  Cholelithiasis without evidence of cholecystitis.    CT Neck:  Impression:     1. No soft tissue mass or suspicious adenopathy in the neck.  2. Mild atheromatous plaque along the proximal internal carotid  arteries. This does not appear flow limiting.  3. Breath-hold with closed glottis, which limits evaluation of the vocal folds.    Bone Scan:  IMPRESSION:  1.  LEFT iliac wing metastatic lesion, correlating with abnormality on same-day CT.  2.  No other suspicious bone lesion identified.     5/7/2021 Procedure    Mediport placement     5/10/2021 -  Chemotherapy    OP LUNG Nivolumab / Ipilimumab / PACLitaxel / CARBOplatin AUC=6      5/10/2021 -  Chemotherapy    OP CENTRAL VENOUS ACCESS DEVICE ACCESS, CARE, AND MAINTENANCE (CVAD)           PAST MEDICAL HISTORY:  ALLERGIES:  No Known Allergies  CURRENT MEDICATIONS:  Outpatient Encounter Medications as of 5/14/2021   Medication Sig Dispense Refill   • budesonide-formoterol (SYMBICORT) 80-4.5 MCG/ACT inhaler Inhale 2 puffs 2 (Two) Times a Day. 1 inhaler 12   • buPROPion XL (WELLBUTRIN XL) 150 MG 24 hr tablet Take 150 mg by mouth Daily.     • dexamethasone (DECADRON) 2 MG tablet Take 1 tablet by mouth 2 (Two) Times a Day With Meals. 30 tablet 2   • furosemide (LASIX) 20 MG tablet Take 2 tablets by mouth Daily. 60  tablet 3   • HYDROcodone-acetaminophen (NORCO) 7.5-325 MG per tablet Take 1 tablet by mouth Every 4 (Four) Hours As Needed for Moderate Pain  (Pain). 15 tablet 0   • ipratropium-albuterol (DUO-NEB) 0.5-2.5 mg/3 ml nebulizer Take 3 mL by nebulization Every 6 (Six) Hours. For COPD J44.9 360 mL 3   • lidocaine (LIDODERM) 5 % Place 1 patch on the skin as directed by provider Daily As Needed for Mild Pain . Remove & Discard patch within 12 hours or as directed by MD    Right lower quadrant     • O2 (OXYGEN) Inhale 3 L/min 1 (One) Time. 24 hours a day     • ondansetron (Zofran) 8 MG tablet Take 1 tablet by mouth Every 8 (Eight) Hours As Needed for Nausea or Vomiting. 30 tablet 3   • oxybutynin (DITROPAN) 5 MG tablet Take 5 mg by mouth Daily.     • oxyCODONE-acetaminophen (PERCOCET) 7.5-325 MG per tablet Take 1 tablet by mouth 2 (Two) Times a Day As Needed.     • pantoprazole (PROTONIX) 40 MG EC tablet Take 40 mg by mouth Daily.     • potassium chloride (KAYCIEL) 20 MEQ/15ML (10%) solution Take 20 mEq by mouth Daily.     • propranolol (INDERAL) 20 MG tablet Take 20 mg by mouth Daily.     • sertraline (ZOLOFT) 100 MG tablet Take 100 mg by mouth Daily.     • tamsulosin (FLOMAX) 0.4 MG capsule 24 hr capsule Take 0.4 mg by mouth Daily.     • traZODone (DESYREL) 50 MG tablet Take 50 mg by mouth At Night As Needed for Sleep.       No facility-administered encounter medications on file as of 5/14/2021.     ADULT ILLNESSES:  Patient Active Problem List   Diagnosis Code   • Basal cell carcinoma of left ala nasi C44.311   • BMI 34.0-34.9,adult Z68.34   • Open wound of nose S01.20XA   • Open wound of left cheek S01.402A   • Open wound of lip S01.501A   • Urinary retention R33.9   • Chronic obstructive pulmonary disease with acute exacerbation (CMS/HCC) J44.1   • Chronic respiratory failure with hypercapnia (CMS/HCC) J96.12   • Pulmonary nodules/lesions, multiple R91.8   • Presence of ileostomy (CMS/HCC) Z93.2   • Respiratory acidosis  E87.2   • Anemia, chronic disease D63.8   • Acute on chronic respiratory failure with hypoxia and hypercapnia (CMS/HCC) J96.21, J96.22   • History of tobacco use Z87.891   • Malignant neoplasm of lung (CMS/HCC) C34.90   • Neck mass R22.1   • Fatigue R53.83   • Bone pain M89.8X9   • Alcoholic cirrhosis of liver with ascites (CMS/HCC) K70.31   • Alcoholic intoxication with complication (CMS/HCC) F10.929   • Atelectasis J98.11   • Colon distention K63.89   • Hyperbilirubinemia E80.6   • Hypokalemia E87.6   • Hypomagnesemia E83.42   • Lactic acidosis E87.2   • Leukocytosis D72.829   • LFTs abnormal R94.5   • MRSA bacteremia R78.81, B95.62   • Perforated viscus R19.8   • Septic shock (CMS/HCC) A41.9, R65.21   • Severe sepsis (CMS/HCC) A41.9, R65.20   • Smoker F17.200   • Weight loss R63.4   • Hip pain M25.559   • Chronic bronchitis (CMS/HCC) J42   • Former smoker Z87.891     SURGERIES:  Past Surgical History:   Procedure Laterality Date   • COLON SURGERY      x4   • EYE SURGERY Bilateral     cataracts    • FLAP HEAD/NECK Left 10/23/2018    Procedure: LEFT CHEEK ADVANCEMENT FLAP CLOSURE OF LIP AND CHEEK,5CM X 6CM IPSILATERAL LEFT SEPTAL MUCOSAL HINGE FLAP,2CM X 3CM;  Surgeon: Izaiah Ceron MD;  Location: [facility] OR;  Service: ENT   • FLAP HEAD/NECK Bilateral 11/13/2018    Procedure: Pedicle division and flap inset of nose;  Surgeon: Izaiah Ceron MD;  Location: [facility] OR;  Service: ENT   • HEAD/NECK LESION/CYST EXCISION Left 10/23/2018    Procedure: LEFT PARAMEDIAN FOREHEAD FLAP WITH PRESERVATION OF VASCULAR PEDICLE;  Surgeon: Izaiah Ceron MD;  Location: [facility] OR;  Service: ENT   • SKIN FULL THICKNESS GRAFT Left 10/23/2018    Procedure: Conchal cartilage graft from right ear to left nose with complex closure of skin of forehead;  Surgeon: Izaiah Ceron MD;  Location: [facility] OR;  Service: ENT   • VENOUS ACCESS DEVICE (PORT) INSERTION N/A 5/7/2021    Procedure: SINGLE LUMEN PORT PLACEMENT;  Surgeon: Isael  Apolonia FIELDS MD;  Location: [facility] OR;  Service: General;  Laterality: N/A;     HEALTH MAINTENANCE ITEMS:  Health Maintenance Due   Topic Date Due   • COLORECTAL CANCER SCREENING  Never done   • Hepatitis B (1 of 3 - Risk 3-dose series) Never done   • TDAP/TD VACCINES (1 - Tdap) Never done   • ZOSTER VACCINE (1 of 2) Never done   • Pneumococcal Vaccine 65+ (1 of 1 - PPSV23) Never done   • ANNUAL WELLNESS VISIT  09/11/2020       <no information>  Last Completed Colonoscopy     This patient has no relevant Health Maintenance data.        Immunization History   Administered Date(s) Administered   • COVID-19 (PFIZER) 04/01/2021, 04/22/2021     Last Completed Mammogram     This patient has no relevant Health Maintenance data.            FAMILY HISTORY:  Family History   Problem Relation Age of Onset   • Diabetes Mother    • Squamous cell carcinoma Mother    • Lung cancer Mother    • Pancreatic cancer Father 67     SOCIAL HISTORY:  Social History     Socioeconomic History   • Marital status:      Spouse name: Not on file   • Number of children: Not on file   • Years of education: Not on file   • Highest education level: Not on file   Tobacco Use   • Smoking status: Former Smoker     Packs/day: 1.00     Years: 50.00     Pack years: 50.00     Types: Cigarettes     Quit date: 04/2018     Years since quitting: 3.1   • Smokeless tobacco: Former User     Types: Chew   Vaping Use   • Vaping Use: Never used   Substance and Sexual Activity   • Alcohol use: No     Comment: quit April 14 2018/ hx of alchohlism    • Drug use: No   • Sexual activity: Defer       REVIEW OF SYSTEMS:  Review of Systems   Constitutional: Positive for activity change and fatigue.   HENT: Negative.    Eyes: Negative.    Respiratory: Negative.    Cardiovascular: Negative.    Gastrointestinal: Negative.    Endocrine: Negative.    Genitourinary: Negative.    Musculoskeletal: Negative.    Skin: Negative.    Allergic/Immunologic: Negative.    Neurological:  "Positive for weakness.   Psychiatric/Behavioral: Positive for stress.       /82   Pulse 72   Temp 97 °F (36.1 °C) (Temporal)   Resp 16   Ht 170.2 cm (67\")   SpO2 (!) 89%   BMI 35.87 kg/m²  Body surface area is 2.14 meters squared.  Pain Score    05/14/21 0847   PainSc: 9  Comment: all over   PainLoc: Generalized       Objective   Vital Signs:   /82   Pulse 72   Temp 97 °F (36.1 °C) (Temporal)   Resp 16   Ht 170.2 cm (67\")   SpO2 (!) 89%   BMI 35.87 kg/m²  Body surface area is 2.14 meters squared.  Pain Score    05/14/21 0847   PainSc: 9  Comment: all over   PainLoc: Generalized     Brett Ortiz reports a pain score of 9.  Given his pain assessment as noted, treatment options were discussed and the following options were decided upon as a follow-up plan to address the patient's pain: .      Patient's Body mass index is 35.87 kg/m². indicating that he is .      Physical Exam       Result Review :     LABS    Lab Results - Last 18 Months   Lab Units 05/10/21  0809 05/04/21  1344 04/12/21  1204 03/17/21  0913 11/29/20  0235 11/28/20  0319 11/27/20  0842 10/07/20  1330 10/07/20  1330   HEMOGLOBIN g/dL 8.8* 9.5* 9.1*  --  8.7* 8.7* 9.7*  --  10.2*   HEMATOCRIT % 31.3* 34.4* 32.7*  --  28.7* 29.5* 34.0*  --  34.2*   MCV fL 86.0 84.7 86.7  --  88.3 91.0 93.9  --  92.9   WBC 10*3/mm3 17.09* 15.47* 9.65  --  7.51 7.52 7.71  --  8.09   RDW % 15.4 15.2 14.3  --  14.3 13.4 13.2  --  13.7   MPV fL 10.8 11.3 10.8  --  11.6 11.7 10.7  --  11.8   PLATELETS 10*3/mm3 98* 109* 151 161 111* 111* 114*  --  82*   IMM GRAN % % 0.8*  --  0.3  --  0.3 0.3 0.4  --   --    NEUTROS ABS 10*3/mm3 14.04*  12.99* 13.09* 7.22*  --  4.66 5.02 5.57   < > 5.01   LYMPHS ABS 10*3/mm3 1.10 1.23 1.08  --  1.63 1.25 1.05  --   --    MONOS ABS 10*3/mm3 1.75* 1.01* 1.13*  --  1.16* 1.22* 0.85  --   --    EOS ABS 10*3/mm3 0.04  0.34 0.04 0.18  --  0.03 0.00 0.18   < > 0.42*   BASOS ABS 10*3/mm3 0.02 0.02 0.01  --  0.01 0.01 0.03  -- "   --    IMMATURE GRANS (ABS) 10*3/mm3 0.14*  --  0.03  --  0.02 0.02 0.03  --   --    NRBC /100 WBC 0.0  --  0.0  --  0.0 0.0 0.0  --   --    NEUTROPHIL % % 76.0  --   --   --   --   --   --   --  61.9   MONOCYTES % % 11.0  --   --   --   --   --   --   --  12.4*   ANISOCYTOSIS  Slight/1+  --   --   --   --   --   --   --   --     < > = values in this interval not displayed.       Lab Results - Last 18 Months   Lab Units 05/10/21  0809 05/04/21  1344 04/27/21  1534 04/20/21  1504 04/12/21  1204 03/03/21  1344 11/29/20  0235 11/28/20  0319 11/27/20  0842   GLUCOSE mg/dL 120* 117* 99  --  112*  --  94 113* 153*   SODIUM mmol/L 142 142 140  --  141  --  144 141 144   POTASSIUM mmol/L 4.3 4.4 4.2  --  4.3  --  3.4* 3.6 3.9   CO2 mmol/L 45.0* 44.0* 43.0*  --  45.0*  --  41.0* 43.0* 43.0*   CHLORIDE mmol/L 95* 95* 94*  --  91*  --  96* 92* 97*   ANION GAP mmol/L 2.0* 3.0* 3.0*  --  5.0  --  7.0 6.0 4.0*   CREATININE mg/dL 0.54* 0.57* 0.49* 0.70 0.56* 0.70 0.67* 0.72* 0.59*   BUN mg/dL 11 14 13  --  10  --  13 12 6*   BUN / CREAT RATIO  20.4 24.6 26.5*  --  17.9  --  19.4 16.7 10.2   CALCIUM mg/dL 9.3 9.3 9.0  --  9.6  --  9.4 9.8 9.2   EGFR IF NONAFRICN AM mL/min/1.73 150 141 >150  --  144  --  117 108 136   ALK PHOS U/L 127* 127* 114  --  115  --  86  --  115   TOTAL PROTEIN g/dL 6.5 7.2 6.9  --  7.7  --  6.8  --  7.5   ALT (SGPT) U/L 18 23 25  --  7  --  10  --  11   AST (SGOT) U/L 15 18 16  --  16  --  19  --  16   BILIRUBIN mg/dL 0.2 0.3 0.2  --  0.3  --  0.4  --  0.4   ALBUMIN g/dL 3.10* 3.60 3.40*  --  3.50  --  3.60  --  3.90   GLOBULIN gm/dL 3.4 3.6 3.5  --  4.2  --  3.2  --  3.6       Lab Results - Last 18 Months   Lab Units 11/28/20  0319 11/27/20  1356   CEA ng/mL  --  2.80   LDH U/L 146  --        Lab Results - Last 18 Months   Lab Units 05/10/21  0809 04/27/21  1534 04/12/21  1204 11/28/20  0319   IRON mcg/dL  --  19*  --  53*   TIBC mcg/dL  --  495  --  454   IRON SATURATION %  --  4*  --  12*   FERRITIN  ng/mL  --  23.95*  --   --    TSH uIU/mL 0.686  --  0.720  --              Assessment and Plan    Problem List Items Addressed This Visit        Other    Hip pain    Relevant Orders    CBC and Differential    Comprehensive metabolic panel    Magnesium    Phosphorus    Lab Appointment Request    Clinic Appointment Request    Infusion Appointment Request      Other Visit Diagnoses     Anemia, unspecified type    -  Primary    Relevant Orders    CBC & Differential    Comprehensive Metabolic Panel    Sedimentation Rate        In summary  1. He feels better after chemo  2. He is ready for next chemo  3. He will have XGEVA in next chemo day    Follow Up   Patient was given instructions and counseling regarding his condition or for health maintenance advice. Please see specific information pulled into the AVS if appropriate.

## 2021-05-16 NOTE — ASSESSMENT & PLAN NOTE
1. He has finished chemo plus immunotherapy C1D1  2/ It was uneventful  3. We have discussed about goal of treatment and mechnaisms of drug action

## 2021-05-18 NOTE — TELEPHONE ENCOUNTER
Received call from patient wife, she calls to report patient Brett Ortiz/ has been coughing up brownish colored phlegm, she questions if this is normal? Patient received his first chemotherapy txt on 5/10/21  Patient denies fever, chest congestion, elevated temp at this time.   Patient was scheduled to start his radiation therapy on Monday 5/17/21, but this has been delayed now until Thursday 5/20/21.    Wife informed this is to be expected, although keep a check on his temp 2-3 x per day and if the mucous/phelgm does change in color to a dark yellowish or greenish color she does need to contact the Physician. Wife v/u.

## 2021-05-20 NOTE — TELEPHONE ENCOUNTER
Pt came for initial radiation treatment 5/17/21 and was unable to lay in treatment position. Dr Ward spoke with patient afterwards. Pt was to return the next day. His wife called and informed us that his breathing wasn't any better and that he would need to reschedule. Pt was rescheduled for today. We received a call from pt's wife today and informed us that he was refusing to come for radiation and would need to reschedule again. Pt has been rescheduled for tomorrow.

## 2021-05-21 PROBLEM — R06.02 SHORTNESS OF BREATH: Status: ACTIVE | Noted: 2021-01-01

## 2021-05-21 NOTE — TELEPHONE ENCOUNTER
Pt wife called to inform us that pt is having trouble breathing and that she is calling an ambulance to bring him to [facility] ED. We will check on pt's status Mon 5/24.

## 2021-05-22 PROBLEM — I50.33 ACUTE ON CHRONIC DIASTOLIC CHF (CONGESTIVE HEART FAILURE) (HCC): Status: ACTIVE | Noted: 2021-01-01

## 2021-05-23 PROBLEM — E87.6 HYPOKALEMIA: Status: ACTIVE | Noted: 2021-01-01

## 2021-05-24 PROBLEM — C34.90 NON-SMALL CELL LUNG CANCER (NSCLC) (HCC): Status: ACTIVE | Noted: 2021-01-01

## 2021-05-27 PROBLEM — D69.6 THROMBOCYTOPENIA (HCC): Status: ACTIVE | Noted: 2021-01-01

## 2021-06-01 NOTE — PROGRESS NOTES
[facility]  INPATIENT PROGRESS NOTE    Patient Name: Brett Ortiz  Date of Admission: 5/21/2021  Today's Date: 06/01/21  Length of Stay: 11  Primary Care Physician: Reid Farooq MD    Subjective   Chief Complaint: Weakness.   HPI  He has been down for radiation already today.    He feels his pain is improving with addition of low-dose OxyContin yesterday.  He was more agreeable to work with therapy yesterday afternoon and states that he will do so again today.    Awaiting placement.  This has been difficult as referenced in prior notes.    Review of Systems   All pertinent negatives and positives are as above. All other systems have been reviewed and are negative unless otherwise stated.     Objective    Temp:  [97.7 °F (36.5 °C)-97.9 °F (36.6 °C)] 97.9 °F (36.6 °C)  Heart Rate:  [64-92] 86  Resp:  [16] 16  BP: (109-129)/(61-78) 129/71  Physical Exam  Vitals reviewed.   Constitutional:       General: He is not in acute distress.     Appearance: He is ill-appearing (chronically).      Comments: Up in bed, watching television.  No distress.  His wife is present.  Discussed with his nurse, Yesica.   HENT:      Head: Normocephalic.      Mouth/Throat:      Pharynx: No oropharyngeal exudate.      Comments: Hoarse voice.  Eyes:      Pupils: Pupils are equal, round, and reactive to light.   Cardiovascular:      Rate and Rhythm: Normal rate.   Pulmonary:      Effort: Pulmonary effort is normal. No respiratory distress.      Breath sounds: No wheezing or rales.      Comments: On 1.5 L NC.   Abdominal:      Tenderness: There is abdominal tenderness.      Comments: Ostomy with liquid brown stool.   Musculoskeletal:         General: No deformity.      Cervical back: Neck supple.   Skin:     General: Skin is warm.      Capillary Refill: Capillary refill takes less than 2 seconds.   Neurological:      General: No focal deficit present.      Mental Status: He is  alert.      Motor: Weakness present.   Psychiatric:         Mood and Affect: Mood normal.     Results Review:  I have reviewed the labs, radiology results, and diagnostic studies.    Laboratory Data:   Results from last 7 days   Lab Units 05/31/21  0324 05/28/21  0601 05/27/21  0542   WBC 10*3/mm3 8.82 8.75 8.14   HEMOGLOBIN g/dL 9.0* 9.4* 9.5*   HEMATOCRIT % 31.2* 32.3* 32.7*   PLATELETS 10*3/mm3 117* 115* 111*     Results from last 7 days   Lab Units 05/31/21  0324 05/28/21  0601 05/27/21  0542   SODIUM mmol/L 138 141 138   POTASSIUM mmol/L 3.7 3.9 3.2*   CHLORIDE mmol/L 98 99 93*   CO2 mmol/L 35.0* 38.0* 42.0*   BUN mg/dL 14 15 13   CREATININE mg/dL 0.45* 0.58* 0.57*   CALCIUM mg/dL 8.6 8.8 9.0   BILIRUBIN mg/dL 0.2  --  0.3   ALK PHOS U/L 126*  --  121*   ALT (SGPT) U/L 20  --  18   AST (SGOT) U/L 21  --  18   GLUCOSE mg/dL 148* 106* 84     I have reviewed the patient's current medications.     Assessment/Plan     Active Hospital Problems    Diagnosis    • **Acute on chronic diastolic CHF (congestive heart failure) (CMS/HCC)    • Acute on chronic respiratory failure with hypoxia and hypercapnia (CMS/HCC)    • Chronic obstructive pulmonary disease with acute exacerbation (CMS/HCC)    • Non-small cell lung cancer (NSCLC) (CMS/HCC)    • Thrombocytopenia (CMS/HCC)    • Bone pain    • Anemia, chronic disease    • Hypokalemia    • History of tobacco use    • Alcoholic cirrhosis of liver with ascites (CMS/HCC)      Plan:  The patient was admitted on 5/21 by Dr. Conway.  He presented to the emergency department with complaints of shortness of breath.  He has a history of non-small cell lung cancer.  He had been increasing his oxygen requirements in the home setting.  He had last had chemotherapy for his lung cancer on 5/10.  He follows with Dr. Bassett.  It was felt that his shortness of breath is secondary to acute on chronic diastolic heart failure as well as an acute exacerbation of COPD.    Dr. Conway felt that his  presentation is more consistent with acute on chronic diastolic heart failure.  He was diuresed with IV Lasix initially and is now on oral Lasix.  Most recent echocardiogram was on this admission and showed an ejection fraction of 61-65%.  Losartan and Toprol XL were added on 5/29.     He was diuresed from the outset, but was also placed on vancomycin and cefepime at presentation by oncology due to concern of neutropenic sepsis coverage.  Cefepime was transitioned to oral Omnicef, which he completed on 5/28.  Blood cultures showed no growth. CT scan of the chest on 5/21 showed no pneumonia.  It showed progression of neoplastic disease.    He was given a trial of IV steroids, which have been tapered to oral prednisone.    He is typically on 2 L of oxygen in the home setting and this is what he requires at this point.    Chemotherapy has been on hold.  He has been receiving radiation therapy and has completed 6 of 10 fractions at this point.     Working on pain control. Low-dose scheduled OxyContin with breakthrough Percocet.    He also has a history of alcoholic cirrhosis.    PT/OT.    Lovenox for DVT prophylaxis.    Discharge Planning: Placement has been difficult secondary to no bed availability at several of the local facilities.  The patient does not wish to have referrals to certain facilities.  He cannot go out of [facility] due to his need of continued radiation treatments.    Electronically signed by Izaiah Cisneros DO, 06/01/21, 10:45 CDT.

## 2021-06-01 NOTE — PLAN OF CARE
Goal Outcome Evaluation:  Plan of Care Reviewed With: patient  Progress: no change  Outcome Summary: VSS. Medicated per MAR for c/o chronic pain. Remains on 2L O2 NC. Discharge planning ongoing. Will continue to monitor.

## 2021-06-01 NOTE — PLAN OF CARE
Goal Outcome Evaluation:  Plan of Care Reviewed With: patient  Progress: no change  Outcome Summary: Pt. agreeable to therapy, though he states his pain is worse today. He states he has pain in B LEs, abdomen, and back. Pt. declined any bed mobility, but did participate with B LE exercises. He was active with these and tolerated them well. Will continue to work on strengthening and progress as able.

## 2021-06-01 NOTE — THERAPY TREATMENT NOTE
Acute Care - Physical Therapy Treatment Note  [facility]     Patient Name: Brett Ortiz  : 1950  MRN: [id number]  Today's Date: 2021           PT Assessment (last 12 hours)      PT Evaluation and Treatment     Row Name 21 1339 21 0827       Physical Therapy Time and Intention    Subjective Information  complains of;pain  -  --    Document Type  therapy note (daily note)  -  --    Mode of Treatment  physical therapy  -  physical therapy  -    Session Not Performed  --  patient unavailable for treatment  -    Comment, Session Not Performed  --  pt gone for radiation  -    Row Name 21 1339          General Information    Existing Precautions/Restrictions  fall;oxygen therapy device and L/min  -     Row Name 21 8679          Pain Scale: Numbers Pre/Post-Treatment    Pretreatment Pain Rating  9/10  -     Posttreatment Pain Rating  9/10  -     Pain Location - Side  Bilateral  -     Pain Location - Orientation  lower  -     Pain Location  extremity;abdomen;back  -     Pre/Posttreatment Pain Comment  pt states his pain in back is worse today.   -     Row Name 21 5632          Bed Mobility    Comment (Bed Mobility)  pt declined any bed mobility due to increased pain.   -     Row Name 21 3902          Aerobic Exercise    Comment, Aerobic Exercise (Therapeutic Exercise)  AROM B LEs x 20 reps  -     Row Name 21 3654          Plan of Care Review    Plan of Care Reviewed With  patient  -     Progress  no change  -     Outcome Summary  Pt. agreeable to therapy, though he states his pain is worse today. He states he has pain in B LEs, abdomen, and back. Pt. declined any bed mobility, but did participate with B LE exercises. He was active with these and tolerated them well. Will continue to work on strengthening and progress as able.   -     Row Name 21 7467          Positioning and Restraints    Pre-Treatment Position  in bed  [facility]      Post Treatment Position  bed  -MF     In Bed  fowlers;call light within reach;encouraged to call for assist;side rails up x2  -MF       User Key  (r) = Recorded By, (t) = Taken By, (c) = Cosigned By    Initials Name Provider Type    Rylie Rosas, CARMEL Physical Therapy Assistant        Physical Therapy Education                 Title: PT OT SLP Therapies (In Progress)     Topic: Physical Therapy (Done)     Point: Mobility training (Done)     Learning Progress Summary           Patient Acceptance, E,TB,D, VU,NR by  at 5/25/2021 1200    Comment: pt anxius with all mobility per pain - SOB                   Point: Home exercise program (Done)     Learning Progress Summary           Patient Acceptance, E,TB,D,H, VU,DU by  at 5/31/2021 1409    Comment: BLE HEP    Acceptance, E,TB,D, VU,NR by  at 5/25/2021 1200    Comment: pt anxius with all mobility per pain - SOB   Significant Other Acceptance, E,TB,D,H, VU,DU by  at 5/31/2021 1409    Comment: BLE HEP                   Point: Body mechanics (Done)     Learning Progress Summary           Patient Acceptance, E,TB,D, VU,NR by  at 5/25/2021 1200    Comment: pt anxius with all mobility per pain - SOB                   Point: Precautions (Done)     Learning Progress Summary           Patient Acceptance, E,TB,D, VU,NR by  at 5/25/2021 1200    Comment: pt anxius with all mobility per pain - SOB                               User Key     Initials Effective Dates Name Provider Type [facility] 08/02/16 -  Sharmaine Pacheco PTA Physical Therapy Assistant PT     11/18/19 -  Óscar Sinclair PT Physical Therapist PT              PT Recommendation and Plan     Plan of Care Reviewed With: patient  Progress: no change  Outcome Summary: Pt. agreeable to therapy, though he states his pain is worse today. He states he has pain in B LEs, abdomen, and back. Pt. declined any bed mobility, but did participate with B LE exercises. He was active with these and  tolerated them well. Will continue to work on strengthening and progress as able.   Outcome Measures     Row Name 05/30/21 1331             How much help from another is currently needed...    Putting on and taking off regular lower body clothing?  1  -CJ      Bathing (including washing, rinsing, and drying)  1  -CJ      Toileting (which includes using toilet bed pan or urinal)  2  -CJ      Putting on and taking off regular upper body clothing  2  -CJ      Taking care of personal grooming (such as brushing teeth)  2  -CJ      Eating meals  2  -CJ      AM-PAC 6 Clicks Score (OT)  10  -CJ         Functional Assessment    Outcome Measure Options  AM-PAC 6 Clicks Daily Activity (OT)  -CJ        User Key  (r) = Recorded By, (t) = Taken By, (c) = Cosigned By    Initials Name Provider Type     Bay Drummond COTA/L Occupational Therapy Assistant           Time Calculation:   PT Charges     Row Name 06/01/21 1356             Time Calculation    Start Time  1339  -MF      Stop Time  1353  -MF      Time Calculation (min)  14 min  -      PT Received On  06/01/21  -         Time Calculation- PT    Total Timed Code Minutes- PT  14 minute(s)  -MF         Timed Charges    77377 - PT Therapeutic Exercise Minutes  14  -MF         Total Minutes    Timed Charges Total Minutes  14  -MF       Total Minutes  14  -MF        User Key  (r) = Recorded By, (t) = Taken By, (c) = Cosigned By    Initials Name Provider Type    Rylie Rosas PTA Physical Therapy Assistant        Therapy Charges for Today     Code Description Service Date Service Provider Modifiers Qty    49927204311 HC PT THER PROC EA 15 MIN 6/1/2021 Rlyie Ace PTA GP 1          PT G-Codes  Outcome Measure Options: AM-PAC 6 Clicks Daily Activity (OT)  AM-PAC 6 Clicks Score (PT): 8  AM-PAC 6 Clicks Score (OT): 10    Rylie Ace PTA  6/1/2021

## 2021-06-02 NOTE — CASE MANAGEMENT/SOCIAL WORK
Continued Stay Note  [facility]     Patient Name: Brett Ortiz  MRN: [id number]  Today's Date: 6/2/2021    Admit Date: 5/21/2021    Discharge Plan     Row Name 06/02/21 0927       Plan    Plan  [facility]    Patient/Family in Agreement with Plan  yes    Final Discharge Disposition Code  03 - skilled nursing facility (SNF)    Final Note  Farida from [facility] has offered a bed on pt.  Pt has accepted.  Pt can discharge once medically stable.  Phone:  [phone], fax [phone].    Row Name 06/02/21 0831       Plan    Plan  SNF    Patient/Family in Agreement with Plan  yes    Plan Comments  [facility] are reviewing referral.  RIKI verified with Dr. Bassett that pt will not be getting chemo; only immunotherapy.  RIKI has left a message with [facility] to see if they have any male bed openings yet.        Discharge Codes    No documentation.             HIRAL Tolentino

## 2021-06-02 NOTE — PLAN OF CARE
Goal Outcome Evaluation:  Plan of Care Reviewed With: patient     Outcome Summary: Pt medicated for c/o pain to abdomen et back with prn et scheduled pain meds, with good results.  Lung sounds diminished, occ np cough.  No IV access, awaiting placement to nh.  Radation therapy today.  Safety maintained, continue to monitor.

## 2021-06-02 NOTE — PLAN OF CARE
Goal Outcome Evaluation:  Plan of Care Reviewed With: patient      Patient to D/C to [facility].   Pharmacy faxed in a request for prior authorization on:    Medication: Adderall tablet  Dosage: 20 mg  Quantity requested:  60  Pharmacy for prescription has been selected.    Initiation of prior authorization needed.

## 2021-06-02 NOTE — DISCHARGE SUMMARY
[facility] Medicine Services  DISCHARGE SUMMARY       Date of Admission: 5/21/2021  Date of Discharge:  6/2/2021  Primary Care Physician: Reid Farooq MD    Presenting Problem/History of Present Illness:  Shortness of breath.     Final Discharge Diagnoses:  Active Hospital Problems    Diagnosis    • **Acute on chronic diastolic CHF (congestive heart failure) (CMS/HCC)    • Acute on chronic respiratory failure with hypoxia and hypercapnia (CMS/HCC)    • Chronic obstructive pulmonary disease with acute exacerbation (CMS/HCC)    • Non-small cell lung cancer (NSCLC) (CMS/HCC)    • Thrombocytopenia (CMS/HCC)    • Bone pain    • Anemia, chronic disease    • Hypokalemia    • History of tobacco use    • Alcoholic cirrhosis of liver with ascites (CMS/HCC)      Consults:   1. Dr. Bassett with oncology.   2. Dr. Ward with radiation oncology.     Procedures Performed: None.     Pertinent Test Results:   Results for orders placed during the hospital encounter of 05/21/21    Adult Transthoracic Echo Limited W/ Cont if Necessary Per Protocol    Interpretation Summary  · The right ventricular cavity is moderately dilated.  · Left ventricular ejection fraction appears to be 61 - 65%. Left ventricular systolic function is normal.  · Hyperdynamic right ventricular systolic function noted.  · Left ventricular diastolic function was indeterminate.    Imaging Results (All)     Procedure Component Value Units Date/Time    XR Abdomen KUB [[id number]] Collected: 05/28/21 1307     Updated: 05/28/21 1311    Narrative:      XR ABDOMEN KUB- 5/28/2021 12:50 PM CDT     HISTORY: ABDOMINAL PAIN; R06.02-Shortness of breath; R53.1-Weakness;  A41.9-Sepsis, unspecified organism; R65.21-Severe sepsis with septic  shock; Z74.09-Other reduced mobility; Z74.09-Other reduced mobility;  Z78.9-Other specified health status       COMPARISON: None     FINDINGS:  There is a nonspecific bowel gas pattern. Multiple small  multifaceted  calcifications are present in the right upper quadrant. This is  consistent with a gallbladder filled with calculi..     No acute skeletal abnormality is identified.        Impression:      1. Cholelithiasis.         This report was finalized on 05/28/2021 13:08 by Dr. Kevan Campos MD.    US Venous Doppler Lower Extremity Bilateral (duplex) [[id number]] Collected: 05/27/21 1426     Updated: 05/27/21 1429    Narrative:      History: Swelling       Impression:      Impression: There is no evidence of deep venous thrombosis or  superficial thrombophlebitis of right or left lower extremities.     Comments: Bilateral lower extremity venous duplex exam was performed  using color Doppler flow, Doppler waveform analysis, and grayscale  imaging, with and without compression. There is no evidence of deep  venous thrombosis in the common femoral, superficial femoral, popliteal,  peroneal, anterior tibial, and posterior tibial veins bilaterally. No  thrombus is identified in the saphenofemoral junctions and greater  saphenous veins bilaterally.         This report was finalized on 05/27/2021 14:26 by Dr. Ford Adamson MD.    CT Angiogram Chest [[id number]] Collected: 05/21/21 1142     Updated: 05/21/21 1151    Narrative:      EXAMINATION: CT ANGIOGRAM CHEST-      5/21/2021 11:18 AM CDT     HISTORY: PE suspected, low/intermediate prob, positive D-dimer;  R06.02-Shortness of breath; R53.1-Weakness     In order to have a CT radiation dose as low as reasonably achievable  Automated Exposure Control was utilized for adjustment of the mA and/or  KV according to patient size.     DLP in mGycm= 397.     CT angiography protocol.   CT imaging with bolus IV contrast injection.   Under concurrent supervision axial, sagittal, coronal, and  three-dimensional data sets were constructed.     Comparison is made with 4/23/2021.     Normal heart size.  Patchy calcification within the aorta.  No aneurysm or dissection.  Coronary  artery calcification is present.     Symmetric well opacified pulmonary arteries.  No pulmonary embolism.     Anterior pleural-based right upper lobe mass measures 53 x 33 mm  compared with 43 x 30 mm.  Left hilar soft tissue nodule measures 37 x 28 mm compared with 33 x 27  mm.  Unchanged 10 mm cavitary nodule within the superior segment right lower  lobe.  New finding of multiple 6 to 8 mm nodules within the base of the right  upper lobe, right middle lobe, and right lower lobe.     No pneumothorax, pleural effusion, or heart failure.     Summary:  1. No pulmonary embolism.  2. Progression of neoplastic disease compared with 1 month ago.              This report was finalized on 05/21/2021 11:48 by Dr. Oneal Jackson MD.    XR Chest 1 View [[id number]] Collected: 05/21/21 0955     Updated: 05/21/21 1001    Narrative:      EXAMINATION: XR CHEST 1 VW-     5/21/2021 9:52 AM CDT     HISTORY: dyspnea; R06.02-Shortness of breath; R53.1-Weakness     1 view chest x-ray compared with 5/7/2021.     Stable heart and mediastinum.  Unchanged left subclavian port.     Unchanged round 3 cm lesion at the left hilum.     Previously seen 3-4 cm right upper lobe mass is not as well visualized  at this time which may be due to rotation.     Chronic interstitial lung disease.  No pneumothorax.     Summary:  1. No significant change.  This report was finalized on 05/21/2021 09:58 by Dr. Oneal Jackson MD.        LAB RESULTS:      Lab 05/31/21  0324 05/28/21  0601 05/27/21  0542   WBC 8.82 8.75 8.14   HEMOGLOBIN 9.0* 9.4* 9.5*   HEMATOCRIT 31.2* 32.3* 32.7*   PLATELETS 117* 115* 111*   MCV 85.0 83.2 83.0         Lab 05/31/21  0324 05/28/21  0601 05/27/21  0542   SODIUM 138 141 138   POTASSIUM 3.7 3.9 3.2*   CHLORIDE 98 99 93*   CO2 35.0* 38.0* 42.0*   ANION GAP 5.0 4.0* 3.0*   BUN 14 15 13   CREATININE 0.45* 0.58* 0.57*   GLUCOSE 148* 106* 84   CALCIUM 8.6 8.8 9.0         Lab 05/31/21  0324 05/27/21  0542   TOTAL PROTEIN 5.8* 5.9*    ALBUMIN 2.60* 2.90*   GLOBULIN 3.2 3.0   ALT (SGPT) 20 18   AST (SGOT) 21 18   BILIRUBIN 0.2 0.3   ALK PHOS 126* 121*                     Brief Urine Lab Results  (Last result in the past 365 days)      Color   Clarity   Blood   Leuk Est   Nitrite   Protein   CREAT   Urine HCG        10/07/20 1330 Dark Yellow Cloudy Negative Trace Negative 30 mg/dL (1+)             Microbiology Results (last 10 days)     ** No results found for the last 240 hours. **        Hospital Course:  The patient was admitted on 5/21 by Dr. Conway.  He presented to the emergency department with complaints of shortness of breath.  He has a history of non-small cell lung cancer.  He had been increasing his oxygen requirements in the home setting.  He had last had chemotherapy for his lung cancer on 5/10.  He follows with Dr. Bassett.  It was felt that his shortness of breath is secondary to acute on chronic diastolic heart failure as well as an acute exacerbation of COPD.     Dr. Conway felt that his presentation is more consistent with acute on chronic diastolic heart failure.  He was diuresed with IV Lasix initially and is now on oral Lasix.  Most recent echocardiogram was on this admission and showed an ejection fraction of 61-65%.  Losartan and Toprol XL were added on 5/29.      He was diuresed from the outset, but was also placed on vancomycin and cefepime at presentation by oncology due to concern of neutropenic sepsis coverage.  Cefepime was transitioned to oral Omnicef, which he completed on 5/28.  Blood cultures showed no growth. CT scan of the chest on 5/21 showed no pneumonia.  It showed progression of neoplastic disease.     He was given a trial of IV steroids, which have been tapered to oral prednisone.     He is typically on 2 L of oxygen in the home setting and this is what he requires at this point.     Chemotherapy has been on hold.  He has been receiving radiation therapy and has completed 6 of 10 fractions at this point.  "     Working on pain control. Increasing scheduled OxyContin a bit today with breakthrough Percocet.     He also has a history of alcoholic cirrhosis.     PT/OT.     Lovenox was used for DVT prophylaxis.    Disposition has been an ongoing struggle since last week when Dr. Duenas was taking care of him.  None of the skilled nursing facilities [facility] had a bed at that time.  We then ran against the holiday weekend as far as insurance precertification goes.  He has finally been accepted to [facility] today.  He and his wife are upset that he will be going there today because they were hoping he would stay in the hospital to finish his radiation treatment.  I have explained to them on several occasions that this is not necessary.  He has been medically ready for discharge for several days now.  He can finish up his radiation treatment by coming back and forth from [facility].  His ongoing rehabilitative efforts are much more important than holding up his discharge for that at this point.    He needs to be reevaluated by the SNF physician or his PCP in 1 week.  Keep regular follow-up with Dr. Bassett.  Keep regular follow-up with Dr. Ward.    Physical Exam on Discharge:  /56 (BP Location: Left arm, Patient Position: Sitting)   Pulse 81   Temp 97.6 °F (36.4 °C) (Oral)   Resp 16   Ht 172.7 cm (67.99\")   Wt 98.7 kg (217 lb 9 oz)   SpO2 96%   BMI 33.09 kg/m²   Physical Exam  Constitutional:       General: He is not in acute distress.     Appearance: He is ill-appearing (chronically).      Comments: Up in bed.  Doing exercises with occupational therapy. No distress.  His wife is not currently present, but I spoke with her by phone.  Discussed with his nurse, Mackenzie; also discussed with the charge nurse, Karen Segura and the RIKI, Mary Ann Hernadez.   HENT:      Head: Normocephalic.      Mouth/Throat:      Pharynx: No oropharyngeal exudate.      Comments: Hoarse voice.  Eyes:      Pupils: Pupils are equal, round, and " reactive to light.   Cardiovascular:      Rate and Rhythm: Normal rate.   Pulmonary:      Effort: Pulmonary effort is normal. No respiratory distress.      Breath sounds: No wheezing or rales.      Comments: On 2 L NC.   Abdominal:      Tenderness: There is abdominal tenderness.      Comments: Ostomy with liquid brown stool.   Musculoskeletal:         General: No deformity.      Cervical back: Neck supple.   Skin:     General: Skin is warm.      Capillary Refill: Capillary refill takes less than 2 seconds.   Neurological:      General: No focal deficit present.      Mental Status: He is alert.      Motor: Weakness present.   Psychiatric:         Mood and Affect: Mood normal.     Condition on Discharge: Stable for SNF care.    Discharge Disposition:  Skilled Nursing Facility (DC - External): [facility].     Discharge Medications:     Discharge Medications      New Medications      Instructions Start Date   acetaminophen 325 MG tablet  Commonly known as: TYLENOL   650 mg, Oral, Every 4 Hours PRN      buPROPion  MG 24 hr tablet  Commonly known as: WELLBUTRIN XL   150 mg, Oral, Daily   Start Date: Kristen 3, 2021     guaiFENesin 600 MG 12 hr tablet  Commonly known as: MUCINEX   1,200 mg, Oral, Every 12 Hours Scheduled      losartan 25 MG tablet  Commonly known as: COZAAR   25 mg, Oral, Every 24 Hours Scheduled   Start Date: Kristen 3, 2021     metoprolol succinate XL 25 MG 24 hr tablet  Commonly known as: TOPROL-XL   25 mg, Oral, Every 24 Hours Scheduled   Start Date: Kristen 3, 2021     nitroglycerin 0.4 MG SL tablet  Commonly known as: NITROSTAT   0.4 mg, Sublingual, Every 5 Minutes PRN, Take no more than 3 doses in 15 minutes.      ondansetron 4 MG tablet  Commonly known as: ZOFRAN   4 mg, Oral, Every 6 Hours PRN      oxyCODONE ER 15 MG tablet extended-release 12 hour  Commonly known as: oxyCONTIN   15 mg, Oral, Every 12 Hours Scheduled      predniSONE 10 MG tablet  Commonly known as: DELTASONE   Take 4 tablets daily for  3 days, then take 2 tablets daily for 3 days, then take 1 tablet daily for 3 days, then stop.         Changes to Medications      Instructions Start Date   oxyCODONE-acetaminophen 7.5-325 MG per tablet  Commonly known as: PERCOCET  What changed:   · when to take this  · reasons to take this   1 tablet, Oral, Every 4 Hours PRN         Continue These Medications      Instructions Start Date   budesonide-formoterol 80-4.5 MCG/ACT inhaler  Commonly known as: SYMBICORT   2 puffs, Inhalation, 2 Times Daily - RT      furosemide 20 MG tablet  Commonly known as: LASIX   40 mg, Oral, Daily      ipratropium-albuterol 0.5-2.5 mg/3 ml nebulizer  Commonly known as: DUO-NEB   3 mL, Nebulization, Every 6 Hours - RT, For COPD J44.9      oxybutynin 5 MG tablet  Commonly known as: DITROPAN   5 mg, Oral, Daily      pantoprazole 40 MG EC tablet  Commonly known as: PROTONIX   40 mg, Oral, Daily      sertraline 100 MG tablet  Commonly known as: ZOLOFT   100 mg, Oral, Daily      tamsulosin 0.4 MG capsule 24 hr capsule  Commonly known as: FLOMAX   0.4 mg, Oral, Daily         Stop These Medications    cefdinir 300 MG capsule  Commonly known as: OMNICEF     dexamethasone 2 MG tablet  Commonly known as: DECADRON     O2  Commonly known as: OXYGEN     propranolol 20 MG tablet  Commonly known as: INDERAL     traZODone 50 MG tablet  Commonly known as: DESYREL          Discharge Diet:   Diet Instructions     Diet: Cardiac; Thin      Discharge Diet: Cardiac    Fluid Consistency: Thin        Activity at Discharge:   Activity Instructions     Activity as Tolerated          Follow-up Appointments:   1. PCP or SNF physician in 1 week.   2. Dr. Bassett per him.   3. Dr. Ward for ongoing radiation. Will complete 10 fractions.     Test Results Pending at Discharge: None.     Electronically signed by Izaiah Cisneros DO, 06/02/21, 14:00 CDT.    Time: 35 minutes.

## 2021-06-02 NOTE — NURSING NOTE
Attempted to call [facility], [phone], with report on patient. Spoke with Katie who said the facility was still getting the paperwork together and awaiting a call from jaydon (Hospital ), before RN could give report. Will attempt to call again later. Mackenzie Swain, RN

## 2021-06-02 NOTE — PLAN OF CARE
Goal Outcome Evaluation:  Plan of Care Reviewed With: patient  Progress: no change  Outcome Summary: VSS. Medicated per MAR with pain meds. 2L O2 remains. Discharge planning continues. Sinus arrhythmmia on tele. Will continue to monitor.

## 2021-06-02 NOTE — PLAN OF CARE
Goal Outcome Evaluation:  Plan of Care Reviewed With: patient, spouse  Progress: improving  Outcome Summary: Pt. jevonwlers in bed, performed ue exs with rests after every set, Pt. c/o's pain in LLE, states that he will do more when radiation is over!

## 2021-06-02 NOTE — CASE MANAGEMENT/SOCIAL WORK
Continued Stay Note   [facility]     Patient Name: Brett Ortiz  MRN: [id number]  Today's Date: 6/2/2021    Admit Date: 5/21/2021    Discharge Plan     Row Name 06/02/21 0831       Plan    Plan  SNF    Patient/Family in Agreement with Plan  yes    Plan Comments  [facility] are reviewing referral.  RIKI verified with Dr. Bassett that pt will not be getting chemo; only immunotherapy.  RIKI has left a message with [facility] to see if they have any male bed openings yet.        Discharge Codes    No documentation.             HIRAL Tolentino

## 2021-06-02 NOTE — NURSING NOTE
Report gave to Tomasa BENNETT at [facility]. Mackenzie Swain RN

## 2021-06-03 DIAGNOSIS — M54.40 CHRONIC BILATERAL LOW BACK PAIN WITH SCIATICA, SCIATICA LATERALITY UNSPECIFIED: Primary | ICD-10-CM

## 2021-06-03 DIAGNOSIS — G89.29 CHRONIC BILATERAL LOW BACK PAIN WITH SCIATICA, SCIATICA LATERALITY UNSPECIFIED: Primary | ICD-10-CM

## 2021-06-03 RX ORDER — OXYCODONE HYDROCHLORIDE 15 MG/1
1 TABLET, FILM COATED, EXTENDED RELEASE ORAL EVERY 12 HOURS
Qty: 60 TABLET | Refills: 0 | Status: SHIPPED | OUTPATIENT
Start: 2021-06-03 | End: 2021-06-30 | Stop reason: SDUPTHER

## 2021-06-03 RX ORDER — OXYCODONE AND ACETAMINOPHEN 7.5; 325 MG/1; MG/1
1 TABLET ORAL EVERY 6 HOURS PRN
Qty: 120 TABLET | Refills: 0 | Status: SHIPPED | OUTPATIENT
Start: 2021-06-03 | End: 2021-07-03

## 2021-06-03 NOTE — THERAPY DISCHARGE NOTE
Acute Care - Occupational Therapy Discharge Summary  [facility]     Patient Name: Brett Ortiz  : 1950  MRN: [id number]    Today's Date: 6/3/2021                 Admit Date: 2021        OT Recommendation and Plan    Visit Dx:    ICD-10-CM ICD-9-CM   1. Shortness of breath  R06.02 786.05   2. Generalized weakness  R53.1 780.79   3. Septic shock (CMS/[facility])  A41.9 038.9    R65.21 785.52     995.92   4. Impaired functional mobility and activity tolerance  Z74.09 V49.89   5. Impaired mobility and ADLs  Z74.09 V49.89    Z78.9    6. Non-small cell lung cancer, unspecified laterality (CMS/[facility])  C34.90 162.9               OT Rehab Goals     Row Name 21 1615             Dressing Goal 1 (OT)    Activity/Device (Dressing Goal 1, OT)  upper body dressing  -MT      [place]/Cues Needed (Dressing Goal 1, OT)  minimum assist (75% or more patient effort);set-up required  -MT      Time Frame (Dressing Goal 1, OT)  long term goal (LTG);10 days  -MT      Progress/Outcome (Dressing Goal 1, OT)  goal not met  -MT         Grooming Goal 1 (OT)    Activity/Device (Grooming Goal 1, OT)  grooming skills, all  -MT      [place] (Grooming Goal 1, OT)  modified independence;set-up required  -MT      Time Frame (Grooming Goal 1, OT)  long term goal (LTG);10 days  -MT      Progress/Outcome (Grooming Goal 1, OT)  goal not met  -MT         ROM Goal 1 (OT)    ROM Goal 1 (OT)  BUE AROM HEP independently with handout to increase strength and functional independence.  -MT      Time Frame (ROM Goal 1, OT)  long term goal (LTG);10 days  -MT      Progress/Outcome (ROM Goal 1, OT)  goal not met  -MT        User Key  (r) = Recorded By, (t) = Taken By, (c) = Cosigned By    Initials Name Provider Type Discipline    MT Ly Squires COTA/L Occupational Therapy Assistant OT          Outcome Measures     Row Name 21 1336             How much help from another is currently needed...    Putting on and taking off regular lower  body clothing?  1  -CJ      Bathing (including washing, rinsing, and drying)  1  -CJ      Toileting (which includes using toilet bed pan or urinal)  2  -CJ      Putting on and taking off regular upper body clothing  2  -CJ      Taking care of personal grooming (such as brushing teeth)  2  -CJ      Eating meals  2  -CJ      AM-PAC 6 Clicks Score (OT)  10  -CJ         Functional Assessment    Outcome Measure Options  AM-PAC 6 Clicks Daily Activity (OT)  -CJ        User Key  (r) = Recorded By, (t) = Taken By, (c) = Cosigned By    Initials Name Provider Type     Bay Drummond COTA/L Occupational Therapy Assistant          Timed Therapy Charges  Total Units: 2    Charges  Total Units: 2    Procedure Name Documented Minutes Units Code    HC OT THER PROC EA 15 MIN 26  2    73636 (CPT®)               Documented Minutes  Total Minutes: 26    Therapy Provided Minutes    05774 - OT Therapeutic Exercise Minutes 26                    OT Discharge Summary  Reason for Discharge: Discharge from facility  Outcomes Achieved: Refer to plan of care for updates on goals achieved  Discharge Destination: SNF      MARTY John  6/3/2021

## 2021-06-03 NOTE — THERAPY TREATMENT NOTE
Acute Care - Occupational Therapy Treatment Note  [facility]     Patient Name: Brett Ortiz  : 1950  MRN: [id number]  Today's Date: 6/3/2021             Admit Date: 2021       ICD-10-CM ICD-9-CM   1. Shortness of breath  R06.02 786.05   2. Generalized weakness  R53.1 780.79   3. Septic shock (CMS/HCC)  A41.9 038.9    R65.21 785.52     995.92   4. Impaired functional mobility and activity tolerance  Z74.09 V49.89   5. Impaired mobility and ADLs  Z74.09 V49.89    Z78.9    6. Non-small cell lung cancer, unspecified laterality (CMS/HCC)  C34.90 162.9     Patient Active Problem List   Diagnosis   • Basal cell carcinoma of left ala nasi   • BMI 34.0-34.9,adult   • Open wound of nose   • Open wound of left cheek   • Open wound of lip   • Urinary retention   • Chronic obstructive pulmonary disease with acute exacerbation (CMS/HCC)   • Chronic respiratory failure with hypercapnia (CMS/HCC)   • Pulmonary nodules/lesions, multiple   • Presence of ileostomy (CMS/HCC)   • Respiratory acidosis   • Anemia, chronic disease   • Acute on chronic respiratory failure with hypoxia and hypercapnia (CMS/HCC)   • History of tobacco use   • Malignant neoplasm of lung (CMS/HCC)   • Neck mass   • Bone pain   • Alcoholic cirrhosis of liver with ascites (CMS/HCC)   • Alcoholic intoxication with complication (CMS/HCC)   • Atelectasis   • Colon distention   • Hyperbilirubinemia   • Hypokalemia   • Hypomagnesemia   • Lactic acidosis   • Leukocytosis   • LFTs abnormal   • MRSA bacteremia   • Perforated viscus   • Septic shock (CMS/HCC)   • Severe sepsis (CMS/HCC)   • Smoker   • Weight loss   • Hip pain   • Chronic bronchitis (CMS/HCC)   • Former smoker   • Acute on chronic diastolic CHF (congestive heart failure) (CMS/HCC)   • Hypokalemia   • Non-small cell lung cancer (NSCLC) (CMS/HCC)   • Thrombocytopenia (CMS/HCC)     Past Medical History:   Diagnosis Date   • Basal cell carcinoma (BCC) of left ala nasi    • Cardiac arrest  (CMS/HCC)    • Cherry hemangioma    • Cirrhosis of liver (CMS/HCC)     alcohol   • COPD (chronic obstructive pulmonary disease) (CMS/HCC)    • Ileostomy in place (CMS/HCC) 2018   • Left leg numbness    • Nerve damage 2018    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    • Nevus    • Oxygen dependent    • Perforation of colon (CMS/HCC) 2018   • Venous insufficiency    • Weakness of extremity     LEFT SIDE   • Wheelchair dependent      Past Surgical History:   Procedure Laterality Date   • COLON SURGERY      x4   • EYE SURGERY Bilateral     cataracts    • FLAP HEAD/NECK Left 10/23/2018    Procedure: LEFT CHEEK ADVANCEMENT FLAP CLOSURE OF LIP AND CHEEK,5CM X 6CM IPSILATERAL LEFT SEPTAL MUCOSAL HINGE FLAP,2CM X 3CM;  Surgeon: Izaiah Ceron MD;  Location:  [facility] OR;  Service: ENT   • FLAP HEAD/NECK Bilateral 11/13/2018    Procedure: Pedicle division and flap inset of nose;  Surgeon: Izaiah Ceron MD;  Location:  [facility] OR;  Service: ENT   • HEAD/NECK LESION/CYST EXCISION Left 10/23/2018    Procedure: LEFT PARAMEDIAN FOREHEAD FLAP WITH PRESERVATION OF VASCULAR PEDICLE;  Surgeon: Izaiah Ceron MD;  Location:  [facility] OR;  Service: ENT   • SKIN FULL THICKNESS GRAFT Left 10/23/2018    Procedure: Conchal cartilage graft from right ear to left nose with complex closure of skin of forehead;  Surgeon: Izaiah Ceron MD;  Location:  [facility] OR;  Service: ENT   • VENOUS ACCESS DEVICE (PORT) INSERTION N/A 5/7/2021    Procedure: SINGLE LUMEN PORT PLACEMENT;  Surgeon: Apolonia Kirkland MD;  Location:  [facility] OR;  Service: General;  Laterality: N/A;            OT ASSESSMENT FLOWSHEET (last 12 hours)      OT Evaluation and Treatment    No documentation.        Occupational Therapy Education                 Title: PT OT SLP Therapies (Resolved)     Topic: Occupational Therapy (Resolved)     Point: ADL training (Resolved)     Description:   Instruct learner(s) on proper safety adaptation and remediation techniques during self care or  transfers.   Instruct in proper use of assistive devices.              Learning Progress Summary           Patient Acceptance, E, NR by MM at 5/25/2021 1723    Comment: OT role, benefits, POC, d/c planning                   Point: Home exercise program (Resolved)     Description:   Instruct learner(s) on appropriate technique for monitoring, assisting and/or progressing therapeutic exercises/activities.              Learning Progress Summary           Patient Acceptance, E,TB, VU,DU,NR by  at 6/2/2021 1336    Comment: Pt. c/o's pain in pelvic,hip areas, ue exs performed to increase his act. soraya and transfers!    Acceptance, E,TB, NR,VU,DU by  at 5/30/2021 1331    Comment: Pt. c/o's pain in LLE, agrees to ue exs with rests whenever he needs!    Acceptance, E,TB, VU,DU,NR by  at 5/26/2021 1302    Comment: Pt. performed ue exs to increase his abed mobility!                   Point: Precautions (Resolved)     Description:   Instruct learner(s) on prescribed precautions during self-care and functional transfers.              Learning Progress Summary           Patient Acceptance, E,TB, VU,DU,NR by  at 6/2/2021 1336    Comment: Pt. c/o's pain in pelvic,hip areas, ue exs performed to increase his act. soraya and transfers!    Acceptance, E,TB, NR,VU,DU by  at 5/30/2021 1331    Comment: Pt. c/o's pain in LLE, agrees to ue exs with rests whenever he needs!    Acceptance, E,TB, VU,DU,NR by  at 5/26/2021 1302    Comment: Pt. performed ue exs to increase his abed mobility!    Acceptance, E, NR by MM at 5/25/2021 1723    Comment: OT role, benefits, POC, d/c planning                   Point: Body mechanics (Resolved)     Description:   Instruct learner(s) on proper positioning and spine alignment during self-care, functional mobility activities and/or exercises.              Learning Progress Summary           Patient Acceptance, E,TB, VU,DU,NR by  at 6/2/2021 1336    Comment: Pt. c/o's pain in pelvic,hip areas, ue exs  performed to increase his act. soraya and transfers!    Acceptance, E,TB, NR,VU,DU by  at 5/30/2021 1331    Comment: Pt. c/o's pain in LLE, agrees to ue exs with rests whenever he needs!    Acceptance, E,TB, VU,DU,NR by  at 5/26/2021 1302    Comment: Pt. performed ue exs to increase his abed mobility!    Acceptance, E, NR by MM at 5/25/2021 1723    Comment: OT role, benefits, POC, d/c planning                               User Key     Initials Effective Dates Name Provider Type Discipline     08/02/16 -  Bay Drummond, MARCANO/L Occupational Therapy Assistant OT     07/05/20 -  Dong Lee, OTR/L Occupational Therapist OT                  OT Recommendation and Plan     Progress Toward Functional Goals (OT): progress toward functional goals is fair  Plan of Care Review  Plan of Care Reviewed With: patient, spouse  Progress: improving  Outcome Summary: Pt. rob in bed, performed ue exs with rests after every set, Pt. c/o's pain in LLE, states that he will do more when radiation is over!  Plan of Care Reviewed With: patient, spouse  Outcome Summary: Pt. rob in bed, performed ue exs with rests after every set, Pt. c/o's pain in LLE, states that he will do more when radiation is over!    Outcome Measures     Row Name 06/02/21 1336             How much help from another is currently needed...    Putting on and taking off regular lower body clothing?  1  -CJ      Bathing (including washing, rinsing, and drying)  1  -CJ      Toileting (which includes using toilet bed pan or urinal)  2  -CJ      Putting on and taking off regular upper body clothing  2  -CJ      Taking care of personal grooming (such as brushing teeth)  2  -CJ      Eating meals  2  -CJ      AM-PAC 6 Clicks Score (OT)  10  -         Functional Assessment    Outcome Measure Options  AM-PAC 6 Clicks Daily Activity (OT)  -        User Key  (r) = Recorded By, (t) = Taken By, (c) = Cosigned By    Initials Name Provider Type     Bhanu  KRYS Garcia/EARL Occupational Therapy Assistant          Time Calculation:     Therapy Charges for Today     Code Description Service Date Service Provider Modifiers Qty    [id number] HC OT THER PROC EA 15 MIN 6/2/2021 Bay Drummond COTA/L GO 2               MARTY Cotto  6/3/2021

## 2021-06-03 NOTE — THERAPY DISCHARGE NOTE
Acute Care - Physical Therapy Discharge Summary  [facility]       Patient Name: Brett Ortiz  : 1950  MRN: [id number]    Today's Date: 6/3/2021                 Admit Date: 2021      PT Recommendation and Plan    Visit Dx:    ICD-10-CM ICD-9-CM   1. Shortness of breath  R06.02 786.05   2. Generalized weakness  R53.1 780.79   3. Septic shock (CMS/[facility])  A41.9 038.9    R65.21 785.52     995.92   4. Impaired functional mobility and activity tolerance  Z74.09 V49.89   5. Impaired mobility and ADLs  Z74.09 V49.89    Z78.9    6. Non-small cell lung cancer, unspecified laterality (CMS/[facility])  C34.90 162.9       Outcome Measures     Row Name 21 1336             How much help from another is currently needed...    Putting on and taking off regular lower body clothing?  1  -CJ      Bathing (including washing, rinsing, and drying)  1  -CJ      Toileting (which includes using toilet bed pan or urinal)  2  -CJ      Putting on and taking off regular upper body clothing  2  -CJ      Taking care of personal grooming (such as brushing teeth)  2  -CJ      Eating meals  2  -CJ      AM-PAC 6 Clicks Score (OT)  10  -         Functional Assessment    Outcome Measure Options  AM-PAC 6 Clicks Daily Activity (OT)  -        User Key  (r) = Recorded By, (t) = Taken By, (c) = Cosigned By    Initials Name Provider Type    CJ Bay Drummond COTA/L Occupational Therapy Assistant              PT Rehab Goals     Row Name 21 1104             Bed Mobility Goal 1 (PT)    Activity/Assistive Device (Bed Mobility Goal 1, PT)  bed mobility activities, all  -AB      Ellis Level/Cues Needed (Bed Mobility Goal 1, PT)  moderate assist (50-74% patient effort)  -AB      Time Frame (Bed Mobility Goal 1, PT)  by discharge  -AB      Progress/Outcomes (Bed Mobility Goal 1, PT)  goal met  -AB         Transfer Goal 1 (PT)    Activity/Assistive Device (Transfer Goal 1, PT)  transfers, all  -AB      Ellis Level/Cues Needed  (Transfer Goal 1, PT)  maximum assist (25-49% patient effort)  -AB      Time Frame (Transfer Goal 1, PT)  by discharge  -AB      Progress/Outcome (Transfer Goal 1, PT)  goal not met  -AB        User Key  (r) = Recorded By, (t) = Taken By, (c) = Cosigned By    Initials Name Provider Type Discipline    Keke Cox, PTA Physical Therapy Assistant PT              PT Discharge Summary  Anticipated Discharge Disposition (PT): inpatient rehabilitation facility  Reason for Discharge: Discharge from facility  Outcomes Achieved: Refer to plan of care for updates on goals achieved  Discharge Destination: SNF      Keke Rivera, CARMEL   6/3/2021

## 2021-06-15 NOTE — TELEPHONE ENCOUNTER
Caller: SHAUN    Relationship: WIFE    Best call back number: [phone]    What is the best time to reach you: NEXT AVAILABLE APPT    Who are you requesting to speak with (clinical staff, provider,  specific staff member): DR AC    What was the call regarding: SHE'D LIKE TO MAKE SURE PATIENT WILL BE RECEIVING A BABY DOSE OF CHEMO ON 06/17 AND NOT THE HARSH DOSAGE. SHE ALSO IS REQUESTING FOR HIM TO SEE DR AC WHEN HE COMES IN ON 06/17.    Do you require a callback: YES

## 2021-06-16 NOTE — TELEPHONE ENCOUNTER
Trid to contact pt to let him know his chemo has been dose reduced for tomorrow. Cannot reach at this time to notify

## 2021-06-17 NOTE — PROGRESS NOTES
Reported abnormal labs to Kaitlin BENNETT Magnesium 1.4, sed rate 84, HGB 8.3 and albumin 2.90. Waiting on callback.  Per Kaitlin okay to treat and Dr Bassett wants to add 2 grams of Mag.

## 2021-06-17 NOTE — PATIENT INSTRUCTIONS
Nivolumab injection  What is this medicine?  NIVOLUMAB (erik VOL ue mab) is a monoclonal antibody. It is used to treat colon cancer, esophageal cancer, head and neck cancer, Hodgkin lymphoma, kidney cancer, liver cancer, lung cancer, mesothelioma, melanoma, and urothelial cancer.  This medicine may be used for other purposes; ask your health care provider or pharmacist if you have questions.  COMMON BRAND NAME(S): Opdivo  What should I tell my health care provider before I take this medicine?  They need to know if you have any of these conditions:  · autoimmune diseases like Crohn's disease, ulcerative colitis, or lupus  · have had or planning to have an allogeneic stem cell transplant (uses someone else's stem cells)  · history of chest radiation  · history of organ transplant  · nervous system problems like myasthenia gravis or Guillain-[place] syndrome  · an unusual or allergic reaction to nivolumab, other medicines, foods, dyes, or preservatives  · pregnant or trying to get pregnant  · breast-feeding  How should I use this medicine?  This medicine is for infusion into a vein. It is given by a health care professional in a hospital or clinic setting.  A special MedGuide will be given to you before each treatment. Be sure to read this information carefully each time.  Talk to your pediatrician regarding the use of this medicine in children. While this drug may be prescribed for children as young as 12 years for selected conditions, precautions do apply.  Overdosage: If you think you have taken too much of this medicine contact a poison control center or emergency room at once.  NOTE: This medicine is only for you. Do not share this medicine with others.  What if I miss a dose?  It is important not to miss your dose. Call your doctor or health care professional if you are unable to keep an appointment.  What may interact with this medicine?  Interactions have not been studied.  Give your health care provider a list of  all the medicines, herbs, non-prescription drugs, or dietary supplements you use. Also tell them if you smoke, drink alcohol, or use illegal drugs. Some items may interact with your medicine.  This list may not describe all possible interactions. Give your health care provider a list of all the medicines, herbs, non-prescription drugs, or dietary supplements you use. Also tell them if you smoke, drink alcohol, or use illegal drugs. Some items may interact with your medicine.  What should I watch for while using this medicine?  This drug may make you feel generally unwell. Continue your course of treatment even though you feel ill unless your doctor tells you to stop.  You may need blood work done while you are taking this medicine.  Do not become pregnant while taking this medicine or for 5 months after stopping it. Women should inform their doctor if they wish to become pregnant or think they might be pregnant. There is a potential for serious side effects to an unborn child. Talk to your health care professional or pharmacist for more information. Do not breast-feed an infant while taking this medicine or for 5 months after stopping it.  What side effects may I notice from receiving this medicine?  Side effects that you should report to your doctor or health care professional as soon as possible:  · allergic reactions like skin rash, itching or hives, swelling of the face, lips, or tongue  · breathing problems  · blood in the urine  · bloody or watery diarrhea or black, tarry stools  · changes in emotions or moods  · changes in vision  · chest pain  · cough  · dizziness  · feeling faint or lightheaded, falls  · fever, chills  · headache with fever, neck stiffness, confusion, loss of memory, sensitivity to light, hallucination, loss of contact with reality, or seizures  · joint pain  · mouth sores  · redness, blistering, peeling or loosening of the skin, including inside the mouth  · severe muscle pain or  weakness  · signs and symptoms of high blood sugar such as dizziness; dry mouth; dry skin; fruity breath; nausea; stomach pain; increased hunger or thirst; increased urination  · signs and symptoms of kidney injury like trouble passing urine or change in the amount of urine  · signs and symptoms of liver injury like dark yellow or brown urine; general ill feeling or flu-like symptoms; light-colored stools; loss of appetite; nausea; right upper belly pain; unusually weak or tired; yellowing of the eyes or skin  · swelling of the ankles, feet, hands  · trouble passing urine or change in the amount of urine  · unusually weak or tired  · weight gain or loss  Side effects that usually do not require medical attention (report to your doctor or health care professional if they continue or are bothersome):  · bone pain  · constipation  · decreased appetite  · diarrhea  · muscle pain  · nausea, vomiting  · tiredness  This list may not describe all possible side effects. Call your doctor for medical advice about side effects. You may report side effects to FDA at 4-099-QKI-0273.  Where should I keep my medicine?  This drug is given in a hospital or clinic and will not be stored at home.  NOTE: This sheet is a summary. It may not cover all possible information. If you have questions about this medicine, talk to your doctor, pharmacist, or health care provider.  © 2021 [employer] (2020-11-18 19:19:27)  Carboplatin injection  What is this medicine?  CARBOPLATIN (DOUGIE krys nura tin) is a chemotherapy drug. It targets fast dividing cells, like cancer cells, and causes these cells to die. This medicine is used to treat ovarian cancer and many other cancers.  This medicine may be used for other purposes; ask your health care provider or pharmacist if you have questions.  COMMON BRAND NAME(S): Paraplatin  What should I tell my health care provider before I take this medicine?  They need to know if you have any of these  conditions:  · blood disorders  · hearing problems  · kidney disease  · recent or ongoing radiation therapy  · an unusual or allergic reaction to carboplatin, cisplatin, other chemotherapy, other medicines, foods, dyes, or preservatives  · pregnant or trying to get pregnant  · breast-feeding  How should I use this medicine?  This drug is usually given as an infusion into a vein. It is administered in a hospital or clinic by a specially trained health care professional.  Talk to your pediatrician regarding the use of this medicine in children. Special care may be needed.  Overdosage: If you think you have taken too much of this medicine contact a poison control center or emergency room at once.  NOTE: This medicine is only for you. Do not share this medicine with others.  What if I miss a dose?  It is important not to miss a dose. Call your doctor or health care professional if you are unable to keep an appointment.  What may interact with this medicine?  · medicines for seizures  · medicines to increase blood counts like filgrastim, pegfilgrastim, sargramostim  · some antibiotics like amikacin, gentamicin, neomycin, streptomycin, tobramycin  · vaccines  Talk to your doctor or health care professional before taking any of these medicines:  · acetaminophen  · aspirin  · ibuprofen  · ketoprofen  · naproxen  This list may not describe all possible interactions. Give your health care provider a list of all the medicines, herbs, non-prescription drugs, or dietary supplements you use. Also tell them if you smoke, drink alcohol, or use illegal drugs. Some items may interact with your medicine.  What should I watch for while using this medicine?  Your condition will be monitored carefully while you are receiving this medicine. You will need important blood work done while you are taking this medicine.  This drug may make you feel generally unwell. This is not uncommon, as chemotherapy can affect healthy cells as well as  cancer cells. Report any side effects. Continue your course of treatment even though you feel ill unless your doctor tells you to stop.  In some cases, you may be given additional medicines to help with side effects. Follow all directions for their use.  Call your doctor or health care professional for advice if you get a fever, chills or sore throat, or other symptoms of a cold or flu. Do not treat yourself. This drug decreases your body's ability to fight infections. Try to avoid being around people who are sick.  This medicine may increase your risk to bruise or bleed. Call your doctor or health care professional if you notice any unusual bleeding.  Be careful brushing and flossing your teeth or using a toothpick because you may get an infection or bleed more easily. If you have any dental work done, tell your dentist you are receiving this medicine.  Avoid taking products that contain aspirin, acetaminophen, ibuprofen, naproxen, or ketoprofen unless instructed by your doctor. These medicines may hide a fever.  Do not become pregnant while taking this medicine. Women should inform their doctor if they wish to become pregnant or think they might be pregnant. There is a potential for serious side effects to an unborn child. Talk to your health care professional or pharmacist for more information. Do not breast-feed an infant while taking this medicine.  What side effects may I notice from receiving this medicine?  Side effects that you should report to your doctor or health care professional as soon as possible:  · allergic reactions like skin rash, itching or hives, swelling of the face, lips, or tongue  · signs of infection - fever or chills, cough, sore throat, pain or difficulty passing urine  · signs of decreased platelets or bleeding - bruising, pinpoint red spots on the skin, black, tarry stools, nosebleeds  · signs of decreased red blood cells - unusually weak or tired, fainting spells,  lightheadedness  · breathing problems  · changes in hearing  · changes in vision  · chest pain  · high blood pressure  · low blood counts - This drug may decrease the number of white blood cells, red blood cells and platelets. You may be at increased risk for infections and bleeding.  · nausea and vomiting  · pain, swelling, redness or irritation at the injection site  · pain, tingling, numbness in the hands or feet  · problems with balance, talking, walking  · trouble passing urine or change in the amount of urine  Side effects that usually do not require medical attention (report to your doctor or health care professional if they continue or are bothersome):  · hair loss  · loss of appetite  · metallic taste in the mouth or changes in taste  This list may not describe all possible side effects. Call your doctor for medical advice about side effects. You may report side effects to FDA at 3-412-FDA-7618.  Where should I keep my medicine?  This drug is given in a hospital or clinic and will not be stored at home.  NOTE: This sheet is a summary. It may not cover all possible information. If you have questions about this medicine, talk to your doctor, pharmacist, or health care provider.  © 2021 [employer] Standard (2009-03-24 14:38:05)  Paclitaxel injection  What is this medicine?  PACLITAXEL (GOLDEN li TAX el) is a chemotherapy drug. It targets fast dividing cells, like cancer cells, and causes these cells to die. This medicine is used to treat ovarian cancer, breast cancer, lung cancer, Kaposi's sarcoma, and other cancers.  This medicine may be used for other purposes; ask your health care provider or pharmacist if you have questions.  COMMON BRAND NAME(S): Onxol, Taxol  What should I tell my health care provider before I take this medicine?  They need to know if you have any of these conditions:  · history of irregular heartbeat  · liver disease  · low blood counts, like low white cell, platelet, or red cell  counts  · lung or breathing disease, like asthma  · tingling of the fingers or toes, or other nerve disorder  · an unusual or allergic reaction to paclitaxel, alcohol, polyoxyethylated castor oil, other chemotherapy, other medicines, foods, dyes, or preservatives  · pregnant or trying to get pregnant  · breast-feeding  How should I use this medicine?  This drug is given as an infusion into a vein. It is administered in a hospital or clinic by a specially trained health care professional.  Talk to your pediatrician regarding the use of this medicine in children. Special care may be needed.  Overdosage: If you think you have taken too much of this medicine contact a poison control center or emergency room at once.  NOTE: This medicine is only for you. Do not share this medicine with others.  What if I miss a dose?  It is important not to miss your dose. Call your doctor or health care professional if you are unable to keep an appointment.  What may interact with this medicine?  Do not take this medicine with any of the following medications:  · live virus vaccines  This medicine may also interact with the following medications:  · antiviral medicines for hepatitis, HIV or AIDS  · certain antibiotics like erythromycin and clarithromycin  · certain medicines for fungal infections like ketoconazole and itraconazole  · certain medicines for seizures like carbamazepine, phenobarbital, phenytoin  · gemfibrozil  · nefazodone  · rifampin  · [place]'s wort  This list may not describe all possible interactions. Give your health care provider a list of all the medicines, herbs, non-prescription drugs, or dietary supplements you use. Also tell them if you smoke, drink alcohol, or use illegal drugs. Some items may interact with your medicine.  What should I watch for while using this medicine?  Your condition will be monitored carefully while you are receiving this medicine. You will need important blood work done while you are  taking this medicine.  This medicine can cause serious allergic reactions. To reduce your risk you will need to take other medicine(s) before treatment with this medicine. If you experience allergic reactions like skin rash, itching or hives, swelling of the face, lips, or tongue, tell your doctor or health care professional right away.  In some cases, you may be given additional medicines to help with side effects. Follow all directions for their use.  This drug may make you feel generally unwell. This is not uncommon, as chemotherapy can affect healthy cells as well as cancer cells. Report any side effects. Continue your course of treatment even though you feel ill unless your doctor tells you to stop.  Call your doctor or health care professional for advice if you get a fever, chills or sore throat, or other symptoms of a cold or flu. Do not treat yourself. This drug decreases your body's ability to fight infections. Try to avoid being around people who are sick.  This medicine may increase your risk to bruise or bleed. Call your doctor or health care professional if you notice any unusual bleeding.  Be careful brushing and flossing your teeth or using a toothpick because you may get an infection or bleed more easily. If you have any dental work done, tell your dentist you are receiving this medicine.  Avoid taking products that contain aspirin, acetaminophen, ibuprofen, naproxen, or ketoprofen unless instructed by your doctor. These medicines may hide a fever.  Do not become pregnant while taking this medicine. Women should inform their doctor if they wish to become pregnant or think they might be pregnant. There is a potential for serious side effects to an unborn child. Talk to your health care professional or pharmacist for more information. Do not breast-feed an infant while taking this medicine.  Men are advised not to father a child while receiving this medicine.  This product may contain alcohol. Ask your  pharmacist or healthcare provider if this medicine contains alcohol. Be sure to tell all healthcare providers you are taking this medicine. Certain medicines, like metronidazole and disulfiram, can cause an unpleasant reaction when taken with alcohol. The reaction includes flushing, headache, nausea, vomiting, sweating, and increased thirst. The reaction can last from 30 minutes to several hours.  What side effects may I notice from receiving this medicine?  Side effects that you should report to your doctor or health care professional as soon as possible:  · allergic reactions like skin rash, itching or hives, swelling of the face, lips, or tongue  · breathing problems  · changes in vision  · fast, irregular heartbeat  · high or low blood pressure  · mouth sores  · pain, tingling, numbness in the hands or feet  · signs of decreased platelets or bleeding - bruising, pinpoint red spots on the skin, black, tarry stools, blood in the urine  · signs of decreased red blood cells - unusually weak or tired, feeling faint or lightheaded, falls  · signs of infection - fever or chills, cough, sore throat, pain or difficulty passing urine  · signs and symptoms of liver injury like dark yellow or brown urine; general ill feeling or flu-like symptoms; light-colored stools; loss of appetite; nausea; right upper belly pain; unusually weak or tired; yellowing of the eyes or skin  · swelling of the ankles, feet, hands  · unusually slow heartbeat  Side effects that usually do not require medical attention (report to your doctor or health care professional if they continue or are bothersome):  · diarrhea  · hair loss  · loss of appetite  · muscle or joint pain  · nausea, vomiting  · pain, redness, or irritation at site where injected  · tiredness  This list may not describe all possible side effects. Call your doctor for medical advice about side effects. You may report side effects to FDA at 9-040-FDA-5073.  Where should I keep my  medicine?  This drug is given in a hospital or clinic and will not be stored at home.  NOTE: This sheet is a summary. It may not cover all possible information. If you have questions about this medicine, talk to your doctor, pharmacist, or health care provider.  © 2021 [employer] (2020-11-18 13:37:23)

## 2021-06-22 NOTE — TELEPHONE ENCOUNTER
Caller: PROSPER     Relationship: WIFE     Best call back number [phone]    WIFE AND PATIENT ARE WONDERING HOW PATIENTS CANCER IS REACTING TO HIS CHEMO. IS HE DOING BETTER/ WORSE  PLEASE CALL AND ADVISE   THANK YOU

## 2021-06-22 NOTE — TELEPHONE ENCOUNTER
Called and spoke with patients wife,  She states they were wanting to know if his chemo was helping.  I did explain that we would have to see him in follow up and see about scans or further testing to see if chemo was helping, we couldn't just say yes or no.  She states they dont have a follow up appt scheduled but he has another treatment set up and she thinks they are done with radiation, but again, not sure.  Patient was last seen in May.  We did arrange a follow up appt with Dr Bassett to be seen the day of his next treatment.  He comes via ambulance so Dr Bassett will need to see patient before treatment is given.  She has a lot of questions and is confused on what needs to take place next.

## 2021-06-22 NOTE — TELEPHONE ENCOUNTER
Caller: Yonathan Ortiz    Relationship: WIFE    Best call back number: [phone]    What is the best time to reach you: ANY    Do you know the name of the person who called: YONATHAN    What was the call regarding: SHE IS LEAVING TO GO VISIT HER  & IS REQUESTING YOU CALL HER ON HER CELL NUMBER THAT IS LISTED ABOVE RE: THIS MORNINGS ENCOUNTER.

## 2021-06-30 DIAGNOSIS — M54.40 CHRONIC BILATERAL LOW BACK PAIN WITH SCIATICA, SCIATICA LATERALITY UNSPECIFIED: ICD-10-CM

## 2021-06-30 DIAGNOSIS — G89.29 CHRONIC BILATERAL LOW BACK PAIN WITH SCIATICA, SCIATICA LATERALITY UNSPECIFIED: ICD-10-CM

## 2021-06-30 RX ORDER — OXYCODONE HYDROCHLORIDE 15 MG/1
1 TABLET, FILM COATED, EXTENDED RELEASE ORAL EVERY 12 HOURS
Qty: 60 TABLET | Refills: 0 | Status: SHIPPED | OUTPATIENT
Start: 2021-06-30 | End: 2021-07-30

## 2021-07-08 NOTE — PROGRESS NOTES
S/w Gabriel Price RN with Dr. Bassett regarding lab results.  OK for treatment today as scheduled. Ruchi [place]

## 2021-07-08 NOTE — PROGRESS NOTES
[facility] ON[facility] HEMATOLOGY AND ONCOLOGY  [address]  [place] [address] [phone]    Chief Complaint  No chief complaint on file.    Patient had his been on radiation treatment today while his systemic TX is still on hold.  He states that he is doing pretty well today.  States that his breathing is good.       While he was in hospital, he continued to have some pain on the lower left side of the abdomen but also moves down to his left hip region.  He has completed RT as a inpatient setting    Now he is seen in the [facility]    Oncology/Hematology History   Malignant neoplasm of lung (CMS/HCC)    Initial Diagnosis    Malignant neoplasm of lung (CMS/HCC)     11/27/2020 Imaging    CXR:     IMPRESSION:  1.  Patchy consolidation in the LEFT lung base, laterally could  represent pneumonia or atelectasis. Linear band of atelectasis in the LEFT lung base is also noted.   2.  There is a rounded density in the LEFT perihilar region which maysimply represent a prominent pulmonary vessel on end; however, needs  further evaluation with CT chest with contrast to exclude a pulmonary  nodule.     12/7/2020 Imaging    PET  BHP  1. There are 2 FDG avid pulmonary nodules, which could represent primaryor metastatic malignancy.  2. Mild asymmetric uptake in the left posterior nasopharynx/tonsil.  Recommend direct visualization.  3. Focus of FDG uptake along the colon suture line with maximum SUV 6.0.  4. Diffusely heterogeneous FDG uptake in the bones, which could be seen with bony metastatic disease or bone marrow activation. Correlate with patient history. No convincing corollary CT lesions appreciated at this time.   5. Nodular liver contour with heterogeneous FDG uptake suggests chronic liver disease.  6. Gallstones.     3/3/2021 Imaging    CT Chest  1. There has been progressive increase in some of the pulmonary  nodules/masses. The 2 largest in both of  the upper lobes now appear to be somewhat necrotic. A small right upper lobe lesion is cavitary. Neoplastic disease and infection/inflammation are in the differential. Neoplastic disease is favored. One of the right upper lobe subpleural nodules is smaller in size. All of the other areas are stable or larger.  2. No enlarged mediastinal or hilar lymph nodes.  3. Atheromatous disease of the thoracic aorta and coronary arteries. Dilated main pulmonary artery segment.  4. Macronodular appearance of the liver with fatty infiltration.  Possible liver cirrhosis. Correlate clinically.  5. Vague lucency in the superior endplate of T11 is stable. This is  nonspecific. No acute appearing bony abnormality is seen.       3/17/2021 Biopsy    Final Diagnosis   Lung, right upper lobe mass, fine-needle core biopsies and touch preparation: Poorly differentiated non-small cell carcinoma, consistent with squamous cell carcinoma.        4/20/2021 Imaging    MRI Brain With & Without Contrast  1. No acute intracranial finding. Specifically, no evidence of metastatic disease. 2. Loss of the normal swallow tail sign at the cerebral peduncles, which in the appropriate clinical setting could be supportive of Parkinson's disease.       4/23/2021 Imaging    CT Chest:  IMPRESSION:  1.  Findings of disease progression.  2.  Enlarging 4.4 cm RIGHT upper lobe lung mass anteriorly,  biopsy-proven neoplasm.  3.  Enlarging 3.5 cm LEFT upper lobe suprahilar mass, also likely a  neoplasm (second primary versus metastasis). Enlarging satellite  subcentimeter pulmonary nodules.  4.  New 10 mm cavitary pulmonary nodule in the RIGHT lower lobe superior segment, likely metastatic lesion.    CT Abd/Pelvis:  IMPRESSION:  1.  3.4 cm lytic lesion in the LEFT iliac wing, compatible with bone  metastasis.  2.  No other evidence of metastatic disease in the abdomen or pelvis.  3.  Cirrhotic liver. Small enhancing lesion in the caudate is  indeterminate with  differential including hemangioma and hepatocellular carcinoma. Further characterization can be obtained with MRI liver protocol.  4.  Cholelithiasis without evidence of cholecystitis.    CT Neck:  Impression:     1. No soft tissue mass or suspicious adenopathy in the neck.  2. Mild atheromatous plaque along the proximal internal carotid  arteries. This does not appear flow limiting.  3. Breath-hold with closed glottis, which limits evaluation of the vocal folds.    Bone Scan:  IMPRESSION:  1.  LEFT iliac wing metastatic lesion, correlating with abnormality on same-day CT.  2.  No other suspicious bone lesion identified.     5/7/2021 Procedure    Mediport placement     5/10/2021 -  Chemotherapy    OP LUNG Nivolumab / Ipilimumab / PACLitaxel / CARBOplatin AUC=6      5/10/2021 -  Chemotherapy    OP CENTRAL VENOUS ACCESS DEVICE ACCESS, CARE, AND MAINTENANCE ([facility])           PAST MEDICAL HISTORY:  ALLERGIES:  No Known Allergies  CURRENT MEDICATIONS:  Outpatient Encounter Medications as of 7/8/2021   Medication Sig Dispense Refill   • acetaminophen (TYLENOL) 325 MG tablet Take 2 tablets by mouth Every 4 (Four) Hours As Needed for Mild Pain .     • budesonide-formoterol (SYMBICORT) 80-4.5 MCG/ACT inhaler Inhale 2 puffs 2 (Two) Times a Day. 1 inhaler 12   • buPROPion XL (WELLBUTRIN XL) 150 MG 24 hr tablet Take 1 tablet by mouth Daily.     • furosemide (LASIX) 20 MG tablet Take 2 tablets by mouth Daily. 60 tablet 3   • guaiFENesin (MUCINEX) 600 MG 12 hr tablet Take 2 tablets by mouth Every 12 (Twelve) Hours.     • ipratropium-albuterol (DUO-NEB) 0.5-2.5 mg/3 ml nebulizer Take 3 mL by nebulization Every 6 (Six) Hours. For COPD J44.9 360 mL 3   • losartan (COZAAR) 25 MG tablet Take 1 tablet by mouth Daily.     • metoprolol succinate XL (TOPROL-XL) 25 MG 24 hr tablet Take 1 tablet by mouth Daily.     • nitroglycerin (NITROSTAT) 0.4 MG SL tablet Place 1 tablet under the tongue Every 5 (Five) Minutes As Needed for Chest Pain  (Only if SBP Greater Than 100). Take no more than 3 doses in 15 minutes.  12   • ondansetron (ZOFRAN) 4 MG tablet Take 1 tablet by mouth Every 6 (Six) Hours As Needed for Nausea or Vomiting.     • oxybutynin (DITROPAN) 5 MG tablet Take 5 mg by mouth Daily.     • oxyCODONE ER (oxyCONTIN) 15 MG tablet extended-release 12 hour Take 1 tablet by mouth Every 12 (Twelve) Hours. 6 tablet 0   • oxyCODONE-acetaminophen (PERCOCET) 7.5-325 MG per tablet Take 1 tablet by mouth Every 4 (Four) Hours As Needed for Severe Pain . 12 tablet 0   • pantoprazole (PROTONIX) 40 MG EC tablet Take 40 mg by mouth Daily.     • predniSONE (DELTASONE) 10 MG tablet Take 4 tablets daily for 3 days, then take 2 tablets daily for 3 days, then take 1 tablet daily for 3 days, then stop. 21 tablet 0   • sertraline (ZOLOFT) 100 MG tablet Take 100 mg by mouth Daily.     • tamsulosin (FLOMAX) 0.4 MG capsule 24 hr capsule Take 0.4 mg by mouth Daily.       No facility-administered encounter medications on file as of 7/8/2021.     ADULT ILLNESSES:  Patient Active Problem List   Diagnosis Code   • Basal cell carcinoma of left ala nasi C44.311   • BMI 34.0-34.9,adult Z68.34   • Open wound of nose S01.20XA   • Open wound of left cheek S01.402A   • Open wound of lip S01.501A   • Urinary retention R33.9   • Chronic obstructive pulmonary disease with acute exacerbation (CMS/HCC) J44.1   • Chronic respiratory failure with hypercapnia (CMS/HCC) J96.12   • Pulmonary nodules/lesions, multiple R91.8   • Presence of ileostomy (CMS/HCC) Z93.2   • Respiratory acidosis E87.2   • Anemia, chronic disease D63.8   • Acute on chronic respiratory failure with hypoxia and hypercapnia (CMS/HCC) J96.21, J96.22   • History of tobacco use Z87.891   • Malignant neoplasm of lung (CMS/HCC) C34.90   • Neck mass R22.1   • Bone pain M89.8X9   • Alcoholic cirrhosis of liver with ascites (CMS/HCC) K70.31   • Alcoholic intoxication with complication (CMS/HCC) F10.929   • Atelectasis J98.11    • Colon distention K63.89   • Hyperbilirubinemia E80.6   • Hypokalemia E87.6   • Hypomagnesemia E83.42   • Lactic acidosis E87.2   • Leukocytosis D72.829   • LFTs abnormal R94.5   • MRSA bacteremia R78.81, B95.62   • Perforated viscus R19.8   • Septic shock (CMS/HCC) A41.9, R65.21   • Severe sepsis (CMS/HCC) A41.9, R65.20   • Smoker F17.200   • Weight loss R63.4   • Hip pain M25.559   • Chronic bronchitis (CMS/HCC) J42   • Former smoker Z87.891   • Acute on chronic diastolic CHF (congestive heart failure) (CMS/HCC) I50.33   • Hypokalemia E87.6   • Non-small cell lung cancer (NSCLC) (CMS/HCC) C34.90   • Thrombocytopenia (CMS/HCC) D69.6     SURGERIES:  Past Surgical History:   Procedure Laterality Date   • COLON SURGERY      x4   • EYE SURGERY Bilateral     cataracts    • FLAP HEAD/NECK Left 10/23/2018    Procedure: LEFT CHEEK ADVANCEMENT FLAP CLOSURE OF LIP AND CHEEK,5CM X 6CM IPSILATERAL LEFT SEPTAL MUCOSAL HINGE FLAP,2CM X 3CM;  Surgeon: Izaiah Ceron MD;  Location: [facility] OR;  Service: ENT   • FLAP HEAD/NECK Bilateral 11/13/2018    Procedure: Pedicle division and flap inset of nose;  Surgeon: Izaiah Ceron MD;  Location: [facility] OR;  Service: ENT   • HEAD/NECK LESION/CYST EXCISION Left 10/23/2018    Procedure: LEFT PARAMEDIAN FOREHEAD FLAP WITH PRESERVATION OF VASCULAR PEDICLE;  Surgeon: Izaiah Ceron MD;  Location: [facility] OR;  Service: ENT   • SKIN FULL THICKNESS GRAFT Left 10/23/2018    Procedure: Conchal cartilage graft from right ear to left nose with complex closure of skin of forehead;  Surgeon: Izaiah Ceron MD;  Location: [facility] OR;  Service: ENT   • VENOUS ACCESS DEVICE (PORT) INSERTION N/A 5/7/2021    Procedure: SINGLE LUMEN PORT PLACEMENT;  Surgeon: Apolonia Kirkland MD;  Location:  [facility] OR;  Service: General;  Laterality: N/A;     HEALTH MAINTENANCE ITEMS:  Health Maintenance Due   Topic Date Due   • COLORECTAL CANCER SCREENING  Never done   • Hepatitis B (1 of 3 - Risk 3-dose series)  Never done   • TDAP/TD VACCINES (1 - Tdap) Never done   • ZOSTER VACCINE (1 of 2) Never done   • Pneumococcal Vaccine 65+ (1 of 1 - PPSV23) Never done   • ANNUAL WELLNESS VISIT  09/11/2020       <no information>  Last Completed Colonoscopy     This patient has no relevant Health Maintenance data.        Immunization History   Administered Date(s) Administered   • COVID-19 (PFIZER) 04/01/2021, 04/22/2021     Last Completed Mammogram     This patient has no relevant Health Maintenance data.            FAMILY HISTORY:  Family History   Problem Relation Age of Onset   • Diabetes Mother    • Squamous cell carcinoma Mother    • Lung cancer Mother    • Pancreatic cancer Father 67     SOCIAL HISTORY:  Social History     Socioeconomic History   • Marital status:      Spouse name: Not on file   • Number of children: Not on file   • Years of education: Not on file   • Highest education level: Not on file   Tobacco Use   • Smoking status: Former Smoker     Packs/day: 1.00     Years: 50.00     Pack years: 50.00     Types: Cigarettes     Quit date: 04/2018     Years since quitting: 3.2   • Smokeless tobacco: Former User     Types: Chew   Vaping Use   • Vaping Use: Never used   Substance and Sexual Activity   • Alcohol use: No     Comment: quit April 14 2018/ hx of alchohlism    • Drug use: No   • Sexual activity: Defer       REVIEW OF SYSTEMS:  Review of Systems   Constitutional: Positive for activity change and fatigue.   Respiratory: Negative for shortness of breath.    Cardiovascular: Negative.    Gastrointestinal: Negative.    Endocrine: Negative.    Genitourinary: Negative.    Musculoskeletal: Negative.    Skin: Negative.    Allergic/Immunologic: Negative.    Neurological: Positive for weakness.   Psychiatric/Behavioral: Positive for stress.       There were no vitals taken for this visit. There is no height or weight on file to calculate BSA.  There were no vitals filed for this visit.    Objective   Vital Signs:    There were no vitals taken for this visit. There is no height or weight on file to calculate BSA.  There were no vitals filed for this visit.  Brett Ortiz reports a pain score of .  Given his pain assessment as noted, treatment options were discussed and the following options were decided upon as a follow-up plan to address the patient's pain:       Patient's There is no height or weight on file to calculate BMI. indicating that he is .      Physical Exam  Constitutional:       General: He is in acute distress.      Appearance: He is obese.   HENT:      Head: Atraumatic.      Mouth/Throat:      Mouth: Mucous membranes are dry.   Cardiovascular:      Rate and Rhythm: Normal rate.   Pulmonary:      Effort: Pulmonary effort is normal.      Breath sounds: Normal breath sounds.   Abdominal:      General: There is no distension.      Palpations: Abdomen is soft.      Tenderness: There is no abdominal tenderness.   Musculoskeletal:      Cervical back: Normal range of motion.      Right lower leg: Edema present.      Left lower leg: Edema present.   Skin:     General: Skin is warm and dry.   Neurological:      General: No focal deficit present.      Mental Status: He is alert and oriented to person, place, and time.   Psychiatric:         Mood and Affect: Mood normal.         Behavior: Behavior normal.            Result Review :     LABS    Lab Results - Last 18 Months   Lab Units 07/08/21  0813 06/17/21  1015 06/03/21  0300 05/31/21  0324 05/28/21  0601 05/27/21  0542 05/23/21  0539 05/22/21  0257 05/21/21  0920 05/10/21  0809 05/04/21  1344 04/12/21  1204 11/29/20  0235 10/07/20  1330 10/07/20  1330   HEMOGLOBIN g/dL 9.1* 8.3* 8.8* 9.0* 9.4* 9.5* 8.8* 6.8* 7.7* 8.8* 9.5* 9.1* 8.7*   < > 10.2*   HEMATOCRIT % 29.7* 28.4* 30.1* 31.2* 32.3* 32.7* 31.1* 24.0* 28.1* 31.3* 34.4* 32.7* 28.7*   < > 34.2*   MCV fL 83.0 84.3 83.6 85.0 83.2 83.0 84.5 83.3 85.4 86.0 84.7 86.7 88.3   < > 92.9   WBC 10*3/mm3 9.21 7.81 11.52* 8.82  8.75 8.14 17.41* 4.56 2.28* 17.09* 15.47* 9.65 7.51   < > 8.09   RDW % 19.0* 18.5* 17.8* 17.7* 17.3* 16.9* 16.5* 16.6* 15.9* 15.4 15.2 14.3 14.3   < > 13.7   MPV fL 9.5 10.7 10.1 12.7* 11.1 10.0 10.4 11.2 10.1 10.8 11.3 10.8 11.6   < > 11.8   PLATELETS 10*3/mm3 152 117* 213 117* 115* 111* 171 143 141 98* 109* 151 111*   < > 82*   IMM GRAN % % 1.2* 0.5 0.9*  --   --   --   --   --   --  0.8*  --  0.3 0.3  --   --    NEUTROS ABS 10*3/mm3 7.12* 6.14 9.06*  --   --   --  13.01* 2.30 0.73* 14.04*  12.99* 13.09* 7.22* 4.66   < > 5.01   LYMPHS ABS 10*3/mm3 0.82 0.57* 0.99  --   --   --   --   --   --  1.10 1.23 1.08 1.63  --   --    MONOS ABS 10*3/mm3 1.04* 0.87 1.33*  --   --   --   --   --   --  1.75* 1.01* 1.13* 1.16*  --   --    EOS ABS 10*3/mm3 0.09 0.17 0.02  --   --   --   --   --   --  0.04  0.34 0.04 0.18 0.03  --  0.42*   BASOS ABS 10*3/mm3 0.03 0.02 0.02  --   --   --   --   --   --  0.02 0.02 0.01 0.01  --   --    IMMATURE GRANS (ABS) 10*3/mm3 0.11* 0.04 0.10*  --   --   --   --   --   --  0.14*  --  0.03 0.02  --   --    NRBC /100 WBC 0.0 0.0 0.0  --   --   --   --   --   --  0.0  --  0.0 0.0  --   --    NEUTROPHIL % %  --   --   --   --   --   --  63.6 29.3* 25.8* 76.0  --   --   --   --  61.9   MONOCYTES % %  --   --   --   --   --   --  21.2* 31.3* 44.3* 11.0  --   --   --   --  12.4*   ANISOCYTOSIS   --   --   --   --   --   --   --  Slight/1+  --  Slight/1+  --   --   --   --   --    GIANT PLT   --   --   --   --   --   --   --   --  Large/3+  --   --   --   --   --   --     < > = values in this interval not displayed.       Lab Results - Last 18 Months   Lab Units 07/08/21  0813 06/17/21  1015 06/03/21  0300 05/31/21  0324 05/28/21  0601 05/27/21  0542 05/24/21  0331 05/23/21  0539 05/23/21  0539   GLUCOSE mg/dL 98 131* 104* 148* 106* 84 161*  --  145*   SODIUM mmol/L 137 136 135* 138 141 138 139  --  140   POTASSIUM mmol/L 3.6 3.5 3.7 3.7 3.9 3.2* 2.6*  --  3.0*   CO2 mmol/L 31.0* 33.0* 34.0* 35.0*  38.0* 42.0* >50.0*  --  >50.0*   CHLORIDE mmol/L 100 95* 95* 98 99 93* 86*  --  87*   ANION GAP mmol/L 6.0 8.0 6.0 5.0 4.0* 3.0*  --    < >  --    CREATININE mg/dL 0.66* 0.73* 0.58* 0.45* 0.58* 0.57* 0.67*  --  0.52*   BUN mg/dL 5* 11 16 14 15 13 15  --  14   BUN / CREAT RATIO  7.6 15.1 27.6* 31.1* 25.9* 22.8 22.4  --  26.9*   CALCIUM mg/dL 8.7 9.2 9.0 8.6 8.8 9.0 8.7  --  9.2   EGFR IF NONAFRICN AM mL/min/1.73 119 106 139 >150 139 141 117  --  >150   ALK PHOS U/L 112 101 118* 126*  --  121* 120*  --  121*   TOTAL PROTEIN g/dL 6.0 6.1 5.7* 5.8*  --  5.9* 5.7*  --  5.9*   ALT (SGPT) U/L 10 12 21 20  --  18 16  --  15   AST (SGOT) U/L 17 13 14 21  --  18 20  --  18   BILIRUBIN mg/dL 0.2 0.3 0.2 0.2  --  0.3 0.3  --  0.5   ALBUMIN g/dL 2.70* 2.90* 2.90* 2.60*  --  2.90* 2.80*  --  2.70*   GLOBULIN gm/dL 3.3 3.2  --  3.2  --  3.0 2.9  --  3.2    < > = values in this interval not displayed.       Lab Results - Last 18 Months   Lab Units 11/28/20  0319 11/27/20  1356   CEA ng/mL  --  2.80   LDH U/L 146  --        Lab Results - Last 18 Months   Lab Units 07/08/21  0813 06/03/21  0300 05/22/21  0257 05/10/21  0809 04/27/21  1534 04/12/21  1204 11/28/20  0319   IRON mcg/dL  --   --   --   --  19*  --  53*   TIBC mcg/dL  --   --   --   --  495  --  454   IRON SATURATION %  --   --   --   --  4*  --  12*   FERRITIN ng/mL  --   --   --   --  23.95*  --   --    TSH uIU/mL 0.690 1.520 0.157* 0.686  --  0.720  --               Assessment and Plan    Problem List Items Addressed This Visit        Other    Malignant neoplasm of lung (CMS/HCC)    Current Assessment & Plan     1. We are resuming his immunotherapy         Hip pain    Current Assessment & Plan     1. He had RT  2. His pain is better controlled           Other Visit Diagnoses     Anemia, unspecified type    -  Primary    Relevant Orders    CBC & Differential    Comprehensive Metabolic Panel    Sedimentation Rate    TSH        In summary  1. He has stage IV NSCLC  2. His  chemo was on hold   3. Due to his declining overall condition, we planned to give only immunotherapy while original plan was chemo immunotherapy  4. His pain control has been well managed  5. He has TX today  6. I will see him next week    Follow Up     Patient was given instructions and counseling regarding his condition or for health maintenance advice. Please see specific information pulled into the AVS if appropriate.

## 2021-07-08 NOTE — PROGRESS NOTES
S/w Gabriel Price RN with Dr. Bassett regarding Xgeva.  Hold Xgeva today and will possibly resume after next office visit. NIK Zepeda Rn

## 2021-07-09 NOTE — TELEPHONE ENCOUNTER
Caller: SHAUN    Relationship: WIFE    Best call back number: [phone]    What is the best time to reach you: ANYTIME    Who are you requesting to speak with (clinical staff, provider,  specific staff member): NURSE    What was the call regarding: REQUESTING A STRONGER DOSE OF ZOFRAN TO HELP WITH NAUSEA. SHE SAYS PATIENT COULD HARDLY EAT YESTERDAY EVENING    Do you require a callback: YES

## 2021-07-15 NOTE — PROGRESS NOTES
[facility] GROUP HEMATOLOGY AND ONCOLOGY  [address]  [place] [address] [phone]    Chief Complaint  Follow-up    Patient had his been on radiation treatment today while his systemic TX is still on hold.  He states that he is doing pretty well today.  States that his breathing is good.       While he was in hospital, he continued to have some pain on the lower left side of the abdomen but also moves down to his left hip region.  He has completed RT as a inpatient setting     Now he is seen in the infusion center. He has done well after last infusion and this is a FU    Oncology/Hematology History   Malignant neoplasm of lung (CMS/HCC)    Initial Diagnosis    Malignant neoplasm of lung (CMS/HCC)     11/27/2020 Imaging    CXR:     IMPRESSION:  1.  Patchy consolidation in the LEFT lung base, laterally could  represent pneumonia or atelectasis. Linear band of atelectasis in the LEFT lung base is also noted.   2.  There is a rounded density in the LEFT perihilar region which maysimply represent a prominent pulmonary vessel on end; however, needs  further evaluation with CT chest with contrast to exclude a pulmonary  nodule.     12/7/2020 Imaging    PET  BHP  1. There are 2 FDG avid pulmonary nodules, which could represent primaryor metastatic malignancy.  2. Mild asymmetric uptake in the left posterior nasopharynx/tonsil.  Recommend direct visualization.  3. Focus of FDG uptake along the colon suture line with maximum SUV 6.0.  4. Diffusely heterogeneous FDG uptake in the bones, which could be seen with bony metastatic disease or bone marrow activation. Correlate with patient history. No convincing corollary CT lesions appreciated at this time.   5. Nodular liver contour with heterogeneous FDG uptake suggests chronic liver disease.  6. Gallstones.     3/3/2021 Imaging    CT Chest  1. There has been progressive increase in some of the  pulmonary  nodules/masses. The 2 largest in both of the upper lobes now appear to be somewhat necrotic. A small right upper lobe lesion is cavitary. Neoplastic disease and infection/inflammation are in the differential. Neoplastic disease is favored. One of the right upper lobe subpleural nodules is smaller in size. All of the other areas are stable or larger.  2. No enlarged mediastinal or hilar lymph nodes.  3. Atheromatous disease of the thoracic aorta and coronary arteries. Dilated main pulmonary artery segment.  4. Macronodular appearance of the liver with fatty infiltration.  Possible liver cirrhosis. Correlate clinically.  5. Vague lucency in the superior endplate of T11 is stable. This is  nonspecific. No acute appearing bony abnormality is seen.       3/17/2021 Biopsy    Final Diagnosis   Lung, right upper lobe mass, fine-needle core biopsies and touch preparation: Poorly differentiated non-small cell carcinoma, consistent with squamous cell carcinoma.        4/20/2021 Imaging    MRI Brain With & Without Contrast  1. No acute intracranial finding. Specifically, no evidence of metastatic disease. 2. Loss of the normal swallow tail sign at the cerebral peduncles, which in the appropriate clinical setting could be supportive of Parkinson's disease.       4/23/2021 Imaging    CT Chest:  IMPRESSION:  1.  Findings of disease progression.  2.  Enlarging 4.4 cm RIGHT upper lobe lung mass anteriorly,  biopsy-proven neoplasm.  3.  Enlarging 3.5 cm LEFT upper lobe suprahilar mass, also likely a  neoplasm (second primary versus metastasis). Enlarging satellite  subcentimeter pulmonary nodules.  4.  New 10 mm cavitary pulmonary nodule in the RIGHT lower lobe superior segment, likely metastatic lesion.    CT Abd/Pelvis:  IMPRESSION:  1.  3.4 cm lytic lesion in the LEFT iliac wing, compatible with bone  metastasis.  2.  No other evidence of metastatic disease in the abdomen or pelvis.  3.  Cirrhotic liver. Small enhancing  lesion in the caudate is  indeterminate with differential including hemangioma and hepatocellular carcinoma. Further characterization can be obtained with MRI liver protocol.  4.  Cholelithiasis without evidence of cholecystitis.    CT Neck:  Impression:     1. No soft tissue mass or suspicious adenopathy in the neck.  2. Mild atheromatous plaque along the proximal internal carotid  arteries. This does not appear flow limiting.  3. Breath-hold with closed glottis, which limits evaluation of the vocal folds.    Bone Scan:  IMPRESSION:  1.  LEFT iliac wing metastatic lesion, correlating with abnormality on same-day CT.  2.  No other suspicious bone lesion identified.     5/7/2021 Procedure    Mediport placement     5/10/2021 -  Chemotherapy    OP LUNG Nivolumab / Ipilimumab / PACLitaxel / CARBOplatin AUC=6      5/10/2021 -  Chemotherapy    OP CENTRAL VENOUS ACCESS DEVICE ACCESS, CARE, AND MAINTENANCE ([facility])           PAST MEDICAL HISTORY:  ALLERGIES:  No Known Allergies  CURRENT MEDICATIONS:  Outpatient Encounter Medications as of 7/15/2021   Medication Sig Dispense Refill   • acetaminophen (TYLENOL) 325 MG tablet Take 2 tablets by mouth Every 4 (Four) Hours As Needed for Mild Pain .     • budesonide-formoterol (SYMBICORT) 80-4.5 MCG/ACT inhaler Inhale 2 puffs 2 (Two) Times a Day. 1 inhaler 12   • buPROPion XL (WELLBUTRIN XL) 150 MG 24 hr tablet Take 1 tablet by mouth Daily.     • busPIRone (BUSPAR) 5 MG tablet Take 5 mg by mouth 3 (Three) Times a Day.     • furosemide (LASIX) 20 MG tablet Take 2 tablets by mouth Daily. 60 tablet 3   • guaiFENesin (MUCINEX) 600 MG 12 hr tablet Take 2 tablets by mouth Every 12 (Twelve) Hours.     • ipratropium-albuterol (DUO-NEB) 0.5-2.5 mg/3 ml nebulizer Take 3 mL by nebulization Every 6 (Six) Hours. For COPD J44.9 360 mL 3   • lidocaine-prilocaine (EMLA) 2.5-2.5 % cream Apply to port area 30-60 minutes prior to each chemotherapy treatment and cover with clear dressing 30 g 3   •  losartan (COZAAR) 25 MG tablet Take 1 tablet by mouth Daily.     • metoprolol succinate XL (TOPROL-XL) 25 MG 24 hr tablet Take 1 tablet by mouth Daily.     • nitroglycerin (NITROSTAT) 0.4 MG SL tablet Place 1 tablet under the tongue Every 5 (Five) Minutes As Needed for Chest Pain (Only if SBP Greater Than 100). Take no more than 3 doses in 15 minutes.  12   • ondansetron (ZOFRAN) 8 MG tablet Take 1 tablet by mouth Every 8 (Eight) Hours As Needed for Nausea or Vomiting. 45 tablet 5   • oxybutynin (DITROPAN) 5 MG tablet Take 5 mg by mouth Daily.     • oxyCODONE ER (oxyCONTIN) 15 MG tablet extended-release 12 hour Take 1 tablet by mouth Every 12 (Twelve) Hours. 6 tablet 0   • oxyCODONE-acetaminophen (PERCOCET) 7.5-325 MG per tablet Take 1 tablet by mouth Every 4 (Four) Hours As Needed for Severe Pain . 12 tablet 0   • pantoprazole (PROTONIX) 40 MG EC tablet Take 40 mg by mouth Daily.     • predniSONE (DELTASONE) 10 MG tablet Take 4 tablets daily for 3 days, then take 2 tablets daily for 3 days, then take 1 tablet daily for 3 days, then stop. 21 tablet 0   • prochlorperazine (COMPAZINE) 10 MG tablet Take 1 tablet by mouth Every 6 (Six) Hours As Needed for Nausea or Vomiting. 60 tablet 5   • sertraline (ZOLOFT) 100 MG tablet Take 100 mg by mouth Daily.     • tamsulosin (FLOMAX) 0.4 MG capsule 24 hr capsule Take 0.4 mg by mouth Daily.       No facility-administered encounter medications on file as of 7/15/2021.     ADULT ILLNESSES:  Patient Active Problem List   Diagnosis Code   • Basal cell carcinoma of left ala nasi C44.311   • BMI 34.0-34.9,adult Z68.34   • Open wound of nose S01.20XA   • Open wound of left cheek S01.402A   • Open wound of lip S01.501A   • Urinary retention R33.9   • Chronic obstructive pulmonary disease with acute exacerbation (CMS/HCC) J44.1   • Chronic respiratory failure with hypercapnia (CMS/HCC) J96.12   • Pulmonary nodules/lesions, multiple R91.8   • Presence of ileostomy (CMS/HCC) Z93.2   •  Respiratory acidosis E87.2   • Anemia, chronic disease D63.8   • Acute on chronic respiratory failure with hypoxia and hypercapnia (CMS/HCC) J96.21, J96.22   • History of tobacco use Z87.891   • Malignant neoplasm of lung (CMS/HCC) C34.90   • Neck mass R22.1   • Bone pain M89.8X9   • Alcoholic cirrhosis of liver with ascites (CMS/HCC) K70.31   • Alcoholic intoxication with complication (CMS/HCC) F10.929   • Atelectasis J98.11   • Colon distention K63.89   • Hyperbilirubinemia E80.6   • Hypokalemia E87.6   • Hypomagnesemia E83.42   • Lactic acidosis E87.2   • Leukocytosis D72.829   • LFTs abnormal R94.5   • MRSA bacteremia R78.81, B95.62   • Perforated viscus R19.8   • Septic shock (CMS/HCC) A41.9, R65.21   • Severe sepsis (CMS/HCC) A41.9, R65.20   • Smoker F17.200   • Weight loss R63.4   • Hip pain M25.559   • Chronic bronchitis (CMS/HCC) J42   • Former smoker Z87.891   • Acute on chronic diastolic CHF (congestive heart failure) (CMS/HCC) I50.33   • Hypokalemia E87.6   • Non-small cell lung cancer (NSCLC) (CMS/HCC) C34.90   • Thrombocytopenia (CMS/HCC) D69.6     SURGERIES:  Past Surgical History:   Procedure Laterality Date   • COLON SURGERY      x4   • EYE SURGERY Bilateral     cataracts    • FLAP HEAD/NECK Left 10/23/2018    Procedure: LEFT CHEEK ADVANCEMENT FLAP CLOSURE OF LIP AND CHEEK,5CM X 6CM IPSILATERAL LEFT SEPTAL MUCOSAL HINGE FLAP,2CM X 3CM;  Surgeon: Izaiah Ceron MD;  Location:  [facility] OR;  Service: ENT   • FLAP HEAD/NECK Bilateral 11/13/2018    Procedure: Pedicle division and flap inset of nose;  Surgeon: Izaiah Ceron MD;  Location:  [facility] OR;  Service: ENT   • HEAD/NECK LESION/CYST EXCISION Left 10/23/2018    Procedure: LEFT PARAMEDIAN FOREHEAD FLAP WITH PRESERVATION OF VASCULAR PEDICLE;  Surgeon: Izaiah Ceron MD;  Location:  [facility] OR;  Service: ENT   • SKIN FULL THICKNESS GRAFT Left 10/23/2018    Procedure: Conchal cartilage graft from right ear to left nose with complex closure of skin of  forehead;  Surgeon: Izaiah Ceron MD;  Location:  [facility] OR;  Service: ENT   • VENOUS ACCESS DEVICE (PORT) INSERTION N/A 5/7/2021    Procedure: SINGLE LUMEN PORT PLACEMENT;  Surgeon: Apolonia Kirkland MD;  Location:  [facility] OR;  Service: General;  Laterality: N/A;     HEALTH MAINTENANCE ITEMS:  Health Maintenance Due   Topic Date Due   • COLORECTAL CANCER SCREENING  Never done   • Hepatitis B (1 of 3 - Risk 3-dose series) Never done   • TDAP/TD VACCINES (1 - Tdap) Never done   • ZOSTER VACCINE (1 of 2) Never done   • Pneumococcal Vaccine 65+ (1 of 1 - PPSV23) Never done   • ANNUAL WELLNESS VISIT  09/11/2020       <no information>  Last Completed Colonoscopy     This patient has no relevant Health Maintenance data.        Immunization History   Administered Date(s) Administered   • COVID-19 (PFIZER) 04/01/2021, 04/22/2021     Last Completed Mammogram     This patient has no relevant Health Maintenance data.            FAMILY HISTORY:  Family History   Problem Relation Age of Onset   • Diabetes Mother    • Squamous cell carcinoma Mother    • Lung cancer Mother    • Pancreatic cancer Father 67     SOCIAL HISTORY:  Social History     Socioeconomic History   • Marital status:      Spouse name: Not on file   • Number of children: Not on file   • Years of education: Not on file   • Highest education level: Not on file   Tobacco Use   • Smoking status: Former Smoker     Packs/day: 1.00     Years: 50.00     Pack years: 50.00     Types: Cigarettes     Quit date: 04/2018     Years since quitting: 3.2   • Smokeless tobacco: Former User     Types: Chew   Vaping Use   • Vaping Use: Never used   Substance and Sexual Activity   • Alcohol use: No     Comment: quit April 14 2018/ hx of alchohlism    • Drug use: No   • Sexual activity: Defer       REVIEW OF SYSTEMS:  Review of Systems   Constitutional: Positive for activity change and fatigue.   Eyes: Negative.    Respiratory: Positive for chest tightness and shortness of  "breath.    Cardiovascular: Negative.    Gastrointestinal: Negative.    Endocrine: Negative.    Genitourinary: Negative.    Musculoskeletal: Positive for myalgias.   Allergic/Immunologic: Negative.    Neurological: Positive for weakness.   Hematological: Negative.        /64   Pulse 76   Temp 97.3 °F (36.3 °C) (Temporal)   Resp 18   Ht 172.7 cm (67.99\")   SpO2 90% Comment: 2 liters of oxygen via n/c  BMI 34.43 kg/m²  Body surface area is 2.16 meters squared.  Pain Score    07/15/21 0822   PainSc: 0-No pain       Objective   Vital Signs:   /64   Pulse 76   Temp 97.3 °F (36.3 °C) (Temporal)   Resp 18   Ht 172.7 cm (67.99\")   SpO2 90% Comment: 2 liters of oxygen via n/c  BMI 34.43 kg/m²  Body surface area is 2.16 meters squared.  Pain Score    07/15/21 0822   PainSc: 0-No pain     Brett Ortiz reports a pain score of 0.  Given his pain assessment as noted, treatment options were discussed and the following options were decided upon as a follow-up plan to address the patient's pain: .      Patient's Body mass index is 34.43 kg/m². indicating that he is .      Physical Exam  Constitutional:       Appearance: Normal appearance. He is obese.   HENT:      Head: Atraumatic.      Mouth/Throat:      Mouth: Mucous membranes are dry.   Eyes:      Extraocular Movements: Extraocular movements intact.   Cardiovascular:      Rate and Rhythm: Normal rate.   Pulmonary:      Breath sounds: Rhonchi present.   Abdominal:      Palpations: Abdomen is soft.   Musculoskeletal:         General: Normal range of motion.      Cervical back: Normal range of motion.   Skin:     General: Skin is warm and dry.   Neurological:      General: No focal deficit present.      Mental Status: He is oriented to person, place, and time.   Psychiatric:         Mood and Affect: Mood normal.         Behavior: Behavior normal.            Result Review :  LABS    Lab Results - Last 18 Months   Lab Units 07/15/21  0759 07/08/21  0813 " 06/17/21  1015 06/03/21  0300 05/31/21  0324 05/28/21  0601 05/23/21  0539 05/22/21  0257 05/21/21  0920 05/10/21  0809 05/04/21  1344 04/12/21  1204 10/07/20  1330 10/07/20  1330   HEMOGLOBIN g/dL 9.6* 9.1* 8.3* 8.8* 9.0* 9.4* 8.8* 6.8* 7.7* 8.8* 9.5* 9.1*   < > 10.2*   HEMATOCRIT % 31.5* 29.7* 28.4* 30.1* 31.2* 32.3* 31.1* 24.0* 28.1* 31.3* 34.4* 32.7*   < > 34.2*   MCV fL 84.2 83.0 84.3 83.6 85.0 83.2 84.5 83.3 85.4 86.0 84.7 86.7   < > 92.9   WBC 10*3/mm3 9.01 9.21 7.81 11.52* 8.82 8.75 17.41* 4.56 2.28* 17.09* 15.47* 9.65   < > 8.09   RDW % 19.4* 19.0* 18.5* 17.8* 17.7* 17.3* 16.5* 16.6* 15.9* 15.4 15.2 14.3   < > 13.7   MPV fL 10.0 9.5 10.7 10.1 12.7* 11.1 10.4 11.2 10.1 10.8 11.3 10.8   < > 11.8   PLATELETS 10*3/mm3 166 152 117* 213 117* 115* 171 143 141 98* 109* 151   < > 82*   IMM GRAN % % 0.4 1.2* 0.5 0.9*  --   --   --   --   --  0.8*  --  0.3  --   --    NEUTROS ABS 10*3/mm3 6.32 7.12* 6.14 9.06*  --   --  13.01* 2.30 0.73* 14.04*  12.99* 13.09* 7.22*   < > 5.01   LYMPHS ABS 10*3/mm3 1.01 0.82 0.57* 0.99  --   --   --   --   --  1.10 1.23 1.08  --   --    MONOS ABS 10*3/mm3 1.13* 1.04* 0.87 1.33*  --   --   --   --   --  1.75* 1.01* 1.13*  --   --    EOS ABS 10*3/mm3 0.49* 0.09 0.17 0.02  --   --   --   --   --  0.04  0.34 0.04 0.18  --  0.42*   BASOS ABS 10*3/mm3 0.02 0.03 0.02 0.02  --   --   --   --   --  0.02 0.02 0.01  --   --    IMMATURE GRANS (ABS) 10*3/mm3 0.04 0.11* 0.04 0.10*  --   --   --   --   --  0.14*  --  0.03  --   --    NRBC /100 WBC 0.0 0.0 0.0 0.0  --   --   --   --   --  0.0  --  0.0  --   --    NEUTROPHIL % %  --   --   --   --   --   --  63.6 29.3* 25.8* 76.0  --   --   --  61.9   MONOCYTES % %  --   --   --   --   --   --  21.2* 31.3* 44.3* 11.0  --   --   --  12.4*   ANISOCYTOSIS   --   --   --   --   --   --   --  Slight/1+  --  Slight/1+  --   --   --   --    GIANT PLT   --   --   --   --   --   --   --   --  Large/3+  --   --   --   --   --     < > = values in this interval  not displayed.       Lab Results - Last 18 Months   Lab Units 07/15/21  0759 07/08/21  0813 06/17/21  1015 06/03/21  0300 05/31/21  0324 05/28/21  0601 05/27/21  0542 05/24/21  0331   GLUCOSE mg/dL 139* 98 131* 104* 148* 106* 84 161*   SODIUM mmol/L 135* 137 136 135* 138 141 138 139   POTASSIUM mmol/L 3.1* 3.6 3.5 3.7 3.7 3.9 3.2* 2.6*   CO2 mmol/L 33.0* 31.0* 33.0* 34.0* 35.0* 38.0* 42.0* >50.0*   CHLORIDE mmol/L 94* 100 95* 95* 98 99 93* 86*   ANION GAP mmol/L 8.0 6.0 8.0 6.0 5.0 4.0* 3.0*  --    CREATININE mg/dL 0.69* 0.66* 0.73* 0.58* 0.45* 0.58* 0.57* 0.67*   BUN mg/dL 9 5* 11 16 14 15 13 15   BUN / CREAT RATIO  13.0 7.6 15.1 27.6* 31.1* 25.9* 22.8 22.4   CALCIUM mg/dL 8.6 8.7 9.2 9.0 8.6 8.8 9.0 8.7   EGFR IF NONAFRICN AM mL/min/1.73 113 119 106 139 >150 139 141 117   ALK PHOS U/L 114 112 101 118* 126*  --  121* 120*   TOTAL PROTEIN g/dL 6.3 6.0 6.1 5.7* 5.8*  --  5.9* 5.7*   ALT (SGPT) U/L 9 10 12 21 20  --  18 16   AST (SGOT) U/L 21 17 13 14 21  --  18 20   BILIRUBIN mg/dL 0.3 0.2 0.3 0.2 0.2  --  0.3 0.3   ALBUMIN g/dL 2.90* 2.70* 2.90* 2.90* 2.60*  --  2.90* 2.80*   GLOBULIN gm/dL 3.4 3.3 3.2  --  3.2  --  3.0 2.9       Lab Results - Last 18 Months   Lab Units 11/28/20  0319 11/27/20  1356   CEA ng/mL  --  2.80   LDH U/L 146  --        Lab Results - Last 18 Months   Lab Units 07/15/21  0759 07/08/21  0813 06/03/21  0300 05/22/21  0257 05/10/21  0809 04/27/21  1534 04/12/21  1204 11/28/20  0319   IRON mcg/dL  --   --   --   --   --  19*  --  53*   TIBC mcg/dL  --   --   --   --   --  495  --  454   IRON SATURATION %  --   --   --   --   --  4*  --  12*   FERRITIN ng/mL  --   --   --   --   --  23.95*  --   --    TSH uIU/mL 0.971 0.690 1.520 0.157* 0.686  --  0.720  --               Assessment and Plan    Problem List Items Addressed This Visit        Other    Anemia, chronic disease    Current Assessment & Plan     1. His HGB is 9 to 10 ranges  2. Will run anemia profile in the near future         Malignant  neoplasm of lung (CMS/HCC)    Current Assessment & Plan     1. He has resumed immunotherapy  2. He had some fatigue and Nausea  3. These sx all resolved  4. He is ready for next TX  5. Will cont to monitor TSH and ACTH         Bone pain    Current Assessment & Plan     1. He has completed RT  2. He does not complain of new onset bony pain         Alcoholic cirrhosis of liver with ascites (CMS/HCC)    Current Assessment & Plan     1. Even with immunotherapy we do not see major LFT changes           Other Visit Diagnoses     Anemia, unspecified type    -  Primary    Relevant Orders    CBC & Differential    Comprehensive Metabolic Panel    Sedimentation Rate        In summary  1. He has completed RT  2. He is now on chemo immuno  3. So far he has done well  4. I will see him after next echemo    Follow Up     Patient was given instructions and counseling regarding his condition or for health maintenance advice. Please see specific information pulled into the AVS if appropriate.

## 2021-07-16 NOTE — ASSESSMENT & PLAN NOTE
1. He has resumed immunotherapy  2. He had some fatigue and Nausea  3. These sx all resolved  4. He is ready for next TX  5. Will cont to monitor TSH and ACTH

## 2021-07-26 NOTE — TELEPHONE ENCOUNTER
Caller: Yonathan Ortiz    Relationship: Emergency Contact    Best call back number: [phone]    What form or medical record are you requesting: LETTER STATING THAT BREONNA WILL BE IN REHAB CENTER FOR LESS THAN 90 DAYS.    Who is requesting this form or medical record from you: SOCIAL SECURITY OFFICE    How would you like to receive the form or medical records (pick-up, mail, fax): MAIL  If mail, what is the address: [address] [place] [address] [address], [place]    Timeframe paperwork needed: ASAP    Additional notes: SHE NEEDS THIS FOR BREONNA'S SOCIAL SECURITY. THEY TOLD HER THAT IF HE IS IN THE REHAB PLACE FOR MORE THAN 90 DAYS, THEY WILL CUT HIS SOCIAL SECURITY.

## 2021-07-27 NOTE — TELEPHONE ENCOUNTER
Tried to contact wife back. No answer,no v/m. He has been transferred from Dr.Moon Dr. RHODES and he sees him this Thursday the 29th. Dr. RHODES can address it at this appt.

## 2021-07-28 DIAGNOSIS — G89.29 CHRONIC BILATERAL LOW BACK PAIN WITH SCIATICA, SCIATICA LATERALITY UNSPECIFIED: Primary | ICD-10-CM

## 2021-07-28 DIAGNOSIS — M54.40 CHRONIC BILATERAL LOW BACK PAIN WITH SCIATICA, SCIATICA LATERALITY UNSPECIFIED: Primary | ICD-10-CM

## 2021-07-28 RX ORDER — OXYCODONE AND ACETAMINOPHEN 7.5; 325 MG/1; MG/1
TABLET ORAL
Qty: 30 TABLET | Refills: 0 | Status: SHIPPED | OUTPATIENT
Start: 2021-07-28 | End: 2021-08-02

## 2021-07-28 NOTE — PROGRESS NOTES
[facility] HEMATOLOGY AND ONCOLOGY  [address]  [facility] [address] [phone]    Patient Name: Brett Ortiz  Encounter Date: 07/29/2021  YOB: 1950  Patient Number: [id number]      REASON FOR VISIT: Brett Ortiz is a 70-year-old female who was previously managed by Dr. 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  He received C2D1 palliative carboplatin, ipilimumab, nivolumab and paclitaxel on 07/08/2021.  He is seen for assumption of care is Dr. Friedman departure from the practice.  He is here with his spouse, Emmie    I have reviewed the HPI and verified with the patient the accuracy of it. No changes to interval history since the information was documented. Ham Garcia MD 07/29/21     Diagnostic abnormalities:  --11/27/2020-CT angiogram chest (presented to [facility] with shortness of breath).  No evidence of PE.  Atherosclerosis of the aorta and coronary arteries.  Pulmonary arterial hypertension.  Multiple noncalcified pulmonary nodules bilaterally concerning for metastatic disease.  Cirrhotic liver morphology.  --11/28/2020-CT abdomen/pelvis without contrast: Macronodular appearance of the liver.  Mild splenomegaly.  Cholelithiasis.  1.2 cm right renal lesion that is not fully evaluated.  Could be a small cyst.  Egan's pouch in the pelvis extending from proximal to mid sigmoid colon to the rectum.  Right lower quadrant ileostomy.  Most of the colon has been resected.  --11/30/2020-discharge from [facility] with follow-up appointments.  --12/07/2020-PET scan-2 FDG avid pulmonary nodules (right upper lobe 1.7 cm, SUV 19.2; left upper lobe 1.7 cm, SUV 6.8) which could represent primary or metastatic malignancy.  Mild asymmetric uptake in the left posterior nasopharynx/tonsil.  Recommend direct visualization.  FDG uptake along the colon suture line SUV 6.  Diffuse heterogenous FDG  uptake in the bones which could be seen with bony metastatic disease or bone marrow activation.  No convincing corollary CT lesions appreciated.  Nodular liver contour with heterogenous FDG uptake suggesting chronic liver disease.  Gallstones.  --01/05/2021-appointment with Dr. Riddle, cardiothoracic surgery.  CT-guided needle biopsy ordered.  --03/03/2021-CT chest with contrast.  Progressive increase in some of the pulmonary nodules/masses.  To largest in both the upper lobes now appear to be somewhat necrotic.  Small right upper lobe lesion is cavitary.  Neoplastic disease and infection/inflammation are in the differential.  Neoplastic disease favored.  No enlarged mediastinal/hilar nodes.  Macronodular liver with fatty infiltration.  Possible liver cirrhosis.  Vague lucency in the superior endplate of T11 stable.  No acute appearing bony abnormality seen.  --03/17/2021-lung, right upper lobe mass, fine-needle core biopsies and touch preparation: Poorly differentiated non-small cell carcinoma, consistent with squamous cell carcinoma.  --04/12/2021-consult with Dr. Bassett: Stage IV NSCLC.  Restaging.  Needs systemic therapy.  PS poor.  --04/20/2021-MRI brain with and without contrast: No acute intracranial findings.  Specifically no evidence of metastatic disease.  --04/23/2021-CT soft tissue neck with contrast.  No soft tissue mass or suspicious adenopathy in the neck.  --04/23/2021-CT chest with contrast: Enlarging right upper lobe anterior biopsy-proven neoplasm now measuring 2.9 x 4.4 cm (previously 2.4 x 3.8 cm).  Increasing abutment of the right anterior chest wall and right anterior third rib.  Slight increase in size of 3.5 x 2.7 cm left upper lobe mass (previously 3.1 x 2.5 cm) extending into the right suprahilar region with vascular and mediastinal abutment.  Multiple enlarging satellite nodules including 8 mm left upper lobe nodule (previously 5 mm).  Note 10 mm cavitary pulmonary nodule in the right lower  lobe superior segment.  Ectatic thoracic aorta.  --04/23/2021-bone scan: Left iliac wing metastatic lesion correlating with abnormality on same-day CT.  No other suspicious bone lesion identified.  --04/23/2021-CT abdomen/pelvis with contrast.  3.4 cm lytic lesion left iliac wing compatible with bone metastasis.  No other evidence of metastatic disease in the abdomen or pelvis.  Cirrhotic liver.  --05/12/2021-consult Dr. Ward-assessment/plan: Pain from bone metastases.  Malignant neoplasm of overlapping sites of lung.  COPD with acute exacerbation.  Recommend 3000 cGy over 10 treatment fractions to the right iliac wing.    Previous interventions:  --05/07/2021-port placement per Dr. Kirkland  --Palliative nivolumab/ipilimumab/paclitaxel/carboplatin- C1, 05/10/2021; C2, 07/08/2021  --Palliative radiation to the right iliac wing-05/17/2021-06/07/2021 (4/10 fx)      Problem List Items Addressed This Visit     None        Oncology/Hematology History   Malignant neoplasm of lung (CMS/HCC)    Initial Diagnosis    Malignant neoplasm of lung (CMS/HCC)     11/27/2020 Imaging    CXR:     IMPRESSION:  1.  Patchy consolidation in the LEFT lung base, laterally could  represent pneumonia or atelectasis. Linear band of atelectasis in the LEFT lung base is also noted.   2.  There is a rounded density in the LEFT perihilar region which maysimply represent a prominent pulmonary vessel on end; however, needs  further evaluation with CT chest with contrast to exclude a pulmonary  nodule.     12/7/2020 Imaging    PET  BHP  1. There are 2 FDG avid pulmonary nodules, which could represent primaryor metastatic malignancy.  2. Mild asymmetric uptake in the left posterior nasopharynx/tonsil.  Recommend direct visualization.  3. Focus of FDG uptake along the colon suture line with maximum SUV 6.0.  4. Diffusely heterogeneous FDG uptake in the bones, which could be seen with bony metastatic disease or bone marrow activation. Correlate with  patient history. No convincing corollary CT lesions appreciated at this time.   5. Nodular liver contour with heterogeneous FDG uptake suggests chronic liver disease.  6. Gallstones.     3/3/2021 Imaging    CT Chest  1. There has been progressive increase in some of the pulmonary  nodules/masses. The 2 largest in both of the upper lobes now appear to be somewhat necrotic. A small right upper lobe lesion is cavitary. Neoplastic disease and infection/inflammation are in the differential. Neoplastic disease is favored. One of the right upper lobe subpleural nodules is smaller in size. All of the other areas are stable or larger.  2. No enlarged mediastinal or hilar lymph nodes.  3. Atheromatous disease of the thoracic aorta and coronary arteries. Dilated main pulmonary artery segment.  4. Macronodular appearance of the liver with fatty infiltration.  Possible liver cirrhosis. Correlate clinically.  5. Vague lucency in the superior endplate of T11 is stable. This is  nonspecific. No acute appearing bony abnormality is seen.       3/17/2021 Biopsy    Final Diagnosis   Lung, right upper lobe mass, fine-needle core biopsies and touch preparation: Poorly differentiated non-small cell carcinoma, consistent with squamous cell carcinoma.        4/20/2021 Imaging    MRI Brain With & Without Contrast  1. No acute intracranial finding. Specifically, no evidence of metastatic disease. 2. Loss of the normal swallow tail sign at the cerebral peduncles, which in the appropriate clinical setting could be supportive of Parkinson's disease.       4/23/2021 Imaging    CT Chest:  IMPRESSION:  1.  Findings of disease progression.  2.  Enlarging 4.4 cm RIGHT upper lobe lung mass anteriorly,  biopsy-proven neoplasm.  3.  Enlarging 3.5 cm LEFT upper lobe suprahilar mass, also likely a  neoplasm (second primary versus metastasis). Enlarging satellite  subcentimeter pulmonary nodules.  4.  New 10 mm cavitary pulmonary nodule in the RIGHT lower  lobe superior segment, likely metastatic lesion.    CT Abd/Pelvis:  IMPRESSION:  1.  3.4 cm lytic lesion in the LEFT iliac wing, compatible with bone  metastasis.  2.  No other evidence of metastatic disease in the abdomen or pelvis.  3.  Cirrhotic liver. Small enhancing lesion in the caudate is  indeterminate with differential including hemangioma and hepatocellular carcinoma. Further characterization can be obtained with MRI liver protocol.  4.  Cholelithiasis without evidence of cholecystitis.    CT Neck:  Impression:     1. No soft tissue mass or suspicious adenopathy in the neck.  2. Mild atheromatous plaque along the proximal internal carotid  arteries. This does not appear flow limiting.  3. Breath-hold with closed glottis, which limits evaluation of the vocal folds.    Bone Scan:  IMPRESSION:  1.  LEFT iliac wing metastatic lesion, correlating with abnormality on same-day CT.  2.  No other suspicious bone lesion identified.     5/7/2021 Procedure    Mediport placement     5/10/2021 -  Chemotherapy    OP LUNG Nivolumab / Ipilimumab / PACLitaxel / CARBOplatin AUC=6      5/10/2021 -  Chemotherapy    OP CENTRAL VENOUS ACCESS DEVICE ACCESS, CARE, AND MAINTENANCE ([facility])         PAST MEDICAL HISTORY:  ALLERGIES:  No Known Allergies  CURRENT MEDICATIONS:  Outpatient Encounter Medications as of 7/29/2021   Medication Sig Dispense Refill   • acetaminophen (TYLENOL) 325 MG tablet Take 2 tablets by mouth Every 4 (Four) Hours As Needed for Mild Pain .     • budesonide-formoterol (SYMBICORT) 80-4.5 MCG/ACT inhaler Inhale 2 puffs 2 (Two) Times a Day. 1 inhaler 12   • buPROPion XL (WELLBUTRIN XL) 150 MG 24 hr tablet Take 1 tablet by mouth Daily.     • busPIRone (BUSPAR) 5 MG tablet Take 5 mg by mouth 3 (Three) Times a Day.     • furosemide (LASIX) 20 MG tablet Take 2 tablets by mouth Daily. 60 tablet 3   • guaiFENesin (MUCINEX) 600 MG 12 hr tablet Take 2 tablets by mouth Every 12 (Twelve) Hours.     • ipratropium-albuterol  (DUO-NEB) 0.5-2.5 mg/3 ml nebulizer Take 3 mL by nebulization Every 6 (Six) Hours. For COPD J44.9 360 mL 3   • lidocaine-prilocaine (EMLA) 2.5-2.5 % cream Apply to port area 30-60 minutes prior to each chemotherapy treatment and cover with clear dressing 30 g 3   • losartan (COZAAR) 25 MG tablet Take 1 tablet by mouth Daily.     • metoprolol succinate XL (TOPROL-XL) 25 MG 24 hr tablet Take 1 tablet by mouth Daily.     • nitroglycerin (NITROSTAT) 0.4 MG SL tablet Place 1 tablet under the tongue Every 5 (Five) Minutes As Needed for Chest Pain (Only if SBP Greater Than 100). Take no more than 3 doses in 15 minutes.  12   • ondansetron (ZOFRAN) 8 MG tablet Take 1 tablet by mouth Every 8 (Eight) Hours As Needed for Nausea or Vomiting. 45 tablet 5   • oxybutynin (DITROPAN) 5 MG tablet Take 5 mg by mouth Daily.     • oxyCODONE ER (oxyCONTIN) 15 MG tablet extended-release 12 hour Take 1 tablet by mouth Every 12 (Twelve) Hours. 6 tablet 0   • oxyCODONE-acetaminophen (PERCOCET) 7.5-325 MG per tablet Take 1 tablet by mouth Every 4 (Four) Hours As Needed for Severe Pain . 12 tablet 0   • pantoprazole (PROTONIX) 40 MG EC tablet Take 40 mg by mouth Daily.     • predniSONE (DELTASONE) 10 MG tablet Take 4 tablets daily for 3 days, then take 2 tablets daily for 3 days, then take 1 tablet daily for 3 days, then stop. 21 tablet 0   • prochlorperazine (COMPAZINE) 10 MG tablet Take 1 tablet by mouth Every 6 (Six) Hours As Needed for Nausea or Vomiting. 60 tablet 5   • sertraline (ZOLOFT) 100 MG tablet Take 100 mg by mouth Daily.     • tamsulosin (FLOMAX) 0.4 MG capsule 24 hr capsule Take 0.4 mg by mouth Daily.       No facility-administered encounter medications on file as of 7/29/2021.     ADULT ILLNESSES:  Patient Active Problem List   Diagnosis Code   • Basal cell carcinoma of left ala nasi C44.311   • BMI 34.0-34.9,adult Z68.34   • Open wound of nose S01.20XA   • Open wound of left cheek S01.402A   • Open wound of lip S01.501A   •  Urinary retention R33.9   • Chronic obstructive pulmonary disease with acute exacerbation (CMS/HCC) J44.1   • Chronic respiratory failure with hypercapnia (CMS/HCC) J96.12   • Pulmonary nodules/lesions, multiple R91.8   • Presence of ileostomy (CMS/HCC) Z93.2   • Respiratory acidosis E87.2   • Anemia, chronic disease D63.8   • Acute on chronic respiratory failure with hypoxia and hypercapnia (CMS/HCC) J96.21, J96.22   • History of tobacco use Z87.891   • Malignant neoplasm of lung (CMS/HCC) C34.90   • Neck mass R22.1   • Bone pain M89.8X9   • Alcoholic cirrhosis of liver with ascites (CMS/HCC) K70.31   • Alcoholic intoxication with complication (CMS/HCC) F10.929   • Atelectasis J98.11   • Colon distention K63.89   • Hyperbilirubinemia E80.6   • Hypokalemia E87.6   • Hypomagnesemia E83.42   • Lactic acidosis E87.2   • Leukocytosis D72.829   • LFTs abnormal R94.5   • MRSA bacteremia R78.81, B95.62   • Perforated viscus R19.8   • Septic shock (CMS/HCC) A41.9, R65.21   • Severe sepsis (CMS/HCC) A41.9, R65.20   • Smoker F17.200   • Weight loss R63.4   • Hip pain M25.559   • Chronic bronchitis (CMS/HCC) J42   • Former smoker Z87.891   • Acute on chronic diastolic CHF (congestive heart failure) (CMS/HCC) I50.33   • Hypokalemia E87.6   • Non-small cell lung cancer (NSCLC) (CMS/HCC) C34.90   • Thrombocytopenia (CMS/HCC) D69.6     SURGERIES:  Past Surgical History:   Procedure Laterality Date   • COLON SURGERY      x4   • EYE SURGERY Bilateral     cataracts    • FLAP HEAD/NECK Left 10/23/2018    Procedure: LEFT CHEEK ADVANCEMENT FLAP CLOSURE OF LIP AND CHEEK,5CM X 6CM IPSILATERAL LEFT SEPTAL MUCOSAL HINGE FLAP,2CM X 3CM;  Surgeon: Izaiah Ceron MD;  Location: [facility] OR;  Service: ENT   • FLAP HEAD/NECK Bilateral 11/13/2018    Procedure: Pedicle division and flap inset of nose;  Surgeon: Izaiah Ceron MD;  Location: [facility];  Service: ENT   • HEAD/NECK LESION/CYST EXCISION Left 10/23/2018    Procedure: LEFT PARAMEDIAN  FOREHEAD FLAP WITH PRESERVATION OF VASCULAR PEDICLE;  Surgeon: Izaiah Ceron MD;  Location:  [facility] OR;  Service: ENT   • SKIN FULL THICKNESS GRAFT Left 10/23/2018    Procedure: Conchal cartilage graft from right ear to left nose with complex closure of skin of forehead;  Surgeon: Izaiah Ceron MD;  Location:  [facility] OR;  Service: ENT   • VENOUS ACCESS DEVICE (PORT) INSERTION N/A 5/7/2021    Procedure: SINGLE LUMEN PORT PLACEMENT;  Surgeon: Apolonia Kirkland MD;  Location:  [facility] OR;  Service: General;  Laterality: N/A;     HEALTH MAINTENANCE ITEMS:  Health Maintenance Due   Topic Date Due   • COLORECTAL CANCER SCREENING  Never done   • Pneumococcal Vaccine 65+ (1 of 2 - PPSV23) Never done   • Hepatitis B (1 of 3 - Risk 3-dose series) Never done   • TDAP/TD VACCINES (1 - Tdap) Never done   • ZOSTER VACCINE (1 of 2) Never done   • ANNUAL WELLNESS VISIT  09/11/2020       <no information>  Last Completed Colonoscopy     This patient has no relevant Health Maintenance data.        Immunization History   Administered Date(s) Administered   • COVID-19 (PFIZER) 04/01/2021, 04/22/2021     Last Completed Mammogram     This patient has no relevant Health Maintenance data.            FAMILY HISTORY:  Family History   Problem Relation Age of Onset   • Diabetes Mother    • Squamous cell carcinoma Mother    • Lung cancer Mother    • Pancreatic cancer Father 67     SOCIAL HISTORY:  Social History     Socioeconomic History   • Marital status:      Spouse name: Not on file   • Number of children: Not on file   • Years of education: Not on file   • Highest education level: Not on file   Tobacco Use   • Smoking status: Former Smoker     Packs/day: 1.00     Years: 50.00     Pack years: 50.00     Types: Cigarettes     Quit date: 04/2018     Years since quitting: 3.3   • Smokeless tobacco: Former User     Types: Chew   Vaping Use   • Vaping Use: Never used   Substance and Sexual Activity   • Alcohol use: No     Comment: quit  "April 14 2018/ hx of alchohlism    • Drug use: No   • Sexual activity: Defer         REVIEW OF SYSTEMS:  Review of Systems   Chemo tolerance: Fatigue, no mouth sores, (+) nausea, has needed Zofran \"at least a pill a day.\"  No vomiting, no skin changes, no neuropathy, no diarrhea.  Constitutional: Appetite and energy have been \"very low.\"  He needs help with his personal ADLs.  He currently lives at [facility] for rehab.  He is unable to stand, much less walk due to left > right leg weakness.  No fevers, chills, sweats.  Has lost an unspecified weight in the last 3 months.  Is lying in bed or in a recliner \"100% of the time.\"   HENT: No sinus congestion. No nosebleeds.  Occasional morning headaches  Eyes: No new vision changes    Respiratory: Baseline exertional dyspnea and easily SOB with any activity, \"just sitting up makes me lose my breath.\"  Chronic cough with clear phlegm.  Has no chest wall pain.    Cardiovascular:  No non-exertional chest pain nor pressure.  No palpitations.  (+) orthopnea  Gastrointestinal:  No dysphagia.  No nausea.  No vomiting.  (+) intermittent belly pains.  Ileostomy functioning well.  No constipation.  No diarrhea.  No melena  Endocrine: No hot flashes  Genitourinary: No recent UTI.  No dysuria. No bladder difficulties  Musculoskeletal:  Denies back/sacral pains since radiation.  No other arthralgias.  No ankle swelling  Skin:   Allergic/Immunologic: Negative.    Neurological:  Generalized weakness.  (+) dizziness.  No syncope.  Generalized weakness.  (+) paresthesias fingers and feet, left > right   Hematological: No adenopathy.  Bruises easily  Psychiatric/Behavioral: Negative.  No depression.  (+) anxiety.          VITAL SIGNS: /50   Pulse 88   Temp 98 °F (36.7 °C)   Resp 12   Ht 68 cm (26.77\")   Wt 103 kg (226 lb)   SpO2 95%   .76 kg/m² Body surface area is 1.1 meters squared.  Pain Score    07/29/21 0838   PainSc: 0-No pain         PHYSICAL EXAMINATION: "   General:  Pleasant, alert and oriented, cooperative, chronically-ill appearing, obese, modestly kept elderly male who is brought in on a stretcher (patient is seen in the infusion center).  In no acute distress.  ECOG 4 (bedfast)  Head:  Normocephalic  Nose/Sinuses: Wearing a surgical mask today.  He is wearing O2 entrained via cannula.  Mouth/Throat:  Wearing a surgical mask today  Neck:  supple, No evidence of adenopathy in the cervical or supraclavicular areas.  Eyes: No gross abnormalities   Chest:  Respiratory efforts are unlabored, chest with distant breath sounds.  Cardiovascular: Distant heart tones but regular rate and rhythm.  Abdomen:  Soft, obese, with habitus precluding an adequate exam.  No obvious hepatomegaly.  Vague splenomegaly.  The ostomy site is pink.  The ostomy bag is one third filled with soft greenish stool.  Extremities:  warm to touch, no evidence of cyanosis with 2+ bipedal edema.  Skin: No suspicious lesions or rashes  Neurologic:  Alert and oriented.  Is very weak.  There is gross lower extremity weakness, worse on the left..  Psych: Mood and affect are appropriate     Performance Status: ECOG 4       LABS    Lab Results - Last 18 Months   Lab Units 07/15/21  0759 07/08/21  0813 06/17/21  1015 06/03/21  0300 05/31/21  0324 05/28/21  0601 05/26/21  0615 05/23/21  0539 05/22/21  0257 05/22/21  0257 05/21/21  0920 05/21/21  0920 05/10/21  0809 05/10/21  0809 05/04/21  1344 04/12/21  1204 04/12/21  1204 11/27/20  0842 10/07/20  1330 10/07/20  1330   HEMOGLOBIN g/dL 9.6* 9.1* 8.3* 8.8* 9.0* 9.4*   < > 8.8*   < > 6.8*   < > 7.7*   < > 8.8* 9.5*   < > 9.1*   < >   < > 10.2*   HEMATOCRIT % 31.5* 29.7* 28.4* 30.1* 31.2* 32.3*   < > 31.1*   < > 24.0*   < > 28.1*   < > 31.3* 34.4*   < > 32.7*   < >   < > 34.2*   MCV fL 84.2 83.0 84.3 83.6 85.0 83.2   < > 84.5   < > 83.3   < > 85.4   < > 86.0 84.7   < > 86.7   < >   < > 92.9   WBC 10*3/mm3 9.01 9.21 7.81 11.52* 8.82 8.75   < > 17.41*   < > 4.56    < > 2.28*   < > 17.09* 15.47*   < > 9.65   < >   < > 8.09   RDW % 19.4* 19.0* 18.5* 17.8* 17.7* 17.3*   < > 16.5*   < > 16.6*   < > 15.9*   < > 15.4 15.2   < > 14.3   < >   < > 13.7   MPV fL 10.0 9.5 10.7 10.1 12.7* 11.1   < > 10.4   < > 11.2   < > 10.1   < > 10.8 11.3   < > 10.8   < >   < > 11.8   PLATELETS 10*3/mm3 166 152 117* 213 117* 115*   < > 171   < > 143   < > 141   < > 98* 109*   < > 151   < >   < > 82*   IMM GRAN % % 0.4 1.2* 0.5 0.9*  --   --   --   --   --   --   --   --   --  0.8*  --   --  0.3   < >  --   --    NEUTROS ABS 10*3/mm3 6.32 7.12* 6.14 9.06*  --   --   --  13.01*  --  2.30   < > 0.73*   < > 14.04*  12.99* 13.09*   < > 7.22*   < >   < > 5.01   LYMPHS ABS 10*3/mm3 1.01 0.82 0.57* 0.99  --   --   --   --   --   --   --   --   --  1.10 1.23   < > 1.08   < >  --   --    MONOS ABS 10*3/mm3 1.13* 1.04* 0.87 1.33*  --   --   --   --   --   --   --   --   --  1.75* 1.01*   < > 1.13*   < >  --   --    EOS ABS 10*3/mm3 0.49* 0.09 0.17 0.02  --   --   --   --   --   --   --   --   --  0.04  0.34 0.04   < > 0.18   < >   < > 0.42*   BASOS ABS 10*3/mm3 0.02 0.03 0.02 0.02  --   --   --   --   --   --   --   --   --  0.02 0.02   < > 0.01   < >  --   --    IMMATURE GRANS (ABS) 10*3/mm3 0.04 0.11* 0.04 0.10*  --   --   --   --   --   --   --   --   --  0.14*  --   --  0.03   < >  --   --    NRBC /100 WBC 0.0 0.0 0.0 0.0  --   --   --   --   --   --   --   --   --  0.0  --   --  0.0   < >  --   --    NEUTROPHIL % %  --   --   --   --   --   --   --  63.6  --  29.3*  --  25.8*  --  76.0  --   --   --   --   --  61.9   MONOCYTES % %  --   --   --   --   --   --   --  21.2*  --  31.3*  --  44.3*  --  11.0  --   --   --   --   --  12.4*   ANISOCYTOSIS   --   --   --   --   --   --   --   --   --  Slight/1+  --   --   --  Slight/1+  --   --   --   --   --   --    GIANT PLT   --   --   --   --   --   --   --   --   --   --   --  Large/3+  --   --   --   --   --   --   --   --     < > = values in this interval  not displayed.       Lab Results - Last 18 Months   Lab Units 07/15/21  0759 07/08/21  0813 06/17/21  1015 06/03/21  0300 05/31/21  0324 05/28/21  0601 05/27/21  0542 05/27/21  0542 05/24/21  1533 05/24/21  0331   GLUCOSE mg/dL 139* 98 131* 104* 148* 106*   < > 84   < > 161*   SODIUM mmol/L 135* 137 136 135* 138 141   < > 138   < > 139   POTASSIUM mmol/L 3.1* 3.6 3.5 3.7 3.7 3.9   < > 3.2*   < > 2.6*   CO2 mmol/L 33.0* 31.0* 33.0* 34.0* 35.0* 38.0*   < > 42.0*   < > >50.0*   CHLORIDE mmol/L 94* 100 95* 95* 98 99   < > 93*   < > 86*   ANION GAP mmol/L 8.0 6.0 8.0 6.0 5.0 4.0*   < > 3.0*   < >  --    CREATININE mg/dL 0.69* 0.66* 0.73* 0.58* 0.45* 0.58*   < > 0.57*   < > 0.67*   BUN mg/dL 9 5* 11 16 14 15   < > 13   < > 15   BUN / CREAT RATIO  13.0 7.6 15.1 27.6* 31.1* 25.9*   < > 22.8   < > 22.4   CALCIUM mg/dL 8.6 8.7 9.2 9.0 8.6 8.8   < > 9.0   < > 8.7   EGFR IF NONAFRICN AM mL/min/1.73 113 119 106 139 >150 139   < > 141   < > 117   ALK PHOS U/L 114 112 101 118* 126*  --   --  121*  --  120*   TOTAL PROTEIN g/dL 6.3 6.0 6.1 5.7* 5.8*  --   --  5.9*  --  5.7*   ALT (SGPT) U/L 9 10 12 21 20  --   --  18  --  16   AST (SGOT) U/L 21 17 13 14 21  --   --  18  --  20   BILIRUBIN mg/dL 0.3 0.2 0.3 0.2 0.2  --   --  0.3  --  0.3   ALBUMIN g/dL 2.90* 2.70* 2.90* 2.90* 2.60*  --   --  2.90*  --  2.80*   GLOBULIN gm/dL 3.4 3.3 3.2  --  3.2  --   --  3.0  --  2.9    < > = values in this interval not displayed.       Lab Results - Last 18 Months   Lab Units 11/28/20  0319 11/27/20  1356   CEA ng/mL  --  2.80   LDH U/L 146  --        Lab Results - Last 18 Months   Lab Units 07/15/21  0759 07/08/21  0813 06/03/21  0300 05/22/21  0257 05/10/21  0809 04/27/21  1534 04/12/21  1204 11/28/20  0319   IRON mcg/dL  --   --   --   --   --  19*  --  53*   TIBC mcg/dL  --   --   --   --   --  495  --  454   IRON SATURATION %  --   --   --   --   --  4*  --  12*   FERRITIN ng/mL  --   --   --   --   --  23.95*  --   --    TSH uIU/mL 0.971  0.690 1.520 0.157* 0.686  --  0.720  --            ASSESSMENT:   1.   Poorly differentiated squamous cell lung carcinoma          Original tumor stage: IV (cTx, cNx, M1)          Original tumor burden: 1.7 cm right upper lobe pulmonary nodule (SUV 19.2), 1.7 cm left upper lobe pulmonary nodule (SUV 6.8), right iliac wing bone metastasis.          Complications of tumor: Shortness of breath.  Painful metastasis to right iliac wing.          Tumor status:  --04/23/2021-CT chest with contrast: Disease progression.  Enlarging 4.4 cm right upper lobe lung mass anteriorly, biopsy-proven neoplasm.  Enlarging 3.5 cm left upper lobe suprahilar mass, also likely neoplasm (second primary versus metastasis).  Enlarging satellite subcentimeter pulmonary nodules.  New 10 mm cavitary pulmonary nodule in the right lower lobe superior segment likely metastatic lesion.  --05/21/2021-CT angiogram chest.  No pulmonary embolism.  Progression of neoplastic disease compared to 4/23/2021 (anterior pleural-based right upper lobe mass for 53 x 33 mm; from 43 x 30 mm.  Left hilar soft tissue nodule 37 x 28 mm; from 33 x 22 mm.  New finding of multiple 6 to 8 mm nodules within the base of the right upper lobe, right middle lobe and right lower lobe).  --Palliative carboplatin, ipilimumab, nivolumab and paclitaxel:  C1D1, 05/10/2021; C2D1, 07/08/2021.    2.   History of CHF  3.   COPD  4.   Recent prior hospitalizations for acute on chronic respiratory failure (most recently, 05/21/2021-06/02/2021 at [facility])  5.   Anemia of chronic disease  6.   Alcoholic cirrhosis of liver with ascites  7.   Poor PS (ECOG 4)  8.   Poor prognosis    Plan:  1.   Recapitulate patient's diagnostic history regarding the lung cancer (above).  Needs molecular marker assessment of the original biopsy from 03/17/2021.  2.   Send the original biopsy from 03/17/2021 for PD-L1, ALK, ROS1, EGFR, BRAF.  3.   Long discussion regarding the status of his disease, including most  recent CT of the chest on 05/21/2021 indicating progression of neoplastic disease (above) in spite of chemo/immunotherapy.  Given his poor performance status (functionally bedfast), and multiple interacting comorbidities on the background of incurable malignancy, I recommended transitioning to a comfort care approach/best supportive care directed by hospice.  After questions were answered to their apparent satisfaction, they agreed to the latter.  4.   Refer to hospice  5.   Stop chemo/ immunotherapy.  6.   Continue management per primary care and other specialists  7.   Return to office in 12 weeks or as needed only    I spent 109 total minutes, face-to-face, caring for Brett today.  Greater than 50% of this time involved counseling and/or coordination of care as documented within this note regarding the patient's illness(es), pros and cons of various treatment options, instructions and/or risk reduction.

## 2021-07-31 DIAGNOSIS — G89.29 CHRONIC BILATERAL LOW BACK PAIN WITH SCIATICA, SCIATICA LATERALITY UNSPECIFIED: ICD-10-CM

## 2021-07-31 DIAGNOSIS — M54.40 CHRONIC BILATERAL LOW BACK PAIN WITH SCIATICA, SCIATICA LATERALITY UNSPECIFIED: ICD-10-CM

## 2021-08-02 RX ORDER — OXYCODONE AND ACETAMINOPHEN 7.5; 325 MG/1; MG/1
TABLET ORAL
Qty: 120 TABLET | Refills: 0 | Status: SHIPPED | OUTPATIENT
Start: 2021-08-02 | End: 2021-09-01

## 2021-08-02 RX ORDER — OXYCODONE HYDROCHLORIDE 15 MG/1
TABLET, FILM COATED, EXTENDED RELEASE ORAL
Qty: 60 TABLET | Refills: 0 | Status: SHIPPED | OUTPATIENT
Start: 2021-08-02 | End: 2021-09-01

## 2021-08-16 ENCOUNTER — TELEPHONE (OUTPATIENT)
Dept: HEMATOLOGY | Age: 71
End: 2021-08-16

## 2024-03-21 NOTE — H&P (VIEW-ONLY)
Chief Complaint   Patient presents with   • Lung Nodule     Patient is here for a follow up w/ ct.          Subjective     History of Present Illness    Mr. Ortiz is a 70-year-old male with a history of COPD with hypercapnia, cirrhosis secondary to alcohol intake, history of previous cardiac arrest, history of perforated colon with ileostomy in place, previous tobacco smoker quit in 2018 but continues with oral chew, and is wheelchair-bound presents for the evaluation of multiple lung nodules identified during his last admission for shortness of breath and chest pain.  He has been scheduled for a CT guided needle biopsy but has canceled that procedure.  Efforts were made to schedule durably but not successful.  Ultimately I did asked the patient via telephone to present to my office to discuss options given my overriding concern that he has advanced metastatic malignancy or untreated disseminating infection.    He returns today with a new CT scan of the chest.  He is joined by his daughter.  He reports much anxiety leaving the home and did require specific anxiolytic meds to present to my office today.   He denies unintended weight loss, hoarseness of voice, chest pain, hemoptysis, history of pneumonia, or cough.  It is noted that he has had past COVID-19 infection.  With aforementioned I been asked to evaluate his bilateral lung nodules.  He remains a non-smoker.      Review of Systems   Constitutional: Positive for fatigue. Negative for activity change, appetite change, chills, diaphoresis, fever and unexpected weight change.   HENT: Negative for dental problem, hearing loss, nosebleeds, sore throat, trouble swallowing and voice change.    Eyes: Negative for photophobia, redness and visual disturbance.   Respiratory: Positive for shortness of breath. Negative for apnea, cough, chest tightness, wheezing and stridor.    Cardiovascular: Positive for leg swelling. Negative for chest pain and palpitations.  Anesthesia Post-op Note    Patient: Yane Mata  Procedure(s) Performed: ENDOSCOPIC BRONCHIAL ULTRASOUND  Anesthesia type: General    Vitals Value Taken Time   Temp 36.3 °C (97.4 °F) 03/21/24 1105   Pulse 63 03/21/24 1135   Resp 17 03/21/24 1135   SpO2 99 % 03/21/24 1135   /52 03/21/24 1135         Patient Location: PACU Phase 1  Level of Consciousness: participates in exam  Respiratory Status: spontaneous ventilation  Cardiovascular blood pressure returned to baseline  Hydration: euvolemic  Pain Management: well controlled  Vomiting: none  Nausea: None  Airway Patency:patent  Post-op Assessment: no complications, patient tolerated procedure well and dentition within defined limits      No notable events documented.                         Gastrointestinal: Positive for abdominal distention. Negative for abdominal pain, blood in stool, constipation, diarrhea, nausea and vomiting.   Endocrine: Negative for cold intolerance, heat intolerance, polyphagia and polyuria.   Genitourinary: Negative for decreased urine volume, difficulty urinating, dysuria, flank pain, frequency, hematuria and urgency.   Musculoskeletal: Positive for back pain and gait problem. Negative for arthralgias, joint swelling, myalgias and neck pain.   Skin: Negative for pallor, rash and wound.   Allergic/Immunologic: Negative for immunocompromised state.   Neurological: Positive for weakness. Negative for dizziness, tremors, seizures, syncope, speech difficulty, light-headedness, numbness and headaches.   Hematological: Does not bruise/bleed easily.   Psychiatric/Behavioral: Negative for confusion, sleep disturbance and suicidal ideas. The patient is not nervous/anxious.           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    • Nevus    • Perforation of colon (CMS/HCC) 2018   • Venous insufficiency    • Weakness of extremity     LEFT SIDE   • Wheelchair dependent      Past Surgical History:   Procedure Laterality Date   • COLON SURGERY      x4   • EYE SURGERY Bilateral     cataracts    • FLAP HEAD/NECK Left 10/23/2018    Procedure: LEFT CHEEK ADVANCEMENT FLAP CLOSURE OF LIP AND CHEEK,5CM X 6CM IPSILATERAL LEFT SEPTAL MUCOSAL HINGE FLAP,2CM X 3CM;  Surgeon: Izaiah Ceron MD;  Location: [facility] OR;  Service: ENT   • FLAP HEAD/NECK Bilateral 11/13/2018    Procedure: Pedicle division and flap inset of nose;  Surgeon: Izaiah Ceron MD;  Location: [facility] OR;   Service: ENT   • HEAD/NECK LESION/CYST EXCISION Left 10/23/2018    Procedure: LEFT PARAMEDIAN FOREHEAD FLAP WITH PRESERVATION OF VASCULAR PEDICLE;  Surgeon: Izaiah Ceron MD;  Location:  [facility] OR;  Service: ENT   • SKIN FULL THICKNESS GRAFT Left 10/23/2018    Procedure: Conchal cartilage graft from right ear to left nose with complex closure of skin of forehead;  Surgeon: Izaiah Ceron MD;  Location:  [facility] OR;  Service: ENT     Family History   Problem Relation Age of Onset   • Cancer Mother    • Diabetes Mother    • Cancer Father      Social History     Tobacco Use   • Smoking status: Former Smoker     Years: 50.00     Types: Cigarettes     Quit date: 2018     Years since quittin.9   • Smokeless tobacco: Former User     Types: Chew   Vaping Use   • Vaping Use: Never used   Substance Use Topics   • Alcohol use: No     Comment: quit 2018/ hx of alchohlism    • Drug use: No     Current Outpatient Medications   Medication Sig Dispense Refill   • budesonide-formoterol (SYMBICORT) 80-4.5 MCG/ACT inhaler Inhale 2 puffs 2 (Two) Times a Day. 1 inhaler 12   • buPROPion XL (WELLBUTRIN XL) 150 MG 24 hr tablet Take 150 mg by mouth Daily.     • furosemide (LASIX) 20 MG tablet Take 2 tablets by mouth Daily. 60 tablet 3   • ipratropium-albuterol (DUO-NEB) 0.5-2.5 mg/3 ml nebulizer Take 3 mL by nebulization Every 6 (Six) Hours. For COPD J44.9 360 mL 3   • oxybutynin (DITROPAN) 5 MG tablet Take 5 mg by mouth Daily.     • oxyCODONE-acetaminophen (PERCOCET) 7.5-325 MG per tablet Take 1 tablet by mouth 2 (two) times a day.     • pantoprazole (PROTONIX) 40 MG EC tablet Take 40 mg by mouth Daily.     • potassium chloride (KAYCIEL) 20 MEQ/15ML (10%) solution Take 20 mEq by mouth Daily.     • propranolol (INDERAL) 20 MG tablet Take 20 mg by mouth Daily.     • sertraline (ZOLOFT) 100 MG tablet Take 100 mg by mouth Daily.     • tamsulosin (FLOMAX) 0.4 MG capsule 24 hr capsule Take 0.4 mg by mouth Daily.     • traZODone  "(DESYREL) 50 MG tablet Take 50 mg by mouth At Night As Needed for Sleep.       No current facility-administered medications for this visit.     Allergies:  Patient has no known allergies.    Objective      Vital Signs  Visit Vitals  /68 (BP Location: Right arm, Patient Position: Sitting, Cuff Size: Adult)   Pulse 100   Ht 172.7 cm (68\")   Wt 104 kg (229 lb)   SpO2 90%   BMI 34.82 kg/m²         Physical Exam  Constitutional:       Appearance: He is well-developed. He is ill-appearing.      Comments: In a wheelchair   HENT:      Head: Normocephalic and atraumatic.   Eyes:      Pupils: Pupils are equal, round, and reactive to light.   Neck:      Thyroid: No thyromegaly.      Vascular: No JVD.      Trachea: No tracheal deviation.   Cardiovascular:      Rate and Rhythm: Normal rate and regular rhythm.      Heart sounds: Normal heart sounds. No murmur heard.   No friction rub. No gallop.    Pulmonary:      Effort: Pulmonary effort is normal. No respiratory distress.      Breath sounds: Normal breath sounds. No wheezing or rales.   Chest:      Chest wall: No tenderness.   Abdominal:      General: There is no distension.      Palpations: Abdomen is soft.      Tenderness: There is no abdominal tenderness.   Musculoskeletal:         General: Normal range of motion.      Cervical back: Normal range of motion and neck supple.   Lymphadenopathy:      Cervical: No cervical adenopathy.   Skin:     General: Skin is warm and dry.   Neurological:      Mental Status: He is alert and oriented to person, place, and time.      Cranial Nerves: No cranial nerve deficit.         Results Review:     EXAMINATION: CT ANGIOGRAM CHEST- 11/27/2020 10:41 AM CST     HISTORY: Shortness of breath, chest pain, possible nodule     COMPARISON: Chest x-ray 11/27/2020     DOSE: 509 mGy-cm     TECHNIQUE: Sequential imaging was performed from the thoracic inlet  through the upper abdomen following the administration of IV contrast.   Sagittal and " coronal reformations were made from the original source  data and reviewed. Additionally, 3-D MIPS reconstructions of the vessels  were made per CTA protocol. Automated exposure control was also utilized  to decrease patient radiation dose.     FINDINGS:   The visualized thyroid gland is unremarkable. Trachea and main bronchi  appear widely patent and in normal anatomic position. The esophagus is  grossly normal in appearance.     There is bilateral gynecomastia.     No pathologically enlarged axillary, mediastinal, or hilar lymph nodes  are identified.     The heart appears normal in size. There is no pericardial or pleural  effusion. Coronary artery calcifications are present. Atherosclerotic  calcifications are also seen in the aorta and its branch vessels. The  ascending thoracic aorta appears normal in caliber. Main pulmonary  artery is dilated, suggesting pulmonary arterial hypertension. There is  suboptimal contrast bolus timing for evaluation of the pulmonary  arteries. No central or proximal segmental filling defects are  identified to suggest PE.     There is diffuse bronchial wall thickening. There is a noncalcified  nodule in the perihilar left upper lobe which measures 1.7 x 1.7 cm in  size. There is pleural thickening/parenchymal scarring in the lingula.  There is scarring in the left lower lobe. There is a noncalcified nodule  in the right upper lobe which measures 2.5 x 1.7 cm in size. An  additional subpleural nodule posteriorly in the right upper lobe  measures 7 mm in size. A pleural-based nodule in the right upper lobe  measures 9 mm in size. A tiny subpleural nodule in the right lower lobe  measures 4 mm in size.     Review of the visualized portion of the upper abdomen demonstrates  cirrhotic liver morphology. Numerous stones are seen in the gallbladder.  There is mild mesenteric edema.     Review of the visualized osseous structures demonstrates a superior  endplate lucency at T11 of uncertain  "significance. Degenerative spurring  is noted in the spine. There is evidence of an old sternal fracture. Old  left-sided rib fractures are evident. Tiny round sclerotic foci are seen  in multiple left-sided ribs which are too small to characterize.     IMPRESSION:  1. Limited evaluation of the pulmonary arteries due to contrast bolus  timing. No evidence of PE or acute aortic pathology when allowing for  this.     2. Atherosclerosis of the aorta and coronary arteries.  3. Findings suggestive of pulmonary arterial hypertension.  4. Multiple noncalcified pulmonary nodules bilaterally concerning for  metastatic disease.  5. Cirrhotic liver morphology.  6. Cholelithiasis.           This report was finalized on 11/27/2020 10:47 by Dr. Jeet Bellamy MD.    Study Result    EXAM: NM PET SKULL BASE TO MID THIGH- - 12/7/2020 8:12 AM CST     HISTORY: R91.1; R91.1-Solitary pulmonary nodule; R91.8-Other nonspecific  abnormal finding of lung field. On chart review, history of terminal  ileum and subtotal colectomy with pathology report for 20/7/2019  indicating \"ischemic colon with perforation in the cecum. Ischemic  changes primarily involve the cecum and ascending colon. Viable proximal  and distal resection margins. Unremarkable terminal ileum mucosa.  Unremarkable appendix. No evidence of malignancy.\"     COMPARISON: 11/27/2020, 11/20/2020.      TECHNIQUE:   PET CT from skull base to thighs was performed after the intravenous  administration of 10.63 mCi F-18 FDG. Additional administration details  recorded by technologist. CT evaluation is limited due to lack of  intravenous contrast as well as tidal breathing motion required for  accurate PET attenuation correction.     DOSE LENGTH PRODUCT: 1751 mGy cm. Automatic exposure control was  utilized to make radiation dose as low as reasonably achievable.     FINDINGS:      SKULL BASE and NECK:   No lymphadenopathy in the neck. Normal CT appearance of the thyroid.     There is " mild asymmetric uptake of the left posterior nasopharynx or  tonsil with maximum SUV 5.3, and fused series slice 166.     CHEST:   Right upper lobe 1.7 cm pulmonary nodule with maximum SUV 19.2.     Left upper lobe 1.7 cm pulmonary nodule with maximum SUV 6.8.     There are other smaller pulmonary nodules, similar compared to prior,  not optimally evaluated due to small size.     Borderline heart size. Coronary artery calcifications. No pericardial  effusion.     Aorta normal in course and caliber with calcified atherosclerosis.  Normal caliber pulmonary artery.     Esophagus normal in course and caliber. No mediastinal adenopathy.     ABDOMEN and PELVIS:   Mildly heterogeneous FDG uptake in the liver. Nodular liver contour.  Gallstones. No bile duct dilation. Unenhanced pancreas, spleen, adrenal  glands appear within normal limits.     No hydronephrosis or nephrolithiasis. Normal appearance of the urinary  bladder. Normal size prostate.     No free air or free fluid in the abdomen. Changes of prior partial  colectomy with ileostomy. Parastomal fat and bowel containing hernia. No  evidence of bowel obstruction.     There is a focus of FDG uptake along the colon suture line with maximum  SUV 6.0.     Aorta normal in course and caliber with calcified atherosclerosis. No  adenopathy.     MUSCULOSKELETAL:  Diffusely heterogeneous FDG uptake in the bones, concerning for bony  metastatic disease. No convincing bone lesion by CT.        IMPRESSION:  1. There are 2 FDG avid pulmonary nodules, which could represent primary  or metastatic malignancy.  2. Mild asymmetric uptake in the left posterior nasopharynx/tonsil.  Recommend direct visualization.  3. Focus of FDG uptake along the colon suture line with maximum SUV 6.0.     4. Diffusely heterogeneous FDG uptake in the bones, which could be seen  with bony metastatic disease or bone marrow activation. Correlate with  patient history. No convincing corollary CT lesions  appreciated at this  time.   5. Nodular liver contour with heterogeneous FDG uptake suggests chronic  liver disease.  6. Gallstones.  This report was finalized on 12/07/2020 10:44 by Dr Amie Atkins MD.       EXAMINATION:  CT CHEST W CONTRAST DIAGNOSTIC-  3/3/2021 1:33 PM CST     HISTORY: R91.8-Other nonspecific abnormal finding of lung field.  Shortness of air. Pulmonary nodules.     COMPARISON : 11/27/2020.     DLP: 630 mGy-cm. Automated dosage control was utilized.     TECHNIQUE: CT was performed of the chest with contrast. Sagittal and  coronal images were reconstructed.     MEDIASTINUM, HEART AND VASCULAR STRUCTURES: There is atheromatous  disease of the thoracic aorta and coronary arteries. The main pulmonary  artery segment measures 3.4 cm. There are small mediastinal lymph nodes.  There is fatty infiltration of the atrial septum.     LUNGS NODULES:   1. A 1 cm cavitary right upper lobe lesion image 50 of series 3  previously measured 7 mm.  2. A 3.8 cm necrotic appearing lesion in the right upper lobe anteriorly  image 71 of series 3 previously measured 2.5 cm.  3. A 3.1 cm necrotic appearing left upper lobe centrally located lesion  on image 74 of series 3 previously measured 1.9 cm.  4. New 5 mm left upper lobe nodule image 66 of series 3 centrally  located.  5. A 7 mm subpleural right upper lobe nodule posteriorly image 41 of  series 3 previously measured 1 cm.  6. A 3-4 mm subpleural right middle lobe nodule image 105 series 3,  stable.  7. A 9 mm right upper lobe subpleural nodule versus scar image 47 series  3, stable.     ADDITIONAL LUNG FINDINGS: There is mild peribronchial cuffing. There are  linear areas of atelectasis or scarring in the lingula and bilateral  lower lobes. There is no dense airspace consolidation. There is  paraseptal emphysema.     UPPER ABDOMEN: There is a macronodular appearance of the liver with  fatty infiltration. Images of the upper abdomen are otherwise  unremarkable.      BONES: There are degenerative changes of the spine. Vague lucency along  the superior endplate of T11 remains stable. No destructive appearing  rib lesions are appreciated. There is an old healed fracture of the  sternum.     IMPRESSION:  1. There has been progressive increase in some of the pulmonary  nodules/masses. The 2 largest in both of the upper lobes now appear to  be somewhat necrotic. A small right upper lobe lesion is cavitary.  Neoplastic disease and infection/inflammation are in the differential.  Neoplastic disease is favored. One of the right upper lobe subpleural  nodules is smaller in size. All of the other areas are stable or larger.  2. No enlarged mediastinal or hilar lymph nodes.  3. Atheromatous disease of the thoracic aorta and coronary arteries.  Dilated main pulmonary artery segment.  4. Macronodular appearance of the liver with fatty infiltration.  Possible liver cirrhosis. Correlate clinically.  5. Vague lucency in the superior endplate of T11 is stable. This is  nonspecific. No acute appearing bony abnormality is seen.          This report was finalized on 03/03/2021 14:39 by Dr. Kenneth Pugh MD.       I reviewed the patient's new clinical results.  Discussed with patient and wife      Assessment/Plan       Diagnoses and all orders for this visit:    1. BMI 34.0-34.9,adult (Primary)    2. Presence of ileostomy (CMS/HCC)    3. Chronic respiratory failure with hypercapnia (CMS/HCC)    4. Pulmonary nodules/lesions, multiple    5. History of tobacco use      Independent review of CT scan obtained on March 3, 2021 is performed by me demonstrating to dominant lung lesions that have certainly increased in size.  Overall remaining other nodules are either the same size or increase in size.  There is no mediastinal adenopathy.      I discussed the patients findings and my recommendations with patient and wife.    We discussed the differential diagnoses possible with malignancy high in the  differential.  We discussed the importance of staging such that best therapy can be selected and portend prognosis accurately.  We discussed options for care including continued surveillance verses efforts towards diagnosis and treatment.  We discussed options for diagnosis including bronchoscopy, CT-guided needle biopsy, or surgical biopsy with either cervical mediastinoscopy or thoracoscopy with resection.  We discussed the value of clinical staging with a CT/PET scan.  The pros and cons of each option were discussed at length.  I believe further efforts towards diagnosis are warranted.  Since he has bilateral disease I believe CT-guided needle biopsy.  Given the bilateral increase in size this is likely malignancy rather than infection.  Efforts are made towards diagnosis and likely referral to an oncologist for metastatic treatment.  He reports having marked anxiety with testing and leaving his home.  Reassurance was provided.  I did thereafter speak with Saira from Dr. Farooq's office his primary care providers with who will aid with treating his anxiety prior to procedure.  The risks of the procedure were discussed briefly.  I did defer and encourage discussion with the perform radiologist for a formal discussion of risk of procedure.    All questions have been answered to the best of my ability and he is agreeable to the aforementioned plan.    He is a non smoker.      Patient's Body mass index is 34.82 kg/m². BMI is above normal parameters. Recommendations include: exercise counseling, nutrition counseling and referral to primary care.    He is to return to see me after performing CT-guided needle biopsy.

## 2024-08-01 NOTE — OUTREACH NOTE
Prep Survey      Responses   [facility]ational facility patient discharged from?  [facility]   Is LACE score < 7 ?  No   Eligibility  Readm Mgmt   Discharge diagnosis   Acute on chronic respiratory failure with hypoxia and hypercapnia    Does the patient have one of the following disease processes/diagnoses(primary or secondary)?  COPD/Pneumonia   Does the patient have Home health ordered?  Yes   What is the Home health agency?   [facility]    Is there a DME ordered?  No   Prep survey completed?  Yes          Lulu Artis RN        
No
No

## (undated) DEVICE — Device

## (undated) DEVICE — SUTURE VCRL SZ 3-0 L27IN ABSRB UD L26MM SH 1/2 CIR J416H

## (undated) DEVICE — ADHS LIQ MASTISOL 2/3ML

## (undated) DEVICE — SUT SILK 2/0 FS BLK 18IN 685G

## (undated) DEVICE — DISPOSABLE BIPOLAR CABLE 12FT. (3.6M): Brand: KIRWAN

## (undated) DEVICE — SOLUTION IV IRRIG POUR BRL 0.9% SODIUM CHL 2F7124

## (undated) DEVICE — SUT SILK 2/0 SH 30IN K833H

## (undated) DEVICE — SPNG GZ WOVN 4X4IN 12PLY 10/BX STRL

## (undated) DEVICE — SUT VIC 5/0 P3 18IN UD VCP493G

## (undated) DEVICE — ANTIBACTERIAL UNDYED BRAIDED (POLYGLACTIN 910), SYNTHETIC ABSORBABLE SUTURE: Brand: COATED VICRYL

## (undated) DEVICE — DRSNG TELFA PAD NONADH STR 1S 3X8IN

## (undated) DEVICE — PK ENT HD AND NK 30

## (undated) DEVICE — APPL CHLORAPREP HI/LITE 26ML ORNG

## (undated) DEVICE — CVR UNIV C/ARM

## (undated) DEVICE — SUT PROLN 2/0 SH 36IN 8523H

## (undated) DEVICE — BINDER ABD H12IN COT FOR 45-62IN WAIST UNIV PREM 4 PNL DSGN

## (undated) DEVICE — GLV SURG TRIUMPH MICRO PF LTX 7 STRL

## (undated) DEVICE — DRSNG GZ CURAD XEROFORM NONADHS 5X9IN STRL

## (undated) DEVICE — 3M™ STERI-STRIP™ REINFORCED ADHESIVE SKIN CLOSURES, R1547, 1/2 IN X 4 IN (12 MM X 100 MM), 6 STRIPS/ENVELOPE: Brand: 3M™ STERI-STRIP™

## (undated) DEVICE — UTILITY MARKER W/MED LABELS: Brand: MEDLINE

## (undated) DEVICE — DRSNG SURESITE WNDW 4X4.5

## (undated) DEVICE — 3M™ STERI-STRIP™ REINFORCED ADHESIVE SKIN CLOSURES, R1546, 1/4 IN X 4 IN (6 MM X 100 MM), 10 STRIPS/ENVELOPE: Brand: 3M™ STERI-STRIP™

## (undated) DEVICE — SPONGE,LAP,12"X12",XR,ST,5/PK,40PK/CS: Brand: MEDLINE

## (undated) DEVICE — SUTURE PDS + SZ 1 L96IN ABSRB VLT L65MM TP-1 1/2 CIR PDP880G

## (undated) DEVICE — PK TURNOVER RM ADV

## (undated) DEVICE — DRESSING FOAM W4XL12IN SIL RECT ADH WTRPRF FLM BK W/ BORD

## (undated) DEVICE — SUT GUT PLN 5/0 P3 18IN 686G

## (undated) DEVICE — PROXIMATE RH ROTATING HEAD SKIN STAPLERS (35 WIDE) CONTAINS 35 STAINLESS STEEL STAPLES: Brand: PROXIMATE

## (undated) DEVICE — SUTURE ETHLN SZ 2-0 L30IN NONABSORBABLE BLK L36MM FSLX 3/8 1674H

## (undated) DEVICE — GLV SURG BIOGEL M LTX PF 7 1/2

## (undated) DEVICE — C-ARM: Brand: UNBRANDED

## (undated) DEVICE — STAPLER INT L75MM CUT LN L73MM STPL LN L77MM BLU B FRM 8

## (undated) DEVICE — SUT GUT PLN FAST ABS 5/0 PC1 18IN 1915G

## (undated) DEVICE — SET IV PMP 1 PRT CK VLV SPL SEPT PRTS 2 PC M LL 20 GTT LEN

## (undated) DEVICE — MAJOR CDS

## (undated) DEVICE — SUT VIC 4/0 PC3 18IN UD VCP845G

## (undated) DEVICE — ASTOUND STANDARD SURGICAL GOWN, XXL: Brand: CONVERTORS

## (undated) DEVICE — SOL IRR BSS 15ML STRL

## (undated) DEVICE — Z DISCONTINUED USE 2429233 DRESSING FOAM W10XL10CM 5 LAYR SELF ADH VERSATILE SAFETAC

## (undated) DEVICE — SUTURE VCRL + SZ 3-0 L18IN ABSRB UD SH 1/2 CIR TAPERCUT NDL VCP864D

## (undated) DEVICE — SUTURE VCRL SZ 3-0 L36IN ABSRB UD L36MM CT-1 1/2 CIR J944H

## (undated) DEVICE — SYR LL TP 10ML STRL

## (undated) DEVICE — STAPLER INT L60MM REG TISS BLU B FRM 8 FIRING 2 ROW AUTO

## (undated) DEVICE — DRAIN JACKSON PRATT ROUND 15FR: Brand: CARDINAL HEALTH

## (undated) DEVICE — GLOVE SURG SZ 75 CRM LTX FREE POLYISOPRENE POLYMER BEAD ANTI

## (undated) DEVICE — PREP SOL POVIDONE/IODINE BT 4OZ

## (undated) DEVICE — SPNG GZ 2S 2X2 8PLY STRL PK/2

## (undated) DEVICE — CONTAINER,SPECIMEN,OR STERILE,4OZ: Brand: MEDLINE

## (undated) DEVICE — SUT PROLN 5/0 P3 18IN 8698G

## (undated) DEVICE — ELECTRD NDL EDGE/INSUL/PFTE.787MM 2.84IN

## (undated) DEVICE — MARKR SKIN W/RULR AND LBL

## (undated) DEVICE — PACK,UNIVERSAL,NO GOWNS: Brand: MEDLINE

## (undated) DEVICE — BULB SYRINGE, IRRIGATION WITH PROTECTIVE CAP, 60 CC, INDIVIDUALLY WRAPPED: Brand: DOVER

## (undated) DEVICE — DRESSING GRMCDL 6 12FR D1N CNTR HOLE 4MM ANTMCRBL PRTCTVE DI

## (undated) DEVICE — GAUZE,SPONGE,4"X4",16PLY,XRAY,STRL,LF: Brand: MEDLINE

## (undated) DEVICE — CVR HNDL LIGHT RIGID

## (undated) DEVICE — JACKSON-PRATT 100CC BULB RESERVOIR: Brand: CARDINAL HEALTH

## (undated) DEVICE — SPONGE LAP W18XL18IN WHT COT 4 PLY FLD STRUNG RADPQ DISP ST

## (undated) DEVICE — SUT VIC 4/0 P3 18IN UD VCP494G

## (undated) DEVICE — NDL CNTR 40CT FM MAG: Brand: MEDLINE INDUSTRIES, INC.

## (undated) DEVICE — POOLE SUCTION INSTRUMENT WITH REMOVABLE SHEATH: Brand: POOLE

## (undated) DEVICE — SUT SILK 4/0 RB1CR8 18IN C054D

## (undated) DEVICE — 3M™ WARMING BLANKET, LOWER BODY, 10 PER CASE, 42568: Brand: BAIR HUGGER™

## (undated) DEVICE — SEALER ENDOSCP NANO COAT OPN DIV CRV L JAW LIGASURE IMPACT

## (undated) DEVICE — PAD MINOR UNIVERSAL: Brand: MEDLINE INDUSTRIES, INC.

## (undated) DEVICE — GLOVE SURG SZ 85 L12IN FNGR ORTHO 126MIL CRM LTX FREE

## (undated) DEVICE — GLV SURG BIOGEL LTX PF 8

## (undated) DEVICE — SUTURE PERMAHAND SZ 2-0 L18IN NONABSORBABLE BLK L26MM SH C012D

## (undated) DEVICE — SUTURE VCRL SZ 3-0 L18IN ABSRB UD L26MM SH 1/2 CIR J864D